# Patient Record
Sex: FEMALE | Race: WHITE | NOT HISPANIC OR LATINO | Employment: OTHER
[De-identification: names, ages, dates, MRNs, and addresses within clinical notes are randomized per-mention and may not be internally consistent; named-entity substitution may affect disease eponyms.]

---

## 2017-05-27 ENCOUNTER — HEALTH MAINTENANCE LETTER (OUTPATIENT)
Age: 57
End: 2017-05-27

## 2017-07-24 ENCOUNTER — TELEPHONE (OUTPATIENT)
Dept: FAMILY MEDICINE | Facility: CLINIC | Age: 57
End: 2017-07-24

## 2017-07-24 NOTE — TELEPHONE ENCOUNTER
Reason for call:  Patient reporting a symptom    Symptom or request: lower back, uncontrolled bladder     Duration (how long have symptoms been present):     Have you been treated for this before? No    Additional comments: when she stands up she pees all over the place, has really bad back pain     Phone Number patient can be reached at:  Home number on file [phone]  Best Time:  Anytime     Can we leave a detailed message on this number:      Call taken on 7/24/2017 at 3:52 PM by Beverly Arauz

## 2017-07-24 NOTE — TELEPHONE ENCOUNTER
Patient last seen 2014  No PCP listed.    Spoke with patient.  States she has had back pain since Friday 7/21. States it's right below her waist. Does not radiated into lower back or buttocks/legs.  Denies any injury.  Very sore in am better as she moves throughout the day.  Taking Tylenol.  Few times when she has stood up from lying or sitting she is incontinent of urine. No coughing/sneezing at the time.  States she can feel when her bladder is full.  Scheduled patient to see AS Wed. 7/26.  States she is ok to wait until then  Advised ice/heat which every seems to help. Tylenol prn  Lawanda Childers RN

## 2017-07-26 ENCOUNTER — OFFICE VISIT (OUTPATIENT)
Dept: FAMILY MEDICINE | Facility: CLINIC | Age: 57
End: 2017-07-26
Payer: COMMERCIAL

## 2017-07-26 ENCOUNTER — RADIANT APPOINTMENT (OUTPATIENT)
Dept: GENERAL RADIOLOGY | Facility: CLINIC | Age: 57
End: 2017-07-26
Attending: FAMILY MEDICINE
Payer: COMMERCIAL

## 2017-07-26 VITALS
BODY MASS INDEX: 30.66 KG/M2 | SYSTOLIC BLOOD PRESSURE: 124 MMHG | TEMPERATURE: 98.1 F | HEART RATE: 97 BPM | RESPIRATION RATE: 16 BRPM | HEIGHT: 64 IN | DIASTOLIC BLOOD PRESSURE: 74 MMHG | WEIGHT: 179.6 LBS | OXYGEN SATURATION: 99 %

## 2017-07-26 DIAGNOSIS — M25.551 HIP PAIN, RIGHT: ICD-10-CM

## 2017-07-26 DIAGNOSIS — S39.012S STRAIN OF LUMBAR REGION, SEQUELA: ICD-10-CM

## 2017-07-26 DIAGNOSIS — M25.551 HIP PAIN, RIGHT: Primary | ICD-10-CM

## 2017-07-26 PROCEDURE — 73523 X-RAY EXAM HIPS BI 5/> VIEWS: CPT

## 2017-07-26 PROCEDURE — 72100 X-RAY EXAM L-S SPINE 2/3 VWS: CPT

## 2017-07-26 PROCEDURE — 99214 OFFICE O/P EST MOD 30 MIN: CPT | Performed by: FAMILY MEDICINE

## 2017-07-26 RX ORDER — CYCLOBENZAPRINE HCL 5 MG
5 TABLET ORAL
Qty: 20 TABLET | Refills: 0 | Status: SHIPPED | OUTPATIENT
Start: 2017-07-26 | End: 2018-11-30

## 2017-07-26 NOTE — PROGRESS NOTES
"  SUBJECTIVE:                                                    Daysi Ashley is a 57 year old female who presents to clinic today for the following health issues:  Patient of DR Hester- being seen after 3 yrs in [facility] clinic      Patient states that she has been having back pain x5 days.   She reports it started on 7/21- and was very severe at the onset and she lost bladder control once due to pain  She states initially there was no radiation to the leg-  On Friday 7/21 &  States it's right below her waist &  Does not radiated into lower back or buttocks/legs.  She Denies any injury.  She states she is Very sore in am, and is  better as she moves throughout the day Taking Tylenol as needed - also helps  States she can feel when her bladder is full and denies numbness in saddle area , pelvic region, or lower extremtities.  She reports right hip is worse than left, and its  radiating as a dull ache down the right thigh.      She is Under care of Dr Roa for depression - last OV 4 weeks, medications changes made- effexor 450 mg once daily -increased dose and also takes klonopin- took some this morning and feels drowsy.  She states she lives by herself, and managed all activites of daily living   she denies suicidal thoughts or ideation.reports no side effects from medications. Would like to continue.    We reviewed history of cocaine abuse- she reports she is drug free and no other drugs or alcohol abuse     PROBLEMS TO ADD ON...    Problem list and histories reviewed & adjusted, as indicated.  Additional history: as documented    Labs reviewed in EPIC    Reviewed and updated as needed this visit by clinical staff  Tobacco  Meds       Reviewed and updated as needed this visit by Provider         ROS:  Constitutional, HEENT, cardiovascular, pulmonary, gi and gu systems are negative, except as otherwise noted.      OBJECTIVE:   /74  Pulse 97  Temp 98.1  F (36.7  C) (Oral)  Resp 16  Ht 5' 4\" (1.626 m)  Wt " 179 lb 9.6 oz (81.5 kg)  LMP 04/04/2012  SpO2 99%  BMI 30.83 kg/m2  Body mass index is 30.83 kg/(m^2).  GENERAL: healthy, alert and no distress  NECK: no adenopathy, no asymmetry, masses, or scars and thyroid normal to palpation  RESP: lungs clear to auscultation - no rales, rhonchi or wheezes  CV: regular rate and rhythm, normal S1 S2, no S3 or S4, no murmur, click or rub, no peripheral edema and peripheral pulses strong  ABDOMEN: soft, nontender, no hepatosplenomegaly, no masses and bowel sounds normal  MS: no gross musculoskeletal defects noted, no edema  SKIN: no suspicious lesions or rashes  NEURO: Normal strength and tone, mentation intact and speech normal      ASSESSMENT/PLAN:   (M25.551) Hip pain, right  (primary encounter diagnosis)  Plan: XR Pelvis and Hip Bilateral 2 Views, mild degenerative joint disease - no acute fractures- official report of the xray is penidng  Start PHYSICAL THERAPY she requested refill of cyclobenzaprine (FLEXERIL) 5 MG tablet      (S39.012S) Strain of lumbar region, sequela  Plan: XR Lumbar Spine 2/3 Views, mild degenerative joint disease  No acute fractures- we discussed if any activites of daily living that maybe contributing to back pain, or strain- she reports she is trying to be careful with cats litter box. I have advised to follow up in 4 weeks if not better- and follow up earlier if any numbness, or bowel bladder dysfunction including incontinence. She has an episode of loss of bladder control due to pain and has been continent of urine since then      History of depression  Under care of Dr Garcia. She states she is stable on current medications  PHQ-9 SCORE 9/20/2013 3/18/2014 7/30/2017   Total Score 19 15 -   Total Score - - 9         Potential medication side effects were discussed with the patient; let me know if any occur.      The patient indicates understanding of these issues and agrees with the plan.      Melonie Srivastava MD  [facility]

## 2017-07-26 NOTE — LETTER
July 28, 2017    Daysi Ashley  [address]  [place] [address]      Dear Daysi,    Mild degenerative changes , otherwise normal xray     Please keep us posted with questions or concerns .    Thank you very much for choosing [facility]. Please call my office if you have any questions or concerns.      Sincerely,        Melonie Srivastava MD

## 2017-07-26 NOTE — PATIENT INSTRUCTIONS
Start PHYSICAL THERAPY  PHYSICAL THERAPY [facility], [phone]    Take aleeve 1-2 tabs over the counter with meals up to twice daily as needed  For next 1 weeks  Muscle relaxants once daily or bedtime as needed     If any saddle seat numbness or bowel or bladder loss- and worsening pain- seek urgent care or emergency department care    If no improvement in pain follow up in 1 months

## 2017-07-26 NOTE — MR AVS SNAPSHOT
After Visit Summary   7/26/2017    Daysi Ashley    MRN: [id number]           Patient Information     Date Of Birth          1960        Visit Information        Provider Department      7/26/2017 1:00 PM Melonie Srivastava MD [facility]        Today's Diagnoses     Hip pain, right    -  1    Strain of lumbar region, sequela          Care Instructions    Start PHYSICAL THERAPY  PHYSICAL THERAPY [facility], [phone]    Take aleeve 1-2 tabs over the counter with meals up to twice daily as needed  For next 1 weeks  Muscle relaxants once daily or bedtime as needed     If any saddle seat numbness or bowel or bladder loss- and worsening pain- seek urgent care or emergency department care    If no improvement in pain follow up in 1 months            Follow-ups after your visit        Additional Services     [facility] PT, HAND, AND CHIROPRACTIC REFERRAL       **This order will print in the [facility] Scheduling Office**    Physical Therapy, Hand Therapy and Chiropractic Care are available through:    *[facility]  *[facility]  *[facility]    Call one number to schedule at any of the above locations: [phone].    Your provider has referred you to: Physical Therapy at [facility] or [facility]    Indication/Reason for Referral: Low Back Pain and right hip bursitis, OA  Onset of Illness: 1 weeks  Therapy Orders: Evaluate and Treat  Special Programs: None  Special Request: None    Benita Joy      Additional Comments for the Therapist or Chiropractor:     Please be aware that coverage of these services is subject to the terms and limitations of your health insurance plan.  Call member services at your health plan with any benefit or coverage questions.      Please bring the following to your appointment:    *Your personal calendar for scheduling future appointments  *Comfortable clothing                  Who to contact     If you have  "questions or need follow up information about today's clinic visit or your schedule please contact [facility] directly at [phone].  Normal or non-critical lab and imaging results will be communicated to you by MyChart, letter or phone within 4 business days after the clinic has received the results. If you do not hear from us within 7 days, please contact the clinic through [employer] or phone. If you have a critical or abnormal lab result, we will notify you by phone as soon as possible.  Submit refill requests through [employer] or call your pharmacy and they will forward the refill request to us. Please allow 3 business days for your refill to be completed.          Additional Information About Your Visit        MyChart Information     UserVoice lets you send messages to your doctor, view your test results, renew your prescriptions, schedule appointments and more. To sign up, go to [email or web address] . Click on \"Log in\" on the left side of the screen, which will take you to the Welcome page. Then click on \"Sign up Now\" on the right side of the page.     You will be asked to enter the access code listed below, as well as some personal information. Please follow the directions to create your username and password.     Your access code is: [email or web address]  Expires: 10/24/2017  2:13 PM     Your access code will  in 90 days. If you need help or a new code, please call your [place] clinic or [phone].        Care EveryWhere ID     This is your Care EveryWhere ID. This could be used by other organizations to access your [place] medical records  [id number]        Your Vitals Were     Pulse Temperature Respirations Height Last Period Pulse Oximetry    97 98.1  F (36.7  C) (Oral) 16 5' 4\" (1.626 m) 2012 99%    BMI (Body Mass Index)                   30.83 kg/m2            Blood Pressure from Last 3 Encounters:   17 124/74   14 120/70   10/14/13 110/72    Weight from Last 3 " Encounters:   07/26/17 179 lb 9.6 oz (81.5 kg)   03/18/14 162 lb (73.5 kg)   10/14/13 154 lb (69.9 kg)              We Performed the Following     [facility] PT, HAND, AND CHIROPRACTIC REFERRAL     XR Lumbar Spine 2/3 Views          Today's Medication Changes          These changes are accurate as of: 7/26/17  2:13 PM.  If you have any questions, ask your nurse or doctor.               Start taking these medicines.        Dose/Directions    cyclobenzaprine 5 MG tablet   Commonly known as:  FLEXERIL   Used for:  Hip pain, right, Strain of lumbar region, sequela   Started by:  Melonie Srivastava MD        Dose:  5 mg   Take 1 tablet (5 mg) by mouth nightly as needed for muscle spasms   Quantity:  20 tablet   Refills:  0            Where to get your medicines      These medications were sent to [facility] [address] [place] [address] AT [address] [address]    Hours:  24-hours Phone:  [phone]     cyclobenzaprine 5 MG tablet                Primary Care Provider Office Phone # Fax #    Melonie Srivastava [phone]       [place] [address] [facility] [address]        Equal Access to Services     SAGRARIO SANTORO AH: Haley gonsalves hadasho Soomaali, waaxda luqadaha, qaybta kaalmada adeegyada, waxay suzanin hayheladion shahzad simpson. So [facility] [phone].    ATENCIÓN: Si habla español, tiene a diaz disposición servicios gratuitos de asistencia lingüística. Llame al [phone].    We comply with applicable federal civil rights laws and [place] laws. We do not discriminate on the basis of race, color, national origin, age, disability sex, sexual orientation or gender identity.            Thank you!     Thank you for choosing [facility]  for your care. Our goal is always to provide you with excellent care. Hearing back from our patients is one way we can continue to improve our services. Please take a few minutes to  complete the written survey that you may receive in the mail after your visit with us. Thank you!             Your Updated Medication List - Protect others around you: Learn how to safely use, store and throw away your medicines at www.disposemymeds.org.          This list is accurate as of: 7/26/17  2:13 PM.  Always use your most recent med list.                   Brand Name Dispense Instructions for use Diagnosis    ABILIFY 15 MG tablet   Generic drug:  ARIPiprazole     30    1 TABLET DAILY        clonazePAM 2 MG tablet    klonoPIN     Take 6 mg by mouth At Bedtime        cyclobenzaprine 5 MG tablet    FLEXERIL    20 tablet    Take 1 tablet (5 mg) by mouth nightly as needed for muscle spasms    Hip pain, right, Strain of lumbar region, sequela       METAMUCIL PO      Take by mouth daily        SENNA S 8.6-50 MG per tablet   Generic drug:  senna-docusate      Take 1 tablet by mouth as needed.        venlafaxine 75 MG 24 hr capsule    EFFEXOR-XR    270 capsule    Take 225 mg by mouth daily Patient states that she takes 450mg daily

## 2017-07-26 NOTE — LETTER
July 28, 2017    Daysi Ashley  [address]  [place] [address]      Dear Daysi,    The xray shows- mild degenerative changes on hip, no acute fractures and that's good    Please keep us posted with questions or concerns .    Thank you very much for choosing [facility]. Please call my office if you have any questions or concerns.      Sincerely,        Melonie Srivastava MD  Results for orders placed or performed in visit on 07/26/17   XR Pelvis and Hip Bilateral 2 Views    Narrative    XR PELVIS AND HIP BILATERAL 2 VIEWS  7/26/2017 2:00 PM    HISTORY:  Pain in right hip    COMPARISON:  None.      Impression    IMPRESSION:  There may be very mild degenerative changes of the hips  bilaterally. Surgical coils project over the right pelvis. Otherwise  negative.     KEVIN NJ MD

## 2017-07-28 NOTE — PROGRESS NOTES
Send lab & letter-    The xray shows- mild degenerative changes on hip, no acute fractures and that's good    Please keep us posted with questions or concerns .      Best Regards,    Melonie Srivastava MD  [facility]  [phone]

## 2017-07-28 NOTE — PROGRESS NOTES
Send lab & letter    Mild degenerative changes , otherwise normal xray     Please keep us posted with questions or concerns .      Best Regards,    Melonie Srivastava MD  [facility]  [phone]

## 2017-07-31 ENCOUNTER — TELEPHONE (OUTPATIENT)
Dept: FAMILY MEDICINE | Facility: CLINIC | Age: 57
End: 2017-07-31

## 2017-07-31 DIAGNOSIS — M54.41 BILATERAL LOW BACK PAIN WITH RIGHT-SIDED SCIATICA, UNSPECIFIED CHRONICITY: Primary | ICD-10-CM

## 2017-07-31 ASSESSMENT — PATIENT HEALTH QUESTIONNAIRE - PHQ9: SUM OF ALL RESPONSES TO PHQ QUESTIONS 1-9: 9

## 2017-07-31 NOTE — TELEPHONE ENCOUNTER
Reason for call:  Patient reporting a symptom    Symptom or request: urinary incontinence, does not want to be seen or  Go to ER, too sick to leave home    Duration (how long have symptoms been present): 2 days    Have you been treated for this before? No    Additional comments: would just like to discuss her symptoms    Phone Number patient can be reached at:  [phone]    Best Time:  anytime    Can we leave a detailed message on this number:  YES    Call taken on 7/31/2017 at 11:10 AM by Geetha Maldonado

## 2017-07-31 NOTE — TELEPHONE ENCOUNTER
I have left a VM , if she call back today discuss following or try her one more time to discuss  Xray show mild degenerative joint disease- no fractures that's good    If lower back pain  is associated with saddle numbness(pelvic region) or urinary incontinence should be seen by md  Proceed with mri -lumbar spine- order is in epic- irrespective of above responses    Thank you  Melonie Srivastava ....................  7/31/2017   4:57 PM

## 2017-08-16 ENCOUNTER — HOSPITAL ENCOUNTER (OUTPATIENT)
Dept: MRI IMAGING | Facility: CLINIC | Age: 57
Discharge: HOME | End: 2017-08-16
Attending: FAMILY MEDICINE | Admitting: FAMILY MEDICINE
Payer: COMMERCIAL

## 2017-08-16 DIAGNOSIS — M54.41 BILATERAL LOW BACK PAIN WITH RIGHT-SIDED SCIATICA, UNSPECIFIED CHRONICITY: ICD-10-CM

## 2017-08-16 PROCEDURE — 72148 MRI LUMBAR SPINE W/O DYE: CPT

## 2017-08-17 NOTE — PROGRESS NOTES
Send lab & letter    There is a very small right foraminal disc protrusion at L3-L4 which does not result in any stenosis.and that's good   There are degenerative changes elsewhere with no other disc herniation or stenosis seen. PHYSICAL THERAPY if helpful- then it should be continued    Please keep us posted with questions or concerns .      Best Regards,    Melonie Srivastava MD  [facility]  [phone]

## 2018-11-30 ENCOUNTER — HOSPITAL ENCOUNTER (INPATIENT)
Facility: CLINIC | Age: 58
LOS: 7 days | Discharge: HOME | DRG: 885 | End: 2018-12-07
Attending: PSYCHIATRY & NEUROLOGY | Admitting: PSYCHIATRY & NEUROLOGY
Payer: COMMERCIAL

## 2018-11-30 DIAGNOSIS — F33.1 MAJOR DEPRESSIVE DISORDER, RECURRENT EPISODE, MODERATE (H): ICD-10-CM

## 2018-11-30 DIAGNOSIS — F43.10 PTSD (POST-TRAUMATIC STRESS DISORDER): ICD-10-CM

## 2018-11-30 DIAGNOSIS — F32.1 MAJOR DEPRESSIVE DISORDER, SINGLE EPISODE, MODERATE (H): ICD-10-CM

## 2018-11-30 DIAGNOSIS — F33.2 SEVERE RECURRENT MAJOR DEPRESSION WITHOUT PSYCHOTIC FEATURES (H): ICD-10-CM

## 2018-11-30 PROBLEM — F32.A DEPRESSION WITH SUICIDAL IDEATION: Status: ACTIVE | Noted: 2018-11-30

## 2018-11-30 PROBLEM — R45.851 DEPRESSION WITH SUICIDAL IDEATION: Status: ACTIVE | Noted: 2018-11-30

## 2018-11-30 LAB
ALBUMIN SERPL-MCNC: 3.6 G/DL (ref 3.4–5)
ALP SERPL-CCNC: 76 U/L (ref 40–150)
ALT SERPL W P-5'-P-CCNC: 17 U/L (ref 0–50)
AMPHETAMINES UR QL SCN: NEGATIVE
ANION GAP SERPL CALCULATED.3IONS-SCNC: 4 MMOL/L (ref 3–14)
AST SERPL W P-5'-P-CCNC: 14 U/L (ref 0–45)
BARBITURATES UR QL: NEGATIVE
BASOPHILS # BLD AUTO: 0 10E9/L (ref 0–0.2)
BASOPHILS NFR BLD AUTO: 0.5 %
BENZODIAZ UR QL: NEGATIVE
BILIRUB SERPL-MCNC: 0.3 MG/DL (ref 0.2–1.3)
BUN SERPL-MCNC: 22 MG/DL (ref 7–30)
CALCIUM SERPL-MCNC: 8.8 MG/DL (ref 8.5–10.1)
CANNABINOIDS UR QL SCN: POSITIVE
CHLORIDE SERPL-SCNC: 106 MMOL/L (ref 94–109)
CO2 SERPL-SCNC: 29 MMOL/L (ref 20–32)
COCAINE UR QL: POSITIVE
CREAT SERPL-MCNC: 1.06 MG/DL (ref 0.52–1.04)
DIFFERENTIAL METHOD BLD: ABNORMAL
EOSINOPHIL # BLD AUTO: 0 10E9/L (ref 0–0.7)
EOSINOPHIL NFR BLD AUTO: 0.5 %
ERYTHROCYTE [DISTWIDTH] IN BLOOD BY AUTOMATED COUNT: 13.4 % (ref 10–15)
ETHANOL UR QL SCN: NEGATIVE
GFR SERPL CREATININE-BSD FRML MDRD: 53 ML/MIN/1.7M2
GLUCOSE SERPL-MCNC: 89 MG/DL (ref 70–99)
HCT VFR BLD AUTO: 37.5 % (ref 35–47)
HGB BLD-MCNC: 12.2 G/DL (ref 11.7–15.7)
IMM GRANULOCYTES # BLD: 0 10E9/L (ref 0–0.4)
IMM GRANULOCYTES NFR BLD: 0 %
LYMPHOCYTES # BLD AUTO: 0.9 10E9/L (ref 0.8–5.3)
LYMPHOCYTES NFR BLD AUTO: 46.3 %
MCH RBC QN AUTO: 29.5 PG (ref 26.5–33)
MCHC RBC AUTO-ENTMCNC: 32.5 G/DL (ref 31.5–36.5)
MCV RBC AUTO: 91 FL (ref 78–100)
MONOCYTES # BLD AUTO: 0.2 10E9/L (ref 0–1.3)
MONOCYTES NFR BLD AUTO: 10.4 %
NEUTROPHILS # BLD AUTO: 0.9 10E9/L (ref 1.6–8.3)
NEUTROPHILS NFR BLD AUTO: 42.3 %
NRBC # BLD AUTO: 0 10*3/UL
NRBC BLD AUTO-RTO: 0 /100
OPIATES UR QL SCN: NEGATIVE
PLATELET # BLD AUTO: 206 10E9/L (ref 150–450)
POTASSIUM SERPL-SCNC: 4.1 MMOL/L (ref 3.4–5.3)
PROT SERPL-MCNC: 7 G/DL (ref 6.8–8.8)
RBC # BLD AUTO: 4.13 10E12/L (ref 3.8–5.2)
SODIUM SERPL-SCNC: 139 MMOL/L (ref 133–144)
TSH SERPL DL<=0.005 MIU/L-ACNC: 0.7 MU/L (ref 0.4–4)
WBC # BLD AUTO: 2 10E9/L (ref 4–11)

## 2018-11-30 PROCEDURE — 99285 EMERGENCY DEPT VISIT HI MDM: CPT | Mod: 25 | Performed by: PSYCHIATRY & NEUROLOGY

## 2018-11-30 PROCEDURE — 80307 DRUG TEST PRSMV CHEM ANLYZR: CPT | Performed by: PSYCHIATRY & NEUROLOGY

## 2018-11-30 PROCEDURE — 90791 PSYCH DIAGNOSTIC EVALUATION: CPT

## 2018-11-30 PROCEDURE — 80053 COMPREHEN METABOLIC PANEL: CPT | Performed by: PSYCHIATRY & NEUROLOGY

## 2018-11-30 PROCEDURE — 85025 COMPLETE CBC W/AUTO DIFF WBC: CPT | Performed by: PSYCHIATRY & NEUROLOGY

## 2018-11-30 PROCEDURE — 84443 ASSAY THYROID STIM HORMONE: CPT | Performed by: PSYCHIATRY & NEUROLOGY

## 2018-11-30 PROCEDURE — 12400007 ZZH R&B MH INTERMEDIATE UMMC

## 2018-11-30 PROCEDURE — 80320 DRUG SCREEN QUANTALCOHOLS: CPT | Performed by: PSYCHIATRY & NEUROLOGY

## 2018-11-30 PROCEDURE — 99285 EMERGENCY DEPT VISIT HI MDM: CPT | Mod: Z6 | Performed by: PSYCHIATRY & NEUROLOGY

## 2018-11-30 RX ORDER — HYDROXYZINE HYDROCHLORIDE 25 MG/1
25 TABLET, FILM COATED ORAL EVERY 4 HOURS PRN
Status: DISCONTINUED | OUTPATIENT
Start: 2018-11-30 | End: 2018-12-05

## 2018-11-30 RX ORDER — CLONAZEPAM 2 MG/1
4-6 TABLET ORAL AT BEDTIME
Status: DISCONTINUED | OUTPATIENT
Start: 2018-11-30 | End: 2018-12-01

## 2018-11-30 RX ORDER — ARIPIPRAZOLE 20 MG/1
20 TABLET ORAL AT BEDTIME
Status: ON HOLD | COMMUNITY
End: 2019-05-02

## 2018-11-30 RX ORDER — ARIPIPRAZOLE 10 MG/1
20 TABLET ORAL AT BEDTIME
Status: DISCONTINUED | OUTPATIENT
Start: 2018-11-30 | End: 2018-12-07 | Stop reason: HOSPADM

## 2018-11-30 RX ORDER — VENLAFAXINE HYDROCHLORIDE 150 MG/1
300 CAPSULE, EXTENDED RELEASE ORAL AT BEDTIME
Status: ON HOLD | COMMUNITY
End: 2019-05-02

## 2018-11-30 RX ORDER — OLANZAPINE 10 MG/2ML
10 INJECTION, POWDER, FOR SOLUTION INTRAMUSCULAR
Status: DISCONTINUED | OUTPATIENT
Start: 2018-11-30 | End: 2018-12-07 | Stop reason: HOSPADM

## 2018-11-30 RX ORDER — OLANZAPINE 10 MG/1
10 TABLET ORAL
Status: DISCONTINUED | OUTPATIENT
Start: 2018-11-30 | End: 2018-12-07 | Stop reason: HOSPADM

## 2018-11-30 RX ORDER — ARIPIPRAZOLE 5 MG/1
5 TABLET ORAL AT BEDTIME
Status: DISCONTINUED | OUTPATIENT
Start: 2018-11-30 | End: 2018-12-01

## 2018-11-30 RX ORDER — ARIPIPRAZOLE 5 MG/1
5 TABLET ORAL AT BEDTIME
Status: ON HOLD | COMMUNITY
End: 2018-12-07

## 2018-11-30 RX ORDER — VENLAFAXINE HYDROCHLORIDE 150 MG/1
300 TABLET, EXTENDED RELEASE ORAL AT BEDTIME
Status: DISCONTINUED | OUTPATIENT
Start: 2018-11-30 | End: 2018-12-07 | Stop reason: HOSPADM

## 2018-11-30 ASSESSMENT — ENCOUNTER SYMPTOMS
MUSCULOSKELETAL NEGATIVE: 1
SLEEP DISTURBANCE: 1
HEMATOLOGIC/LYMPHATIC NEGATIVE: 1
CARDIOVASCULAR NEGATIVE: 1
APPETITE CHANGE: 1
EYES NEGATIVE: 1
HALLUCINATIONS: 0
ACTIVITY CHANGE: 1
HYPERACTIVE: 0
NEUROLOGICAL NEGATIVE: 1
DECREASED CONCENTRATION: 1
GASTROINTESTINAL NEGATIVE: 1
RESPIRATORY NEGATIVE: 1
ENDOCRINE NEGATIVE: 1
NERVOUS/ANXIOUS: 1

## 2018-11-30 ASSESSMENT — ACTIVITIES OF DAILY LIVING (ADL)
ORAL_HYGIENE: INDEPENDENT
GROOMING: INDEPENDENT
LAUNDRY: WITH SUPERVISION
DRESS: INDEPENDENT

## 2018-11-30 NOTE — IP AVS SNAPSHOT
MRN:[id number]                      After Visit Summary   11/30/2018    Daysi Ashley    MRN: [id number]           Thank you!     Thank you for choosing [facility] for your care. Our goal is always to provide you with excellent care.        Patient Information     Date Of Birth          1960        Designated Caregiver       Most Recent Value    Caregiver    Will someone help with your care after discharge? no      About your hospital stay     You were admitted on:  November 30, 2018 You last received care in the:  [facility]    You were discharged on:  December 7, 2018        Reason for your hospital stay       Worsening depression                  Who to Call     For medical emergencies, please call 911.  For non-urgent questions about your medical care, please call your primary care provider or clinic, [phone]          Attending Provider     Provider Specialty    Gopi Pierre MD Psychiatry    Dongre, Eddie Marques MD Psychiatry    Desrosier, Uriel Alvarado MD Psychiatry       Primary Care Provider Office Phone # Fax #    Melonie Srivastava [phone]      After Care Instructions     Activity       Your activity upon discharge: activity as tolerated            Diet       Follow this diet upon discharge: Orders Placed This Encounter      Regular Diet Adult            Discharge Instructions       1. Please do not harm yourself or others.  2. Please continue to take your medications.  3. Please follow up with your outpatient care team.  4. Please do not take drugs or alcohol as this will worsen your condition.  5. Please do not take more than the prescribed doses of medications as this may make them dangerous.   6. Please follow your safety plan of action.  7. Please call crisis if having trouble.  8. If having thoughts of harming self or others please come in to the emergency department as soon as possible.                  Your next 10 appointments already scheduled      Dec 10, 2018  7:15 AM CST   Electroconvulsive Therapy with James Shannon MD   [facility] ([facility])    [address] [place] [id number] [phone]              Additional Services     BEHAVIORAL HEALTH OUTPATIENT PROGRAM       Your provider has referred you to the Adult Mental Health Outpatient Program.    Order Diagnostic Assessment: Assess and Treat. Program placement will be determined by the     Provider recommendation to consider placement for (select one or more programs): 55+ Day Treatment: Specializes in multiple tracks grouped by Diagnosis, Age, Onset and Current Level of Functioning    Is it clinically necessary for the Diagnostic Assessment to be completed prior to the patient's discharge from the hospital: No      If you have questions about this referral,  please contact Behavioral Access at: [phone]    Programs are all located at:  [facility] [place] [address], [address] [address]    Please be aware that coverage of these services is subject to the terms and limitations of your health insurance plan.  Call member services at your health plan with any benefit or coverage questions.      Please bring the following with you to your appointment:      (1) List of current medications   (2) This referral request   (3) Any documents/labs given to you for this referral                  Future tests that were ordered for you     Electroconvulsive therapy                 Further instructions from your care team        Behavioral Discharge Planning and Instructions      Summary:  You were admitted on 2018  due to increasing depression..  You were treated by Dr Uriel Lee and discharged on 18 from [facility] to your home.      Principal Diagnosis: Major Depressive Disorder, Recurrent, Severe      Health Care Follow-up  Appointments:   Dr. Rafal Roa - Standing appointment on Fridays at 1pm  [facility]  [address] [place], [place]  [phone]  FAX: [phone]    We discussed the 55+ Outpatient Day Treatment Program here at [facility]. Dr. Lee entered an Order and the Program should be calling you to set up an Intake.  If you don't hear from them by Wednesday, please call them at [phone] to inquire.      Attend all scheduled appointments with your outpatient providers. Call at least 24 hours in advance if you need to reschedule an appointment to ensure continued access to your outpatient providers.   Major Treatments, Procedures and Findings:  You were provided with: a psychiatric assessment, assessed for medical stability, medication evaluation and/or management, group therapy and milieu management    Symptoms to Report: losing more sleep, mood getting worse or thoughts of suicide    Early warning signs can include: increased depression or anxiety sleep disturbances    Safety and Wellness:  Take all medicines as directed.  Make no changes unless your doctor suggests them.      Follow treatment recommendations.  Refrain from alcohol and non-prescribed drugs.  If there is a concern for safety, call 911.    Resources:   Crisis Intervention: 667.461.1846 or 331-687-2100 (TTY: 212.962.4033).  Call anytime for help.  National [employer] on Mental Illness (www.mn.felix.org): 455.551.2539 or 508-081-1208.    The treatment team has appreciated the opportunity to work with you.     If you have any questions or concerns our unit number is [phone].       Pending Results     Date and Time Order Name Status Description    12/3/2018 0030 Blood Morphology Pathologist Review In process             Statement of Approval     Ordered          12/07/18 1111  I have reviewed and agree with all the recommendations and orders detailed in this document.  EFFECTIVE NOW     Approved and electronically signed by:  Uriel Lee  "MD Christiano             Admission Information     Date & Time Provider Department Dept. Phone    2018 Uriel Lee MD [address] [phone]      Your Vitals Were     Blood Pressure Pulse Temperature Respirations Weight Last Period    134/73 102 97.7  F (36.5  C) (Oral) 18 80.8 kg (178 lb 1.6 oz) 2012    Pulse Oximetry BMI (Body Mass Index)                97% 30.57 kg/m2          [employer] App Information     Bantr lets you send messages to your doctor, view your test results, renew your prescriptions, schedule appointments and more. To sign up, go to [email or web address] . Click on \"Log in\" on the left side of the screen, which will take you to the Welcome page. Then click on \"Sign up Now\" on the right side of the page.     You will be asked to enter the access code listed below, as well as some personal information. Please follow the directions to create your username and password.     Your access code is: [email or web address]  Expires: 3/3/2019  9:34 AM     Your access code will  in 90 days. If you need help or a new code, please call your [facility] clinic or [phone].        Care EveryWhere ID     This is your Care EveryWhere ID. This could be used by other organizations to access your [facility] medical records  [id number]        Equal Access to Services     [facility] AH: [place], waaxda luchandraadaha, qaybta kaalmajohnson marks . So [facility] [phone].    ATENCIÓN: Si habla español, tiene a diaz disposición servicios gratuitos de asistencia lingüística. Llame al [phone].    We comply with applicable federal civil rights laws and [place] laws. We do not discriminate on the basis of race, color, national origin, age, disability, sex, sexual orientation, or gender identity.               Review of your medicines      START taking        Dose / Directions    melatonin 3 MG tablet        Dose:  6 mg   Take 2 tablets (6 mg) by " mouth At Bedtime   Refills:  0         CONTINUE these medicines which may have CHANGED, or have new prescriptions. If we are uncertain of the size of tablets/capsules you have at home, strength may be listed as something that might have changed.        Dose / Directions    ARIPiprazole 20 MG tablet   Commonly known as:  ABILIFY   This may have changed:  Another medication with the same name was removed. Continue taking this medication, and follow the directions you see here.        Dose:  20 mg   Take 20 mg by mouth At Bedtime (take in addition to aripiprazole 5 mg for total daily dose of 25 mg)   Refills:  0         CONTINUE these medicines which have NOT CHANGED        Dose / Directions    clonazePAM 2 MG tablet   Commonly known as:  klonoPIN        Dose:  4-6 mg   Take 4-6 mg by mouth At Bedtime   Refills:  0       venlafaxine 150 MG 24 hr capsule   Commonly known as:  EFFEXOR-XR        Dose:  300 mg   Take 300 mg by mouth At Bedtime   Refills:  0                Protect others around you: Learn how to safely use, store and throw away your medicines at www.disposemymeds.org.             Medication List: This is a list of all your medications and when to take them. Check marks below indicate your daily home schedule. Keep this list as a reference.      Medications           Morning Afternoon Evening Bedtime As Needed    ARIPiprazole 20 MG tablet   Commonly known as:  ABILIFY   Take 20 mg by mouth At Bedtime (take in addition to aripiprazole 5 mg for total daily dose of 25 mg)   Last time this was given:  20 mg on 12/6/2018  7:04 PM                                   clonazePAM 2 MG tablet   Commonly known as:  klonoPIN   Take 4-6 mg by mouth At Bedtime   Last time this was given:  4 mg on 12/6/2018  5:48 PM                                   melatonin 3 MG tablet   Take 2 tablets (6 mg) by mouth At Bedtime   Last time this was given:  6 mg on 12/6/2018  7:05 PM                                   venlafaxine 150 MG 24  hr capsule   Commonly known as:  EFFEXOR-XR   Take 300 mg by mouth At Bedtime

## 2018-11-30 NOTE — IP AVS SNAPSHOT
[address]    [facility] [address]    Phone:  [phone]                                       After Visit Summary   11/30/2018    Daysi Ashley    MRN: [id number]           After Visit Summary Signature Page     I have received my discharge instructions, and my questions have been answered. I have discussed any challenges I see with this plan with the nurse or doctor.    ..........................................................................................................................................  Patient/Patient Representative Signature      ..........................................................................................................................................  Patient Representative Print Name and Relationship to Patient    ..................................................               ................................................  Date                                   Time    ..........................................................................................................................................  Reviewed by Signature/Title    ...................................................              ..............................................  Date                                               Time          22EPIC Rev 08/18

## 2018-12-01 PROCEDURE — 99207 ZZC CDG-MDM COMPONENT: MEETS LOW - DOWN CODED: CPT | Performed by: PSYCHIATRY & NEUROLOGY

## 2018-12-01 PROCEDURE — 25000132 ZZH RX MED GY IP 250 OP 250 PS 637: Performed by: PSYCHIATRY & NEUROLOGY

## 2018-12-01 PROCEDURE — 93005 ELECTROCARDIOGRAM TRACING: CPT

## 2018-12-01 PROCEDURE — 12400007 ZZH R&B MH INTERMEDIATE UMMC

## 2018-12-01 PROCEDURE — 93010 ELECTROCARDIOGRAM REPORT: CPT | Performed by: INTERNAL MEDICINE

## 2018-12-01 PROCEDURE — 99222 1ST HOSP IP/OBS MODERATE 55: CPT | Mod: AI | Performed by: PSYCHIATRY & NEUROLOGY

## 2018-12-01 RX ORDER — CLONAZEPAM 2 MG/1
4 TABLET ORAL AT BEDTIME
Status: DISCONTINUED | OUTPATIENT
Start: 2018-12-01 | End: 2018-12-07 | Stop reason: HOSPADM

## 2018-12-01 RX ADMIN — VENLAFAXINE HYDROCHLORIDE 300 MG: 150 TABLET, EXTENDED RELEASE ORAL at 00:03

## 2018-12-01 RX ADMIN — VENLAFAXINE HYDROCHLORIDE 300 MG: 150 TABLET, EXTENDED RELEASE ORAL at 20:16

## 2018-12-01 RX ADMIN — ARIPIPRAZOLE 20 MG: 10 TABLET ORAL at 00:03

## 2018-12-01 RX ADMIN — ARIPIPRAZOLE 20 MG: 10 TABLET ORAL at 20:16

## 2018-12-01 RX ADMIN — HYDROXYZINE HYDROCHLORIDE 25 MG: 25 TABLET ORAL at 16:58

## 2018-12-01 RX ADMIN — CLONAZEPAM 4 MG: 2 TABLET ORAL at 20:16

## 2018-12-01 RX ADMIN — CLONAZEPAM 4 MG: 2 TABLET ORAL at 00:03

## 2018-12-01 ASSESSMENT — ACTIVITIES OF DAILY LIVING (ADL)
ORAL_HYGIENE: INDEPENDENT
GROOMING: HANDWASHING;BATH;INDEPENDENT
ORAL_HYGIENE: INDEPENDENT
DRESS: INDEPENDENT
DRESS: SCRUBS (BEHAVIORAL HEALTH)
LAUNDRY: UNABLE TO COMPLETE
LAUNDRY: WITH SUPERVISION
GROOMING: INDEPENDENT

## 2018-12-01 NOTE — PROGRESS NOTES
12/01/18 0314   Patient Belongings   Did you bring any home meds/supplements to the hospital?  Yes   Disposition of meds  Sent to security/pharmacy per site process   Patient Belongings clothing;money (see comment);purse;suitcase   Disposition of Belongings Locker;Sent to security per site process;Kept with patient   Belongings Search Yes   Clothing Search (PM Staff)   Second Staff (PM Staff)     In Room With Patient:  Night Gown    In Locker:  Jacket, pair of socks, bra, pair of pants, turtle neck shirt, black sweater    Suitcase containing pink pajama pants, pink pajama shirt, pink turtle neck shirt, pink belt, tan turtle neck shirt, salmon long sleeve shirt, blue striped button up shirt, green long sleeve shirt, 1 night gown, blue pair jeans, green pair pants, red long sleeve shirt, 1 bra    Purse containg 1 GatoradeBottle,  $2.50 in assorted change, receipts, 1 brush, pair of red gloves, pair of sunglasses, pair of glasses, make-up bag with assorted makeup products, hair binder    Wallet containing drivers license, [employer] Rew[employer] Card, library card, insurance card, receipt    To Security:  [employer] Visa Card [id number]  [employer] Visa Card [id number]  [employer] Card [id number]  [employer] Gift Card [phone]  $23 Cash    A               Admission:  I am responsible for any personal items that are not sent to the safe or pharmacy.  [facility] is not responsible for loss, theft or damage of any property in my possession.    Signature:  _________________________________ Date: _______  Time: _____                                              Staff Signature:  ____________________________ Date: ________  Time: _____      2nd Staff person, if patient is unable/unwilling to sign:    Signature: ________________________________ Date: ________  Time: _____     Discharge:  [facility] has returned all of my personal belongings:    Signature: _________________________________ Date: ________  Time: _____                                          Staff  Signature:  ____________________________ Date: ________  Time: _____

## 2018-12-01 NOTE — ED NOTES
"ED to Behavioral Floor Handoff<BR><BR>SITUATION<BR>Daysi Ashley is a 58 year old female who speaks English and lives in a home unknown The patient arrived in the ED by private car from home with a complaint of Depression (\"I'm feeling depressed an my doctor told me to come in.\" Denies SI.)<BR>.In the ED, pt was diagnosed with Final diagnoses:<BR>PTSD (post-traumatic stress disorder)<BR>Major depressive disorder, recurrent episode, moder   ate (H) <BR><BR>Initial vitals were: BP: 116/55<BR>Pulse: 65<BR>Temp: 98.5  F (36.9  C)<BR>Resp: 16<BR>Weight: 81.1 kg (178 lb 12.8 oz)<BR>SpO2: 96 % <BR>--------<BR>Is the patient diabetic? No <BR>If yes, last blood glucose? --     If yes, was this treated in the ED? --<BR>--------<BR>Is the patient inebriated (ETOH) No or Impaired on other substances? No<BR>MSSA done? No<BR>Last MSSA score: --    Were withdrawal symptoms treated? No<B   R>Does the patient have a seizure history? No. If yes, date of most recent seizure--<BR>--------<BR>Is the patient patient experiencing suicidal ideation? reports suicidal ideation with out intention or a suicidal plan  <BR>Homicidal ideation? denies current or recent homicidal ideation or behaviors.  <BR>Self-injurious behavior/urges? denies current or recent self injurious behavior or ideation.<BR>------<BR>Was pt aggressive in the ED    No<BR>Was a code called No<BR>Is the pt now cooperative? Yes<BR>-------<BR>Meds given in ED: Medications - No data to display <BR>Family present during ED course? No<BR>Family currently present? No<BR><BR>BACKGROUND<BR>Does the patient have a cognitive impairment or developmental disability? No<BR>Allergies:  -- Phenothiazines <BR>  --  Compazine. <BR>Social demographics are Social History<BR>  Marital status:             Spous   e name:                   <BR>  Years of education:                 Number of children:           <BR><BR>Social History Main Topics<BR>  Smoking status: Never Smoker            "                                               <BR><BR>  Smokeless status: Never Used                    <BR>  Alcohol use: No          <BR>  Drug use: No          <BR>  Sexual activity: Yes               Partners with: Male<BR><BR> <BR><BR>ASSESSMENT<BR>Labs res   ults Labs Ordered and Resulted from Time of ED Arrival Up to the Time of Departure from the ED<BR>DRUG ABUSE SCREEN 6 CHEM DEP URINE ([facility]) - Abnormal; Notable for the following: <BR>   Cannabinoids Qual Urine       Positive (*)          <BR>   Cocaine Qual Urine            Positive (*)          <BR>All other components within normal limits<BR>CBC WITH PLATELETS DIFFERENTIAL - Abnormal; Notable for the following: <BR>   WBC                              2.0 (*)           <BR>   Absolute Neutrophil           0.9 (*)           <BR>All other components within normal limits<BR>COMPREHENSIVE METABOLIC PANEL<BR>TSH WITH FREE T4 REFLEX <BR>Imaging Studies: No results found for this or any previous visit (from the past 24 hour(s)). <BR>Most recent vital signs /55  Pulse 65  Temp 98.5  F (36.9  C)  Resp 16  Wt 81.1 kg (178 lb 12.8 oz)  LMP 04/04/2012  SpO2 96%  Breast   feeding? No  BMI 30.69 kg/m2 <BR>Abnormal labs/tests/findings requiring intervention:--- <BR>Pain control: pt had none<BR>Nausea control: pt had none<BR><BR>RECOMMENDATION<BR>Are any infection precautions needed (MRSA, VRE, etc.)? No If yes, what infection? --<BR>---<BR>Does the patient have mobility issues? independently. If yes, what device does the pt use? ---<BR>---<BR>Is patient on 72 hour hold or commitment? No If on 72 hour hold   , have hold and rights been given to patient? No<BR>Are admitting orders written if after 10 p.m. ?No<BR>Tasks needing to be completed:--- <BR><BR>Aidee Fournier <BR>ascom-- [id number] <BR>[phone] [facility] ED <BR>[phone] [facility] ED
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
## 2018-12-01 NOTE — PHARMACY-ADMISSION MEDICATION HISTORY
"Admission medication history for the November 30, 2018 admission is complete.     Interview sources:  Patient, [employer] prescription bottles brought in by patient    Reliability of source: good - patient knew the names and doses of her medications; verified all with [employer] prescription bottles    Medication compliance: Moderate - pt has been taking medications daily, however she is prescribed to take aripiprazole 25 mg daily but she has only been taking 20 mg.     Preferred Outpatient Pharmacy: [facility])     Changes made to PTA medication list (reason)  Added: aripiprazole 5 mg tablet (per pharmacy)  Deleted: none  Changed:   - aripiprazole 15 mg daily --> 25 mg at bedtime (using 20 mg tablet + 5 mg tablet) per pt and pharmacy  - clonazepam 2 mg tablets, take 6 mg at bedtime --> 2 mg tablets, take 4-6 mg at bedtime (per pharmacy)   - venlafaxine XR 75 mg capsule, take 225 mg daily --> venlafaxine  mg capsules; take 300 mg at bedtime (per pt and pharmacy)     Additional medication history information:   - aripiprazole - pt confirmed she is prescribed to take 25 mg at bedtime, however she has only been taking 20 mg because she noticed a \"twitch by her mouth\" when she was taking 25 mg. She was concerned about developing TD.   - clonazepam 2 mg tablets dispensed on 10/30/18 for quantity #90 tablets (30 day supply)     Prior to Admission Medication List:  Prior to Admission medications    Medication Sig Last Dose Taking? Auth Provider   ARIPiprazole (ABILIFY) 20 MG tablet Take 20 mg by mouth At Bedtime (take in addition to aripiprazole 5 mg for total daily dose of 25 mg) 11/29/2018 at PM Yes Unknown, Entered By History   ARIPiprazole (ABILIFY) 5 MG tablet Take 5 mg by mouth At Bedtime (take in addition to aripiprazole 20 mg for total daily dose of 25 mg) not taking, pt has only been taking 20 mg at bedtime for about 1 month Yes Unknown, Entered By History   clonazePAM (KLONOPIN) 2 MG tablet " Take 4-6 mg by mouth At Bedtime  11/29/2018 at PM Yes Reported, Patient   venlafaxine (EFFEXOR-XR) 150 MG 24 hr capsule Take 300 mg by mouth At Bedtime 11/29/2018 at PM Yes Unknown, Entered By History       Time spent: 20 minutes    Medication history completed by:   Farrah Scott PharmD  [facility]  Available daily from 1-9 PM: phone [phone], pager [phone]

## 2018-12-01 NOTE — PLAN OF CARE
"Admission:     Admitted voluntarily to [facility] for increasing depression. Pt wants to begin ECT treatment. Per notes, will be transferred to Dr Jordy flores on [address] when a bed is available. Pt states last inpt admission was 12-14 years ago. Does report 3 past suicide attempts with the last being \"a long long time ago\". States all 3 attempts were impulsive acts. Cites financial stressors from a past relationship. 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 The person is now . This took place in , but pt is still struggling. Reporting increased sleep, approximately 14 hours a day, poor appetite, occasional, passive suicidal thoughts, poor self care, hopelessness, increased withdrawal and isolation.     Presents with blunted affect. Provided meal, juice. Appears somnolent. Speech is clear and coherent. Slightly delayed at first but seemed to improve through interview. Pt denies current suicidal ideation or thoughts to harm self and/or others. Oriented to room and unit. Encouraged to voice needs, questions, and concerns. Pt verbalizes understanding.   "

## 2018-12-02 PROBLEM — F33.2 SEVERE RECURRENT MAJOR DEPRESSION WITHOUT PSYCHOTIC FEATURES (H): Status: ACTIVE | Noted: 2018-12-02

## 2018-12-02 PROCEDURE — 99221 1ST HOSP IP/OBS SF/LOW 40: CPT | Performed by: PHYSICIAN ASSISTANT

## 2018-12-02 PROCEDURE — 25000132 ZZH RX MED GY IP 250 OP 250 PS 637: Performed by: PSYCHIATRY & NEUROLOGY

## 2018-12-02 PROCEDURE — 99207 ZZC CONSULT E&M CHANGED TO INITIAL LEVEL: CPT | Performed by: PHYSICIAN ASSISTANT

## 2018-12-02 PROCEDURE — 25000128 H RX IP 250 OP 636: Performed by: PSYCHIATRY & NEUROLOGY

## 2018-12-02 PROCEDURE — 90682 RIV4 VACC RECOMBINANT DNA IM: CPT | Performed by: PSYCHIATRY & NEUROLOGY

## 2018-12-02 PROCEDURE — 12400007 ZZH R&B MH INTERMEDIATE UMMC

## 2018-12-02 RX ORDER — LANOLIN ALCOHOL/MO/W.PET/CERES
6 CREAM (GRAM) TOPICAL AT BEDTIME
Status: DISCONTINUED | OUTPATIENT
Start: 2018-12-02 | End: 2018-12-07 | Stop reason: HOSPADM

## 2018-12-02 RX ADMIN — ARIPIPRAZOLE 20 MG: 10 TABLET ORAL at 20:56

## 2018-12-02 RX ADMIN — INFLUENZA A VIRUS A/MICHIGAN/45/2015 (H1N1) RECOMBINANT HEMAGGLUTININ ANTIGEN, INFLUENZA A VIRUS A/SINGAPORE/INFIMH-16-0019/2016 (H3N2) RECOMBINANT HEMAGGLUTININ ANTIGEN, INFLUENZA B VIRUS B/MARYLAND/15/2016 RECOMBINANT HEMAGGLUTININ ANTIGEN, AND INFLUENZA B VIRUS B/PHUKET/3073/2013 RECOMBINANT HEMAGGLUTININ ANTIGEN 0.5 ML: 45; 45; 45; 45 INJECTION INTRAMUSCULAR at 11:49

## 2018-12-02 RX ADMIN — MELATONIN 6 MG: 3 TAB ORAL at 20:57

## 2018-12-02 RX ADMIN — CLONAZEPAM 4 MG: 2 TABLET ORAL at 17:17

## 2018-12-02 RX ADMIN — VENLAFAXINE HYDROCHLORIDE 300 MG: 150 TABLET, EXTENDED RELEASE ORAL at 20:57

## 2018-12-02 ASSESSMENT — ACTIVITIES OF DAILY LIVING (ADL)
ORAL_HYGIENE: INDEPENDENT
HYGIENE/GROOMING: INDEPENDENT
LAUNDRY: WITH SUPERVISION
DRESS: INDEPENDENT

## 2018-12-02 NOTE — PROGRESS NOTES
"   12/01/18 2100   Behavioral Health   Hallucinations denies / not responding to hallucinations   Thinking poor concentration   Orientation person: oriented;place: oriented   Memory baseline memory   Insight poor   Judgement impaired   Eye Contact at examiner   Affect full range affect   Mood mood is calm   Physical Appearance/Attire neat   Hygiene well groomed   Suicidality other (see comments)  (denies )   1. Wish to be Dead No   2. Non-Specific Active Suicidal Thoughts  No   Self Injury other (see comment)  (denies )   Activity other (see comment)  (visible in the milieu and socialized with others )   Speech clear;coherent   Activities of Daily Living   Hygiene/Grooming independent   Oral Hygiene independent   Dress independent   Laundry with supervision   Room Organization independent       Pt denied SI and SIB.  Pt reported feeling depressed (5), agitated and anxious (6) \" I don't know.\"  Pt reported overall feeling \"bored.\"  Pt seems calm, ate supper, pacing, visible in the milieu and socialized with others.  Pt daily goal \" just to make it through he day.\"  Pt goal  After discharge \"I don't know go back to AA or go to [employer] the doctor said it was too soon to think about it.\"  Pt denied having any racing thoughts or any psychotic symptoms.  Pt reported good appetite, not sleeping well, no pain and no SE's.  Pt is independent with ADL's.  Pt reported feeling hopeful.  Pt wants visitors.    "

## 2018-12-02 NOTE — CONSULTS
"  [facility]  Internal Medicine Consult    Daysi Ashley MRN# [id number]   Age: 58 year old YOB: 1960     Date of Admission: 2018  Date of Consult:  2018    Requesting Service: Behavioral Health - Eddie Del Angel MD  Reason for Consult: Pre ECT Medicine Evaluation   Indication for ECT: Severe depression    Chief Complaint: \"I'm so bad right now, my depression\"  HPI: Ms. Daysi Ashley is a 59 yo female admitted to [address] for severe depression, excessive sleeping, SI and not wanting to live. Please refer to psychiatric H&P by Dr. Simpson dated  for further details that led up to admission. An internal medicine consultation was ordered by Dr. Simpson to assess patient as having any contraindications to ECT tentatively scheduled for tomorrow am. At this time, Ms. Ashley denies acute physical concerns including fever, chills, chest pain, SOB, nausea, abd pain, bowel and bladder concerns.     Review of Systems:   Cardiovascular: negative  Pulmonary: No shortness of breath, dyspnea on exertion, cough, or hemoptysis  Neurological: negative    Past Medical History:   Prior Anesthesia: Yes  If yes, any complications: No    Prior ECT: Yes  If yes, \"Over ten years ago\"  Response, side effects: \"I don't remember\"    Cardiovascular: CAD No, MI No, HTN No, CHF No, pacemaker or ICD No  Pulmonary: Asthma/COPD No, Prior respiratory failure or need for emergent intubation No, On theophylline No (note that theophylline use is a contraindication to proceeding with ECT, needs to be tapered off prior)  Neurological: Brain tumor No, TIA/CVA No, Dementia No, Neuromuscular Disease (including post polio syndrome) No, Seizures and/or Epilepsy No, Head Injury No, Intracranial Hemorrhage No  Diabetes: No    Past Surgical History:   Past Surgical History:   Procedure Laterality Date     APPENDECTOMY       C NONSPECIFIC PROCEDURE      Appendectomy      SECTION   &  " "    FOOT SURGERY      right     HERNIORRHAPHY UMBILICAL  2000     ORTHOPEDIC SURGERY         Allergies:      Allergies   Allergen Reactions     Phenothiazines Other (See Comments)     Compazine. \"My tongue goes back\"       Medication list reviewed.    Physical Exam:  /67  Pulse 76  Temp 97.4  F (36.3  C) (Oral)  Resp 16  Wt 78.8 kg (173 lb 12.8 oz)  LMP 04/04/2012  SpO2 95%  Breastfeeding? No  BMI 29.83 kg/m2   Cardiovascular: RRR  Pulmonary: CTAB  Neurological: A&Ox3. CNs II-XII grossly intact; No acute focal deficits noted.     Data:  EKG: Unremarkable    Head Imaging: None    Labs were obtained within the last 30 days on 11/30 and are as follows:   CBC:  Recent Labs   Lab Test  11/30/18 1942   WBC  2.0*   RBC  4.13   HGB  12.2   HCT  37.5   MCV  91   MCH  29.5   MCHC  32.5   RDW  13.4   PLT  206     CMP:  Recent Labs   Lab Test  11/30/18 1942   NA  139   POTASSIUM  4.1   CHLORIDE  106   MAME  8.8   CO2  29   BUN  22   CR  1.06*   GLC  89   AST  14   ALT  17   BILITOTAL  0.3   ALBUMIN  3.6   PROTTOTAL  7.0   ALKPHOS  76     HCG:   N/A    Assessment:   1) Severe depression with planned ECT tentatively scheduled for tomorrow am. Patient does not have absolute medical contraindications to proceeding with ECT at this time. Treatment plan per psychiatry.   2) Mild SHAHNAZ on admission labs likely prerenal and due to mild dehydration  3) Marked Leukopenia on admission labs of unclear etiology or significance at this time. Pt noted to have a WBC of 1.6 last Jan., 2018, however, she denies hx of hematologic disorders    Recommendations:  ECT per psychiatry. Repeat BMP tomorrow am. In interim, encourage pt to increase her PO water intake. Obtain repeat WBC w/ differential tomorrow am w/ peripheral smear. Medicine will continue to follow and f/u on results. Please feel free to call with questions.     Thank you for this consultation.     Dejan Galaviz PA-C  Internal Medicine Hospitalist   [facility]  [phone]

## 2018-12-02 NOTE — PLAN OF CARE
"Problem: Mood Impairment (Depressive Signs/Symptoms) (Adult)  Goal: Improved Mood Symptoms (Depressive Signs/Symptoms)  Outcome: Improving  Pt has been social with peers and staff all shift. Pt continues to wear her night gown but is well groomed. Pt first states she has no goal then states to \"stay calm and rest\" Pt will have ECT #1 this round tomorrow. Pt has had ECT in the past. Pt denied to watch the ECT video or receive any handouts and did not have any questions for writer regarding ECT.  Will recheck CBC in the am according to IM. WBC was low on admission.  Pt states his depression is \"not too bad\" and \"is better in the morning\". States her anxiety is \"ok\" Denies psychotic symtoms. Concentration is distractable, pt states she would be able to read a paragraph without issue. Denies SI asd SIB. Pt states she would like to be discharged by Dec 21st because she has a plane ticket to her mothers in [place] for a family deanna.  Pt talks very highly of her mother and her daughter and would like to spend time with family for the holiday.  Flu vaccine given without issue. Appetite is \"ok\" pt states she is eating a lot of vegetables so she does not gain weight. Pt states she has been awake since 2 am and is worried her Klonopin is not working as it once did. She states she has been using it for 30 years. Denies pain. Denies SI and SIB.  Pt states she dislikes men and does not trust them, except for \"goldberg men\"  Pt tells writer she has several family struggles now including her father who lives in [place]  pt's college roommate and now has dementia and his new wife is having an affair and not taking care of him as pt thinks she should.       "

## 2018-12-03 ENCOUNTER — ANESTHESIA EVENT (OUTPATIENT)
Dept: BEHAVIORAL HEALTH | Facility: CLINIC | Age: 58
End: 2018-12-03

## 2018-12-03 ENCOUNTER — ANESTHESIA (OUTPATIENT)
Dept: BEHAVIORAL HEALTH | Facility: CLINIC | Age: 58
End: 2018-12-03

## 2018-12-03 LAB
ALBUMIN UR-MCNC: 10 MG/DL
ANION GAP SERPL CALCULATED.3IONS-SCNC: 5 MMOL/L (ref 3–14)
APPEARANCE UR: CLEAR
BACTERIA #/AREA URNS HPF: ABNORMAL /HPF
BASOPHILS # BLD AUTO: 0 10E9/L (ref 0–0.2)
BASOPHILS NFR BLD AUTO: 0.4 %
BILIRUB UR QL STRIP: NEGATIVE
BUN SERPL-MCNC: 24 MG/DL (ref 7–30)
CALCIUM SERPL-MCNC: 8.8 MG/DL (ref 8.5–10.1)
CHLORIDE SERPL-SCNC: 106 MMOL/L (ref 94–109)
CO2 SERPL-SCNC: 30 MMOL/L (ref 20–32)
COLOR UR AUTO: YELLOW
CREAT SERPL-MCNC: 1.32 MG/DL (ref 0.52–1.04)
CREAT UR-MCNC: 122 MG/DL
DIFFERENTIAL METHOD BLD: ABNORMAL
EOSINOPHIL # BLD AUTO: 0 10E9/L (ref 0–0.7)
EOSINOPHIL NFR BLD AUTO: 1.4 %
GFR SERPL CREATININE-BSD FRML MDRD: 41 ML/MIN/1.7M2
GLUCOSE SERPL-MCNC: 85 MG/DL (ref 70–99)
GLUCOSE UR STRIP-MCNC: NEGATIVE MG/DL
HGB UR QL STRIP: ABNORMAL
IMM GRANULOCYTES # BLD: 0 10E9/L (ref 0–0.4)
IMM GRANULOCYTES NFR BLD: 0.4 %
INTERPRETATION ECG - MUSE: NORMAL
KETONES UR STRIP-MCNC: NEGATIVE MG/DL
LEUKOCYTE ESTERASE UR QL STRIP: NEGATIVE
LYMPHOCYTES # BLD AUTO: 1.8 10E9/L (ref 0.8–5.3)
LYMPHOCYTES NFR BLD AUTO: 65.9 %
MONOCYTES # BLD AUTO: 0.3 10E9/L (ref 0–1.3)
MONOCYTES NFR BLD AUTO: 10.9 %
MUCOUS THREADS #/AREA URNS LPF: PRESENT /LPF
NEUTROPHILS # BLD AUTO: 0.6 10E9/L (ref 1.6–8.3)
NEUTROPHILS NFR BLD AUTO: 21 %
NITRATE UR QL: NEGATIVE
NRBC # BLD AUTO: 0 10*3/UL
NRBC BLD AUTO-RTO: 0 /100
PH UR STRIP: 6 PH (ref 5–7)
POTASSIUM SERPL-SCNC: 4.2 MMOL/L (ref 3.4–5.3)
RBC #/AREA URNS AUTO: 8 /HPF (ref 0–2)
RETICS # AUTO: 51.2 10E9/L (ref 25–95)
RETICS/RBC NFR AUTO: 1.1 % (ref 0.5–2)
SODIUM SERPL-SCNC: 141 MMOL/L (ref 133–144)
SODIUM UR-SCNC: 17 MMOL/L
SOURCE: ABNORMAL
SP GR UR STRIP: 1.01 (ref 1–1.03)
SQUAMOUS #/AREA URNS AUTO: 1 /HPF (ref 0–1)
UROBILINOGEN UR STRIP-MCNC: NORMAL MG/DL (ref 0–2)
WBC # BLD AUTO: 2.8 10E9/L (ref 4–11)
WBC #/AREA URNS AUTO: 2 /HPF (ref 0–5)

## 2018-12-03 PROCEDURE — 84300 ASSAY OF URINE SODIUM: CPT | Performed by: NURSE PRACTITIONER

## 2018-12-03 PROCEDURE — 36415 COLL VENOUS BLD VENIPUNCTURE: CPT | Performed by: PHYSICIAN ASSISTANT

## 2018-12-03 PROCEDURE — 85048 AUTOMATED LEUKOCYTE COUNT: CPT | Performed by: PHYSICIAN ASSISTANT

## 2018-12-03 PROCEDURE — 80048 BASIC METABOLIC PNL TOTAL CA: CPT | Performed by: PHYSICIAN ASSISTANT

## 2018-12-03 PROCEDURE — 12400007 ZZH R&B MH INTERMEDIATE UMMC

## 2018-12-03 PROCEDURE — 85004 AUTOMATED DIFF WBC COUNT: CPT | Performed by: PHYSICIAN ASSISTANT

## 2018-12-03 PROCEDURE — 25000132 ZZH RX MED GY IP 250 OP 250 PS 637: Performed by: PSYCHIATRY & NEUROLOGY

## 2018-12-03 PROCEDURE — 25000128 H RX IP 250 OP 636: Performed by: STUDENT IN AN ORGANIZED HEALTH CARE EDUCATION/TRAINING PROGRAM

## 2018-12-03 PROCEDURE — 90870 ELECTROCONVULSIVE THERAPY: CPT

## 2018-12-03 PROCEDURE — 81001 URINALYSIS AUTO W/SCOPE: CPT | Performed by: NURSE PRACTITIONER

## 2018-12-03 PROCEDURE — 25000125 ZZHC RX 250: Performed by: STUDENT IN AN ORGANIZED HEALTH CARE EDUCATION/TRAINING PROGRAM

## 2018-12-03 PROCEDURE — 82570 ASSAY OF URINE CREATININE: CPT | Performed by: NURSE PRACTITIONER

## 2018-12-03 PROCEDURE — 85045 AUTOMATED RETICULOCYTE COUNT: CPT | Performed by: PHYSICIAN ASSISTANT

## 2018-12-03 PROCEDURE — 25000128 H RX IP 250 OP 636: Performed by: NURSE PRACTITIONER

## 2018-12-03 PROCEDURE — 99233 SBSQ HOSP IP/OBS HIGH 50: CPT | Mod: 25 | Performed by: PSYCHIATRY & NEUROLOGY

## 2018-12-03 RX ORDER — ACETAMINOPHEN 325 MG/1
650 TABLET ORAL EVERY 4 HOURS PRN
Status: DISCONTINUED | OUTPATIENT
Start: 2018-12-03 | End: 2018-12-07 | Stop reason: HOSPADM

## 2018-12-03 RX ADMIN — ACETAMINOPHEN 650 MG: 325 TABLET, FILM COATED ORAL at 13:42

## 2018-12-03 RX ADMIN — CLONAZEPAM 4 MG: 2 TABLET ORAL at 20:07

## 2018-12-03 RX ADMIN — METHOHEXITAL SODIUM 80 MG: 500 INJECTION, POWDER, LYOPHILIZED, FOR SOLUTION INTRAMUSCULAR; INTRAVENOUS; RECTAL at 10:13

## 2018-12-03 RX ADMIN — Medication 80 MG: at 10:13

## 2018-12-03 RX ADMIN — ARIPIPRAZOLE 20 MG: 10 TABLET ORAL at 20:08

## 2018-12-03 RX ADMIN — MELATONIN 6 MG: 3 TAB ORAL at 20:08

## 2018-12-03 RX ADMIN — SODIUM CHLORIDE 1000 ML: 9 INJECTION, SOLUTION INTRAVENOUS at 17:53

## 2018-12-03 RX ADMIN — VENLAFAXINE HYDROCHLORIDE 300 MG: 150 TABLET, EXTENDED RELEASE ORAL at 20:07

## 2018-12-03 ASSESSMENT — ACTIVITIES OF DAILY LIVING (ADL)
DRESS: INDEPENDENT
GROOMING: INDEPENDENT
ORAL_HYGIENE: INDEPENDENT

## 2018-12-03 ASSESSMENT — PAIN DESCRIPTION - DESCRIPTORS: DESCRIPTORS: HEADACHE

## 2018-12-03 NOTE — PLAN OF CARE
Problem: General Plan of Care (Inpatient Behavioral)  Goal: Team Discussion  Team Plan:  BEHAVIORAL TEAM DISCUSSION    Participants: Amina ALFARO RN; Yessenia BLAKE; Arlene Lira OTR/L    Progress: Patient here for ECT treatments.  Cooperative with care.    Continued Stay Criteria/Rationale: Getting course of ECT    Medical/Physical: See consult    Precautions:   Behavioral Orders   Procedures     Code 1 - Restrict to Unit     Electroconvulsive therapy     Series of up to 12 treatments. Begin Date: 12/3/2018  Treating Psychiatrist providing ECT:  Dr Shannon  Notified on:  December 1, 2018     Electroconvulsive therapy     ECT every Monday, Wednesday, and Friday     Electroconvulsive therapy     Series of up to 12 treatments. Begin Date: 12/3/18     Treating Psychiatrist providing ECT:  Mirtha     Notified on:  12/2/18     Fall precautions     Routine Programming     As clinically indicated     Status 15     Every 15 minutes.     Plan: The patient has been encouraged to attend all programming, will complete a course of ECT and return home to resume care with her outpatient providers.    Rationale for change in precautions or plan: No changes

## 2018-12-03 NOTE — ANESTHESIA POSTPROCEDURE EVALUATION
Anesthesia POST Procedure Evaluation    Patient: Daysi Ashley   MRN:     [id number] Gender:   female   Age:    58 year old :      1960        Preoperative Diagnosis: * No pre-op diagnosis entered *   * No procedures listed *   Postop Comments: No value filed.       Anesthesia Type:  General    Reportable Event: NO     PAIN: Uncomplicated   Sign Out status: Comfortable, Well controlled pain     PONV: No PONV   Sign Out status:  No Nausea or Vomiting     Neuro/Psych: Uneventful perioperative course   Sign Out Status: Preoperative baseline; Age appropriate mentation     Airway/Resp.: Uneventful perioperative course   Sign Out Status: Non labored breathing, age appropriate RR; Resp. Status within EXPECTED Parameters     CV: Uneventful perioperative course   Sign Out status: Appropriate BP and perfusion indices; Appropriate HR/Rhythm     Disposition:   Sign Out in:  PACU  Disposition:  Phase II; Home  Recovery Course: Uneventful  Follow-Up: Not required           Last Anesthesia Record Vitals:  CRNA VITALS  12/3/2018 0951 - 12/3/2018 1041      12/3/2018             Resp Rate (set): 8          Last PACU/Preop Vitals:  Vitals:    18 0755 18 1021 18 1038   BP: 101/49 163/70 147/61   Pulse: 67 79 94   Resp: 18 20 20   Temp:  36.4  C (97.5  F)    SpO2:  97% 96%         Electronically Signed By: Oh Bolton MD, December 3, 2018, 10:41 AM

## 2018-12-03 NOTE — PROGRESS NOTES
Pt discharge from recovery room via wheelchair back to unit with staff at this time, 11:00.  Report given to [facility] nurse, RICKIE Huynh.

## 2018-12-03 NOTE — PROGRESS NOTES
12/02/18 1957   Behavioral Health   Hallucinations denies / not responding to hallucinations   Thinking distractable   Orientation person: oriented;place: oriented;date: oriented;time: oriented   Memory baseline memory   Insight admits / accepts   Judgement intact   Eye Contact at examiner   Affect full range affect   Mood mood is calm   Physical Appearance/Attire appears stated age   Hygiene well groomed   Suicidality other (see comments)  (denies)   1. Wish to be Dead No   2. Non-Specific Active Suicidal Thoughts  No   Self Injury other (see comment)  (denies)   Activity other (see comment)  (visible on the milieu)   Speech clear;coherent   Medication Sensitivity no observed side effects   Psychomotor / Gait balanced;steady   Activities of Daily Living   Hygiene/Grooming independent   Oral Hygiene independent   Dress independent   Laundry with supervision   Room Organization independent   Patient had a good shift.    Patient did not require seclusion/restraints to manage behavior.    Daysi Ashley did participate in groups and was visible in the milieu.    Notable mental health symptoms during this shift:depressed mood  distractable    Patient is working on these coping/social skills: Sharing feelings  Asking for help  Avoiding engaging in negative behavior of others    Visitors during this shift included daughter.  Overall, the visit was good.  Significant events during the visit included N/A.    Other information about this shift: Pt was visible on the milieu. Calm and pleasant. Bright and social with select peers. Pt attended community meeting and watched movie with peers. Pt reported of feeling depressed(8). Pt visited with daughter and it appears the visit is going well. Pt denies thoughts to harm self. No other issues to reported.

## 2018-12-03 NOTE — PROGRESS NOTES
"Pt has been resting much of the shift.  She had her first ECT treatment today.  She reports that she has had ECT before and that it has been helpful.  Pt reports that she is feeling \"numb\".  She reports that her depression level is 5/10 and her anxiety level is 4/10 with 10 the most.  Pt denies any SI or SIB thinking.       12/03/18 1440   Behavioral Health   Hallucinations denies / not responding to hallucinations   Thinking poor concentration   Orientation person: oriented;place: oriented;date: oriented   Insight (fair)   Judgement (fair)   Eye Contact at examiner   Affect blunted, flat   Mood mood is calm   Physical Appearance/Attire attire appropriate to age and situation   Hygiene (adequate)   Suicidality (denies SI)   1. Wish to be Dead No   2. Non-Specific Active Suicidal Thoughts  No   Self Injury (denies SIB thinking)   Elopement (none observed)   Activity (resting much of the shift/had ECT today)   Speech clear;coherent   Psychomotor / Gait balanced;steady   Activities of Daily Living   Hygiene/Grooming independent   Oral Hygiene independent   Dress independent   Room Organization independent     "

## 2018-12-03 NOTE — ANESTHESIA PREPROCEDURE EVALUATION
Anesthesia Pre-Procedure Evaluation    Patient: Daysi Ashley   MRN:     [id number] Gender:   female   Age:    58 year old :      1960        Preoperative Diagnosis: * No surgery found *        Past Medical History:   Diagnosis Date     311      Allergic rhinitis, cause unspecified      Migraine, unspecified, without mention of intractable migraine without mention of status migrainosus      Rapid weight loss 2013      Past Surgical History:   Procedure Laterality Date     APPENDECTOMY       C NONSPECIFIC PROCEDURE      Appendectomy      SECTION   &      FOOT SURGERY      right     HERNIORRHAPHY UMBILICAL  2000     ORTHOPEDIC SURGERY            Anesthesia Evaluation     .             ROS/MED HX    ENT/Pulmonary:  - neg pulmonary ROS     Neurologic:  - neg neurologic ROS     Cardiovascular:  - neg cardiovascular ROS   (+) ----. : . . . :. . Previous cardiac testing date:results:date: results:ECG reviewed date: results: date: results:          METS/Exercise Tolerance:  >4 METS   Hematologic:  - neg hematologic  ROS       Musculoskeletal:  - neg musculoskeletal ROS       GI/Hepatic:  - neg GI/hepatic ROS       Renal/Genitourinary:  - ROS Renal section negative       Endo:         Psychiatric:     (+) psychiatric history depression (polysubstance abuse)      Infectious Disease:  - neg infectious disease ROS       Malignancy:      - no malignancy   Other:    - neg other ROS                     PHYSICAL EXAM:   Mental Status/Neuro: A/A/O   Airway: Facies: Feasible  Mallampati: I  Mouth/Opening: Full  TM distance: > 6 cm  Neck ROM: Full   Respiratory: Auscultation: CTAB     Resp. Rate: Normal     Resp. Effort: Normal      CV: Rhythm: Regular  Rate: Age appropriate  Heart: Normal Sounds   Comments:      Dental: Normal                  Lab Results   Component Value Date    WBC 2.8 (L) 2018    HGB 12.2 2018    HCT 37.5 2018     2018    CRP 14.4 (H) 2012     "SED 10 04/20/2012     12/03/2018    POTASSIUM 4.2 12/03/2018    CHLORIDE 106 12/03/2018    CO2 30 12/03/2018    BUN 24 12/03/2018    CR 1.32 (H) 12/03/2018    GLC 85 12/03/2018    MAME 8.8 12/03/2018    ALBUMIN 3.6 11/30/2018    PROTTOTAL 7.0 11/30/2018    ALT 17 11/30/2018    AST 14 11/30/2018    GGT 48 (H) 03/28/2006    ALKPHOS 76 11/30/2018    BILITOTAL 0.3 11/30/2018    TSH 0.70 11/30/2018    HCG Negative 03/27/2006       Preop Vitals  BP Readings from Last 3 Encounters:   12/03/18 101/49   07/26/17 124/74   03/18/14 120/70    Pulse Readings from Last 3 Encounters:   12/03/18 67   07/26/17 97   03/18/14 76      Resp Readings from Last 3 Encounters:   12/03/18 18   07/26/17 16   03/18/14 16    SpO2 Readings from Last 3 Encounters:   12/03/18 100%   07/26/17 99%   10/14/13 100%      Temp Readings from Last 1 Encounters:   12/03/18 36.7  C (98.1  F) (Oral)    Ht Readings from Last 1 Encounters:   07/26/17 1.626 m (5' 4\")      Wt Readings from Last 1 Encounters:   12/02/18 78.8 kg (173 lb 12.8 oz)    Estimated body mass index is 29.83 kg/(m^2) as calculated from the following:    Height as of 7/26/17: 1.626 m (5' 4\").    Weight as of this encounter: 78.8 kg (173 lb 12.8 oz).     LDA:            Assessment:   ASA SCORE: 2    NPO Status: > 2 hours since completed Clear Liquids; > 6 hours since completed Solid Foods   Documentation: H&P complete; Preop Testing complete; Consents complete   Proceeding: Proceed without further delay  Tobacco Use:  NO Active use of Tobacco/UNKNOWN Tobacco use status     Plan:   Anes. Type:  General   Pre-Induction: Midazolam IV; Acetaminophen PO   Induction:  IV (Standard)   Airway: Mask   Access/Monitoring: PIV   Maintenance: Balanced   Emergence: Procedure Site   Logistics: Same Day Surgery     PONV Management:  Adult Risk Factors: Female, Non-Smoker     CONSENT: Direct conversation   Plan and risks discussed with: Patient                            Oh Bolton MD  "

## 2018-12-03 NOTE — PROGRESS NOTES
Patient with headache post-ECT. No medications ordered prn for pain/headache. Dr. Olson paged to inform. Also notified ECT of possible need to premedicate to prevent headache next treatment Wednesday. Once order received, prn acetaminophen given. Patient reported resolution of pain when rechecked.

## 2018-12-03 NOTE — PROGRESS NOTES
During lab draw, patient had near syncopal episode. She was assisted to lay down (had been sitting). There was no loss of consciousness. Vital signs checked and WNL. She was assisted to laying position. ECT RN Andra called and updated on this information.

## 2018-12-03 NOTE — PROGRESS NOTES
Pt has not attended scheduled occupational therapy sessions. Encourage attendance and participation. Pt will be given self-assessment form, and OT staff will explain the purpose of including them in their treatment plan and offer options for meeting their needs.     Repair Anesthesia Method: local infiltration

## 2018-12-03 NOTE — PROCEDURES
"Procedure/Surgery Information   [facility], [place]    Bedside Procedure Note  Date of Service (when I performed the procedure): 12/03/2018    Daysi Ashley is a 58 year old female patient.  1. PTSD (post-traumatic stress disorder)    2. Major depressive disorder, recurrent episode, moderate (H)    3. Major depressive disorder, single episode, moderate (H)      Past Medical History:   Diagnosis Date     311      Allergic rhinitis, cause unspecified      Migraine, unspecified, without mention of intractable migraine without mention of status migrainosus      Rapid weight loss 1/22/2013     Temp: 98.1  F (36.7  C) Temp src: Oral BP: 101/49 Pulse: 67   Resp: 18 SpO2: 100 % O2 Device: None (Room air)      Procedures     James Shannon     [facility], [place]   ECT Procedure Note  12/03/2018    Daysi Ashley [id number]   58 year old 1960     Patient Status: Inpatient    Is this the first in a series of 12 treatments?  Yes    History and Physical: Reviewed in medical record    Consent: Informed consent was signed by: patient    Date Consent Signed: 12/3/18      Allergies   Allergen Reactions     Phenothiazines Other (See Comments)     Compazine. \"My tongue goes back\"       Weight:  173 lbs 12.8 oz    Patient Preparations: Other (comment)         Indications for ECT:   Medications ineffective and History of good ECT response in one or more previous episodes of illness         Clinical Narrative:   Patient is admitted with worsening depression, SI and inability to function.  She's starting ECT for depression.   NPO after 2400.  Alert and Oriented x 3.  Mood is depressed.            Diagnosis:   Major depression         Pause for the Cause:     Right patient Yes   Right procedure/laterality settings: Yes          Intra-Procedure Documentation:     ECT #: 1   Treatment number this series: 1   Total treatment number: Many (past ECT)     Type of ECT:  Right, unilateral " ultrabrief    ECT Medications:    Brevital: 80mg  Succinyl Choline: 80mg    ECT Strip Summary:   Energy Level: 40 percent  Motor Seizure Duration: 26 seconds  EEG Seizure Duration:  45 seconds    Complications: No    Plan:   Next ECT 12/5/18    James Shannon MD

## 2018-12-03 NOTE — PROGRESS NOTES
"Met with the patient to introduce myself and discuss her current outpatient treatment providers.  She is interested in doing CBT therapy with \"someone.\"  She has seen a few people in the past and had no connection with them.  This writer will look into finding her a therapy clinic and provider near to her home. She reported being a patient of Dr. Light for many years and he kept her as a private patient after he moved from [facility] to [facility] but his specialty is ED and she is not eating disordered.    "

## 2018-12-03 NOTE — PROGRESS NOTES
[facility], [place]   Psychiatric Progress Note  Daysi Ashley  [id number]  12/03/18    Chief Complaint: Continued medical care  Time: 37 minutes on encounter, >50% of which was spent in counseling and/or coordination of care consisting of: communication and education with the patient/family, lab/image/study evaluation, support staff communication, and other sources pertinent to excellent patient care.          Interim History:   The patient's care was discussed with the treatment team during the daily team meeting and/or staff's chart notes were reviewed.  Staff report patient has been feeling slightly better since being in the hospital creatinine was increased slightly and she is interested in doing electroconvulsive therapy she slept about 7 hours.    Upon meeting with her she says that she is feeling okay she had her first electroconvulsive therapy treatment today and that went well.  She says that her depression has been worsening over the past several months and getting more flat and depressed.  She did use cocaine and cannabis about a week ago and that was helpful for her symptoms though she felt worse afterwards.  She would like to continue her current medications and does not believe she has had any side effects nor has she had any side effects from the electroconvulsive therapy yet.  No current thoughts of harming herself or others or any hopelessness or helplessness but does have low energy guilty feelings and poor appetite.  Her sleep is also disruptive.  She says many things have occurred including limited contact with her children lost her house of more than 30 years.  She is dealing with trying to be alone and has had worsening depressive symptoms which worsens how she has been feeling.  Denies any paranoia or hallucinations or any manic kind of symptoms in terms of grandiose ideas or elevation of energy.         Medications:       ARIPiprazole  20 mg Oral At Bedtime      "clonazePAM  4 mg Oral At Bedtime     melatonin  6 mg Oral At Bedtime     venlafaxine  300 mg Oral At Bedtime          Allergies:     Allergies   Allergen Reactions     Phenothiazines Other (See Comments)     Compazine. \"My tongue goes back\"          Labs:     Recent Results (from the past 24 hour(s))   WBC and differential    Collection Time: 12/03/18  7:55 AM   Result Value Ref Range    WBC 2.8 (L) 4.0 - 11.0 10e9/L    Diff Method Automated Method     % Neutrophils 21.0 %    % Lymphocytes 65.9 %    % Monocytes 10.9 %    % Eosinophils 1.4 %    % Basophils 0.4 %    % Immature Granulocytes 0.4 %    Nucleated RBCs 0 0 /100    Absolute Neutrophil 0.6 (L) 1.6 - 8.3 10e9/L    Absolute Lymphocytes 1.8 0.8 - 5.3 10e9/L    Absolute Monocytes 0.3 0.0 - 1.3 10e9/L    Absolute Eosinophils 0.0 0.0 - 0.7 10e9/L    Absolute Basophils 0.0 0.0 - 0.2 10e9/L    Abs Immature Granulocytes 0.0 0 - 0.4 10e9/L    Absolute Nucleated RBC 0.0    Reticulocyte Count    Collection Time: 12/03/18  7:55 AM   Result Value Ref Range    % Retic 1.1 0.5 - 2.0 %    Absolute Retic 51.2 25 - 95 10e9/L   Basic metabolic panel    Collection Time: 12/03/18  7:55 AM   Result Value Ref Range    Sodium 141 133 - 144 mmol/L    Potassium 4.2 3.4 - 5.3 mmol/L    Chloride 106 94 - 109 mmol/L    Carbon Dioxide 30 20 - 32 mmol/L    Anion Gap 5 3 - 14 mmol/L    Glucose 85 70 - 99 mg/dL    Urea Nitrogen 24 7 - 30 mg/dL    Creatinine 1.32 (H) 0.52 - 1.04 mg/dL    GFR Estimate 41 (L) >60 mL/min/1.7m2    GFR Estimate If Black 50 (L) >60 mL/min/1.7m2    Calcium 8.8 8.5 - 10.1 mg/dL          Psychiatric Examination:     /57  Pulse 73  Temp 96.9  F (36.1  C) (Tympanic)  Resp 16  Wt 78.8 kg (173 lb 12.8 oz)  LMP 04/04/2012  SpO2 96%  Breastfeeding? No  BMI 29.83 kg/m2  Weight is 173 lbs 12.8 oz  Body mass index is 29.83 kg/(m^2).                Sitting Orthostatic BP: 119/60      Sitting Orthostatic Pulse: 65 bpm      Standing Orthostatic BP: 126/76    "   Standing Orthostatic Pulse: 68 bpm       Weight over time:  Vitals:    11/30/18 1819 11/30/18 2201 12/02/18 0818   Weight: 81.1 kg (178 lb 12.8 oz) 78.4 kg (172 lb 14.4 oz) 78.8 kg (173 lb 12.8 oz)       Orthostatic Vitals       Most Recent      Sitting Orthostatic /60 12/03 0730    Sitting Orthostatic Pulse (bpm) 65 12/03 0730    Standing Orthostatic /76 12/03 0730    Standing Orthostatic Pulse (bpm) 68 12/03 0730            Daysi 58-year-old female with gray hair wearing a robe.  Her speech is of an appropriate rate and tone in her language is intact.  Her behavior is appropriate and she does not have any abnormal movements.  Her affect is neutral and flat though she does smile slightly at times.  Her mood she describes as okay.  Her thought content consists of the above without thoughts of harming herself or others or current delusions.  Her thought process is logical without looseness of association.  She does not have any abnormal perceptions.  She is alert and aware of her current location and circumstances.  Her attention and concentration appear adequate.  Her cognition and fund of knowledge appear normal.  Her long-term/short-term/remote memory appear intact.  Her insight and judgment are both fair.         Precautions:     Behavioral Orders   Procedures     Code 1 - Restrict to Unit     Electroconvulsive therapy     Series of up to 12 treatments. Begin Date: 12/3/2018  Treating Psychiatrist providing ECT:  Dr Shannon  Notified on:  December 1, 2018     Electroconvulsive therapy     ECT every Monday, Wednesday, and Friday     Electroconvulsive therapy     Series of up to 12 treatments. Begin Date: 12/3/18     Treating Psychiatrist providing ECT:  Mirtha     Notified on:  12/2/18     Fall precautions     Routine Programming     As clinically indicated     Status 15     Every 15 minutes.          DIagnoses:     Major depressive disorder severe recurrent  Generalized anxiety disorder  History of  OCD  Cocaine use disorder  Cannabis use disorder         Assessment & Plan:     Daysi has had worsening depressive symptoms and has previously benefited from electroconvulsive therapy about 30 years ago.  She is hopeful that this will again be beneficial and she would like to continue her current medications.  We did discuss that some of her medications might impair her ability to get treatment with ECT including her benzodiazepines.  She is accepting of the need to potentially reduce these medications in the future if needed.  We went through electroconvulsive therapy and how things have changed over the many years since she has had it as well as her current medications.    Continue voluntary hospitalization    The risks, benefits, alternatives and side effects have been discussed and are understood by the patient and other caregivers.      Uriel Banks  [facility] Psychiat[facility]      The following document has been created with voice recognition software and may contain unintentional word substitutions.    Non clinically relevant CMS requirements:  Clinical Global Impressions  First:  Considering your total clinical experience with this particular patient population, how severe are the patient's symptoms at this time?: 7 (12/01/18 1456)  Compared to the patient's condition at the START of treatment, this patient's condition is:: 4 (12/01/18 1456)  Most recent:  Considering your total clinical experience with this particular patient population, how severe are the patient's symptoms at this time?: 7 (12/01/18 1456)  Compared to the patient's condition at the START of treatment, this patient's condition is:: 4 (12/01/18 1456)

## 2018-12-03 NOTE — PROGRESS NOTES
Lab called to inform that blood sat for too long due to lab error and the blood morph was unable to be resulted. Dr. Olson informed.

## 2018-12-04 LAB
ANION GAP SERPL CALCULATED.3IONS-SCNC: 4 MMOL/L (ref 3–14)
BASOPHILS # BLD AUTO: 0 10E9/L (ref 0–0.2)
BASOPHILS NFR BLD AUTO: 0.5 %
BUN SERPL-MCNC: 22 MG/DL (ref 7–30)
CALCIUM SERPL-MCNC: 8.3 MG/DL (ref 8.5–10.1)
CHLORIDE SERPL-SCNC: 109 MMOL/L (ref 94–109)
CO2 SERPL-SCNC: 29 MMOL/L (ref 20–32)
CREAT SERPL-MCNC: 1.15 MG/DL (ref 0.52–1.04)
DIFFERENTIAL METHOD BLD: ABNORMAL
EOSINOPHIL # BLD AUTO: 0 10E9/L (ref 0–0.7)
EOSINOPHIL NFR BLD AUTO: 1 %
ERYTHROCYTE [DISTWIDTH] IN BLOOD BY AUTOMATED COUNT: 13.1 % (ref 10–15)
GFR SERPL CREATININE-BSD FRML MDRD: 48 ML/MIN/1.7M2
GLUCOSE SERPL-MCNC: 86 MG/DL (ref 70–99)
HCT VFR BLD AUTO: 36.2 % (ref 35–47)
HGB BLD-MCNC: 11.7 G/DL (ref 11.7–15.7)
IMM GRANULOCYTES # BLD: 0 10E9/L (ref 0–0.4)
IMM GRANULOCYTES NFR BLD: 0 %
LYMPHOCYTES # BLD AUTO: 1.3 10E9/L (ref 0.8–5.3)
LYMPHOCYTES NFR BLD AUTO: 62.8 %
MCH RBC QN AUTO: 29.5 PG (ref 26.5–33)
MCHC RBC AUTO-ENTMCNC: 32.3 G/DL (ref 31.5–36.5)
MCV RBC AUTO: 91 FL (ref 78–100)
MONOCYTES # BLD AUTO: 0.2 10E9/L (ref 0–1.3)
MONOCYTES NFR BLD AUTO: 10.6 %
NEUTROPHILS # BLD AUTO: 0.5 10E9/L (ref 1.6–8.3)
NEUTROPHILS NFR BLD AUTO: 25.1 %
NRBC # BLD AUTO: 0 10*3/UL
NRBC BLD AUTO-RTO: 0 /100
PLATELET # BLD AUTO: 201 10E9/L (ref 150–450)
POTASSIUM SERPL-SCNC: 4 MMOL/L (ref 3.4–5.3)
RBC # BLD AUTO: 3.97 10E12/L (ref 3.8–5.2)
RETICS # AUTO: 44.1 10E9/L (ref 25–95)
RETICS/RBC NFR AUTO: 1.1 % (ref 0.5–2)
SODIUM SERPL-SCNC: 142 MMOL/L (ref 133–144)
WBC # BLD AUTO: 2 10E9/L (ref 4–11)

## 2018-12-04 PROCEDURE — 25000132 ZZH RX MED GY IP 250 OP 250 PS 637: Performed by: PSYCHIATRY & NEUROLOGY

## 2018-12-04 PROCEDURE — 85045 AUTOMATED RETICULOCYTE COUNT: CPT | Performed by: PHYSICIAN ASSISTANT

## 2018-12-04 PROCEDURE — 40000611 ZZHCL STATISTIC MORPHOLOGY W/INTERP HEMEPATH TC 85060: Performed by: PHYSICIAN ASSISTANT

## 2018-12-04 PROCEDURE — 36415 COLL VENOUS BLD VENIPUNCTURE: CPT | Performed by: NURSE PRACTITIONER

## 2018-12-04 PROCEDURE — 80048 BASIC METABOLIC PNL TOTAL CA: CPT | Performed by: NURSE PRACTITIONER

## 2018-12-04 PROCEDURE — 99232 SBSQ HOSP IP/OBS MODERATE 35: CPT | Performed by: PSYCHIATRY & NEUROLOGY

## 2018-12-04 PROCEDURE — 85025 COMPLETE CBC W/AUTO DIFF WBC: CPT | Performed by: PHYSICIAN ASSISTANT

## 2018-12-04 PROCEDURE — G0177 OPPS/PHP; TRAIN & EDUC SERV: HCPCS

## 2018-12-04 PROCEDURE — 12400007 ZZH R&B MH INTERMEDIATE UMMC

## 2018-12-04 RX ADMIN — MELATONIN 6 MG: 3 TAB ORAL at 19:05

## 2018-12-04 RX ADMIN — CLONAZEPAM 4 MG: 2 TABLET ORAL at 17:39

## 2018-12-04 RX ADMIN — ARIPIPRAZOLE 20 MG: 10 TABLET ORAL at 19:05

## 2018-12-04 RX ADMIN — VENLAFAXINE HYDROCHLORIDE 300 MG: 150 TABLET, EXTENDED RELEASE ORAL at 19:06

## 2018-12-04 ASSESSMENT — ACTIVITIES OF DAILY LIVING (ADL)
LAUNDRY: UNABLE TO COMPLETE
ORAL_HYGIENE: INDEPENDENT
GROOMING: SHOWER;INDEPENDENT
ORAL_HYGIENE: INDEPENDENT
DRESS: INDEPENDENT
LAUNDRY: WITH SUPERVISION
GROOMING: INDEPENDENT
DRESS: STREET CLOTHES

## 2018-12-04 NOTE — PROGRESS NOTES
INITIAL OT NOTE     12/04/18 1500   Occupational Therapy   Type of Intervention structured groups   Response Initiates, socially acceptable   Hours 1     Groups Attended: OT Life Skills    Affect/Hygiene Presentation: Pleasant and engaged with a congruent affect. No apparent hygiene concerns.     Patient Performance/Response: Pt attended a structured OT group with a focus on self-awareness and socialization. Pt appeared comfortable sharing positive personal information, and was respectful in listening and responding to peers.     Plan: Will continue to assess, initial assessment and OT care plan/goals to be initiated upon additional group participation. Pt will be given self-assessment form, and OT staff will explain the purpose of including them in their treatment plan and offer options for meeting their needs.
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
## 2018-12-04 NOTE — PROGRESS NOTES
"[facility], [place]   Psychiatric Progress Note  Daysi Ashley  [id number]  12/04/18    Chief Complaint: Continued medical care          Interim History:   The patient's care was discussed with the treatment team during the daily team meeting and/or staff's chart notes were reviewed.  Staff report patient has been doing well on the unit had some nausea and headache after ECT now better slept about 6 hours.    Upon meeting with her says that she is feeling okay her headache has improved after given acetaminophen yesterday.  Denies any thoughts of harming herself or others any hallucinations or paranoia or other side effects from the electroconvulsive therapy.  No attention or concentration issues though she does have some sadness lack of energy and some hopelessness and helplessness.  She does not have much anxiety or guilty feelings and is hoping that electroconvulsive therapy will be helpful.  She has planning on getting out of bed and forcing her to go to activities so that she can increase her energy level and participate in unit activities.  Denies any grandiose ideas about herself or delusional content.         Medications:       ARIPiprazole  20 mg Oral At Bedtime     clonazePAM  4 mg Oral At Bedtime     melatonin  6 mg Oral At Bedtime     venlafaxine  300 mg Oral At Bedtime          Allergies:     Allergies   Allergen Reactions     Phenothiazines Other (See Comments)     Compazine. \"My tongue goes back\"          Labs:     Recent Results (from the past 24 hour(s))   Sodium random urine    Collection Time: 12/03/18  5:05 PM   Result Value Ref Range    Sodium Urine mmol/L 17 mmol/L   Creatinine random urine    Collection Time: 12/03/18  5:05 PM   Result Value Ref Range    Creatinine Urine Random 122 mg/dL   UA with Microscopic reflex to Culture    Collection Time: 12/03/18  5:05 PM   Result Value Ref Range    Color Urine Yellow     Appearance Urine Clear     Glucose Urine Negative " NEG^Negative mg/dL    Bilirubin Urine Negative NEG^Negative    Ketones Urine Negative NEG^Negative mg/dL    Specific Gravity Urine 1.014 1.003 - 1.035    Blood Urine Small (A) NEG^Negative    pH Urine 6.0 5.0 - 7.0 pH    Protein Albumin Urine 10 (A) NEG^Negative mg/dL    Urobilinogen mg/dL Normal 0.0 - 2.0 mg/dL    Nitrite Urine Negative NEG^Negative    Leukocyte Esterase Urine Negative NEG^Negative    Source Midstream Urine     WBC Urine 2 0 - 5 /HPF    RBC Urine 8 (H) 0 - 2 /HPF    Bacteria Urine Few (A) NEG^Negative /HPF    Squamous Epithelial /HPF Urine 1 0 - 1 /HPF    Mucous Urine Present (A) NEG^Negative /LPF   Basic metabolic panel    Collection Time: 12/04/18  9:21 AM   Result Value Ref Range    Sodium 142 133 - 144 mmol/L    Potassium 4.0 3.4 - 5.3 mmol/L    Chloride 109 94 - 109 mmol/L    Carbon Dioxide 29 20 - 32 mmol/L    Anion Gap 4 3 - 14 mmol/L    Glucose 86 70 - 99 mg/dL    Urea Nitrogen 22 7 - 30 mg/dL    Creatinine 1.15 (H) 0.52 - 1.04 mg/dL    GFR Estimate 48 (L) >60 mL/min/1.7m2    GFR Estimate If Black 58 (L) >60 mL/min/1.7m2    Calcium 8.3 (L) 8.5 - 10.1 mg/dL   CBC with platelets differential    Collection Time: 12/04/18  9:21 AM   Result Value Ref Range    WBC 2.0 (L) 4.0 - 11.0 10e9/L    RBC Count 3.97 3.8 - 5.2 10e12/L    Hemoglobin 11.7 11.7 - 15.7 g/dL    Hematocrit 36.2 35.0 - 47.0 %    MCV 91 78 - 100 fl    MCH 29.5 26.5 - 33.0 pg    MCHC 32.3 31.5 - 36.5 g/dL    RDW 13.1 10.0 - 15.0 %    Platelet Count 201 150 - 450 10e9/L    Diff Method Automated Method     % Neutrophils 25.1 %    % Lymphocytes 62.8 %    % Monocytes 10.6 %    % Eosinophils 1.0 %    % Basophils 0.5 %    % Immature Granulocytes 0.0 %    Nucleated RBCs 0 0 /100    Absolute Neutrophil 0.5 (L) 1.6 - 8.3 10e9/L    Absolute Lymphocytes 1.3 0.8 - 5.3 10e9/L    Absolute Monocytes 0.2 0.0 - 1.3 10e9/L    Absolute Eosinophils 0.0 0.0 - 0.7 10e9/L    Absolute Basophils 0.0 0.0 - 0.2 10e9/L    Abs Immature Granulocytes 0.0 0 - 0.4  10e9/L    Absolute Nucleated RBC 0.0    Reticulocyte count    Collection Time: 12/04/18  9:21 AM   Result Value Ref Range    % Retic 1.1 0.5 - 2.0 %    Absolute Retic 44.1 25 - 95 10e9/L          Psychiatric Examination:     /65  Pulse 79  Temp 98.3  F (36.8  C) (Oral)  Resp 16  Wt 80.4 kg (177 lb 3.2 oz)  LMP 04/04/2012  SpO2 96%  Breastfeeding? No  BMI 30.42 kg/m2  Weight is 177 lbs 3.2 oz  Body mass index is 30.42 kg/(m^2).                Sitting Orthostatic BP: 134/48      Sitting Orthostatic Pulse: 73 bpm      Standing Orthostatic BP: 123/63      Standing Orthostatic Pulse: 73 bpm       Weight over time:  Vitals:    11/30/18 1819 11/30/18 2201 12/02/18 0818 12/04/18 0757   Weight: 81.1 kg (178 lb 12.8 oz) 78.4 kg (172 lb 14.4 oz) 78.8 kg (173 lb 12.8 oz) 80.4 kg (177 lb 3.2 oz)       Orthostatic Vitals       Most Recent      Sitting Orthostatic /48 12/04 0757    Sitting Orthostatic Pulse (bpm) 73 12/04 0757    Standing Orthostatic /63 12/04 0757    Standing Orthostatic Pulse (bpm) 73 12/04 0757            Daysi 58-year-old female with gray hair wearing a robe.  Her speech is of an appropriate rate and tone in her language is intact.  Her behavior is appropriate and she does not have any abnormal movements.  Her affect is neutral and flat though she does smile slightly at times.  Her mood she describes as sad.  Her thought content consists of the above without thoughts of harming herself or others or current delusions.  Her thought process is logical without looseness of association.  She does not have any abnormal perceptions.  She is alert and aware of her current location and circumstances.  Her attention and concentration appear adequate.  Her cognition and fund of knowledge appear normal.  Her long-term/short-term/remote memory appear intact.  Her insight and judgment are both fair.         Precautions:     Behavioral Orders   Procedures     Code 1 - Restrict to Unit      Electroconvulsive therapy     Series of up to 12 treatments. Begin Date: 12/3/2018  Treating Psychiatrist providing ECT:  Dr Shannon  Notified on:  December 1, 2018     Electroconvulsive therapy     ECT every Monday, Wednesday, and Friday     Electroconvulsive therapy     Series of up to 12 treatments. Begin Date: 12/3/18     Treating Psychiatrist providing ECT:  Mirtha     Notified on:  12/2/18     Fall precautions     Routine Programming     As clinically indicated     Status 15     Every 15 minutes.          DIagnoses:     Major depressive disorder severe recurrent  Generalized anxiety disorder  History of OCD  Cocaine use disorder  Cannabis use disorder         Assessment & Plan:     Daysi has received her first electroconvulsive therapy which seems to have been adequate and she is early in the process of treatment and will likely not have benefit until about the sixth treatment.  We discussed her current circumstances and medications as well as potential benefits from electroconvulsive therapy.  She did have some headache but it seems as though that has resolved with acetaminophen which is quite common.  She otherwise is doing well with her current treatment continue the current planning unchanged.    Continue voluntary hospitalization    The risks, benefits, alternatives and side effects have been discussed and are understood by the patient and other caregivers.      Uriel Banks  [facility]      The following document has been created with voice recognition software and may contain unintentional word substitutions.    Non clinically relevant CMS requirements:  Clinical Global Impressions  First:  Considering your total clinical experience with this particular patient population, how severe are the patient's symptoms at this time?: 7 (12/01/18 1456)  Compared to the patient's condition at the START of treatment, this patient's condition is:: 4 (12/01/18 1456)  Most recent:  Considering  your total clinical experience with this particular patient population, how severe are the patient's symptoms at this time?: 7 (12/01/18 1456)  Compared to the patient's condition at the START of treatment, this patient's condition is:: 4 (12/01/18 1456)

## 2018-12-04 NOTE — PROGRESS NOTES
"Pt rates dep. At 8 and anxiety at 5. Pt denies SI or SIB. Pt was encouraged to get out of bed this am and go to groups. Pt said she didn't want to but got up, got dressed and went to group. Pt said \"I know it is better for me to do this but it is just so hard sometimes.\" pt is pleasant but still very depressed     12/04/18 1140   Behavioral Health   Hallucinations denies / not responding to hallucinations   Thinking distractable;poor concentration   Orientation person: oriented;place: oriented;date: oriented;time: oriented   Memory baseline memory   Insight insight appropriate to situation   Judgement impaired   Eye Contact at examiner   Affect blunted, flat;other (see comments)  (brightens when engaged)   Mood depressed;anxious   Physical Appearance/Attire neat   Hygiene well groomed   Suicidality other (see comments)  (denies)   Self Injury other (see comment)  (denies)   Elopement (no statements)   Activity isolative;withdrawn   Speech clear;coherent   Psychomotor / Gait balanced;steady   Sleep/Rest/Relaxation   Sleep/Rest/Relaxation sleeping between care;no problem identified   Safety   Suicidality Status 15   Coping/Psychosocial   Verbalized Emotional State anxiety;depression;frustration   Psycho Education   Type of Intervention 1:1 intervention   Response participates, initiates socially appropriate   Hours 0.5   Treatment Detail (not isolating)   Activities of Daily Living   Hygiene/Grooming shower;independent   Oral Hygiene independent   Dress street clothes   Laundry with supervision   Room Organization independent     "

## 2018-12-04 NOTE — PLAN OF CARE
"Problem: Mood Impairment (Depressive Signs/Symptoms) (Adult)  Goal: Improved Mood Symptoms (Depressive Signs/Symptoms)  Outcome: No Change  Pt had ECT #1 this round.  Denies headache or nausea but states she did have a headache earlier. Pt states no memory loss. Depression is \"better now I know ECT goes\" and anxiety as \"always have some\" Pts goal was to \"get thru ECT and shower\" both of which she did. Pt is not wearing her nightgown and rope anymore and is wearing personal shirt and pant.  Pt received a NaCl IV 1000ml/hr bolus once without issue.  Pt denies current racing thoughts and states she did have racing thoughts last night when she could not go back to sleep. Appetite is \"ok\" Social with peers and staff. Medication complaint. Polite and cooperative. Blood draws should be done laying down.        "

## 2018-12-05 ENCOUNTER — ANESTHESIA EVENT (OUTPATIENT)
Dept: BEHAVIORAL HEALTH | Facility: CLINIC | Age: 58
End: 2018-12-05

## 2018-12-05 ENCOUNTER — ANESTHESIA (OUTPATIENT)
Dept: BEHAVIORAL HEALTH | Facility: CLINIC | Age: 58
End: 2018-12-05

## 2018-12-05 LAB
ANION GAP SERPL CALCULATED.3IONS-SCNC: 4 MMOL/L (ref 3–14)
BUN SERPL-MCNC: 28 MG/DL (ref 7–30)
CALCIUM SERPL-MCNC: 8.5 MG/DL (ref 8.5–10.1)
CHLORIDE SERPL-SCNC: 108 MMOL/L (ref 94–109)
CO2 SERPL-SCNC: 31 MMOL/L (ref 20–32)
COPATH REPORT: NORMAL
CREAT SERPL-MCNC: 1.17 MG/DL (ref 0.52–1.04)
GFR SERPL CREATININE-BSD FRML MDRD: 47 ML/MIN/1.7M2
GLUCOSE SERPL-MCNC: 90 MG/DL (ref 70–99)
POTASSIUM SERPL-SCNC: 4.8 MMOL/L (ref 3.4–5.3)
SODIUM SERPL-SCNC: 143 MMOL/L (ref 133–144)

## 2018-12-05 PROCEDURE — 25000125 ZZHC RX 250: Performed by: PSYCHIATRY & NEUROLOGY

## 2018-12-05 PROCEDURE — 99232 SBSQ HOSP IP/OBS MODERATE 35: CPT | Mod: 25 | Performed by: PSYCHIATRY & NEUROLOGY

## 2018-12-05 PROCEDURE — 25000132 ZZH RX MED GY IP 250 OP 250 PS 637: Performed by: PSYCHIATRY & NEUROLOGY

## 2018-12-05 PROCEDURE — 25000131 ZZH RX MED GY IP 250 OP 636 PS 637: Performed by: PSYCHIATRY & NEUROLOGY

## 2018-12-05 PROCEDURE — 25000128 H RX IP 250 OP 636: Performed by: ANESTHESIOLOGY

## 2018-12-05 PROCEDURE — 90870 ELECTROCONVULSIVE THERAPY: CPT

## 2018-12-05 PROCEDURE — 36415 COLL VENOUS BLD VENIPUNCTURE: CPT | Performed by: NURSE PRACTITIONER

## 2018-12-05 PROCEDURE — 80048 BASIC METABOLIC PNL TOTAL CA: CPT | Performed by: NURSE PRACTITIONER

## 2018-12-05 PROCEDURE — 25000125 ZZHC RX 250: Performed by: ANESTHESIOLOGY

## 2018-12-05 PROCEDURE — 12400007 ZZH R&B MH INTERMEDIATE UMMC

## 2018-12-05 PROCEDURE — G0177 OPPS/PHP; TRAIN & EDUC SERV: HCPCS

## 2018-12-05 RX ORDER — IBUPROFEN 400 MG/1
400 TABLET, FILM COATED ORAL EVERY 6 HOURS PRN
Status: DISCONTINUED | OUTPATIENT
Start: 2018-12-05 | End: 2018-12-07 | Stop reason: HOSPADM

## 2018-12-05 RX ORDER — ONDANSETRON 4 MG/1
4 TABLET, ORALLY DISINTEGRATING ORAL EVERY 6 HOURS PRN
Status: DISCONTINUED | OUTPATIENT
Start: 2018-12-05 | End: 2018-12-07 | Stop reason: HOSPADM

## 2018-12-05 RX ORDER — HYDROXYZINE HYDROCHLORIDE 50 MG/1
50 TABLET, FILM COATED ORAL EVERY 4 HOURS PRN
Status: DISCONTINUED | OUTPATIENT
Start: 2018-12-05 | End: 2018-12-07 | Stop reason: HOSPADM

## 2018-12-05 RX ORDER — CAFFEINE AND SODIUM BENZOATE 125 MG/ML
500 INJECTION, SOLUTION INTRAMUSCULAR; INTRAVENOUS
Status: COMPLETED | OUTPATIENT
Start: 2018-12-05 | End: 2018-12-05

## 2018-12-05 RX ORDER — TRAZODONE HYDROCHLORIDE 50 MG/1
50 TABLET, FILM COATED ORAL
Status: DISCONTINUED | OUTPATIENT
Start: 2018-12-05 | End: 2018-12-07 | Stop reason: HOSPADM

## 2018-12-05 RX ADMIN — MELATONIN 6 MG: 3 TAB ORAL at 19:53

## 2018-12-05 RX ADMIN — ONDANSETRON 4 MG: 4 TABLET, ORALLY DISINTEGRATING ORAL at 16:59

## 2018-12-05 RX ADMIN — ACETAMINOPHEN 650 MG: 325 TABLET, FILM COATED ORAL at 13:30

## 2018-12-05 RX ADMIN — CAFFEINE AND SODIUM BENZOATE 500 MG: 125 INJECTION, SOLUTION INTRAMUSCULAR; INTRAVENOUS at 12:53

## 2018-12-05 RX ADMIN — CLONAZEPAM 4 MG: 2 TABLET ORAL at 19:53

## 2018-12-05 RX ADMIN — Medication 70 MG: at 12:53

## 2018-12-05 RX ADMIN — VENLAFAXINE HYDROCHLORIDE 300 MG: 150 TABLET, EXTENDED RELEASE ORAL at 19:53

## 2018-12-05 RX ADMIN — ARIPIPRAZOLE 20 MG: 10 TABLET ORAL at 19:53

## 2018-12-05 RX ADMIN — IBUPROFEN 400 MG: 400 TABLET ORAL at 16:59

## 2018-12-05 RX ADMIN — METHOHEXITAL SODIUM 80 MG: 500 INJECTION, POWDER, LYOPHILIZED, FOR SOLUTION INTRAMUSCULAR; INTRAVENOUS; RECTAL at 12:53

## 2018-12-05 ASSESSMENT — ACTIVITIES OF DAILY LIVING (ADL)
GROOMING: INDEPENDENT
ORAL_HYGIENE: INDEPENDENT
DRESS: INDEPENDENT
ORAL_HYGIENE: INDEPENDENT
GROOMING: INDEPENDENT
DRESS: INDEPENDENT
LAUNDRY: WITH SUPERVISION

## 2018-12-05 NOTE — PROCEDURES
"Procedure/Surgery Information   [facility], [place]    Bedside Procedure Note  Date of Service (when I performed the procedure): 12/05/2018    Daysi Ashley is a 58 year old female patient.  1. PTSD (post-traumatic stress disorder)    2. Major depressive disorder, recurrent episode, moderate (H)    3. Major depressive disorder, single episode, moderate (H)    4. Severe recurrent major depression without psychotic features (H)      Past Medical History:   Diagnosis Date     311      Allergic rhinitis, cause unspecified      Migraine, unspecified, without mention of intractable migraine without mention of status migrainosus      Rapid weight loss 1/22/2013     Temp: 99  F (37.2  C) Temp src: Tympanic       Resp: 16          Procedures     James Shannon     [facility], [place]   ECT Procedure Note  12/05/2018    Daysi Ashley [id number]   58 year old 1960     Patient Status: Inpatient    Is this the first in a series of 12 treatments?  No    History and Physical: Reviewed in medical record    Consent: Informed consent was signed by: patient    Date Consent Signed: 12/3/18      Allergies   Allergen Reactions     Phenothiazines Other (See Comments)     Compazine. \"My tongue goes back\"       Weight:  177 lbs 3.2 oz    Patient Preparations: Other (comment)         Indications for ECT:   Medications ineffective and History of good ECT response in one or more previous episodes of illness         Clinical Narrative:   Patient is admitted with worsening depression, SI and inability to function.  She's continuing ECT for depression.   NPO after 2400.  Alert and Oriented x 3.  Mood is depressed, no better.  No problems with the last ECT.           Diagnosis:   Major depression         Pause for the Cause:     Right patient Yes   Right procedure/laterality settings: Yes          Intra-Procedure Documentation:     ECT #: 2   Treatment number this series: 2   Total " treatment number: Many (past ECT)     Type of ECT:  Right, unilateral ultrabrief    ECT Medications:    Caffeine:  500mg  Brevital: 80mg  Succinyl Choline: 80mg    ECT Strip Summary:   Energy Level: 40 percent  Motor Seizure Duration:  29 seconds  EEG Seizure Duration:   40 seconds    Complications: No    Plan:   Next ECT 12/7/18    James Shannon MD

## 2018-12-05 NOTE — ANESTHESIA PREPROCEDURE EVALUATION
Anesthesia Pre-Procedure Evaluation    Patient: Daysi Ashley   MRN:     [id number] Gender:   female   Age:    58 year old :      1960        Preoperative Diagnosis: * No pre-op diagnosis entered *   * No procedures listed *     Past Medical History:   Diagnosis Date     311      Allergic rhinitis, cause unspecified      Migraine, unspecified, without mention of intractable migraine without mention of status migrainosus      Rapid weight loss 2013      Past Surgical History:   Procedure Laterality Date     APPENDECTOMY       C NONSPECIFIC PROCEDURE      Appendectomy      SECTION   &      FOOT SURGERY      right     HERNIORRHAPHY UMBILICAL  2000     ORTHOPEDIC SURGERY            Anesthesia Evaluation     . Pt has had prior anesthetic. Type: General           ROS/MED HX    ENT/Pulmonary:  - neg pulmonary ROS     Neurologic:  - neg neurologic ROS     Cardiovascular:  - neg cardiovascular ROS   (+) ----. : . . . :. . Previous cardiac testing date:results:date: results:ECG reviewed date: results: date: results:          METS/Exercise Tolerance:  >4 METS   Hematologic:  - neg hematologic  ROS       Musculoskeletal:  - neg musculoskeletal ROS       GI/Hepatic:  - neg GI/hepatic ROS       Renal/Genitourinary:  - ROS Renal section negative       Endo:         Psychiatric:     (+) psychiatric history depression (polysubstance abuse)      Infectious Disease:  - neg infectious disease ROS       Malignancy:      - no malignancy   Other:    - neg other ROS                     PHYSICAL EXAM:   Mental Status/Neuro: A/A/O   Airway: Facies: Feasible  Mallampati: I  Mouth/Opening: Full  TM distance: > 6 cm  Neck ROM: Full   Respiratory: Auscultation: CTAB     Resp. Rate: Normal     Resp. Effort: Normal      CV: Rhythm: Regular  Rate: Age appropriate  Heart: Normal Sounds   Comments:      Dental: Normal                  Lab Results   Component Value Date    WBC 2.0 (L) 2018    HGB 11.7 2018  "   HCT 36.2 12/04/2018     12/04/2018    CRP 14.4 (H) 04/20/2012    SED 10 04/20/2012     12/05/2018    POTASSIUM 4.8 12/05/2018    CHLORIDE 108 12/05/2018    CO2 31 12/05/2018    BUN 28 12/05/2018    CR 1.17 (H) 12/05/2018    GLC 90 12/05/2018    MAME 8.5 12/05/2018    ALBUMIN 3.6 11/30/2018    PROTTOTAL 7.0 11/30/2018    ALT 17 11/30/2018    AST 14 11/30/2018    GGT 48 (H) 03/28/2006    ALKPHOS 76 11/30/2018    BILITOTAL 0.3 11/30/2018    TSH 0.70 11/30/2018    HCG Negative 03/27/2006       Preop Vitals  BP Readings from Last 3 Encounters:   12/03/18 118/65   07/26/17 124/74   03/18/14 120/70    Pulse Readings from Last 3 Encounters:   12/03/18 79   07/26/17 97   03/18/14 76      Resp Readings from Last 3 Encounters:   12/05/18 16   07/26/17 16   03/18/14 16    SpO2 Readings from Last 3 Encounters:   12/05/18 98%   07/26/17 99%   10/14/13 100%      Temp Readings from Last 1 Encounters:   12/05/18 36.4  C (97.6  F) (Oral)    Ht Readings from Last 1 Encounters:   07/26/17 1.626 m (5' 4\")      Wt Readings from Last 1 Encounters:   12/04/18 80.4 kg (177 lb 3.2 oz)    Estimated body mass index is 30.42 kg/(m^2) as calculated from the following:    Height as of 7/26/17: 1.626 m (5' 4\").    Weight as of this encounter: 80.4 kg (177 lb 3.2 oz).     LDA:  Peripheral IV Right Lower forearm (Active)   Site Assessment WDL 12/3/2018  5:00 PM   Line Status Saline locked 12/3/2018  5:00 PM   Phlebitis Scale 0-->no symptoms 12/3/2018  5:00 PM   Infiltration Scale 0 12/3/2018  5:00 PM   Infiltration Site Treatment Method  None 12/3/2018  5:00 PM   Extravasation? No 12/3/2018  5:00 PM   Dressing Intervention Other (Comment) 12/3/2018  5:00 PM   Number of days:            Assessment:   ASA SCORE: 2    NPO Status: > 6 hours since completed Solid [employer]   Documentation: H&P complete; Preop Testing complete; Consents complete   Proceeding: Proceed without further delay  Tobacco Use:  NO Active use of Tobacco/UNKNOWN Tobacco " use status     Plan:   Anes. Type:  General   Pre-Induction: Midazolam IV; Acetaminophen PO   Induction:  IV (Standard)   Airway: Oral ETT   Access/Monitoring: PIV   Maintenance: Balanced   Emergence: Procedure Site   Logistics: Same Day Surgery     Postop Pain/Sedation Strategy:  Standard-Options: Opioids PRN     PONV Management:  Adult Risk Factors: Female, Non-Smoker, Postop Opioids     CONSENT: Direct conversation                               Gary Villa MD, MD

## 2018-12-05 NOTE — ANESTHESIA POSTPROCEDURE EVALUATION
Anesthesia POST Procedure Evaluation    Patient: Daysi Ashley   MRN:     [id number] Gender:   female   Age:    58 year old :      1960        Preoperative Diagnosis: * No pre-op diagnosis entered *   * No procedures listed *   Postop Comments: No value filed.       Anesthesia Type:  General    Reportable Event: NO     PAIN: Uncomplicated   Sign Out status: Comfortable, Well controlled pain     PONV: No PONV   Sign Out status:  No Nausea or Vomiting     Neuro/Psych: Uneventful perioperative course   Sign Out Status: Preoperative baseline; Age appropriate mentation     Airway/Resp.: Uneventful perioperative course   Sign Out Status: Non labored breathing, age appropriate RR; Resp. Status within EXPECTED Parameters     CV: Uneventful perioperative course   Sign Out status: Appropriate BP and perfusion indices; Appropriate HR/Rhythm     Disposition:   Sign Out in:  PACU  Recovery Course: Uneventful  Follow-Up: Not required           Last Anesthesia Record Vitals:      Last PACU/Preop Vitals:  Vitals:    18 0757 18 1600 18 0950   BP:      Pulse:      Resp: 16 16 16   Temp: 36.8  C (98.3  F) 37.2  C (99  F) 36.4  C (97.6  F)   SpO2:   98%         Electronically Signed By: Gary Villa MD, MD, 2018, 12:37 PM

## 2018-12-05 NOTE — OR NURSING
Patient adequate for discharge. Report given to RN providing transport - Amina, ECT, RN. Awaiting call from inpatient RN.  VSS, A/O, IV removed. Discharged with staff at this time. 650mg tylenol given at 1330.

## 2018-12-05 NOTE — PROGRESS NOTES
"[facility], [place]   Psychiatric Progress Note  Daysi Ashley  [id number]  12/05/18    Chief Complaint: Continued medical care          Interim History:   The patient's care was discussed with the treatment team during the daily team meeting and/or staff's chart notes were reviewed.  Staff report patient has been feeling blah and been keeping to herself describes recent hard times and slept about 8 hours.    Upon meeting with her says that she is irritated she has not gone for electroconvulsive therapy yet apparently they are backed up.  She still has sadness energy problems attention concentration issues and her sleep is still difficult.  She has noise from the nursing station and feels overly hot at night.  She does not have any thoughts of harming others or any muscle problems or soreness excluding her foot from the ECT.  No memory gaps or hallucinations or paranoia.  Her appetite is adequate and she does not have any grandiose ideas about herself or guilty feelings.  She does not have any rambling thoughts.  She is hoping to move rooms away from the nursing station and later asked the nurse for some as needed trazodone if she needs it at night.         Medications:       ARIPiprazole  20 mg Oral At Bedtime     caffeine-sodium benzoate  500 mg Intravenous Once in ECT     clonazePAM  4 mg Oral At Bedtime     melatonin  6 mg Oral At Bedtime     venlafaxine  300 mg Oral At Bedtime          Allergies:     Allergies   Allergen Reactions     Phenothiazines Other (See Comments)     Compazine. \"My tongue goes back\"          Labs:     Recent Results (from the past 24 hour(s))   Basic metabolic panel    Collection Time: 12/05/18  7:51 AM   Result Value Ref Range    Sodium 143 133 - 144 mmol/L    Potassium 4.8 3.4 - 5.3 mmol/L    Chloride 108 94 - 109 mmol/L    Carbon Dioxide 31 20 - 32 mmol/L    Anion Gap 4 3 - 14 mmol/L    Glucose 90 70 - 99 mg/dL    Urea Nitrogen 28 7 - 30 mg/dL    Creatinine " 1.17 (H) 0.52 - 1.04 mg/dL    GFR Estimate 47 (L) >60 mL/min/1.7m2    GFR Estimate If Black 57 (L) >60 mL/min/1.7m2    Calcium 8.5 8.5 - 10.1 mg/dL          Psychiatric Examination:     /65  Pulse 79  Temp 97.6  F (36.4  C) (Oral)  Resp 16  Wt 80.4 kg (177 lb 3.2 oz)  LMP 04/04/2012  SpO2 98%  Breastfeeding? No  BMI 30.42 kg/m2  Weight is 177 lbs 3.2 oz  Body mass index is 30.42 kg/(m^2).                Sitting Orthostatic BP: 120/58      Sitting Orthostatic Pulse: 71 bpm      Standing Orthostatic BP: 134/54      Standing Orthostatic Pulse: 85 bpm       Weight over time:  Vitals:    11/30/18 1819 11/30/18 2201 12/02/18 0818 12/04/18 0757   Weight: 81.1 kg (178 lb 12.8 oz) 78.4 kg (172 lb 14.4 oz) 78.8 kg (173 lb 12.8 oz) 80.4 kg (177 lb 3.2 oz)       Orthostatic Vitals       Most Recent      Sitting Orthostatic /58 12/05 0950    Sitting Orthostatic Pulse (bpm) 71 12/05 0950    Standing Orthostatic /54 12/05 0950    Standing Orthostatic Pulse (bpm) 85 12/04 1600            Daysi 58-year-old female with gray hair wearing a robe.  Her speech is of an appropriate rate and tone in her language is intact.  Her behavior is appropriate and she does not have any abnormal movements.  Her affect is neutral and flat though she does smile slightly at times.  Her mood she describes as sad.  Her thought content consists of the above without thoughts of harming herself or others or current delusions.  Her thought process is logical without looseness of association.  She does not have any abnormal perceptions.  She is alert and aware of her current location and circumstances.  Her attention and concentration appear adequate.  Her cognition and fund of knowledge appear normal.  Her long-term/short-term/remote memory appear intact.  Her insight and judgment are both fair.         Precautions:     Behavioral Orders   Procedures     Code 2     Electroconvulsive therapy     Series of up to 12 treatments. Begin  Date: 12/3/2018  Treating Psychiatrist providing ECT:  Dr Shannon  Notified on:  December 1, 2018     Electroconvulsive therapy     ECT every Monday, Wednesday, and Friday     Electroconvulsive therapy     Series of up to 12 treatments. Begin Date: 12/3/18     Treating Psychiatrist providing ECT:  Mirtha     Notified on:  12/2/18     Fall precautions     Routine Programming     As clinically indicated     Status 15     Every 15 minutes.          DIagnoses:     Major depressive disorder severe recurrent  Generalized anxiety disorder  History of OCD  Cocaine use disorder  Cannabis use disorder         Assessment & Plan:     Daysi has continued to receive electroconvulsive therapy and is awaiting her next treatment.  She has not noticed any severe side effects other than some muscle soreness of her right leg and headache after treatment.  She has not noticed any memory issues or problems and feels as though her medications are still adequate at this point.  She is having difficulty sleeping and we talked about potentially moving her room in the future if we are able to do so.  We will continue her current series of electroconvulsive therapy.    Continue voluntary hospitalization  Increased hydroxyzine to 50 and trazodone was added if she would like to take it to assist with sleep    The risks, benefits, alternatives and side effects have been discussed and are understood by the patient and other caregivers.      Uriel Banks  [facility]      The following document has been created with voice recognition software and may contain unintentional word substitutions.    Non clinically relevant CMS requirements:  Clinical Global Impressions  First:  Considering your total clinical experience with this particular patient population, how severe are the patient's symptoms at this time?: 7 (12/01/18 2935)  Compared to the patient's condition at the START of treatment, this patient's condition is:: 4  (12/01/18 1456)  Most recent:  Considering your total clinical experience with this particular patient population, how severe are the patient's symptoms at this time?: 7 (12/01/18 1456)  Compared to the patient's condition at the START of treatment, this patient's condition is:: 4 (12/01/18 1456)

## 2018-12-05 NOTE — PROGRESS NOTES
Returned from ECT at this time.  States has a slight headache and slight nausea, is eating lunch and taking fluids.  Alert and oriented X3.

## 2018-12-05 NOTE — PLAN OF CARE
"Problem: Mood Impairment (Depressive Signs/Symptoms) (Adult)  Goal: Improved Mood Symptoms (Depressive Signs/Symptoms)  Outcome: Improving   12/05/18 1430   Improved Mood Symptoms (Depressive Signs/Symptoms)   Improved Mood Symptoms Time Frame for Action Step (STG) other (see comments)   Improved Mood Symptoms Action Step (STG) Outcome making progress toward outcome     48 hour nursing observation     Major depressive disorder, recurrent episode, moderate (H) [F33.1]  PTSD (post-traumatic stress disorder) [F43.10]    Admit Date: 11/30/2018    Patient evaluation continues. Assessed mood,anxiety,thoughts and behavior. Patient is slowly progressing towards goals. Patient is encouraged to participate in groups and assisted to develop healthy coping skills.      /83  Pulse 78  Temp 97.2  F (36.2  C)  Resp 16  Wt 80.4 kg (177 lb 3.2 oz)  LMP 04/04/2012  SpO2 99%  Breastfeeding? No  BMI 30.42 kg/m2    Patient reports depression level of 4-5 and anxiety level of  2 with 10 being the worst. Patient's affect is blunted, flat but does brighten some upon approach. Patient denies SI, denies SIB, denies HI and when asked if feeling hopeful about the future, pt replied, \"I don't know yet\". Patient states concentration is \"bad\" and endorses thoughts going \"too fast\".  Patient denies auditory or visual hallucinations. Patient reports sleeping from [id number] last night \"but then I was awake the rest of the night, it was really noisy at the desk\".  Writer instructed pt to tell staff if she is awake so they can record her sleep hours more accurately and to check with nurse if she has any meds that might help her sleep during the night.  Pt stated, \"I took Trazodone a long time ago, maybe that would help\".  This info was passed on to Dr Lee. Writer spoke with one of the night RN's, Claudia, today about pt's request for staff to try and be quieter at the desk. Appetite \"too good\".  Patient is compliant with medications " "and side effects reported are none. Patient attended no groups this AM, rested in bed until taken to ECT#2 at 1230.      Pt returned at 1400.  B/P elevated some (see flowsheet) and c/o mild HA but was already given Tylenol 650 mg in ECT Dept at 1330.  Pt ate lunch and took fluids.  Alert and oriented X 3 upon her return. ADLs are adequate.  Pt stated she had some mild right foot aching-\"I have a pin in the foot and in ECT on Monday they had tied a band around my lower leg\".  Writer notified Tonya, ECT RN to request that it be placed on her left leg and Tonya said she will make a note about that. Refer to daily team meeting notes for individualized plan of care. Nursing will continue to observe and assess, offer support and encouragement.              "

## 2018-12-05 NOTE — PROGRESS NOTES
"Pt had a calm shift. Pt was present in the milieu. Pt kept to herself for the most part but brighten up upon approach. At check in pt stated that she was board and feeling \"blah\". Pt ranked her depression at a 5 (on a scale 1-10,10 being the worse) and anxiety at a 5 as well. Pt denied any SI/SIB/AH/ and VH. Pt stated that she has no question or concerns at this time.        12/04/18 4458   Behavioral Health   Hallucinations denies / not responding to hallucinations   Thinking poor concentration   Orientation person: oriented;place: oriented;date: oriented;time: oriented   Memory baseline memory   Insight insight appropriate to situation   Judgement impaired   Eye Contact at examiner   Affect blunted, flat   Mood mood is calm   Hygiene well groomed   Suicidality other (see comments)  (Pt denies )   1. Wish to be Dead No   2. Non-Specific Active Suicidal Thoughts  No   Self Injury other (see comment)  (pt denies )   Elopement (none observed )   Activity other (see comment)  (present in milieu)   Speech clear;coherent   Medication Sensitivity no observed side effects;no stated side effects   Psychomotor / Gait steady;balanced   Activities of Daily Living   Hygiene/Grooming independent   Oral Hygiene independent   Dress independent   Laundry unable to complete   Room Organization independent     "

## 2018-12-06 LAB
ANION GAP SERPL CALCULATED.3IONS-SCNC: 6 MMOL/L (ref 3–14)
BUN SERPL-MCNC: 28 MG/DL (ref 7–30)
CALCIUM SERPL-MCNC: 8.7 MG/DL (ref 8.5–10.1)
CHLORIDE SERPL-SCNC: 107 MMOL/L (ref 94–109)
CO2 SERPL-SCNC: 29 MMOL/L (ref 20–32)
CREAT SERPL-MCNC: 1.14 MG/DL (ref 0.52–1.04)
GFR SERPL CREATININE-BSD FRML MDRD: 49 ML/MIN/1.7M2
GLUCOSE SERPL-MCNC: 90 MG/DL (ref 70–99)
POTASSIUM SERPL-SCNC: 4.5 MMOL/L (ref 3.4–5.3)
SODIUM SERPL-SCNC: 142 MMOL/L (ref 133–144)

## 2018-12-06 PROCEDURE — G0177 OPPS/PHP; TRAIN & EDUC SERV: HCPCS

## 2018-12-06 PROCEDURE — 25000132 ZZH RX MED GY IP 250 OP 250 PS 637: Performed by: PSYCHIATRY & NEUROLOGY

## 2018-12-06 PROCEDURE — 80048 BASIC METABOLIC PNL TOTAL CA: CPT | Performed by: NURSE PRACTITIONER

## 2018-12-06 PROCEDURE — 12400007 ZZH R&B MH INTERMEDIATE UMMC

## 2018-12-06 PROCEDURE — 36415 COLL VENOUS BLD VENIPUNCTURE: CPT | Performed by: NURSE PRACTITIONER

## 2018-12-06 PROCEDURE — 85025 COMPLETE CBC W/AUTO DIFF WBC: CPT | Performed by: NURSE PRACTITIONER

## 2018-12-06 RX ADMIN — MELATONIN 6 MG: 3 TAB ORAL at 19:05

## 2018-12-06 RX ADMIN — VENLAFAXINE HYDROCHLORIDE 300 MG: 150 TABLET, EXTENDED RELEASE ORAL at 19:04

## 2018-12-06 RX ADMIN — ARIPIPRAZOLE 20 MG: 10 TABLET ORAL at 19:04

## 2018-12-06 RX ADMIN — CLONAZEPAM 4 MG: 2 TABLET ORAL at 17:48

## 2018-12-06 ASSESSMENT — ACTIVITIES OF DAILY LIVING (ADL)
LAUNDRY: WITH SUPERVISION
DRESS: INDEPENDENT
DRESS: INDEPENDENT
HYGIENE/GROOMING: INDEPENDENT
ORAL_HYGIENE: INDEPENDENT
LAUNDRY: WITH SUPERVISION
GROOMING: INDEPENDENT
ORAL_HYGIENE: INDEPENDENT

## 2018-12-06 NOTE — PROGRESS NOTES
Met with the patient to discuss her progress.  She is feeling a bit better today.  Has no idea how many more ECT treatments she will be getting but has only had two so far.  Dr. Rafal Roa is her outpatient psychiatrist and she sees him at [facility] on [facility] in [place].  He usually keeps a 1pm appt open for her on Fridays.  She has seen him for 27 years here when he was on staff at [facility].

## 2018-12-06 NOTE — PROGRESS NOTES
Pt was sleeping until noon this morning except for coming out for breakfast after which she was present in the [facility]. Pt stated her mood was much better than it has been in a long time , rating depression at a low 4/10 and no anxiety at all. Pt feels she can do things without feeling too overwhelmed which is a good feeling for her and wants to keep her mood up for as long as she can. Pt denies SI/SiB by saying not today. Pt denies AH/VH.       12/06/18 1419   Behavioral Health   Hallucinations denies / not responding to hallucinations   Thinking intact   Orientation person: oriented;place: oriented;date: oriented;time: oriented   Memory baseline memory   Insight admits / accepts   Judgement intact   Eye Contact at examiner   Affect full range affect   Mood mood is calm   Physical Appearance/Attire attire appropriate to age and situation   Hygiene well groomed   Suicidality (Pt denies)   1. Wish to be Dead No   Wish to be Dead Description Not today   2. Non-Specific Active Suicidal Thoughts  No   Non-Specific Active Suicidal Thought Description Not today   Self Injury (Pt denies)   Elopement (no concerning thoughts or behaviours)   Activity isolative;other (see comment)   Speech clear   Medication Sensitivity no stated side effects   Psychomotor / Gait slow;balanced   Activities of Daily Living   Hygiene/Grooming independent   Oral Hygiene independent   Dress independent   Laundry with supervision   Room Organization independent

## 2018-12-06 NOTE — PROGRESS NOTES
12/05/18 0837   Behavioral Health   Hallucinations denies / not responding to hallucinations   Thinking intact   Orientation person: oriented;place: oriented;date: oriented   Memory baseline memory   Insight admits / accepts   Judgement intact   Eye Contact cultural specific   Affect full range affect   Mood depressed;mood is calm   Physical Appearance/Attire attire appropriate to age and situation   Hygiene well groomed   1. Wish to be Dead No   2. Non-Specific Active Suicidal Thoughts  No   Medication Sensitivity no stated side effects   Psychomotor / Gait slow;balanced;steady   Safety   Suicidality Status 15   Psycho Education   Type of Intervention 1:1 intervention   Response participates, initiates socially appropriate   Hours 0.5   Treatment Detail IMR   Activities of Daily Living   Hygiene/Grooming independent   Oral Hygiene independent   Dress independent   Room Organization independent   Pt ate meals and at times was social with peers. Pt states that she believes mood has been improving. Pt states she has no thought or desire to harm self or others.

## 2018-12-07 ENCOUNTER — ANESTHESIA EVENT (OUTPATIENT)
Dept: BEHAVIORAL HEALTH | Facility: CLINIC | Age: 58
End: 2018-12-07

## 2018-12-07 ENCOUNTER — ANESTHESIA (OUTPATIENT)
Dept: BEHAVIORAL HEALTH | Facility: CLINIC | Age: 58
End: 2018-12-07

## 2018-12-07 VITALS
BODY MASS INDEX: 30.57 KG/M2 | SYSTOLIC BLOOD PRESSURE: 134 MMHG | RESPIRATION RATE: 18 BRPM | DIASTOLIC BLOOD PRESSURE: 73 MMHG | WEIGHT: 178.1 LBS | OXYGEN SATURATION: 97 % | TEMPERATURE: 97.7 F | HEART RATE: 102 BPM

## 2018-12-07 PROCEDURE — GZB0ZZZ ELECTROCONVULSIVE THERAPY, UNILATERAL-SINGLE SEIZURE: ICD-10-PCS | Performed by: PSYCHIATRY & NEUROLOGY

## 2018-12-07 PROCEDURE — 25000128 H RX IP 250 OP 636: Performed by: ANESTHESIOLOGY

## 2018-12-07 PROCEDURE — 90870 ELECTROCONVULSIVE THERAPY: CPT

## 2018-12-07 PROCEDURE — 99239 HOSP IP/OBS DSCHRG MGMT >30: CPT | Mod: 25 | Performed by: PSYCHIATRY & NEUROLOGY

## 2018-12-07 PROCEDURE — 25000125 ZZHC RX 250: Performed by: PSYCHIATRY & NEUROLOGY

## 2018-12-07 PROCEDURE — 25000132 ZZH RX MED GY IP 250 OP 250 PS 637: Performed by: PSYCHIATRY & NEUROLOGY

## 2018-12-07 PROCEDURE — 25000125 ZZHC RX 250: Performed by: ANESTHESIOLOGY

## 2018-12-07 PROCEDURE — 25000128 H RX IP 250 OP 636: Performed by: PSYCHIATRY & NEUROLOGY

## 2018-12-07 RX ORDER — KETOROLAC TROMETHAMINE 30 MG/ML
30 INJECTION, SOLUTION INTRAMUSCULAR; INTRAVENOUS
Status: COMPLETED | OUTPATIENT
Start: 2018-12-07 | End: 2018-12-07

## 2018-12-07 RX ORDER — CAFFEINE AND SODIUM BENZOATE 125 MG/ML
500 INJECTION, SOLUTION INTRAMUSCULAR; INTRAVENOUS
Status: COMPLETED | OUTPATIENT
Start: 2018-12-07 | End: 2018-12-07

## 2018-12-07 RX ORDER — LANOLIN ALCOHOL/MO/W.PET/CERES
6 CREAM (GRAM) TOPICAL AT BEDTIME
COMMUNITY
Start: 2018-12-07 | End: 2019-04-26

## 2018-12-07 RX ORDER — SUMATRIPTAN 25 MG/1
25 TABLET, FILM COATED ORAL ONCE
Status: COMPLETED | OUTPATIENT
Start: 2018-12-07 | End: 2018-12-07

## 2018-12-07 RX ORDER — CAFFEINE AND SODIUM BENZOATE 125 MG/ML
500 INJECTION, SOLUTION INTRAMUSCULAR; INTRAVENOUS
Status: CANCELLED
Start: 2018-12-07 | End: 2018-12-07

## 2018-12-07 RX ADMIN — SUMATRIPTAN SUCCINATE 25 MG: 25 TABLET, FILM COATED ORAL at 11:47

## 2018-12-07 RX ADMIN — KETOROLAC TROMETHAMINE 30 MG: 30 INJECTION, SOLUTION INTRAMUSCULAR at 08:54

## 2018-12-07 RX ADMIN — CAFFEINE AND SODIUM BENZOATE 500 MG: 125 INJECTION, SOLUTION INTRAMUSCULAR; INTRAVENOUS at 08:16

## 2018-12-07 RX ADMIN — Medication 80 MG: at 08:26

## 2018-12-07 RX ADMIN — METHOHEXITAL SODIUM 80 MG: 500 INJECTION, POWDER, LYOPHILIZED, FOR SOLUTION INTRAMUSCULAR; INTRAVENOUS; RECTAL at 08:23

## 2018-12-07 RX ADMIN — ACETAMINOPHEN 650 MG: 325 TABLET, FILM COATED ORAL at 10:36

## 2018-12-07 ASSESSMENT — ACTIVITIES OF DAILY LIVING (ADL)
GROOMING: INDEPENDENT
DRESS: INDEPENDENT
ORAL_HYGIENE: INDEPENDENT

## 2018-12-07 NOTE — DISCHARGE SUMMARY
[facility], [place]   Psychiatric Discharge Summary  Time: 37 minutes on encounter.    Daysi Ashley MRN# [id number]   Age: 58 year old YOB: 1960     Date of Admission:  11/30/2018  Date of Discharge:  12/07/18  Admitting Physician:  Uriel Lee  Discharge Physician:  Uriel Lee         Event Leading to Hospitalization and Hospital Course:   Daysi Ashley was admitted for worsening stress and thoughts of suicide.       See Admission note by Tatiana on 12/1 for additional details.     Daysi Ashley was hospitalized voluntarily primarily to initiate electroconvulsive therapy.  She completed 3 treatments of electroconvulsive therapy and was participating in activities on the unit and felt as though she was improved.  No medication changes or adjustments were made during her hospital stay and she participated adequately on the unit.  On day of discharge she reported feeling better and had anxiety about continuing electroconvulsive therapy.  She was found to have leukopenia during her hospitalization and she was open to going to day treatment services outside of the hospital and possibly continuing outpatient electroconvulsive therapy.  We did discuss that it is important that someone bring her and monitor her after electroconvulsive therapy treatments if she is going to receive them in the outpatient setting.  The only side effect she has had from electroconvulsive therapy was headaches which resolved with acetaminophen.  On day of discharge she denied any thoughts of harming herself or others and is future oriented without current concerns.  Denies any hallucinations or paranoia or any grandiose ideas about herself or delusional content.  She does not have any appetite issues or any guilty feelings and is planning on following up in the outpatient setting with her psychiatrist.  We discussed safety planning in detail and she was prepared for discharge.            DIagnoses:   Major depressive disorder severe recurrent  Generalized anxiety disorder  History of OCD  Cocaine use disorder  Cannabis use disorder         Labs:   No results found for this or any previous visit (from the past 24 hour(s)).         Consults:   Assessment:   1) Severe depression with planned ECT tentatively scheduled for tomorrow am. Patient does not have absolute medical contraindications to proceeding with ECT at this time. Treatment plan per psychiatry.   2) Mild SHAHNAZ on admission labs likely prerenal and due to mild dehydration  3) Marked Leukopenia on admission labs of unclear etiology or significance at this time. Pt noted to have a WBC of 1.6 last Jan., 2018, however, she denies hx of hematologic disorders     Recommendations:  ECT per psychiatry. Repeat BMP tomorrow am. In interim, encourage pt to increase her PO water intake. Obtain repeat WBC w/ differential tomorrow am w/ peripheral smear. Medicine will continue to follow and f/u on results. Please feel free to call with questions.            Discharge Medications:        Review of your medicines      START taking       Dose / Directions    melatonin 3 MG tablet        Dose:  6 mg   Take 2 tablets (6 mg) by mouth At Bedtime   Refills:  0         CONTINUE these medicines which may have CHANGED, or have new prescriptions. If we are uncertain of the size of tablets/capsules you have at home, strength may be listed as something that might have changed.       Dose / Directions    ARIPiprazole 20 MG tablet   Commonly known as:  ABILIFY   This may have changed:  Another medication with the same name was removed. Continue taking this medication, and follow the directions you see here.        Dose:  20 mg   Take 20 mg by mouth At Bedtime (take in addition to aripiprazole 5 mg for total daily dose of 25 mg)   Refills:  0         CONTINUE these medicines which have NOT CHANGED       Dose / Directions    clonazePAM 2 MG tablet   Commonly known as:   klonoPIN        Dose:  4-6 mg   Take 4-6 mg by mouth At Bedtime   Refills:  0       venlafaxine 150 MG 24 hr capsule   Commonly known as:  EFFEXOR-XR        Dose:  300 mg   Take 300 mg by mouth At Bedtime   Refills:  0                  Mental Status Examination:   Daysi 58-year-old female with gray hair.  Her speech is of an appropriate rate and tone in her language is intact.  Her behavior is appropriate and she does not have any abnormal movements.  Her affect is neutral and flat though she does smile slightly at times.  Her mood she describes as better.  Her thought content consists of the above without thoughts of harming herself or others or current delusions.  Her thought process is logical without looseness of association.  She does not have any abnormal perceptions.  She is alert and aware of her current location and circumstances.  Her attention and concentration appear adequate.  Her cognition and fund of knowledge appear normal.  Her long-term/short-term/remote memory appear intact.  Her insight and judgment are both fair.         Discharge Plan:     BEHAVIORAL HEALTH OUTPATIENT PROGRAM     Reason for your hospital stay   Worsening depression     Activity   Your activity upon discharge: activity as tolerated     Discharge Instructions   1. Please do not harm yourself or others.  2. Please continue to take your medications.  3. Please follow up with your outpatient care team.  4. Please do not take drugs or alcohol as this will worsen your condition.  5. Please do not take more than the prescribed doses of medications as this may make them dangerous.   6. Please follow your safety plan of action.  7. Please call crisis if having trouble.  8. If having thoughts of harming self or others please come in to the emergency department as soon as possible.     Full Code     Electroconvulsive therapy     Diet   Follow this diet upon discharge: Orders Placed This Encounter     Regular Diet Adult             Further  instructions from your care team        Behavioral Discharge Planning and Instructions      Summary:  You were admitted on 11/30/2018  due to increasing depression..  You were treated by Dr Uriel Lee and discharged on 12/8/18 from [facility] to your home.      Principal Diagnosis: Major Depressive Disorder, Recurrent, Severe      Health Care Follow-up Appointments:   Dr. Rafal Roa - Standing appointment on Fridays at 1pm  [address]  [phone]  FAX: [phone]    We discussed the 55+ Outpatient Day Treatment Program here at [facility].      Attend all scheduled appointments with your outpatient providers. Call at least 24 hours in advance if you need to reschedule an appointment to ensure continued access to your outpatient providers.   Major Treatments, Procedures and Findings:  You were provided with: a psychiatric assessment, assessed for medical stability, medication evaluation and/or management, group therapy and milieu management    Symptoms to Report: losing more sleep, mood getting worse or thoughts of suicide    Early warning signs can include: increased depression or anxiety sleep disturbances    Safety and Wellness:  Take all medicines as directed.  Make no changes unless your doctor suggests them.      Follow treatment recommendations.  Refrain from alcohol and non-prescribed drugs.  If there is a concern for safety, call [phone].    Resources:   Crisis Intervention: 762.322.9560 or 037-986-9797 (TTY: 472.376.5378).  Call anytime for help.  National [place] on Mental Illness (www.mn.felix.org): 380.977.6730 or 446-241-3911.    The treatment team has appreciated the opportunity to work with you.     If you have any questions or concerns our unit number is [phone].             Please send copy to Rafal Roa    During hospitalizations patients have perpetuating, complicating, situational, acute, and chronic conditions that lead to risk for suicidality or  homicidality. During hospitalizations we mitigate any modifiable risk factors that may have been identified in the history and physical examination and throughout the hospitalization. Chronic non-modifiable risk factors are not able to be changed with acute hospitalization. As a patient that is discharging these risk factors have been modified as much as possible and a supportive network and safety plan has been put in place. Further modifications and assistance will have to be obtained in the outpatient setting and the inpatient hospitalization team is no longer responsible for outcomes.    Uriel Banks  [facility]      The following document has been created with voice recognition software and may contain unintentional word substitutions.      Non clinically relevant CMS requirements:  Clinical Global Impressions  First:  Considering your total clinical experience with this particular patient population, how severe are the patient's symptoms at this time?: 7 (12/01/18 1456)  Compared to the patient's condition at the START of treatment, this patient's condition is:: 4 (12/01/18 1456)  Most recent:  Considering your total clinical experience with this particular patient population, how severe are the patient's symptoms at this time?: 7 (12/01/18 1456)  Compared to the patient's condition at the START of treatment, this patient's condition is:: 4 (12/01/18 1456)

## 2018-12-07 NOTE — PROGRESS NOTES
Patient asked to meet with me earlier today and reported that she had her ECT today and wants to be discharged.  She does not want any further outpatient ECT treatments.  Patient lives alone and has no one to supervise her so explained that usually, a patient goes home the next day.   Let her know that I would mention her request to Dr. Lee but she also needed to discuss it with him.  Patient also was interested in the 55+ [facility] Day Treatment here at [facility].  In Team, mentioned this to Dr. Lee who will discuss requests with her.

## 2018-12-07 NOTE — DISCHARGE INSTRUCTIONS
Behavioral Discharge Planning and Instructions      Summary:  You were admitted on 11/30/2018  due to increasing depression..  You were treated by Dr Uriel Lee and discharged on 12/7/18 from [facility] to your home.      Principal Diagnosis: Major Depressive Disorder, Recurrent, Severe      Health Care Follow-up Appointments:   Dr. Rafal Roa - Standing appointment on Fridays at 1pm  [address]  [phone]  FAX: [phone]    We discussed the 55+ Outpatient Day Treatment Program here at [facility]. Dr. Lee entered an Order and the Program should be calling you to set up an Intake.  If you don't hear from them by Wednesday, please call them at [phone] to inquire.      Attend all scheduled appointments with your outpatient providers. Call at least 24 hours in advance if you need to reschedule an appointment to ensure continued access to your outpatient providers.   Major Treatments, Procedures and Findings:  You were provided with: a psychiatric assessment, assessed for medical stability, medication evaluation and/or management, group therapy and milieu management    Symptoms to Report: losing more sleep, mood getting worse or thoughts of suicide    Early warning signs can include: increased depression or anxiety sleep disturbances    Safety and Wellness:  Take all medicines as directed.  Make no changes unless your doctor suggests them.      Follow treatment recommendations.  Refrain from alcohol and non-prescribed drugs.  If there is a concern for safety, call [phone].    Resources:   Crisis Intervention: 388.337.2337 or 421-012-5685 (TTY: 404.351.6377).  Call anytime for help.  National [place] on Mental Illness (www.mn.felix.org): 881.202.9472 or 645-156-8467.    The treatment team has appreciated the opportunity to work with you.     If you have any questions or concerns our unit number is [phone].

## 2018-12-07 NOTE — PROGRESS NOTES
Patient returned from ECT at about 0915. At this time she denied pain, nausea. Was able to eat breakfast. At 1030 she reported a headache and nausea. Patient was given 650 mg acetaminophen for headache and lemon lime soda for nausea. Patient requested imitrex (she does not have this medication ordered) as she believes the headache may become a migraine. Provider notified of request.

## 2018-12-07 NOTE — PROCEDURES
"Procedure/Surgery Information   [facility], [place]    Bedside Procedure Note  Date of Service (when I performed the procedure): 12/07/2018    Daysi Ashley is a 58 year old female patient.  1. PTSD (post-traumatic stress disorder)    2. Major depressive disorder, recurrent episode, moderate (H)    3. Major depressive disorder, single episode, moderate (H)    4. Severe recurrent major depression without psychotic features (H)      Past Medical History:   Diagnosis Date     311      Allergic rhinitis, cause unspecified      Migraine, unspecified, without mention of intractable migraine without mention of status migrainosus      Rapid weight loss 1/22/2013     Temp: 97.5  F (36.4  C) Temp src: Oral BP: 114/68 Pulse: 72   Resp: 18 SpO2: 97 % O2 Device: None (Room air)      Procedures     James Shannon     [facility], [place]   ECT Procedure Note  12/07/2018    Daysi Ashley [id number]   58 year old 1960     Patient Status: Inpatient    Is this the first in a series of 12 treatments?  No    History and Physical: Reviewed in medical record    Consent: Informed consent was signed by: patient    Date Consent Signed: 12/3/18      Allergies   Allergen Reactions     Phenothiazines Other (See Comments)     Compazine. \"My tongue goes back\"       Weight:  178 lbs 1.6 oz    Patient Preparations: Other (comment)         Indications for ECT:   Medications ineffective and History of good ECT response in one or more previous episodes of illness         Clinical Narrative:   Patient is admitted with worsening depression, SI and inability to function.  She's continuing ECT for depression.   NPO after 2400.  Alert and Oriented x 3.  Mood is 50% better.  No problems with the last ECT.           Diagnosis:   Major depression         Pause for the Cause:     Right patient Yes   Right procedure/laterality settings: Yes          Intra-Procedure Documentation:     ECT #: 3 "   Treatment number this series: 3   Total treatment number: Many (past ECT)     Type of ECT:  Right, unilateral ultrabrief    ECT Medications:    Caffeine:  500mg  Brevital: 80mg  Succinyl Choline: 80mg  Today:  Toradol: 30mg in recovery room for HA    ECT Strip Summary:   Energy Level: 50 percent  Motor Seizure Duration:  31 seconds  EEG Seizure Duration:   45 seconds    Complications: No    Plan:   Next ECT 12/10/18    James Shannon MD

## 2018-12-07 NOTE — PROGRESS NOTES
Discharge Note      Discharge instructions reviewed with patient, paper works signed, including future appointments. Pt left with all of her belongings. She denied depressive symptoms, suicidal and homocidal ideations, and was encouraged to repeat back her warning signs and how to effectively cope with them.

## 2018-12-07 NOTE — ANESTHESIA POSTPROCEDURE EVALUATION
Anesthesia POST Procedure Evaluation    Patient: Daysi Ashley   MRN:     [id number] Gender:   female   Age:    58 year old :      1960        Preoperative Diagnosis: * No pre-op diagnosis entered *   * No procedures listed *   Postop Comments: No value filed.       Anesthesia Type:  General    Reportable Event: NO     PAIN: Uncomplicated   Sign Out status: Comfortable, Well controlled pain     PONV: No PONV   Sign Out status:  No Nausea or Vomiting     Neuro/Psych: Uneventful perioperative course   Sign Out Status: Preoperative baseline; Age appropriate mentation     Airway/Resp.: Uneventful perioperative course   Sign Out Status: Non labored breathing, age appropriate RR; Resp. Status within EXPECTED Parameters     CV: Uneventful perioperative course   Sign Out status: Appropriate BP and perfusion indices; Appropriate HR/Rhythm     Disposition:   Sign Out in:  PACU  Disposition:  Phase II; Home  Recovery Course: Uneventful  Follow-Up: Not required           Last Anesthesia Record Vitals:  CRNA VITALS  2018 0802 - 2018 0847      2018             Pulse: 103    Ht Rate: 104    SpO2: 98 %    Resp Rate (set): 8          Last PACU/Preop Vitals:  Vitals:    18 0709 18 0830 18 0845   BP: 114/68 144/80 136/90   Pulse: 72 115 120   Resp: 18 28 20   Temp: 36.4  C (97.5  F) 36.6  C (97.8  F)    SpO2: 97% 93% 94%         Electronically Signed By: Debra Emanuel MD, 2018, 8:47 AM

## 2018-12-07 NOTE — PLAN OF CARE
"Problem: Mood Impairment (Depressive Signs/Symptoms) (Adult)  Goal: Improved Mood Symptoms (Depressive Signs/Symptoms)  Outcome: Therapy, progress toward functional goals as expected    RN ASSESSMENT    Pt visible in milieu. Pacing in hallway with peer and conversing casually. Affect flat but brightens appropriately on approach. Insight and judgment appropriate. Some short term memory deficits observed, but pt recalls events when reminded. Reports mood overall improved. Rates severity of depressive symptoms at 4/10 today compared to 8/10 on admission. Anxiety similarly improved. Reports worries about ECT procedure and \"thinking I'm going to die, but I know I won't. Any time I go under I think that.\" Also reports some concerns about aftercare arrangements but knows this concern is premature. Denies SI/SIB. Hygiene & appetite good. Sleep better last night. Klonopin given prior to 6 PM in anticipation of ECT in a.m. Continue with current treatment plan and recommendations. Continue to monitor and reassess symptoms. Monitor response to medications. Monitor progress towards treatment goals. Encourage groups and participation.      "

## 2018-12-07 NOTE — PLAN OF CARE
Problem: Mood Impairment (Depressive Signs/Symptoms) (Adult)  Goal: Improved Mood Symptoms (Depressive Signs/Symptoms)  Outcome: Improving  Patient has been resting in bed off and on throughout the shift. Has been up for meals and has made a few phone calls. States she feels tired after ECT. Had complained of a headache/migraine which was relieved with tylenol and imitrex. Patient states her mood has improved greatly. Currently rates her depression and anxiety at a 3/10. Denies suicidal ideation, plan, intent. Feels ready for discharge, provider has written orders for discharge tomorrow 12/8/18. Post-ECT instructions handout provided and questions answered. Patient is aware that it is recommended that she not drive for 7 days following a course of ECT and is planning accordingly.     Update: Patient was able to find a ride home this afternoon from a friend. Verbalizes readiness for discharge. Denies suicidal ideation, plan, intent. Has adequate support network and coping skills in place. No medications sent with patient as she has supply at home. All belongings returned to patient including those sent to security. Patient is discharged at this time.

## 2018-12-07 NOTE — ANESTHESIA PREPROCEDURE EVALUATION
Anesthesia Pre-Procedure Evaluation    Patient: Daysi Ashley   MRN:     [id number] Gender:   female   Age:    58 year old :      1960        Preoperative Diagnosis: * No pre-op diagnosis entered *   * No procedures listed *     Past Medical History:   Diagnosis Date     311      Allergic rhinitis, cause unspecified      Migraine, unspecified, without mention of intractable migraine without mention of status migrainosus      Rapid weight loss 2013      Past Surgical History:   Procedure Laterality Date     APPENDECTOMY       C NONSPECIFIC PROCEDURE      Appendectomy      SECTION   &      FOOT SURGERY      right     HERNIORRHAPHY UMBILICAL  2000     ORTHOPEDIC SURGERY            Anesthesia Evaluation     . Pt has had prior anesthetic. Type: General           ROS/MED HX    ENT/Pulmonary:  - neg pulmonary ROS     Neurologic:  - neg neurologic ROS     Cardiovascular:  - neg cardiovascular ROS   (+) ----. : . . . :. . Previous cardiac testing date:results:date: results:ECG reviewed date: results: date: results:          METS/Exercise Tolerance:  >4 METS   Hematologic:  - neg hematologic  ROS       Musculoskeletal:  - neg musculoskeletal ROS       GI/Hepatic:  - neg GI/hepatic ROS       Renal/Genitourinary:     (+) chronic renal disease, type: CRI, Pt does not require dialysis, Pt has no history of transplant,       Endo:         Psychiatric:     (+) psychiatric history depression (polysubstance abuse)      Infectious Disease:  - neg infectious disease ROS       Malignancy:      - no malignancy   Other:    - neg other ROS                     PHYSICAL EXAM:   Mental Status/Neuro: A/A/O   Airway: Facies: Feasible  Mallampati: I  Mouth/Opening: Full  TM distance: > 6 cm  Neck ROM: Full   Respiratory: Auscultation: CTAB     Resp. Rate: Normal     Resp. Effort: Normal      CV: Rhythm: Regular  Rate: Age appropriate  Heart: Normal Sounds   Comments:      Dental: Normal                  Lab  "Results   Component Value Date    WBC 2.0 (L) 12/04/2018    HGB 11.7 12/04/2018    HCT 36.2 12/04/2018     12/04/2018    CRP 14.4 (H) 04/20/2012    SED 10 04/20/2012     12/06/2018    POTASSIUM 4.5 12/06/2018    CHLORIDE 107 12/06/2018    CO2 29 12/06/2018    BUN 28 12/06/2018    CR 1.14 (H) 12/06/2018    GLC 90 12/06/2018    MAME 8.7 12/06/2018    ALBUMIN 3.6 11/30/2018    PROTTOTAL 7.0 11/30/2018    ALT 17 11/30/2018    AST 14 11/30/2018    GGT 48 (H) 03/28/2006    ALKPHOS 76 11/30/2018    BILITOTAL 0.3 11/30/2018    TSH 0.70 11/30/2018    HCG Negative 03/27/2006       Preop Vitals  BP Readings from Last 3 Encounters:   12/07/18 114/68   07/26/17 124/74   03/18/14 120/70    Pulse Readings from Last 3 Encounters:   12/07/18 72   07/26/17 97   03/18/14 76      Resp Readings from Last 3 Encounters:   12/07/18 18   07/26/17 16   03/18/14 16    SpO2 Readings from Last 3 Encounters:   12/07/18 97%   07/26/17 99%   10/14/13 100%      Temp Readings from Last 1 Encounters:   12/07/18 36.4  C (97.5  F) (Oral)    Ht Readings from Last 1 Encounters:   07/26/17 1.626 m (5' 4\")      Wt Readings from Last 1 Encounters:   12/06/18 80.8 kg (178 lb 1.6 oz)    Estimated body mass index is 30.57 kg/(m^2) as calculated from the following:    Height as of 7/26/17: 1.626 m (5' 4\").    Weight as of this encounter: 80.8 kg (178 lb 1.6 oz).     LDA:  Peripheral IV 12/07/18 Left Hand (Active)   Number of days:0            Assessment:   ASA SCORE: 2    NPO Status: > 6 hours since completed [employer]   Documentation: H&P complete; Preop Testing complete; Consents complete   Proceeding: Proceed without further delay  Tobacco Use:  NO Active use of Tobacco/UNKNOWN Tobacco use status     Plan:   Anes. Type:  General   Pre-Induction: Midazolam IV; Acetaminophen PO   Induction:  IV (Standard)   Airway: Oral ETT   Access/Monitoring: PIV   Maintenance: Balanced   Emergence: Procedure Site   Logistics: Same Day Surgery     Postop " Pain/Sedation Strategy:  Standard-Options: Opioids PRN     PONV Management:  Adult Risk Factors: Female, Non-Smoker, Postop Opioids     CONSENT: Direct conversation                                   Debra Emanuel MD

## 2018-12-07 NOTE — PROGRESS NOTES
Pt refused CBC blood draw this evening. (Lab unable to add on to sample drawn this morning as all the vials required for CBC were not drawn when BMP was drawn.) Pt has multiple bruises on R hand and antecubital space from IV placement and states she does not want to be stuck with needle again even though rationale was explained (low WBC).    Lab states they will cancel the lab draw for now and requested attending psychiatrist reorder it if/when pt willing.

## 2019-04-26 ENCOUNTER — HOSPITAL ENCOUNTER (INPATIENT)
Facility: CLINIC | Age: 59
LOS: 6 days | Discharge: HOME | End: 2019-05-02
Attending: INTERNAL MEDICINE | Admitting: PSYCHIATRY & NEUROLOGY
Payer: COMMERCIAL

## 2019-04-26 ENCOUNTER — TELEPHONE (OUTPATIENT)
Dept: BEHAVIORAL HEALTH | Facility: CLINIC | Age: 59
End: 2019-04-26

## 2019-04-26 DIAGNOSIS — R63.4 RAPID WEIGHT LOSS: ICD-10-CM

## 2019-04-26 DIAGNOSIS — E55.9 VITAMIN D DEFICIENCY: ICD-10-CM

## 2019-04-26 DIAGNOSIS — R45.851 SUICIDAL IDEATION: ICD-10-CM

## 2019-04-26 DIAGNOSIS — F33.2 SEVERE RECURRENT MAJOR DEPRESSION WITHOUT PSYCHOTIC FEATURES (H): Primary | ICD-10-CM

## 2019-04-26 DIAGNOSIS — F19.10 SUBSTANCE ABUSE (H): ICD-10-CM

## 2019-04-26 DIAGNOSIS — F32.2 CURRENT SEVERE EPISODE OF MAJOR DEPRESSIVE DISORDER WITHOUT PSYCHOTIC FEATURES, UNSPECIFIED WHETHER RECURRENT (H): ICD-10-CM

## 2019-04-26 LAB
ALBUMIN UR-MCNC: 100 MG/DL
AMPHETAMINES UR QL SCN: NEGATIVE
APPEARANCE UR: CLEAR
BARBITURATES UR QL: NEGATIVE
BENZODIAZ UR QL: POSITIVE
BILIRUB UR QL STRIP: NEGATIVE
CANNABINOIDS UR QL SCN: POSITIVE
COCAINE UR QL: POSITIVE
COLOR UR AUTO: YELLOW
ETHANOL UR QL SCN: NEGATIVE
GLUCOSE UR STRIP-MCNC: NEGATIVE MG/DL
HGB UR QL STRIP: ABNORMAL
HYALINE CASTS #/AREA URNS LPF: 4 /LPF (ref 0–2)
KETONES UR STRIP-MCNC: NEGATIVE MG/DL
LEUKOCYTE ESTERASE UR QL STRIP: ABNORMAL
MUCOUS THREADS #/AREA URNS LPF: PRESENT /LPF
NITRATE UR QL: NEGATIVE
OPIATES UR QL SCN: NEGATIVE
PH UR STRIP: 6 PH (ref 5–7)
RBC #/AREA URNS AUTO: 18 /HPF (ref 0–2)
SOURCE: ABNORMAL
SP GR UR STRIP: 1.02 (ref 1–1.03)
SQUAMOUS #/AREA URNS AUTO: 1 /HPF (ref 0–1)
TRANS CELLS #/AREA URNS HPF: <1 /HPF (ref 0–1)
UROBILINOGEN UR STRIP-MCNC: NORMAL MG/DL (ref 0–2)
WBC #/AREA URNS AUTO: 4 /HPF (ref 0–5)

## 2019-04-26 PROCEDURE — 99285 EMERGENCY DEPT VISIT HI MDM: CPT | Mod: Z6 | Performed by: INTERNAL MEDICINE

## 2019-04-26 PROCEDURE — 81001 URINALYSIS AUTO W/SCOPE: CPT | Performed by: INTERNAL MEDICINE

## 2019-04-26 PROCEDURE — 99285 EMERGENCY DEPT VISIT HI MDM: CPT | Mod: 25 | Performed by: INTERNAL MEDICINE

## 2019-04-26 PROCEDURE — 80307 DRUG TEST PRSMV CHEM ANLYZR: CPT | Performed by: INTERNAL MEDICINE

## 2019-04-26 PROCEDURE — 80320 DRUG SCREEN QUANTALCOHOLS: CPT | Performed by: INTERNAL MEDICINE

## 2019-04-26 PROCEDURE — 25000132 ZZH RX MED GY IP 250 OP 250 PS 637: Performed by: STUDENT IN AN ORGANIZED HEALTH CARE EDUCATION/TRAINING PROGRAM

## 2019-04-26 PROCEDURE — 93005 ELECTROCARDIOGRAM TRACING: CPT

## 2019-04-26 PROCEDURE — 12400001 ZZH R&B MH UMMC

## 2019-04-26 PROCEDURE — 93010 ELECTROCARDIOGRAM REPORT: CPT | Performed by: INTERNAL MEDICINE

## 2019-04-26 RX ORDER — ALUMINA, MAGNESIA, AND SIMETHICONE 2400; 2400; 240 MG/30ML; MG/30ML; MG/30ML
30 SUSPENSION ORAL EVERY 4 HOURS PRN
Status: DISCONTINUED | OUTPATIENT
Start: 2019-04-26 | End: 2019-05-02 | Stop reason: HOSPADM

## 2019-04-26 RX ORDER — HYDROXYZINE HYDROCHLORIDE 25 MG/1
25 TABLET, FILM COATED ORAL EVERY 4 HOURS PRN
Status: DISCONTINUED | OUTPATIENT
Start: 2019-04-26 | End: 2019-04-28

## 2019-04-26 RX ORDER — VENLAFAXINE HYDROCHLORIDE 150 MG/1
300 CAPSULE, EXTENDED RELEASE ORAL AT BEDTIME
Status: DISCONTINUED | OUTPATIENT
Start: 2019-04-26 | End: 2019-05-02 | Stop reason: HOSPADM

## 2019-04-26 RX ORDER — CLONAZEPAM 2 MG/1
4-6 TABLET ORAL AT BEDTIME
Status: DISCONTINUED | OUTPATIENT
Start: 2019-04-26 | End: 2019-04-27

## 2019-04-26 RX ORDER — ARIPIPRAZOLE 10 MG/1
20 TABLET ORAL AT BEDTIME
Status: DISCONTINUED | OUTPATIENT
Start: 2019-04-26 | End: 2019-04-27

## 2019-04-26 RX ORDER — LANOLIN ALCOHOL/MO/W.PET/CERES
3 CREAM (GRAM) TOPICAL
Status: DISCONTINUED | OUTPATIENT
Start: 2019-04-26 | End: 2019-05-02 | Stop reason: HOSPADM

## 2019-04-26 RX ORDER — ACETAMINOPHEN 325 MG/1
650 TABLET ORAL EVERY 4 HOURS PRN
Status: DISCONTINUED | OUTPATIENT
Start: 2019-04-26 | End: 2019-05-02 | Stop reason: HOSPADM

## 2019-04-26 RX ADMIN — ARIPIPRAZOLE 20 MG: 10 TABLET ORAL at 21:13

## 2019-04-26 RX ADMIN — VENLAFAXINE HYDROCHLORIDE 300 MG: 150 CAPSULE, EXTENDED RELEASE ORAL at 21:13

## 2019-04-26 RX ADMIN — CLONAZEPAM 4 MG: 2 TABLET ORAL at 22:35

## 2019-04-26 ASSESSMENT — ENCOUNTER SYMPTOMS
SHORTNESS OF BREATH: 0
ARTHRALGIAS: 0
ABDOMINAL PAIN: 0
HEADACHES: 0
COLOR CHANGE: 0
NECK STIFFNESS: 0
DIFFICULTY URINATING: 0
FEVER: 0
CONFUSION: 0
VOMITING: 0
EYE REDNESS: 0

## 2019-04-26 ASSESSMENT — MIFFLIN-ST. JEOR: SCORE: 1323.85

## 2019-04-26 NOTE — ED PROVIDER NOTES
"  History     Chief Complaint   Patient presents with     Suicidal     SI with plan 50mg of prescribed medication with alcohol. Previous attempts x2, overdose, last attempt 20 years ago . Stressors include adult children \"hate me. They block their phones. I haven't seen them since \" states patient. Is able to contract for safety.      The history is provided by the patient and medical records.     Daysi Ashley is a 58 year old female with a medical history significant for MDD, MATT, OCD, cocaine use disorder, and cannabis use disorder who presents to the Emergency Department for evaluation of suicidal ideation.    Per chart review, Dr Arthur would like patient admitted to [facility] under his care after she is medically cleared.     Here, patient reports that she is aware that she is here for evaluation prior to mental health admission. She reports that she started to have suicidal ideation a couple of weeks ago, planning to overdose on 40 mg of clonazepam with alcohol. She denies other medical issues. She denies vomiting or shortness of breath. She denies pain. No other symptoms noted. She notes that she is currently on 20 mg Abilify, 4 mg clonazepam, and 300 mg Effexor.     Past Medical History:   Diagnosis Date     311      Allergic rhinitis, cause unspecified      Migraine, unspecified, without mention of intractable migraine without mention of status migrainosus      Rapid weight loss 2013       Past Surgical History:   Procedure Laterality Date     APPENDECTOMY       C NONSPECIFIC PROCEDURE      Appendectomy      SECTION   &      FOOT SURGERY      right     HERNIORRHAPHY UMBILICAL  2000     ORTHOPEDIC SURGERY         History reviewed. No pertinent family history.    Social History     Tobacco Use     Smoking status: Never Smoker     Smokeless tobacco: Never Used   Substance Use Topics     Alcohol use: No       Current Facility-Administered Medications   Medication     " "acetaminophen (TYLENOL) tablet 650 mg     alum & mag hydroxide-simethicone (MYLANTA ES/MAALOX  ES) suspension 30 mL     ARIPiprazole (ABILIFY) tablet 20 mg     clonazePAM (klonoPIN) tablet 4-6 mg     hydrOXYzine (ATARAX) tablet 25 mg     magnesium hydroxide (MILK OF MAGNESIA) suspension 30 mL     melatonin tablet 3 mg     venlafaxine (EFFEXOR-XR) 24 hr capsule 300 mg        Allergies   Allergen Reactions     Phenothiazines Other (See Comments)     Compazine. \"My tongue goes back\"       I have reviewed the Medications, Allergies, Past Medical and Surgical History, and Social History in the Epic system.    Review of Systems   Constitutional: Negative for fever.   HENT: Negative for congestion.    Eyes: Negative for redness.   Respiratory: Negative for shortness of breath.    Cardiovascular: Negative for chest pain.   Gastrointestinal: Negative for abdominal pain and vomiting.   Genitourinary: Negative for difficulty urinating.   Musculoskeletal: Negative for arthralgias and neck stiffness.   Skin: Negative for color change.   Neurological: Negative for headaches.   Psychiatric/Behavioral: Positive for suicidal ideas (Thoughts to overdose on clonazapam with alcohol). Negative for confusion.       Physical Exam   BP: 151/65  Pulse: 70  Temp: 98.2  F (36.8  C)  Resp: 16  Height: 160 cm (5' 3\")  Weight: 78.5 kg (173 lb)  SpO2: 97 %  Sitting Orthostatic BP: 154/69  Sitting Orthostatic Pulse: 63 bpm      Physical Exam   Constitutional: She is oriented to person, place, and time. No distress.   HENT:   Head: Atraumatic.   Mouth/Throat: Oropharynx is clear and moist. No oropharyngeal exudate.   Eyes: Pupils are equal, round, and reactive to light. No scleral icterus.   Neck: Neck supple. No JVD present.   Cardiovascular: Normal rate, regular rhythm, normal heart sounds and intact distal pulses. Exam reveals no gallop and no friction rub.   No murmur heard.  Pulmonary/Chest: Effort normal and breath sounds normal. No stridor. " No respiratory distress. She has no wheezes. She has no rales. She exhibits no tenderness.   Abdominal: Soft. Bowel sounds are normal. She exhibits no distension and no mass. There is no tenderness. There is no rebound and no guarding. No hernia.   Musculoskeletal: She exhibits no edema or tenderness.   Neurological: She is alert and oriented to person, place, and time. No cranial nerve deficit. Coordination normal.   Skin: Skin is warm. No rash noted. She is not diaphoretic.   Psychiatric: Her speech is delayed. She is slowed and withdrawn. She is not actively hallucinating. Thought content is not paranoid and not delusional. She exhibits a depressed mood. She expresses suicidal ideation. She expresses no homicidal ideation. She expresses suicidal plans (OD with klonopin and ETOH). She expresses no homicidal plans. She is attentive.       ED Course   6:00 PM  The patient was seen and examined by David Daugherty MD in Room ED10.        Procedures        Results for orders placed or performed during the hospital encounter of 04/26/19 (from the past 24 hour(s))   Drug abuse screen 6 urine (chem dep)     Status: Abnormal    Collection Time: 04/26/19  5:19 PM   Result Value Ref Range    Amphetamine Qual Urine Negative NEG^Negative    Barbiturates Qual Urine Negative NEG^Negative    Benzodiazepine Qual Urine Positive (A) NEG^Negative    Cannabinoids Qual Urine Positive (A) NEG^Negative    Cocaine Qual Urine Positive (A) NEG^Negative    Ethanol Qual Urine Negative NEG^Negative    Opiates Qualitative Urine Negative NEG^Negative   UA with Microscopic     Status: Abnormal    Collection Time: 04/26/19  5:19 PM   Result Value Ref Range    Color Urine Yellow     Appearance Urine Clear     Glucose Urine Negative NEG^Negative mg/dL    Bilirubin Urine Negative NEG^Negative    Ketones Urine Negative NEG^Negative mg/dL    Specific Gravity Urine 1.017 1.003 - 1.035    Blood Urine Moderate (A) NEG^Negative    pH Urine 6.0 5.0 - 7.0 pH     Protein Albumin Urine 100 (A) NEG^Negative mg/dL    Urobilinogen mg/dL Normal 0.0 - 2.0 mg/dL    Nitrite Urine Negative NEG^Negative    Leukocyte Esterase Urine Small (A) NEG^Negative    Source Unspecified Urine     WBC Urine 4 0 - 5 /HPF    RBC Urine 18 (H) 0 - 2 /HPF    Squamous Epithelial /HPF Urine 1 0 - 1 /HPF    Transitional Epi <1 0 - 1 /HPF    Mucous Urine Present (A) NEG^Negative /LPF    Hyaline Casts 4 (H) 0 - 2 /LPF   EKG 12-lead, tracing only     Status: None (Preliminary result)    Collection Time: 04/26/19  9:01 PM   Result Value Ref Range    Interpretation ECG Click View Image link to view waveform and result            Labs Ordered and Resulted from Time of ED Arrival Up to the Time of Departure from the ED   DRUG ABUSE SCREEN 6 CHEM DEP URINE ([facility]) - Abnormal; Notable for the following components:       Result Value    Benzodiazepine Qual Urine Positive (*)     Cannabinoids Qual Urine Positive (*)     Cocaine Qual Urine Positive (*)     All other components within normal limits   ROUTINE UA WITH MICROSCOPIC - Abnormal; Notable for the following components:    Blood Urine Moderate (*)     Protein Albumin Urine 100 (*)     Leukocyte Esterase Urine Small (*)     RBC Urine 18 (*)     Mucous Urine Present (*)     Hyaline Casts 4 (*)     All other components within normal limits            Assessments & Plan (with Medical Decision Making)  Major depression with SI and plan of Overdose with klonopin and ETOH, medically stable, but noted substance abuse as well, D/W  intake-admit to  voluntary.       I have reviewed the nursing notes.    I have reviewed the findings, diagnosis, plan and need for follow up with the patient.       Medication List      There are no discharge medications for this visit.         Final diagnoses:   Current severe episode of major depressive disorder without psychotic features, unspecified whether recurrent (H)   Suicidal ideation   Substance abuse (H)     I, Antonette Washburn,  am serving as a trained medical scribe to document services personally performed by David Daugherty MD, based on the provider's statements to me.      I, David Daugherty MD, was physically present and have reviewed and verified the accuracy of this note documented by Antonette Washburn.     4/26/2019   [facility], [place], EMERGENCY DEPARTMENT     David Daugherty MD  04/27/19 0111

## 2019-04-26 NOTE — ED TRIAGE NOTES
"SI with plan 50mg of prescribed medication with alcohol. Previous attempts x2, overdose, last attempt 20 years ago . Stressors include adult children \"hate me. They block their phones. I haven't seen them since Ace\" states patient. Is able to contract for safety.   "
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
## 2019-04-26 NOTE — TELEPHONE ENCOUNTER
"S: Dr. Arthur calling with a \"heads up\" regarding this pt en route to ED from Dr. Mcgowan's office.    B: Dr. Arthur states that he is familiar with this patient and would like her admitted to [facility] under his care. Pt is currently coming in for suicidal ideation.    A: Pt en route to [facility] ED to be medically cleared.     R: Admit to [facility] once medically cleared under Dr. Arthur.  "

## 2019-04-26 NOTE — ED NOTES
Bed: HW01  Expected date: 4/26/19  Expected time: 4:31 PM  Means of arrival: Ambulance  Comments:  447--- 58 female, depression

## 2019-04-27 LAB
ALBUMIN SERPL-MCNC: 2.8 G/DL (ref 3.4–5)
ALP SERPL-CCNC: 57 U/L (ref 40–150)
ALT SERPL W P-5'-P-CCNC: 12 U/L (ref 0–50)
ANION GAP SERPL CALCULATED.3IONS-SCNC: 7 MMOL/L (ref 3–14)
AST SERPL W P-5'-P-CCNC: 10 U/L (ref 0–45)
BASOPHILS # BLD AUTO: 0 10E9/L (ref 0–0.2)
BASOPHILS NFR BLD AUTO: 0.6 %
BILIRUB SERPL-MCNC: 0.3 MG/DL (ref 0.2–1.3)
BUN SERPL-MCNC: 17 MG/DL (ref 7–30)
CALCIUM SERPL-MCNC: 8.1 MG/DL (ref 8.5–10.1)
CHLORIDE SERPL-SCNC: 109 MMOL/L (ref 94–109)
CHOLEST SERPL-MCNC: 230 MG/DL
CO2 SERPL-SCNC: 29 MMOL/L (ref 20–32)
CREAT SERPL-MCNC: 0.99 MG/DL (ref 0.52–1.04)
DIFFERENTIAL METHOD BLD: ABNORMAL
EOSINOPHIL # BLD AUTO: 0.1 10E9/L (ref 0–0.7)
EOSINOPHIL NFR BLD AUTO: 2.2 %
ERYTHROCYTE [DISTWIDTH] IN BLOOD BY AUTOMATED COUNT: 13.6 % (ref 10–15)
GFR SERPL CREATININE-BSD FRML MDRD: 62 ML/MIN/{1.73_M2}
GLUCOSE SERPL-MCNC: 85 MG/DL (ref 70–99)
HCT VFR BLD AUTO: 36.9 % (ref 35–47)
HDLC SERPL-MCNC: 42 MG/DL
HGB BLD-MCNC: 11.8 G/DL (ref 11.7–15.7)
IMM GRANULOCYTES # BLD: 0 10E9/L (ref 0–0.4)
IMM GRANULOCYTES NFR BLD: 0 %
LDLC SERPL CALC-MCNC: 167 MG/DL
LYMPHOCYTES # BLD AUTO: 1.7 10E9/L (ref 0.8–5.3)
LYMPHOCYTES NFR BLD AUTO: 53 %
MCH RBC QN AUTO: 28.9 PG (ref 26.5–33)
MCHC RBC AUTO-ENTMCNC: 32 G/DL (ref 31.5–36.5)
MCV RBC AUTO: 90 FL (ref 78–100)
MONOCYTES # BLD AUTO: 0.3 10E9/L (ref 0–1.3)
MONOCYTES NFR BLD AUTO: 8.2 %
NEUTROPHILS # BLD AUTO: 1.1 10E9/L (ref 1.6–8.3)
NEUTROPHILS NFR BLD AUTO: 36 %
NONHDLC SERPL-MCNC: 188 MG/DL
NRBC # BLD AUTO: 0 10*3/UL
NRBC BLD AUTO-RTO: 0 /100
PLATELET # BLD AUTO: 211 10E9/L (ref 150–450)
POTASSIUM SERPL-SCNC: 3.8 MMOL/L (ref 3.4–5.3)
PROT SERPL-MCNC: 5.7 G/DL (ref 6.8–8.8)
RBC # BLD AUTO: 4.08 10E12/L (ref 3.8–5.2)
SODIUM SERPL-SCNC: 145 MMOL/L (ref 133–144)
TRIGL SERPL-MCNC: 103 MG/DL
VIT B12 SERPL-MCNC: 513 PG/ML (ref 193–986)
WBC # BLD AUTO: 3.2 10E9/L (ref 4–11)

## 2019-04-27 PROCEDURE — 25000132 ZZH RX MED GY IP 250 OP 250 PS 637: Performed by: STUDENT IN AN ORGANIZED HEALTH CARE EDUCATION/TRAINING PROGRAM

## 2019-04-27 PROCEDURE — 36415 COLL VENOUS BLD VENIPUNCTURE: CPT | Performed by: STUDENT IN AN ORGANIZED HEALTH CARE EDUCATION/TRAINING PROGRAM

## 2019-04-27 PROCEDURE — 12400001 ZZH R&B MH UMMC

## 2019-04-27 PROCEDURE — 85025 COMPLETE CBC W/AUTO DIFF WBC: CPT | Performed by: STUDENT IN AN ORGANIZED HEALTH CARE EDUCATION/TRAINING PROGRAM

## 2019-04-27 PROCEDURE — 80061 LIPID PANEL: CPT | Performed by: STUDENT IN AN ORGANIZED HEALTH CARE EDUCATION/TRAINING PROGRAM

## 2019-04-27 PROCEDURE — 99223 1ST HOSP IP/OBS HIGH 75: CPT | Mod: AI | Performed by: PSYCHIATRY & NEUROLOGY

## 2019-04-27 PROCEDURE — 82306 VITAMIN D 25 HYDROXY: CPT | Performed by: STUDENT IN AN ORGANIZED HEALTH CARE EDUCATION/TRAINING PROGRAM

## 2019-04-27 PROCEDURE — 82607 VITAMIN B-12: CPT | Performed by: STUDENT IN AN ORGANIZED HEALTH CARE EDUCATION/TRAINING PROGRAM

## 2019-04-27 PROCEDURE — 80053 COMPREHEN METABOLIC PANEL: CPT | Performed by: STUDENT IN AN ORGANIZED HEALTH CARE EDUCATION/TRAINING PROGRAM

## 2019-04-27 RX ORDER — CLONAZEPAM 2 MG/1
2-4 TABLET ORAL AT BEDTIME
Status: DISCONTINUED | OUTPATIENT
Start: 2019-04-27 | End: 2019-05-01

## 2019-04-27 RX ADMIN — BREXPIPRAZOLE 1 MG: 1 TABLET ORAL at 20:05

## 2019-04-27 RX ADMIN — VENLAFAXINE HYDROCHLORIDE 300 MG: 150 CAPSULE, EXTENDED RELEASE ORAL at 20:05

## 2019-04-27 RX ADMIN — CLONAZEPAM 2 MG: 2 TABLET ORAL at 20:04

## 2019-04-27 ASSESSMENT — ACTIVITIES OF DAILY LIVING (ADL)
HYGIENE/GROOMING: INDEPENDENT;SHOWER;HANDWASHING
DRESS: INDEPENDENT
LAUNDRY: WITH SUPERVISION
ORAL_HYGIENE: INDEPENDENT
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
## 2019-04-27 NOTE — PROGRESS NOTES
04/26/19 2105   Valuables   Patient Belongings locker   Patient Belongings Put in Hospital Secure Location (Security or Locker, etc.) bracelet;cash/credit card;cell phone/electronics;clothing;keys;jewelry;earrings;money (see comment);laptop computer;purse/wallet;shoes   Did you bring any home meds/supplements to the hospital?  No     Pt has in their locker:    Jeans Bill statements Keys  Pink cardigan  Assorted mail  One Bracelet   Pink bra  Newspaper  Three rings  Blue shirt  Catalogues   One pair of earrings  Pink Nightgown iPhone  Pink robe  Black purse  Blue slippers  Red Wallet  MAC laptop  Chapsticks    Sent to security in envelope [id number]:    Credit One Visa ending 5266  [employer] Visa ending 6077  $29 cash     A               Admission:  I am responsible for any personal items that are not sent to the safe or pharmacy.  [facility] is not responsible for loss, theft or damage of any property in my possession.    Signature:  _________________________________ Date: _______  Time: _____                                              Staff Signature:  ____________________________ Date: ________  Time: _____      2nd Staff person, if patient is unable/unwilling to sign:    Signature: ________________________________ Date: ________  Time: _____     Discharge:  [facility] has returned all of my personal belongings:    Signature: _________________________________ Date: ________  Time: _____                                          Staff Signature:  ____________________________ Date: ________  Time: _____

## 2019-04-27 NOTE — PROGRESS NOTES
"Pt was visible in the milieu, usually kept to herself, quiet. Pt reports depression, and is sad that her daughters do not have time for her. Pt attended group and participated with encouragement, worked on a scratch art project. Pt denies SI/SIB, stated \"I just don't want to go home\". Pt denies medication side effects. Pt is concerned about her cat, stated he only has a food supply for 4 days.     04/27/19 8963   Behavioral Health   Hallucinations denies / not responding to hallucinations   Thinking distractable   Orientation person: oriented;date: oriented;place: oriented;time: oriented   Memory baseline memory   Insight admits / accepts   Judgement impaired   Eye Contact at examiner   Affect sad   Mood depressed   Physical Appearance/Attire attire appropriate to age and situation   Hygiene well groomed   Suicidality other (see comments)  (denies)   1. Wish to be Dead No   2. Non-Specific Active Suicidal Thoughts  No   Self Injury other (see comment)  (denies)   Elopement   (no current concerns )   Activity withdrawn   Speech clear;coherent   Medication Sensitivity no stated side effects;no observed side effects   Psychomotor / Gait balanced;steady   Psycho Education   Type of Intervention 1:1 intervention   Response participates, initiates socially appropriate   Hours 0.5   Treatment Detail check in    Activities of Daily Living   Hygiene/Grooming independent;shower;handwashing   Oral Hygiene independent   Dress independent   Laundry with supervision   Room Organization independent   Activity   Activity Assistance Provided independent     "
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
## 2019-04-27 NOTE — ED NOTES
"ED to Behavioral Floor Handoff    SITUATION  Daysi Ashley is a 58 year old female who speaks English and lives home alone The patient arrived in the ED by ambulance from home with a complaint of Suicidal (SI with plan 50mg of prescribed medication with alcohol. Previous attempts x2, overdose, last attempt 20 years ago . Stressors include adult children \"hate me. They block their phones. I haven't seen them since [place]\" states patient. Is able to contract for safety. )  .The patient's current symptoms started/worsened 2 month(s) ago and during this time the symptoms have increased.   In the ED, pt was diagnosed with   Final diagnoses:   Current severe episode of major depressive disorder without psychotic features, unspecified whether recurrent (H)   Suicidal ideation   Substance abuse (H)        Initial vitals were: BP: 151/65  Pulse: 70  Temp: 98.2  F (36.8  C)  Resp: 16  Height: 160 cm (5' 3\")  Weight: 78.5 kg (173 lb)  SpO2: 97 %   --------  Is the patient diabetic? No   If yes, last blood glucose? --     If yes, was this treated in the ED? --  --------  Is the patient inebriated (ETOH) No or Impaired on other substances? No  MSSA done? N/A  Last MSSA score: --    Were withdrawal symptoms treated? N/A  Does the patient have a seizure history? No. If yes, date of most recent seizure--  --------  Is the patient patient experiencing suicidal ideation? SI with plan to overdose on Rx with alcohol.     Homicidal ideation? denies current or recent homicidal ideation or behaviors.    Self-injurious behavior/urges? denies current or recent self injurious behavior or ideation.  ------  Was pt aggressive in the ED No  Was a code called No  Is the pt now cooperative? Yes  -------  Meds given in ED: Medications - No data to display   Family present during ED course? No  Family currently present? No    BACKGROUND  Does the patient have a cognitive impairment or developmental disability? No  Allergies:   Allergies " "  Allergen Reactions     Phenothiazines Other (See Comments)     Compazine. \"My tongue goes back\"   .   Social demographics are   Social History     Socioeconomic History     Marital status:      Spouse name: None     Number of children: None     Years of education: None     Highest education level: None   Occupational History     None   Social Needs     Financial resource strain: None     Food insecurity:     Worry: None     Inability: None     Transportation needs:     Medical: None     Non-medical: None   Tobacco Use     Smoking status: Never Smoker     Smokeless tobacco: Never Used   Substance and Sexual Activity     Alcohol use: No     Drug use: No     Sexual activity: Yes     Partners: Male   Lifestyle     Physical activity:     Days per week: None     Minutes per session: None     Stress: None   Relationships     Social connections:     Talks on phone: None     Gets together: None     Attends [employer] service: None     Active member of club or organization: None     Attends meetings of clubs or organizations: None     Relationship status: None     Intimate partner violence:     Fear of current or ex partner: None     Emotionally abused: None     Physically abused: None     Forced sexual activity: None   Other Topics Concern     None   Social History Narrative     None        ASSESSMENT  Labs results   Labs Ordered and Resulted from Time of ED Arrival Up to the Time of Departure from the ED   DRUG ABUSE SCREEN 6 CHEM DEP URINE ([facility]) - Abnormal; Notable for the following components:       Result Value    Benzodiazepine Qual Urine Positive (*)     Cannabinoids Qual Urine Positive (*)     Cocaine Qual Urine Positive (*)     All other components within normal limits   ROUTINE UA WITH MICROSCOPIC - Abnormal; Notable for the following components:    Blood Urine Moderate (*)     Protein Albumin Urine 100 (*)     Leukocyte Esterase Urine Small (*)     RBC Urine 18 (*)     Mucous Urine Present (*)     Hyaline " "Casts 4 (*)     All other components within normal limits      Imaging Studies: No results found for this or any previous visit (from the past 24 hour(s)).   Most recent vital signs /65   Pulse 70   Temp 98.2  F (36.8  C) (Oral)   Resp 16   Ht 1.6 m (5' 3\")   Wt 78.5 kg (173 lb)   LMP 04/04/2012   SpO2 97%   BMI 30.65 kg/m     Abnormal labs/tests/findings requiring intervention:---   Pain control: pt had none  Nausea control: pt had none    RECOMMENDATION  Are any infection precautions needed (MRSA, VRE, etc.)? No If yes, what infection? --  ---  Does the patient have mobility issues? independently. If yes, what device does the pt use? ---  ---  Is patient on 72 hour hold or commitment? No If on 72 hour hold, have hold and rights been given to patient? N/A  Are admitting orders written if after 10 p.m. ?N/A  Tasks needing to be completed:---     Susan Kendrick   [phone] [facility]     "

## 2019-04-28 PROCEDURE — 12400001 ZZH R&B MH UMMC

## 2019-04-28 PROCEDURE — 25000132 ZZH RX MED GY IP 250 OP 250 PS 637: Performed by: STUDENT IN AN ORGANIZED HEALTH CARE EDUCATION/TRAINING PROGRAM

## 2019-04-28 RX ORDER — HYDROXYZINE HYDROCHLORIDE 25 MG/1
25-50 TABLET, FILM COATED ORAL EVERY 4 HOURS PRN
Status: DISCONTINUED | OUTPATIENT
Start: 2019-04-28 | End: 2019-05-02 | Stop reason: HOSPADM

## 2019-04-28 RX ORDER — TRAZODONE HYDROCHLORIDE 50 MG/1
50-100 TABLET, FILM COATED ORAL
Status: DISCONTINUED | OUTPATIENT
Start: 2019-04-28 | End: 2019-05-01

## 2019-04-28 RX ADMIN — HYDROXYZINE HYDROCHLORIDE 50 MG: 25 TABLET ORAL at 17:47

## 2019-04-28 RX ADMIN — CLONAZEPAM 2 MG: 2 TABLET ORAL at 20:39

## 2019-04-28 RX ADMIN — TRAZODONE HYDROCHLORIDE 50 MG: 50 TABLET ORAL at 20:41

## 2019-04-28 RX ADMIN — VENLAFAXINE HYDROCHLORIDE 300 MG: 150 CAPSULE, EXTENDED RELEASE ORAL at 20:39

## 2019-04-28 RX ADMIN — BREXPIPRAZOLE 1 MG: 1 TABLET ORAL at 20:39

## 2019-04-28 RX ADMIN — TRAZODONE HYDROCHLORIDE 50 MG: 50 TABLET ORAL at 22:17

## 2019-04-28 ASSESSMENT — ACTIVITIES OF DAILY LIVING (ADL)
HYGIENE/GROOMING: INDEPENDENT
DRESS: INDEPENDENT
ORAL_HYGIENE: INDEPENDENT

## 2019-04-28 ASSESSMENT — MIFFLIN-ST. JEOR: SCORE: 1332.92

## 2019-04-28 NOTE — PROVIDER NOTIFICATION
Pt out in milieu much of shift, sitting among peers watching TV but minimal interaction.   Pt pleasant in conversation... Smiling/ laughing on occasion.  Patient  Admits being  Depressed.

## 2019-04-28 NOTE — PROGRESS NOTES
Pt spent majority of day in bed sleeping, towards end of shift pt was visible in milieu, talking on the phone.  Pleasant upon approach.  Appears sad, depressed.         04/28/19 1500   Behavioral Health   Hallucinations denies / not responding to hallucinations   Thinking intact   Orientation person: oriented;place: oriented   Memory baseline memory   Eye Contact at examiner   Affect sad   Mood mood is calm;depressed   Physical Appearance/Attire attire appropriate to age and situation   Hygiene neglected grooming - unclean body, hair, teeth   Suicidality other (see comments)  (none observed)   1. Wish to be Dead   (martin)   2. Non-Specific Active Suicidal Thoughts    (martin)   Self Injury other (see comment)  (none observed)   Activity isolative;withdrawn   Speech clear;coherent   Medication Sensitivity no observed side effects   Psychomotor / Gait balanced;steady   Psycho Education   Type of Intervention 1:1 intervention   Response observes from a distance   Activities of Daily Living   Hygiene/Grooming independent   Oral Hygiene independent   Dress independent   Room Organization independent

## 2019-04-29 LAB
DEPRECATED CALCIDIOL+CALCIFEROL SERPL-MC: 8 UG/L (ref 20–75)
INTERPRETATION ECG - MUSE: NORMAL

## 2019-04-29 PROCEDURE — 25000132 ZZH RX MED GY IP 250 OP 250 PS 637: Performed by: STUDENT IN AN ORGANIZED HEALTH CARE EDUCATION/TRAINING PROGRAM

## 2019-04-29 PROCEDURE — H2032 ACTIVITY THERAPY, PER 15 MIN: HCPCS

## 2019-04-29 PROCEDURE — 90853 GROUP PSYCHOTHERAPY: CPT

## 2019-04-29 PROCEDURE — 99232 SBSQ HOSP IP/OBS MODERATE 35: CPT | Mod: GC | Performed by: PSYCHIATRY & NEUROLOGY

## 2019-04-29 PROCEDURE — 12400001 ZZH R&B MH UMMC

## 2019-04-29 RX ORDER — MULTIPLE VITAMINS W/ MINERALS TAB 9MG-400MCG
1 TAB ORAL DAILY
Status: DISCONTINUED | OUTPATIENT
Start: 2019-04-30 | End: 2019-05-02 | Stop reason: HOSPADM

## 2019-04-29 RX ADMIN — VENLAFAXINE HYDROCHLORIDE 300 MG: 150 CAPSULE, EXTENDED RELEASE ORAL at 20:19

## 2019-04-29 RX ADMIN — CLONAZEPAM 2 MG: 2 TABLET ORAL at 20:19

## 2019-04-29 RX ADMIN — TRAZODONE HYDROCHLORIDE 100 MG: 50 TABLET ORAL at 20:51

## 2019-04-29 RX ADMIN — BREXPIPRAZOLE 1 MG: 1 TABLET ORAL at 13:59

## 2019-04-29 ASSESSMENT — ACTIVITIES OF DAILY LIVING (ADL)
ORAL_HYGIENE: INDEPENDENT
HYGIENE/GROOMING: INDEPENDENT
LAUNDRY: WITH SUPERVISION
DRESS: INDEPENDENT
HYGIENE/GROOMING: INDEPENDENT
DRESS: INDEPENDENT
LAUNDRY: WITH SUPERVISION
ORAL_HYGIENE: INDEPENDENT
DRESS: SCRUBS (BEHAVIORAL HEALTH);INDEPENDENT
LAUNDRY: UNABLE TO COMPLETE
HYGIENE/GROOMING: INDEPENDENT
ORAL_HYGIENE: INDEPENDENT

## 2019-04-29 NOTE — PROGRESS NOTES
"    ----------------------------------------------------------------------------------------------------------  [facility], [place]   Psychiatric Progress Note  Hospital Day #3     Interim History:   The patient's care was discussed with the treatment team and chart notes were reviewed.    Sleep 7 hours (04/29/19 0600)  Scheduled Medications: took all scheduled medications as prescribed   PRN medications: hydroxyzine x1 & trazodone 100 mg yesterday     Staff Report:   She is visible in the milieu though usually kept to herself and was quiet  She reported feeling depressed and that \" I just do not want to go home.  \" She denied SI/SIB at one point.  She is slept for the majority of the day yesterday and is isolated to her room in the evening.  She reported concern that her cat only has food supplied for 4 days.    Patient Interview:   Daysi Ashley was interviewed on the unit. She reports that she is \"still feeling really depressed.\" She states that \"I know there are a lot of things that need to change and it won't be fast.\" She did talk to one of her daughters and the conversation was \"ok. She still doesn't want to talk to me because she says I made her depressed. She is going to talk to my other daughter though to make sure my cat is taken care of.\" States that her other daughter still will not talk to her.   She denied having any negative side effects from the brexpiprazole though feels like her sleep was worse. She states that she also only took 2 mg of clonazepam (had been taking 4 mg) and that could also be impacting it though states \"I want to get off that. I don't think it's a good medication.\" States she has been on it \"since Yenny Solano worked here. Probably 30 years.\" She was comfortable with keeping the dose at 2 mg for now, she doesn't want to make too many changes at once then not know what is causing what. She was agreeable for a rule 25 assessment. States she thinks being " "more connected would be helpful though would prefer not to do inpatient CD treatment. She said things were better on this unit then she expect though was still agreeable with transferring to Dr. Arthur's team as previously planned when she was admitted to the hospital.     The risks, benefits, alternatives and side effects of any medication changes have been discussed and are understood by the patient and other caregivers.    Review of systems:     ROS was negative unless noted above.          Allergies:     Allergies   Allergen Reactions     Phenothiazines Other (See Comments)     Compazine. \"My tongue goes back\"            Psychiatric Examination:   /82   Pulse 72   Temp 98.4  F (36.9  C) (Oral)   Resp 16   Ht 1.6 m (5' 3\")   Wt 79.4 kg (175 lb)   LMP 04/04/2012   SpO2 94%   BMI 31.00 kg/m    Weight is 175 lbs 0 oz  Body mass index is 31 kg/m .    Appearance:  Older  female with gray just above the shoulder length hair, wearing a long pale pink bathrobe and pale pink pajamas underneath. She was resting in bed though woke easily for our interview.   Attitude:  cooperative  Eye Contact:  good  Mood:  depressed  Affect:  mood congruent and intensity is blunted to sad appearing  Speech:  clear, coherent and somewhat slow  Psychomotor Behavior:  no evidence of tardive dyskinesia, dystonia, or tics  Thought Process:  logical, linear and goal oriented  Associations:  no loose associations  Thought Content:  no evidence of psychotic thought and passive suicidal ideation present  Insight:  good  Judgment:  intact  Oriented to:  time, person, and place  Attention Span and Concentration:  intact able to remain engaged in conversation  Recent and Remote Memory:  fair  Language: Speaks English clearly and coherently   Fund of Knowledge: appropriate  Muscle Strength and Tone: normal  Gait and Station: Normal         Labs:   No results found for this or any previous visit (from the past 24 hour(s)).     " Assessment    Principal psychiatric diagnosis:   - Major Depressive disorder, severe, recurrent without psychotic features     Secondary psychiatric diagnoses:   - Cocaine use disorder  - R/O cannabis use disorder   - Generalized anxiety disorder   - Obsessive compulsive disorder      Diagnostic Impression: Daysi Ashley is a 58 year old  female with a past psychiatric history of MDD, MATT, OCD who presented to the [facility] ED via ambulance from her psychiatrist office with suicidal ideation and plan in the context of significant psychosocial stressors and substance use. Her last psychiatric hospitalization was in November 2018. She is currently followed by Dr. Mcgowan. Current psychosocial stressors include trauma, chronic mental health issues, family dynamics and finanical stress which she has been coping with by using substances and withdrawing.  Patient's support system includes outpatient team.  Substance use does appear to be playing a contributing role in the patient's presentation.  There is genetic loading for mood and anxiety. Medical history does not appear to be significant for thyroid disorder.  The MSE on admission was notable for a tearful appearing woman, with blunted affect who reports suicidal ideation with a plan and lack of psychotic symptoms. Her current presentation is consistent with her historic diagnosis of Major Depressive Disorder, severe, recurrent without psychotic features as well as cocaine use disorder.      Psychiatric Hospital course:   Daysi Ashely was admitted to [facility] as a voluntary patient. Her PTA venlafaxine was continued, her PTA abilify was discontinued and replaced with brexpiprazole and her klonipin dose was decreased from 4-6 mg to 2-4 mg at bedtime. Trazodone PRN was initiated as well to help with sleep.     Discontinued Medications (& Rationale):  - Abilify - inefficacy/replaced with Brexipiprazole   - decreased clonazepam dose- she would like to get off this  medication     Medical course She was medically cleared for admission and has had no acute medical issues during this hospitalization.      Data:   - UDS + for cannabis, cocaine, benzodiazepines   - EtOH  Level negative   - CMP, CBC, TSH, B12, Vit D., Lipids notable for low albumin & protein likely due to poor nutrition, elevated cholesterol and mild leukopenia of unclear etiology     Consults:   Nutrition     Plan     Today's Changes:  - switch brexipiprazole to AM dosing  - CD assessment ordered  - transfer to station 20 with Dr. Arthur (planned at time of admission but no beds were available)   - Vitamin D level pending   - nutrition consult     Psychotropic Medications:  Scheduled Psych Medications:  - brexpiprazole 1 mg daily   - klonopin2-4 mg at bedtime   - Effexor 24 hr capsule 300 mg at bedtime    PRN Psych Medications  - hydroxyzine 25-50 mg PRN Q4H for anxiety   - trazodone  mg  At bedtime prn for sleep   - melatonin 3 mg at bedtime prn for sleep     Patient will be treated in therapeutic milieu with appropriate individual and group therapies as described.    Medical diagnoses to be addressed this admission:    # Mild Leukopenia, chronic, improved: WBC count of 3.2 which is improved from her last admission. She was evaluated for this at her last admission and it was thought to be insignificant given normal peripheral smear  - follow up with PCP     #hypoalbuminemia and mild proteinemia: likely due to poor nutritional intake   - nutrition consulted     #Elevated cholesterol:   - follow up with PCP after discharge       #Comfort medications:   -Tylenol 650 mg Q4H PRN for pain and fever  -Mylanta 30 ml Q4H PRN for indigestion  - milk of magnesia PRN for constipation     Legal Status:   Orders Placed This Encounter      Voluntary    Safety Assessment:   Behavioral Orders   Procedures     Code 1 - Restrict to Unit     Routine Programming     As clinically indicated     Status 15     Every 15 minutes.      Suicide precautions     Patients on Suicide Precautions should have a Combination Diet ordered that includes a Diet selection(s) AND a Behavioral Tray selection for Safe Tray - with utensils, or Safe Tray - NO utensils         Disposition: Pending stabilization & development of a safe discharge plan.     The patient was seen and the plan was discussed with the attending physician.     Attestation:   Na Bhatti M.D.  Psychiatry PGY-1     Psychiatry Attending Attestation:   This patient has been seen and evaluated by me, Andry Davis.  I have discussed this patient with the house staff team including the resident and medical student and I agree with the findings and plan in this note.    I have reviewed today's vital signs, medications, labs and imaging. Andry Davis M.D., Ph.D.

## 2019-04-29 NOTE — PROGRESS NOTES
"Pt was isolative and withdrawn to her room laying in bed the majority of he morning. Pt reports depression and anxiety, stated \"I'm just waiting for the other shoe to drop\". Pt stated she would attend one group today to get out of bed. Pt hopes to have family therapy with her daughters to help their relationship.      04/29/19 1226   Behavioral Health   Hallucinations denies / not responding to hallucinations   Thinking intact   Orientation person: oriented;place: oriented;date: oriented   Memory baseline memory   Insight poor   Judgement impaired   Eye Contact at examiner   Affect sad   Mood depressed;anxious   Physical Appearance/Attire attire appropriate to age and situation   Hygiene neglected grooming - unclean body, hair, teeth   Suicidality thoughts only   1. Wish to be Dead No   2. Non-Specific Active Suicidal Thoughts  No   Self Injury other (see comment)  (denies)   Elopement   (no current concerns )   Activity isolative;withdrawn   Speech clear;coherent   Medication Sensitivity no stated side effects;no observed side effects   Psychomotor / Gait balanced;steady   Psycho Education   Type of Intervention 1:1 intervention   Response participates, initiates socially appropriate   Hours 0.5   Treatment Detail check in    Activities of Daily Living   Hygiene/Grooming independent   Oral Hygiene independent   Dress independent   Laundry with supervision   Room Organization independent   Activity   Activity Assistance Provided independent     "

## 2019-04-29 NOTE — PLAN OF CARE
BEHAVIORAL TEAM DISCUSSION    Participants: Dr. Andry Bhatti PGY-1  Kaylene Ramos [facility]  Progress: Initiated with hospitalization   Continued Stay Criteria/Rationale: Admitted due to suicidal ideation in the the context of drug use and psychosocial stressors.   Medical/Physical: see psychiatry physician progress note for further details   Precautions:   Behavioral Orders   Procedures    Code 1 - Restrict to Unit    Routine Programming     As clinically indicated    Status 15     Every 15 minutes.    Suicide precautions     Patients on Suicide Precautions should have a Combination Diet ordered that includes a Diet selection(s) AND a Behavioral Tray selection for Safe Tray - with utensils, or Safe Tray - NO utensils       Plan: Patient will continue with medication to help with symptoms. Patient will be transferred to [facility] for an more appropriate milieu.   Rationale for change in precautions or plan: No change has been indicated.

## 2019-04-29 NOTE — PROGRESS NOTES
"CLINICAL NUTRITION SERVICES - ASSESSMENT NOTE     Nutrition Prescription    RECOMMENDATIONS FOR MDs/PROVIDERS TO ORDER:  Recommend order Vitamin D supplement given low lab value.    Malnutrition Status:    Patient does not meet two of the criteria necessary for diagnosing malnutrition     Recommendations already ordered by Registered Dietitian (RD):  Multivitamin/mineral supplement therapy - Thera-Vit-M    Future/Additional Recommendations:  Monitor po intakes and wt trends. Consider need to add snack/supplement. Adjust flavors pending trends and pt preferences.       REASON FOR ASSESSMENT  Daysi Ashley is a/an 58 year old female assessed by the dietitian for Provider Order - \"Please give recs for any dietary modifcations/supplements. Patient agreeable. Hypoalbuminemia and hypoproteinemia.\"    Admitted for suicidal ideation    NUTRITION HISTORY  Daysi reports no known food allergies or intolerances. Regular diet at home. States appetite has \"not been very good\" and has been forcing self to eat. She states this has been going on for the past ~1 year. Usual intakes of Breakfast: OJ, peanut butter toast; Snacks on chips and salsa; Dinner: burger, eggs, or soup with steamed vegetables (\"lots\"). She reports that over the past few months she has been eating less than normal. Does not take vit/min supplements at home.    CURRENT NUTRITION ORDERS  Diet: Regular  Intake/Tolerance: Reports appetite has not necessarily improved here but that it has been easier for her to eat because meals are provided (doesn't have to cook). She reports eating all of dinner last night and all of breakfast and lunch today. Denies nausea, vomiting, constipation, and diarrhea.     LABS  BUN 17 (WNL)  Albumin 2.8 (L) - though this does not serve as a marker for malnutrition  Vitamin D 8 (L)    MEDICATIONS reviewed    ANTHROPOMETRICS  Height: 160 cm (5' 3\")  Most Recent Weight: 79.4 kg (175 lb)    IBW: 52.3 kg (152% IBW)  BMI: Obesity Grade I BMI " "30-34.9  Weight History: States she does not know her UBW. She says that he does not weigh herself \"because I can go into an ED mindset.\" Wt loss of 3 lb (<2%) over the past ~4.5 months per wts below, which is not significant wt loss.  Wt Readings from Last 5 Encounters:   04/28/19 79.4 kg (175 lb)   12/06/18 80.8 kg (178 lb 1.6 oz)   07/26/17 81.5 kg (179 lb 9.6 oz)   03/18/14 73.5 kg (162 lb)   10/14/13 69.9 kg (154 lb)     Dosing Weight: 59 kg (adjusted)    ASSESSED NUTRITION NEEDS  Estimated Energy Needs: 7587-4934 kcals/day (25 - 30 kcals/kg)  Justification: Maintenance  Estimated Protein Needs: 45-60+ grams protein/day (0.8 - 1+ grams of pro/kg)  Justification: Maintenance  Estimated Fluid Needs: 1 mL/kcal, or per provider   Justification: Maintenance    PHYSICAL FINDINGS  See malnutrition section below.    MALNUTRITION  % Intake: < 75% for >/= 1 month (non-severe)  % Weight Loss: Weight loss does not meet criteria  Subcutaneous Fat Loss: None observed  Muscle Loss: None observed  Fluid Accumulation/Edema: None noted  Malnutrition Diagnosis: Patient does not meet two of the above criteria necessary for diagnosing malnutrition    NUTRITION DIAGNOSIS  Predicted inadequate nutrient intake (protein-energy) related to variable appetite as evidenced by intakes adequate so far this admission through reliance on po intakes to meet estimated needs with potential for decline.       INTERVENTIONS  Implementation  Nutrition Education: Encouraged continuation of meals TID, goal to eat at least 75% of nutritionally adequate meal trays. Encouraged pt to utilize easy to prepare meals and sides at home to help ease of preparing food for self. Discussed option of Boost Plus, but pt declined at this time. Discussed recommendation of MVI with minerals. Pt agreeable. Also notified pt will recommend Vitamin D supplement to provider.   Multivitamin/mineral supplement therapy - Thera-Vit-M    Goals  Patient to consume % of " nutritionally adequate meal trays TID, or the equivalent with supplements/snacks.     Monitoring/Evaluation  Progress toward goals will be monitored and evaluated per protocol.    Elizabeth Tom RD, RAMANA  Unit pgr: [phone]

## 2019-04-29 NOTE — PROGRESS NOTES
Pt spent more time today isolating in room, but pleasant on approach.   Pt spoke with staff, lamenting on past decisions in her life.  Pt smiles at times, but otherwise appears flat.

## 2019-04-29 NOTE — PROGRESS NOTES
Participated in Music Therapy group with focus on mood elevation, validation and decreasing anxiety and improved group cohesiveness. Engaged and cooperative in music listening interventions.   Showed progress in session goals.  Chose one song and listened to part of another, then was called out of group.

## 2019-04-29 NOTE — PLAN OF CARE
Problem: Depressive Symptoms  Goal: Depressive Symptoms  Description  Signs and symptoms of listed problems will be absent or manageable.  Outcome: No Change     48 Hour Nursing Assessment    SI/SIB/HI: denies  Hallucinations: denies  Depression: has some depression  Anxiety: has anxiety over transfer to another station  Medication side effects: Pt states that she doesn't know when the best time to take Rexulti would be.  She had taken it at night, but thought that it was keeping her up.  Today, she said that she took it in AM (according to MAR, she took it at 1359) and pt said she's been very drowsy all day.  She would like to figure out how to take her meds so she can sleep at night and is not drowsy during the day.  Medical concerns: none    Shift Summary: Pt was visible in the milieu.  Social during community meeting.  Pt was polite and pleasant during nursing assessment.  She endorses anxiety and appears depressed.  She feels concerned about the transfer and about how she is going to be able to continue her care after she is discharged.  Pt states that she has lost her transportation, but may need to attend a 5-day per week program and she worries about how she will be able to attend.

## 2019-04-30 PROCEDURE — H2032 ACTIVITY THERAPY, PER 15 MIN: HCPCS

## 2019-04-30 PROCEDURE — 12400001 ZZH R&B MH UMMC

## 2019-04-30 PROCEDURE — 25000132 ZZH RX MED GY IP 250 OP 250 PS 637: Performed by: STUDENT IN AN ORGANIZED HEALTH CARE EDUCATION/TRAINING PROGRAM

## 2019-04-30 PROCEDURE — 99232 SBSQ HOSP IP/OBS MODERATE 35: CPT | Mod: GC | Performed by: PSYCHIATRY & NEUROLOGY

## 2019-04-30 PROCEDURE — 25000132 ZZH RX MED GY IP 250 OP 250 PS 637: Performed by: PSYCHIATRY & NEUROLOGY

## 2019-04-30 PROCEDURE — G0177 OPPS/PHP; TRAIN & EDUC SERV: HCPCS

## 2019-04-30 RX ADMIN — MULTIPLE VITAMINS W/ MINERALS TAB 1 TABLET: TAB at 09:47

## 2019-04-30 RX ADMIN — BREXPIPRAZOLE 2 MG: 1 TABLET ORAL at 09:48

## 2019-04-30 RX ADMIN — TRAZODONE HYDROCHLORIDE 50 MG: 50 TABLET ORAL at 20:13

## 2019-04-30 RX ADMIN — CLONAZEPAM 4 MG: 2 TABLET ORAL at 20:13

## 2019-04-30 RX ADMIN — VITAMIN D, TAB 1000IU (100/BT) 1000 UNITS: 25 TAB at 20:13

## 2019-04-30 RX ADMIN — VENLAFAXINE HYDROCHLORIDE 300 MG: 150 CAPSULE, EXTENDED RELEASE ORAL at 20:13

## 2019-04-30 ASSESSMENT — ACTIVITIES OF DAILY LIVING (ADL)
DRESS: INDEPENDENT
ORAL_HYGIENE: INDEPENDENT
HYGIENE/GROOMING: INDEPENDENT

## 2019-04-30 NOTE — PROGRESS NOTES
Pt states she still has transient SI thoughts upon waking. She attributes these thoughts to her own perseverating on mistakes and poor choices of the past. She denies any SIB urges or hallucinations. She was visible at times in the milieu, selectively social. She has a mostly flat affect.     04/30/19 1452   Behavioral Health   Hallucinations denies / not responding to hallucinations   Thinking distractable   Orientation person: oriented;place: oriented;date: oriented;time: oriented   Memory baseline memory   Insight admits / accepts   Judgement impaired   Eye Contact at examiner   Affect sad;irritable;full range affect   Mood mood is calm   Physical Appearance/Attire attire appropriate to age and situation   Hygiene other (see comment)  (adequate)   Suicidality thoughts only;other (see comments)  (*thoughts in the morning upon waking)   1. Wish to be Dead No   2. Non-Specific Active Suicidal Thoughts  No   Self Injury other (see comment)  (denies)   Elopement   (no statements or attempts)   Activity other (see comment)  (visible at times, attended some groups)   Speech clear;coherent

## 2019-04-30 NOTE — PROGRESS NOTES
"    ----------------------------------------------------------------------------------------------------------  [facility], [place]   Psychiatric Progress Note  Hospital Day #4     Interim History:   The patient's care was discussed with the treatment team and chart notes were reviewed.    Sleep 6.5 hours (04/30/19 0600)  Scheduled Medications: took all scheduled medications as prescribed   PRN medications: Trazodone x1    Staff Report:   Patient was transferred to our unit from station 22 yesterday. She was social in the milieu and attended some groups yesterday. She denied SI/SIB yesterday but did endorse feelings of anxiety and depression that she rates as a 9/10. Had difficulty last evening with other patients on the unit making noise.     Patient Interview:   Daysi Ashley was interviewed in the conference room with the team on station 20.   The patient reports that the transition from station 22 did not go very smoothly. She expresses frustration about the increased noise level on the unit, and had difficulty with other patients being disruptive in the night. Felt like it was very difficulty to sleep and she estimates that she only got about 4 hours, and is very tired today. Feels like her depression and suicidal thoughts are \"unchanged\". Denies any side effects from her medications, or any physical symptoms. She notes that she does prefer to take the brexipirazole in the morning.      Daysi would like to get a new outpatient psychiatrist, as she did not feel like her last one was a \"good fit\". She is interested at being seen in the resident clinic. She is also interested in establishing care with a therapist, and is considering the 55+ outpatient program for her depression.     The risks, benefits, alternatives and side effects of any medication changes have been discussed and are understood by the patient and other caregivers.    Review of systems:     ROS was negative unless noted " "above.          Allergies:     Allergies   Allergen Reactions     Phenothiazines Other (See Comments)     Compazine. \"My tongue goes back\"            Psychiatric Examination:   /61 (BP Location: Right arm)   Pulse 87   Temp 98.6  F (37  C) (Oral)   Resp 16   Ht 1.6 m (5' 3\")   Wt 79.4 kg (175 lb)   LMP 04/04/2012   SpO2 97%   BMI 31.00 kg/m    Weight is 175 lbs 0 oz  Body mass index is 31 kg/m .    Appearance: Alert and awake. Appears older than stated age. Wearing a long pink bathrobe and pink pajamas.   Attitude:  cooperative  Eye Contact:  good  Mood:  depressed  Affect:  mood congruent and intensity is blunted   Speech:  clear, coherent and somewhat slow  Psychomotor Behavior:  no evidence of tardive dyskinesia, dystonia, or tics. Mild psychomotor slowing throughout.   Thought Process:  logical, linear and goal oriented  Associations:  no loose associations  Thought Content:  Reports suicidal thoughts are \"unchanged\". No evidence of psychotic symptoms.   Insight:  good  Judgment:  intact  Oriented to:  time, person, and place  Attention Span and Concentration:  intact able to remain engaged in conversation  Recent and Remote Memory:  fair  Language: Speaks English clearly and coherently   Fund of Knowledge: appropriate  Muscle Strength and Tone: normal  Gait and Station: Normal         Labs:   UDS upon admission was positive for cocaine, cannabis, and benzodiazepines.   CBC, CMP, TSH, lipids and B12 show low albumin & protein likely due to poor nutrition, elevated cholesterol and mild leukopenia at 3.2 of unclear etiology   Vitamin D level was low at 8     Assessment    Principal psychiatric diagnosis:   - Major Depressive disorder, severe, recurrent without psychotic features     Secondary psychiatric diagnoses:   - Cocaine use disorder  - R/O cannabis use disorder   - Generalized anxiety disorder   - Obsessive compulsive disorder      Diagnostic Impression: Daysi Ashley is a 58 year old female " with a past psychiatric history of MDD, MATT, OCD who presented to the [facility] ED via ambulance from her psychiatrist office with suicidal ideation and plan in the context of significant psychosocial stressors and substance use. Her last psychiatric hospitalization was in November 2018. She is currently followed by Dr. Mcgowan. Current psychosocial stressors include trauma, chronic mental health issues, family dynamics and finanical stress which she has been coping with by using substances and withdrawing.  Patient's support system includes outpatient team.  Substance use appears to be a contributing role in the patient's presentation. Biological factors include a family history of mood disorders and anxiety. She was found to be vitamin D deficient with a level of 8, which may have been contributing to her depressive symptoms, so supplementation was initiated.  Her current presentation is consistent with her historic diagnosis of Major Depressive Disorder, severe, recurrent without psychotic features as well as cocaine use disorder.      Psychiatric Hospital course:   Daysi Ashley was admitted to Station 22 as a voluntary patient. Her PTA venlafaxine was continued, her PTA abilify was discontinued and replaced with brexpiprazole and then titrated up to 2 mg. Her klonipin dose was decreased from 4-6 mg to 2-4 mg at bedtime. Trazodone PRN was initiated as well to help with sleep.     Discontinued Medications (& Rationale):  - Abilify - inefficacy/replaced with Brexipiprazole   - decreased clonazepam dose- she would like to get off this medication     Medical course She was medically cleared for admission and has had no acute medical issues during this hospitalization.      Consults:   Nutrition     Plan     Today's Changes:  - CD assessment ordered  -Begin vitamin D supplementation     Psychotropic Medications:  Scheduled Psych Medications:  - brexpiprazole 1 mg daily with AM dosing.   - klonopin2-4 mg at bedtime   -  Effexor 24 hr capsule 300 mg at bedtime    PRN Psych Medications  - hydroxyzine 25-50 mg PRN Q4H for anxiety   - trazodone  mg  At bedtime prn for sleep   - melatonin 3 mg at bedtime prn for sleep     Patient will be treated in therapeutic milieu with appropriate individual and group therapies as described.    Medical diagnoses to be addressed this admission:   #Vitamin D deficiency  Vitamin D level at admission was low at 8  -Begin vitamin D supplementation     # Mild Leukopenia, chronic, improved: WBC count of 3.2 which is improved from her last admission. She was evaluated for this at her last admission and it was thought to be insignificant given normal peripheral smear  - follow up with PCP     #hypoalbuminemia and mild proteinemia: likely due to poor nutritional intake   - nutrition consulted, appreciate recs    #Elevated cholesterol:   - follow up with PCP after discharge     #Comfort medications:   -Tylenol 650 mg Q4H PRN for pain and fever  -Mylanta 30 ml Q4H PRN for indigestion  - milk of magnesia PRN for constipation     Legal Status:   Orders Placed This Encounter      Voluntary    Safety Assessment:   Behavioral Orders   Procedures     Code 1 - Restrict to Unit     Routine Programming     As clinically indicated     Status 15     Every 15 minutes.     Suicide precautions     Patients on Suicide Precautions should have a Combination Diet ordered that includes a Diet selection(s) AND a Behavioral Tray selection for Safe Tray - with utensils, or Safe Tray - NO utensils         Disposition: Pending clinical stabilization & development of a safe discharge plan.     The patient was seen and  discussed with the attending physician.     Dyllan Willard, MS3        I was present with the medical student who participated in the service and in the documentation of the note. I have verified the history and personally performed the physical exam and medical decision making. I agree with the assessment and plan  of care as documented in the note .    Nisha Khan MD   Psychiatry resident PGY1      Attestation:  I, Elmer Arthur, saw and evaluated the patient with the resident physician.  I agree with the findings and plan of care as documented in the resident note.  I have reviewed all labs and vital signs.

## 2019-04-30 NOTE — PROGRESS NOTES
" Note  The patient participated in an hour long case management led group. The focus of the group was on formulating SMART goals to aid in mental health recovery. At the beginning of the group, the patient reported that she was feeling \"okay.\" The patient had a flat affect throughout the group and was appropriate with both writer and peers in the group.   "

## 2019-04-30 NOTE — PROGRESS NOTES
Patient reported that she feels fairly ok, denies suicidal ideations, hallucinations, rates depression at seven, anxiety at nine, and mood at two out of ten. Patient complains that her sleep was interrupted last night by another patient. She was assured that staff will work on making sure her sleep will not be disrupted again tonight.  She shares she is working on getting her Clinical treatment Coordinator to connect her with outpatient services she will use when discharged. Overall, patient appears calm, pleasant, displays bright affect, socially withdrawn, and accepts redirections.     04/30/19 1806   Behavioral Health   Hallucinations denies / not responding to hallucinations   Thinking other (see comment)  (Good)   Orientation person: oriented;place: oriented   Memory baseline memory   Insight admits / accepts   Judgement intact   Eye Contact at examiner   Affect full range affect   Mood mood is calm   Physical Appearance/Attire appears stated age;attire appropriate to age and situation   Hygiene well groomed   Suicidality other (see comments)  (Pt denies)   1. Wish to be Dead No   2. Non-Specific Active Suicidal Thoughts  No   Self Injury other (see comment)  (Pt denies)   Elopement   (No concern)   Activity withdrawn   Speech clear;coherent   Medication Sensitivity no stated side effects;no observed side effects   Psychomotor / Gait steady;balanced   Coping/Psychosocial   Verbalized Emotional State acceptance;depression;anxiety   Safety   Suicidality Status 15   Assault status 15   Elopement status 15   Activities of Daily Living   Hygiene/Grooming independent   Oral Hygiene independent   Dress independent   Room Organization independent

## 2019-04-30 NOTE — PROGRESS NOTES
Pt indicated to feeling depressed and anxious some times at 9/10  Pt denies SIB thoughts and denies visual or auditory hallucination  Pt socialized and was visible in the milieu  Pt indicated good appetite and good sleep     04/29/19 1925   Behavioral Health   Hallucinations denies / not responding to hallucinations   Thinking confused;distractable;poor concentration   Orientation person: oriented;place: oriented;date: oriented;time: oriented   Memory baseline memory   Insight admits / accepts;poor   Judgement impaired   Eye Contact at examiner   Affect sad;full range affect   Physical Appearance/Attire disheveled;attire appropriate to age and situation;neat   Suicidality thoughts only  (Pt reported SI thoughts only from time to time from time)   1. Wish to be Dead No   2. Non-Specific Active Suicidal Thoughts  No   Self Injury   (Pt  denies SIB thoughts)   Elopement   (Pt not observed to show risk of elopement)   Activity   (Pt  socialized and was visible in the milieu)   Speech clear;coherent   Medication Sensitivity no stated side effects;no observed side effects   Psychomotor / Gait balanced;steady   Psycho Education   Type of Intervention 1:1 intervention   Response participates with encouragement   Hours 0.5   Activities of Daily Living   Hygiene/Grooming independent   Oral Hygiene independent   Dress independent   Laundry unable to complete   Activity   Activity Assistance Provided independent

## 2019-04-30 NOTE — PROGRESS NOTES
" 04/30/19 1900   Art Therapy   Type of Intervention structured groups   Response Participates   Hours 1   Treatment Detail    (Art Therapy-collage anjelica   Problem-Principal psychiatric diagnosis:   - Major Depressive disorder, severe, recurrent without psychotic features     Secondary psychiatric diagnoses:   - Cocaine use disorder  - R/O cannabis use disorder   - Generalized anxiety disorder   - Obsessive compulsive disorder        Goal- cope/ express/ regulate through Art Therapy directive-      Outcome- pt attended a partial group. She partiicpated in check in and then was hesitant here was a specific art directive. Writer heard her say to peers, \" in other OT groups we get to do whatever we want.\" Writer gently encouraged her and gave her some tips for starting the [facility]., She seemed like she was open and would try, but she left soon after and went to sit in the lounge without letting writer know she was leaving group.                              "

## 2019-04-30 NOTE — PROGRESS NOTES
"   04/29/19 8012   Psycho Education   Type of Intervention structured groups   Response participates, initiates socially appropriate   Hours 1   Patient participated in psychotherapy group which focused on feelings expression in-the-moment through drawing and then processing.  Daysi actively engaged in the \"feelings heart\" activity and processed with the group. She reported feeling surprised when she realized she feels thankful and hopeful. She reported feeling thankful she is in the in the hospital, safe from her suicidal thoughts.  Her initial mood presented as sorrow; however, she left in a bright mood and smiling.   "

## 2019-05-01 PROCEDURE — 25000132 ZZH RX MED GY IP 250 OP 250 PS 637: Performed by: STUDENT IN AN ORGANIZED HEALTH CARE EDUCATION/TRAINING PROGRAM

## 2019-05-01 PROCEDURE — 12400001 ZZH R&B MH UMMC

## 2019-05-01 PROCEDURE — 25000132 ZZH RX MED GY IP 250 OP 250 PS 637: Performed by: PSYCHIATRY & NEUROLOGY

## 2019-05-01 PROCEDURE — 90791 PSYCH DIAGNOSTIC EVALUATION: CPT

## 2019-05-01 PROCEDURE — 25000132 ZZH RX MED GY IP 250 OP 250 PS 637

## 2019-05-01 PROCEDURE — 99231 SBSQ HOSP IP/OBS SF/LOW 25: CPT | Mod: GC | Performed by: PSYCHIATRY & NEUROLOGY

## 2019-05-01 RX ORDER — TRAZODONE HYDROCHLORIDE 100 MG/1
100 TABLET ORAL
Status: DISCONTINUED | OUTPATIENT
Start: 2019-05-01 | End: 2019-05-02 | Stop reason: HOSPADM

## 2019-05-01 RX ORDER — CLONAZEPAM 1 MG/1
1 TABLET ORAL AT BEDTIME
Status: DISCONTINUED | OUTPATIENT
Start: 2019-05-01 | End: 2019-05-02 | Stop reason: HOSPADM

## 2019-05-01 RX ADMIN — CLONAZEPAM 1 MG: 1 TABLET ORAL at 19:13

## 2019-05-01 RX ADMIN — BREXPIPRAZOLE 2 MG: 1 TABLET ORAL at 09:24

## 2019-05-01 RX ADMIN — MULTIPLE VITAMINS W/ MINERALS TAB 1 TABLET: TAB at 09:24

## 2019-05-01 RX ADMIN — VITAMIN D, TAB 1000IU (100/BT) 1000 UNITS: 25 TAB at 09:25

## 2019-05-01 RX ADMIN — VENLAFAXINE HYDROCHLORIDE 300 MG: 150 CAPSULE, EXTENDED RELEASE ORAL at 19:12

## 2019-05-01 RX ADMIN — BREXPIPRAZOLE 1 MG: 1 TABLET ORAL at 14:56

## 2019-05-01 RX ADMIN — HYDROXYZINE HYDROCHLORIDE 25 MG: 25 TABLET ORAL at 03:59

## 2019-05-01 ASSESSMENT — ACTIVITIES OF DAILY LIVING (ADL)
LAUNDRY: WITH SUPERVISION
LAUNDRY: WITH SUPERVISION
ORAL_HYGIENE: INDEPENDENT
DRESS: INDEPENDENT
ORAL_HYGIENE: INDEPENDENT
HYGIENE/GROOMING: INDEPENDENT
DRESS: INDEPENDENT
HYGIENE/GROOMING: INDEPENDENT

## 2019-05-01 NOTE — PROGRESS NOTES
05/01/19 1500   General Information   Has Not Attended OT as of: 05/01/19     OT staff will meet with pt to review the role of occupational therapy and explain the value of having them involved in their treatment plan including options to meet current needs/self-identified goals. As group attendance is established, Pt will be given self-assessment to inform OT initial assessment.

## 2019-05-01 NOTE — PLAN OF CARE
"48 Hour Assessment    Pt isolative at the beginning of the shift, and mostly withdrawn. She ate lunch in the lounge, but did not attend any groups that were offered. When asked how she is feeling today, pt reports, \"The loud people on the unit are annoying me.\" This writer asked what staff could do to help her with this, and pt was not sure at this time.     SI/SIB: Pt denies. States, \"I am over that now.\" Reports feelings hopeful, and that she feels her medications are helping improve her mood.    Auditory/Visual Hallucinations: Pt denies.    Quality of sleep: Pt reports not sleeping well, and that the pt in the room next to her was keeping her up at night.    No additional concerns at this time. Will continue to assess.    "

## 2019-05-01 NOTE — PROGRESS NOTES
"    ----------------------------------------------------------------------------------------------------------  [facility], [place]   Psychiatric Progress Note  Hospital Day #5     Interim History:   The patient's care was discussed with the treatment team and chart notes were reviewed.    Sleep 6.5 hours (05/01/19 0600)  Scheduled Medications: taken as prescribed   PRN medications: hydroxyzine x1, trazodone x1    Staff Report: Pt reported feeling ok, denied SI and hallucinations in the evening. She rated her depression 7/10, anxiety 9/10, and mood 2/10. Pt was calm, pleasant, displayed a bright affect, and was socially withdrawn. She did report SI thoughts on 4/30 upon waking which she attributed to perseverating on mistakes and poor past choices.     Patient Interview: Pt was interviewed in the conference room on station 20. She reported getting poor sleep 2/2 the noise on the unit. She reported feeling \"pretty sad,\" but denied SI. Treatment team commented that she was moving less slowly than previously, and pt was in agreement. She still feels like her thoughts are \"slowed\" and that she's having trouble with memory. She was open to increasing the Rexulti, increasing the trazodone at bedtime for sleep, and decreasing the Klonopin.    The risks, benefits, alternatives and side effects of any medication changes have been discussed and are understood by the patient and other caregivers.    Review of systems:     ROS was negative unless noted above.          Allergies:     Allergies   Allergen Reactions     Phenothiazines Other (See Comments)     Compazine. \"My tongue goes back\"            Psychiatric Examination:   /73 (BP Location: Left arm)   Pulse 90   Temp 98.6  F (37  C) (Oral)   Resp 16   Ht 1.6 m (5' 3\")   Wt 79.4 kg (175 lb)   LMP 04/04/2012   SpO2 97%   BMI 31.00 kg/m    Weight is 175 lbs 0 oz  Body mass index is 31 kg/m .    Appearance: Appears tired, but " "interactive with interview. Appears older than stated age.   Attitude:  cooperative  Eye Contact:  good  Mood: \"pretty sad\"  Affect:  mood congruent and intensity is blunted   Speech:  clear, coherent and somewhat slow. Appears slightly improved compared to prior.  Psychomotor Behavior:  no evidence of tardive dyskinesia, dystonia, or tics. Psychomotor slowing appears to be improving.  Thought Process:  logical, linear and goal oriented  Associations:  no loose associations  Thought Content:  No SI reported. No evidence of psychotic symptoms.   Insight:  good  Judgment:  intact  Oriented to:  time, person, and place  Attention Span and Concentration:  intact able to remain engaged in conversation  Recent and Remote Memory:  fair  Language: Speaks English clearly and coherently   Fund of Knowledge: appropriate  Muscle Strength and Tone: normal  Gait and Station: Normal         Labs:   UDS upon admission was positive for cocaine, cannabis, and benzodiazepines.   CBC, CMP, TSH, lipids and B12 show low albumin & protein likely due to poor nutrition, elevated cholesterol and mild leukopenia at 3.2 of unclear etiology   Vitamin D level was low at 8     Assessment    Principal psychiatric diagnosis:   - Major Depressive disorder, severe, recurrent without psychotic features     Secondary psychiatric diagnoses:   - Cocaine use disorder  - R/O cannabis use disorder   - Generalized anxiety disorder   - Obsessive compulsive disorder      Diagnostic Impression: Daysi Ashley is a 58 year old female with a past psychiatric history of MDD, MATT, OCD who presented to the [facility] ED via ambulance from her psychiatrist office with suicidal ideation and plan in the context of significant psychosocial stressors and substance use. Her last psychiatric hospitalization was in November 2018. She is currently followed by Dr. Mcgowan. Current psychosocial stressors include trauma, chronic mental health issues, family dynamics and finanical stress " which she has been coping with by using substances and withdrawing.  Patient's support system includes outpatient team.  Substance use appears to be a contributing role in the patient's presentation. Biological factors include a family history of mood disorders and anxiety. She was found to be vitamin D deficient with a level of 8, which may have been contributing to her depressive symptoms, so supplementation was initiated.  Her current presentation is consistent with her historic diagnosis of Major Depressive Disorder, severe, recurrent without psychotic features as well as cocaine use disorder.      Psychiatric Hospital course: Daysi Ashley was admitted to Station 22 as a voluntary patient, and was transferred to Station 20 on 4/29/19 with attending . Her PTA venlafaxine was continued for depressive symptoms. Her PTA Abilify was discontinued and replaced with brexpiprazole for antidepressant augmentation. The brexpiprazole was titrated to 3mg. A Klonopin taper was started due to potential for side effects and unclear indication or positive effect for pt. Trazodone PRN was initiated to help with sleep.     Discontinued Medications (& Rationale):  - Abilify - inefficacy/replaced with Brexipiprazole   - Klonopin taper    Medical course: She was medically cleared for admission. Pt was found to be vitamin D deficient and supplementation was started. Nutrition was consulted for hypoalbuminemia and hypoproteinemia and felt pt had inadequate nutrient intake due to appetite and recommended continuing nutritionally adequate meals and starting MVI with minerals.    Consults:  Nutrition for hypoalbuminemia and hypoproteinemia    Plan     Today's Changes:  - Decrease PTA Klonopin to 1mg at bedtime  - Increase brexpiprazole to 3mg qam   - Increase trazodone to 100 mg at bedtime prn for sleep     Psychotropic Medications:  Scheduled Psych Medications:  - PTA Effexor 24 hr capsule 300 mg at bedtime    PRN Psych  Medications  - hydroxyzine 25-50 mg PRN Q4H for anxiety   - melatonin 3 mg at bedtime prn for sleep     Patient will be treated in therapeutic milieu with appropriate individual and group therapies as described.    Medical diagnoses to be addressed this admission:   #Vitamin D deficiency  Vitamin D level at admission was low at 8.  -vitamin D supplementation     # Mild Leukopenia, chronic, improved: WBC count of 3.2 which is improved from her last admission. She was evaluated for this at her last admission and it was thought to be insignificant given normal peripheral smear  - follow up with PCP     #hypoalbuminemia and mild proteinemia: likely due to poor nutritional intake   - nutrition consulted    #Elevated cholesterol:   - follow up with PCP after discharge     #Comfort medications:   - Tylenol 650 mg Q4H PRN for pain and fever  - Mylanta 30 ml Q4H PRN for indigestion  - milk of magnesia PRN for constipation     Legal Status:   Orders Placed This Encounter      Voluntary    Safety Assessment:   Behavioral Orders   Procedures     Code 1 - Restrict to Unit     Routine Programming     As clinically indicated     Status 15     Every 15 minutes.     Suicide precautions     Patients on Suicide Precautions should have a Combination Diet ordered that includes a Diet selection(s) AND a Behavioral Tray selection for Safe Tray - with utensils, or Safe Tray - NO utensils         Disposition: Pending clinical stabilization & development of a safe discharge plan.  55+ day treatment referral placed.    Paula Mcelroy MD   Psychiatry resident PGY1    Attestation:  I, Elmer Arthur, saw and evaluated the patient with the resident physician.  I agree with the findings and plan of care as documented in the resident note.  I have reviewed all labs and vital signs.

## 2019-05-02 VITALS
BODY MASS INDEX: 30.83 KG/M2 | HEART RATE: 114 BPM | RESPIRATION RATE: 16 BRPM | WEIGHT: 174 LBS | SYSTOLIC BLOOD PRESSURE: 150 MMHG | HEIGHT: 63 IN | OXYGEN SATURATION: 98 % | TEMPERATURE: 98.5 F | DIASTOLIC BLOOD PRESSURE: 87 MMHG

## 2019-05-02 PROCEDURE — 99238 HOSP IP/OBS DSCHRG MGMT 30/<: CPT | Mod: GC | Performed by: PSYCHIATRY & NEUROLOGY

## 2019-05-02 PROCEDURE — 25000132 ZZH RX MED GY IP 250 OP 250 PS 637

## 2019-05-02 PROCEDURE — 25000132 ZZH RX MED GY IP 250 OP 250 PS 637: Performed by: PSYCHIATRY & NEUROLOGY

## 2019-05-02 PROCEDURE — G0177 OPPS/PHP; TRAIN & EDUC SERV: HCPCS

## 2019-05-02 RX ORDER — MULTIPLE VITAMINS W/ MINERALS TAB 9MG-400MCG
1 TAB ORAL DAILY
Qty: 30 TABLET | Refills: 0 | Status: SHIPPED | OUTPATIENT
Start: 2019-05-02 | End: 2022-01-16

## 2019-05-02 RX ORDER — CLONAZEPAM 1 MG/1
TABLET ORAL
Qty: 5 TABLET | Refills: 0 | Status: SHIPPED | OUTPATIENT
Start: 2019-05-02 | End: 2019-05-06

## 2019-05-02 RX ORDER — VENLAFAXINE HYDROCHLORIDE 150 MG/1
300 CAPSULE, EXTENDED RELEASE ORAL AT BEDTIME
Qty: 60 CAPSULE | Refills: 0 | Status: SHIPPED | OUTPATIENT
Start: 2019-05-02 | End: 2022-01-16

## 2019-05-02 RX ORDER — CLONAZEPAM 1 MG/1
TABLET ORAL
Qty: 5 TABLET | Refills: 0 | Status: SHIPPED | OUTPATIENT
Start: 2019-05-02 | End: 2019-05-02

## 2019-05-02 RX ADMIN — MULTIPLE VITAMINS W/ MINERALS TAB 1 TABLET: TAB at 09:59

## 2019-05-02 RX ADMIN — BREXPIPRAZOLE 3 MG: 1 TABLET ORAL at 09:59

## 2019-05-02 RX ADMIN — VITAMIN D, TAB 1000IU (100/BT) 1000 UNITS: 25 TAB at 09:59

## 2019-05-02 ASSESSMENT — ACTIVITIES OF DAILY LIVING (ADL)
DRESS: STREET CLOTHES
HYGIENE/GROOMING: INDEPENDENT
LAUNDRY: UNABLE TO COMPLETE
ORAL_HYGIENE: INDEPENDENT

## 2019-05-02 ASSESSMENT — MIFFLIN-ST. JEOR: SCORE: 1328.39
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
## 2019-05-02 NOTE — PROGRESS NOTES
Patient requesting discharge today due to loudness of milieu and improvement in mood.  Affect is brighter and patient much more engaging  Patient will return home to her apartment..  She will begin outpatient 55 plus program Monday and has been referred to [facility] for individual therapy.  Patient plans to resume AA and will contact [employer] to look into rides.  She appears very motivated to stop using and repair relationship with her children.   Patient will be sent home via cab.

## 2019-05-02 NOTE — PROGRESS NOTES
Behavioral Health  Note   Behavioral Health  Spirituality Group Note     Unit 20    Name: Daysi Ashley    YOB: 1960   MRN: [id number]    Age: 58 year old     Patient attended -led group, which included discussion of spirituality, coping with illness and building resilience.   Patient attended group for 1.0 hrs.   patient minimally participated, but was respectful to the group process.     [facility]  Staff    Page [phone]

## 2019-05-02 NOTE — PROGRESS NOTES
05/02/19 1400   Behavioral Health   Hallucinations denies / not responding to hallucinations   Thinking intact   Orientation person: oriented;place: oriented;date: oriented;time: oriented   Memory baseline memory   Insight admits / accepts;insight appropriate to situation;insight appropriate to events   Judgement intact   Eye Contact at examiner   Affect sad;full range affect   Mood mood is calm;depressed   Physical Appearance/Attire attire appropriate to age and situation   Hygiene well groomed   Suicidality other (see comments)  (denies)   1. Wish to be Dead No   2. Non-Specific Active Suicidal Thoughts  No   Activities of Daily Living   Hygiene/Grooming independent   Oral Hygiene independent   Dress street clothes   Laundry unable to complete   Room Organization independent       Patient had a good shift, was visible in the milieu and cooperated with staff and peers. She reported being anxious but excited about leaving, saying that she lacks social supports and has concerns about transportation but has set up outpatient treatment and AA meetings. She denies any thoughts of harming herself or others and no SI, just general anxiety.

## 2019-05-02 NOTE — PLAN OF CARE
Discharge :  alert and oriented x 4.  Denies suicidal thoughts , thoughts of self harm and hallucinations.  Received written discharge instructions with verbal review of instructions.  All questions answered, verbalizes understanding.  All belongings returned to patient and received filled prescriptions . Discharged home at 1450 via cab.

## 2019-05-02 NOTE — DISCHARGE INSTRUCTIONS
Behavioral Discharge Planning and Instructions    Summary:   You were admitted to [facility] on 4/26/19 with worsening depression, substance use under the care of Dr. Arthur.  You met with Dr. Arthur and his team daily for ongoing psychiatric assessment and medication management.  You had opportunities to participate in therapeutic groups on the unit.   At this time you report your mood is stabilizing and you report you are not having thoughts or intent to harm yourself or others.   You will be discharged home and have been referred to outpatient day treatment, individual therapy and Psychiatry.    Disposition:  Home    Main Diagnosis:   Major Depressive Disorder  Cocaine Use Disorder  Alcohol Use Disorder    Major Treatments, Procedures and Findings:   Medications were  managed throughout your stay. An internal medicine consult was completed during your stay. You had the opportunity to participate in treatment programming while on the unit including occupational therapy, mental health support and education and spiritual services.     Symptoms to Report:   Please report if you are experiencing increased aggression and/or confusion, problematic loss of sleep, worsening mood, or thoughts of suicide to your treatment team or notify your primary provider.   IF THE SYMPTOMS YOU ARE EXPERIENCING ARE A MEDICAL EMERGENCY, CALL 911 IMMEDIATELY    Lifestyle Adjustment:   1. Adjust your lifestyle to get enough sleep, relaxation, exercise and good nutrition.  Continue to develop healthy coping skills to decrease stress and promote a healthy and sober lifestyle.  2. Abstain from all substances of abuse.  3. Take medications as prescribed.  Please work with your doctor to discuss any concerns you have with your medications or side effects you may be experiencing.  4. Follow up with appointments as scheduled.        Psychiatry Follow-up:   Appointment: 55 Plus Outpatient Program 5/6/19 at 10:00am  (Mon, & Wed.  "10:00am-3:00pm)  [facility]  [address] Room  [address]  [place] [address]  [phone]    Appointment Psychiatry: Dr. Rafal Roa: 5/10/17 at 1:30pm  [address] [place] [address]    [phone]  [phone]  F: [phone]    Appointment: Therapy: Devorah Matthews PhD:  19 at 12:50pm  [facility]: [address] [place] [id number] [phone]    Appointment: Psychiatry:: Dr. Davie Chen: 19 at 1:20pm  [facility]: [address] [place] [address] [phone]    [facility]: [phone]    [facility]: : [address], [place] [address]      [phone]    Resources:   *[facility] Crisis: COPE: ([phone]) 24 hour mobile crisis support for people having a mental health crisis in [facility].   *[facility]ute Psych[facility] Services ([phone]). 24-hour walk-in crisis psychiatric support at [facility]; Emergency Medications Clinic available 7:30am - 2:00pm  *Pkwa9aodo: Text  \"life\"  to [phone]   A trained crisis counselor will respond immediately  *[facility]: ([phone])  24-hour confidential telephone counseling   *[facility] Emergency Room: [phone]    General Medication Instructions:   See your medication sheet(s) for instructions.   Take all medicines as directed.  Make no changes unless your doctor suggests them.   Go to all your doctor visits.  Be sure to have all your required lab tests. This way, your medicines can be refilled on time.  Do not use any drugs not prescribed by your doctor.    The treatment team has appreciated the opportunity to work with you.  We wish you the best in the future.    If you have any questions or concerns our unit number is [phone].     "

## 2019-05-02 NOTE — PROGRESS NOTES
Patient reports continued depression and anxiety but denies current SI/SIB. Patient was isolative and withdrawn with flat affect for most of the shift, stating she is frustrated by loud and disruptive patients.     05/01/19 2300   Behavioral Health   Hallucinations denies / not responding to hallucinations   Thinking intact   Orientation person: oriented;place: oriented;date: oriented;time: oriented   Memory baseline memory   Insight admits / accepts   Judgement impaired   Eye Contact at examiner   Affect blunted, flat   Mood depressed;anxious   Physical Appearance/Attire appears stated age;attire appropriate to age and situation   Hygiene other (see comment)   Suicidality other (see comments)  (Denies)   1. Wish to be Dead No   2. Non-Specific Active Suicidal Thoughts  No   Self Injury other (see comment)  (Denies)   Activity isolative;withdrawn   Speech clear;coherent   Medication Sensitivity no stated side effects;no observed side effects   Psychomotor / Gait balanced;steady   Activities of Daily Living   Hygiene/Grooming independent   Oral Hygiene independent   Dress independent   Laundry with supervision   Room Organization independent

## 2019-05-03 ENCOUNTER — TELEPHONE (OUTPATIENT)
Dept: FAMILY MEDICINE | Facility: CLINIC | Age: 59
End: 2019-05-03

## 2019-05-03 NOTE — TELEPHONE ENCOUNTER
Hospital F/U 5/2/19 Severe Recurrent Major Depression Without Psychotic Features (H), Current Severe Episode Of Major Depressive Disord ED/IP 0/2    [phone] (home)

## 2019-05-06 DIAGNOSIS — F33.2 SEVERE RECURRENT MAJOR DEPRESSION WITHOUT PSYCHOTIC FEATURES (H): ICD-10-CM

## 2019-05-06 RX ORDER — CLONAZEPAM 1 MG/1
2 TABLET ORAL AT BEDTIME
Qty: 50 TABLET | Refills: 0 | Status: SHIPPED | OUTPATIENT
Start: 2019-05-06 | End: 2022-01-16

## 2019-05-07 ENCOUNTER — TELEPHONE (OUTPATIENT)
Dept: PSYCHIATRY | Facility: CLINIC | Age: 59
End: 2019-05-07

## 2019-05-07 NOTE — TELEPHONE ENCOUNTER
Elmer Arthur MD Pratt, Laura, RN             Yes, please.    Previous Messages      ----- Message -----   From: Gia Gandara, RN   Sent: 5/7/2019  10:38 AM   To: Elmer Arthur MD   Subject: Klonopin 1 mg Script                             Hi Dr. Arthur,     I located a Klonopin 1 mg script on the triage printer. Would you like me to call it into the pharmacy?     -Gia             Writer phoned Klonopin 1 mg tabs, #60 with 0 RFs to Pauline ziegler with [facility] pharmacy at [phone]. Paper script voided and shredded.

## 2019-05-08 ENCOUNTER — TELEPHONE (OUTPATIENT)
Dept: BEHAVIORAL HEALTH | Facility: CLINIC | Age: 59
End: 2019-05-08

## 2019-05-08 NOTE — TELEPHONE ENCOUNTER
Left a vm for client as she has called in ill both days this week stating she is in withdrawal from klonopin. Inquired if she needs any support and requested return call.

## 2019-05-20 ENCOUNTER — TELEPHONE (OUTPATIENT)
Dept: BEHAVIORAL HEALTH | Facility: CLINIC | Age: 59
End: 2019-05-20

## 2019-05-20 NOTE — TELEPHONE ENCOUNTER
Left  for client requested return call as she did not attend today was start day and did not call.

## 2019-05-21 ENCOUNTER — TELEPHONE (OUTPATIENT)
Dept: BEHAVIORAL HEALTH | Facility: CLINIC | Age: 59
End: 2019-05-21

## 2019-05-21 NOTE — TELEPHONE ENCOUNTER
Left client another vm as she did not show up for scheduled 55+ B1 track, informed her she would be removed from her spot in B1 due to no call no show.

## 2019-05-23 ENCOUNTER — TELEPHONE (OUTPATIENT)
Dept: BEHAVIORAL HEALTH | Facility: CLINIC | Age: 59
End: 2019-05-23

## 2019-05-23 NOTE — TELEPHONE ENCOUNTER
Spoke with Daysi. She was scheduled to start the 55+ Program over two weeks ago, but is not interested in the program at this time because of her depression. She states that she has not left the house, taken a bath or washed her clothes since being in the hospital. She will meet with her psychiatrist on Tues and call me back if she changes her mind. No safety concerns.

## 2019-06-25 ENCOUNTER — HOSPITAL ENCOUNTER (OUTPATIENT)
Facility: CLINIC | Age: 59
Setting detail: OBSERVATION
Discharge: HOME | End: 2019-06-26
Attending: EMERGENCY MEDICINE | Admitting: INTERNAL MEDICINE
Payer: COMMERCIAL

## 2019-06-25 DIAGNOSIS — R55 SYNCOPE, UNSPECIFIED SYNCOPE TYPE: ICD-10-CM

## 2019-06-25 DIAGNOSIS — R10.32 LLQ ABDOMINAL PAIN: ICD-10-CM

## 2019-06-25 LAB
ALBUMIN SERPL-MCNC: 3.3 G/DL (ref 3.4–5)
ALP SERPL-CCNC: 70 U/L (ref 40–150)
ALT SERPL W P-5'-P-CCNC: 15 U/L (ref 0–50)
AMPHETAMINES UR QL SCN: NEGATIVE
ANION GAP SERPL CALCULATED.3IONS-SCNC: 6 MMOL/L (ref 3–14)
AST SERPL W P-5'-P-CCNC: 11 U/L (ref 0–45)
BARBITURATES UR QL: NEGATIVE
BASOPHILS # BLD AUTO: 0 10E9/L (ref 0–0.2)
BASOPHILS NFR BLD AUTO: 0.6 %
BENZODIAZ UR QL: NEGATIVE
BILIRUB DIRECT SERPL-MCNC: <0.1 MG/DL (ref 0–0.2)
BILIRUB SERPL-MCNC: 0.4 MG/DL (ref 0.2–1.3)
BUN SERPL-MCNC: 18 MG/DL (ref 7–30)
CALCIUM SERPL-MCNC: 8 MG/DL (ref 8.5–10.1)
CANNABINOIDS UR QL SCN: POSITIVE
CHLORIDE SERPL-SCNC: 106 MMOL/L (ref 94–109)
CO2 SERPL-SCNC: 25 MMOL/L (ref 20–32)
COCAINE UR QL: POSITIVE
CREAT SERPL-MCNC: 1.15 MG/DL (ref 0.52–1.04)
D DIMER PPP FEU-MCNC: 0.3 UG/ML FEU (ref 0–0.5)
DIFFERENTIAL METHOD BLD: ABNORMAL
EOSINOPHIL # BLD AUTO: 0 10E9/L (ref 0–0.7)
EOSINOPHIL NFR BLD AUTO: 0.3 %
ERYTHROCYTE [DISTWIDTH] IN BLOOD BY AUTOMATED COUNT: 13.4 % (ref 10–15)
ETHANOL UR QL SCN: NEGATIVE
GFR SERPL CREATININE-BSD FRML MDRD: 51 ML/MIN/{1.73_M2}
GLUCOSE SERPL-MCNC: 103 MG/DL (ref 70–99)
HCT VFR BLD AUTO: 36.6 % (ref 35–47)
HGB BLD-MCNC: 11.7 G/DL (ref 11.7–15.7)
IMM GRANULOCYTES # BLD: 0 10E9/L (ref 0–0.4)
IMM GRANULOCYTES NFR BLD: 0.3 %
INTERPRETATION ECG - MUSE: NORMAL
LIPASE SERPL-CCNC: 231 U/L (ref 73–393)
LYMPHOCYTES # BLD AUTO: 0.8 10E9/L (ref 0.8–5.3)
LYMPHOCYTES NFR BLD AUTO: 26.5 %
MCH RBC QN AUTO: 29.5 PG (ref 26.5–33)
MCHC RBC AUTO-ENTMCNC: 32 G/DL (ref 31.5–36.5)
MCV RBC AUTO: 92 FL (ref 78–100)
MONOCYTES # BLD AUTO: 0.3 10E9/L (ref 0–1.3)
MONOCYTES NFR BLD AUTO: 9.7 %
NEUTROPHILS # BLD AUTO: 1.9 10E9/L (ref 1.6–8.3)
NEUTROPHILS NFR BLD AUTO: 62.6 %
NRBC # BLD AUTO: 0 10*3/UL
NRBC BLD AUTO-RTO: 0 /100
OPIATES UR QL SCN: NEGATIVE
PLATELET # BLD AUTO: 224 10E9/L (ref 150–450)
POTASSIUM SERPL-SCNC: 4.3 MMOL/L (ref 3.4–5.3)
PROT SERPL-MCNC: 6.3 G/DL (ref 6.8–8.8)
RBC # BLD AUTO: 3.97 10E12/L (ref 3.8–5.2)
SODIUM SERPL-SCNC: 137 MMOL/L (ref 133–144)
TROPONIN I SERPL-MCNC: <0.015 UG/L (ref 0–0.04)
TSH SERPL DL<=0.005 MIU/L-ACNC: 0.78 MU/L (ref 0.4–4)
WBC # BLD AUTO: 3.1 10E9/L (ref 4–11)

## 2019-06-25 PROCEDURE — 85025 COMPLETE CBC W/AUTO DIFF WBC: CPT | Performed by: EMERGENCY MEDICINE

## 2019-06-25 PROCEDURE — 25800030 ZZH RX IP 258 OP 636: Performed by: NURSE PRACTITIONER

## 2019-06-25 PROCEDURE — 76705 ECHO EXAM OF ABDOMEN: CPT | Mod: 26 | Performed by: EMERGENCY MEDICINE

## 2019-06-25 PROCEDURE — G0378 HOSPITAL OBSERVATION PER HR: HCPCS

## 2019-06-25 PROCEDURE — 80076 HEPATIC FUNCTION PANEL: CPT | Performed by: EMERGENCY MEDICINE

## 2019-06-25 PROCEDURE — 84484 ASSAY OF TROPONIN QUANT: CPT | Performed by: NURSE PRACTITIONER

## 2019-06-25 PROCEDURE — 36415 COLL VENOUS BLD VENIPUNCTURE: CPT | Performed by: NURSE PRACTITIONER

## 2019-06-25 PROCEDURE — 99220 ZZC INITIAL OBSERVATION CARE,LEVL III: CPT | Mod: 25 | Performed by: NURSE PRACTITIONER

## 2019-06-25 PROCEDURE — 25000132 ZZH RX MED GY IP 250 OP 250 PS 637: Performed by: NURSE PRACTITIONER

## 2019-06-25 PROCEDURE — 81001 URINALYSIS AUTO W/SCOPE: CPT | Performed by: INTERNAL MEDICINE

## 2019-06-25 PROCEDURE — 83690 ASSAY OF LIPASE: CPT | Performed by: NURSE PRACTITIONER

## 2019-06-25 PROCEDURE — 93010 ELECTROCARDIOGRAM REPORT: CPT | Mod: 59 | Performed by: EMERGENCY MEDICINE

## 2019-06-25 PROCEDURE — 83690 ASSAY OF LIPASE: CPT | Performed by: EMERGENCY MEDICINE

## 2019-06-25 PROCEDURE — 76705 ECHO EXAM OF ABDOMEN: CPT | Performed by: EMERGENCY MEDICINE

## 2019-06-25 PROCEDURE — 84443 ASSAY THYROID STIM HORMONE: CPT | Performed by: EMERGENCY MEDICINE

## 2019-06-25 PROCEDURE — 80048 BASIC METABOLIC PNL TOTAL CA: CPT | Performed by: EMERGENCY MEDICINE

## 2019-06-25 PROCEDURE — 99285 EMERGENCY DEPT VISIT HI MDM: CPT | Mod: 25 | Performed by: EMERGENCY MEDICINE

## 2019-06-25 PROCEDURE — 84484 ASSAY OF TROPONIN QUANT: CPT | Performed by: EMERGENCY MEDICINE

## 2019-06-25 PROCEDURE — 80320 DRUG SCREEN QUANTALCOHOLS: CPT | Performed by: INTERNAL MEDICINE

## 2019-06-25 PROCEDURE — 85379 FIBRIN DEGRADATION QUANT: CPT | Performed by: EMERGENCY MEDICINE

## 2019-06-25 PROCEDURE — 93005 ELECTROCARDIOGRAM TRACING: CPT | Mod: 59 | Performed by: EMERGENCY MEDICINE

## 2019-06-25 PROCEDURE — 80307 DRUG TEST PRSMV CHEM ANLYZR: CPT | Performed by: INTERNAL MEDICINE

## 2019-06-25 RX ORDER — NITROGLYCERIN 0.4 MG/1
0.4 TABLET SUBLINGUAL EVERY 5 MIN PRN
Status: DISCONTINUED | OUTPATIENT
Start: 2019-06-25 | End: 2019-06-26 | Stop reason: HOSPADM

## 2019-06-25 RX ORDER — SODIUM CHLORIDE 9 MG/ML
INJECTION, SOLUTION INTRAVENOUS CONTINUOUS
Status: DISCONTINUED | OUTPATIENT
Start: 2019-06-25 | End: 2019-06-26 | Stop reason: HOSPADM

## 2019-06-25 RX ORDER — CLONAZEPAM 0.5 MG/1
2 TABLET ORAL AT BEDTIME
Status: DISCONTINUED | OUTPATIENT
Start: 2019-06-25 | End: 2019-06-26 | Stop reason: HOSPADM

## 2019-06-25 RX ORDER — ONDANSETRON 4 MG/1
4 TABLET, ORALLY DISINTEGRATING ORAL EVERY 6 HOURS PRN
Status: DISCONTINUED | OUTPATIENT
Start: 2019-06-25 | End: 2019-06-26 | Stop reason: HOSPADM

## 2019-06-25 RX ORDER — PROPRANOLOL HYDROCHLORIDE 10 MG/1
10 TABLET ORAL 2 TIMES DAILY
Status: ON HOLD | COMMUNITY
End: 2019-06-26

## 2019-06-25 RX ORDER — ONDANSETRON 2 MG/ML
4 INJECTION INTRAMUSCULAR; INTRAVENOUS EVERY 6 HOURS PRN
Status: DISCONTINUED | OUTPATIENT
Start: 2019-06-25 | End: 2019-06-26 | Stop reason: HOSPADM

## 2019-06-25 RX ORDER — ACETAMINOPHEN 325 MG/1
650 TABLET ORAL EVERY 4 HOURS PRN
Status: DISCONTINUED | OUTPATIENT
Start: 2019-06-25 | End: 2019-06-26 | Stop reason: HOSPADM

## 2019-06-25 RX ORDER — LIDOCAINE 40 MG/G
CREAM TOPICAL
Status: DISCONTINUED | OUTPATIENT
Start: 2019-06-25 | End: 2019-06-26 | Stop reason: HOSPADM

## 2019-06-25 RX ORDER — PROPRANOLOL HYDROCHLORIDE 20 MG/1
20 TABLET ORAL 2 TIMES DAILY
COMMUNITY
End: 2019-06-25

## 2019-06-25 RX ORDER — ACETAMINOPHEN 650 MG/1
650 SUPPOSITORY RECTAL EVERY 4 HOURS PRN
Status: DISCONTINUED | OUTPATIENT
Start: 2019-06-25 | End: 2019-06-26 | Stop reason: HOSPADM

## 2019-06-25 RX ORDER — VENLAFAXINE HYDROCHLORIDE 150 MG/1
300 CAPSULE, EXTENDED RELEASE ORAL AT BEDTIME
Status: DISCONTINUED | OUTPATIENT
Start: 2019-06-25 | End: 2019-06-26 | Stop reason: HOSPADM

## 2019-06-25 RX ADMIN — CLONAZEPAM 2 MG: 0.5 TABLET ORAL at 21:44

## 2019-06-25 RX ADMIN — VENLAFAXINE HYDROCHLORIDE 300 MG: 150 CAPSULE, EXTENDED RELEASE ORAL at 21:44

## 2019-06-25 RX ADMIN — SODIUM CHLORIDE: 9 INJECTION, SOLUTION INTRAVENOUS at 22:41

## 2019-06-25 ASSESSMENT — ENCOUNTER SYMPTOMS
SEIZURES: 0
SHORTNESS OF BREATH: 0
HEADACHES: 0
ABDOMINAL PAIN: 1

## 2019-06-25 ASSESSMENT — MIFFLIN-ST. JEOR
SCORE: 1323.39
SCORE: 1312.05

## 2019-06-25 NOTE — ED NOTES
"[facility], [place]   ED Nurse to Floor Handoff     Daysi Ashley is a 59 year old female who speaks English and lives alone,  in a home  They arrived in the ED by ambulance from public when with friend.    ED Chief Complaint: Loss of Consciousness    ED Dx;   Final diagnoses:   Syncope, unspecified syncope type         Needed?: No    Allergies:   Allergies   Allergen Reactions     Phenothiazines Other (See Comments)     Compazine. \"My tongue goes back\"   .  Past Medical Hx:   Past Medical History:   Diagnosis Date     311      Allergic rhinitis, cause unspecified      Arthritis      Depressive disorder      Migraine, unspecified, without mention of intractable migraine without mention of status migrainosus      Rapid weight loss 1/22/2013      Baseline Mental status: WDL  Current Mental Status changes: at basesline    Infection present or suspected this encounter: no  Sepsis suspected: No  Isolation type: No active isolations     Activity level - Baseline/Home:  Independent  Activity Level - Current:   Stand with Assist    Bariatric equipment needed?: No    In the ED these meds were given: Medications - No data to display    Drips running?  No    Home pump  No    Current LDAs  Peripheral IV 06/25/19 Right Hand (Active)   Number of days: 0       Labs results:   Labs Ordered and Resulted from Time of ED Arrival Up to the Time of Departure from the ED   CBC WITH PLATELETS DIFFERENTIAL - Abnormal; Notable for the following components:       Result Value    WBC 3.1 (*)     All other components within normal limits   BASIC METABOLIC PANEL - Abnormal; Notable for the following components:    Glucose 103 (*)     Creatinine 1.15 (*)     GFR Estimate 51 (*)     GFR Estimate If Black 59 (*)     Calcium 8.0 (*)     All other components within normal limits   TROPONIN I   PERIPHERAL IV CATHETER   ORTHOSTATIC BLOOD PRESSURE AND PULSE       Imaging Studies: No results found for this or any " "previous visit (from the past 24 hour(s)).    Recent vital signs:   /56   Pulse 73   Temp 97.9  F (36.6  C) (Oral)   Resp 19   Ht 1.6 m (5' 3\")   Wt 78.9 kg (174 lb)   LMP 04/04/2012   SpO2 98%   BMI 30.82 kg/m      [place] Coma Scale Score: 15 (06/25/19 1536)       Cardiac Rhythm: Normal Sinus  Pt needs tele? No  Skin/wound Issues: None    Code Status: Full Code    Pain control: pt had none    Nausea control: pt had none    Abnormal labs/tests/findings requiring intervention: See EPIC    Family present during ED course? No   Family Comments/Social Situation comments: NA    Tasks needing completion: None    Anderson Baugh, RN  [phone] [facility]    "

## 2019-06-25 NOTE — ED TRIAGE NOTES
Syncopal episode witnessed by friend - who then lowered her to the ground. Pt reports recently started taking propanolol.

## 2019-06-25 NOTE — ED PROVIDER NOTES
"  History     Chief Complaint   Patient presents with     Loss of Consciousness     The history is provided by the patient.     Daysi Ashley is a 59 year old female with a history of hyperlipidemia, depression, anxiety, cocaine and cannabis use disorder,who presents to the Emergency Department with complaint of syncope. She is unsure what happened, and notes she was out having coffee with a friend when she stood up from her chair and everything became loud and \"rushing all my thoughts, visibly, too quickly in front of me\". She then states \"lights were on and off and then colors, suddenly I was whirling\". She reports being woken up while on the ground and then paramedics came. She states her friend said they caught her and brought her to the ground, and there were no seizure like activities. She states she was not responsive for a time.  She denies ever fainting in the past. She denies any head trauma or headache. She is not anticoagulated. She denies chest pain, or shortness of breath prior or after her syncopal episode. She denies history of DVT or PE.The patient states she has \"terrible cholesterol\" even when she was young and healthy. She notes she was on Lipitor for some time but stopped taking Lipitor a while ago. The patient states she normally has low blood pressure. She also notes that a cat jumped on her last night and since then she has noticed \"that my abdomen is a little sore\" in the left lower quadrant.     The patient takes Rexulti 4mg at night, venlafaxine 300 mg at night, clonazepam 2 mg at night. The patient notes she was recently started on propanolol as of Friday, 4 days ago. She started propanolol to \"mask the side effects of Rexulti\". She reports taking her medications as prescribed. She reports her father has a \"horrible heart.\" She last saw a PCP about 8 years ago.    I have reviewed the Medications, Allergies, Past Medical and Surgical History, and Social History in the Epic system.  Past " "Medical History:   Diagnosis Date     311      Allergic rhinitis, cause unspecified      Arthritis      Depressive disorder      Migraine, unspecified, without mention of intractable migraine without mention of status migrainosus      Rapid weight loss 2013       Past Surgical History:   Procedure Laterality Date     APPENDECTOMY       C NONSPECIFIC PROCEDURE      Appendectomy      SECTION   &      FOOT SURGERY      right     HERNIORRHAPHY UMBILICAL  2000     ORTHOPEDIC SURGERY         Family History   Problem Relation Age of Onset     Dementia Father      Depression Father      Depression Daughter        Social History     Tobacco Use     Smoking status: Never Smoker     Smokeless tobacco: Never Used   Substance Use Topics     Alcohol use: No       No current facility-administered medications for this encounter.      Current Outpatient Medications   Medication     brexpiprazole (REXULTI) 3 MG tablet     clonazePAM (KLONOPIN) 1 MG tablet     propranolol (INDERAL) 20 MG tablet     venlafaxine (EFFEXOR-XR) 150 MG 24 hr capsule     multivitamin w/minerals (THERA-VIT-M) tablet     vitamin D3 1000 units TABS        Allergies   Allergen Reactions     Phenothiazines Other (See Comments)     Compazine. \"My tongue goes back\"       Review of Systems   Respiratory: Negative for shortness of breath.    Cardiovascular: Negative for chest pain.   Gastrointestinal: Positive for abdominal pain (sore LLQ).   Neurological: Positive for syncope. Negative for seizures and headaches.   All other systems reviewed and are negative.      Physical Exam   BP: 129/56  Pulse: 73  Heart Rate: 70  Temp: 97.9  F (36.6  C)  Resp: 12  Height: 160 cm (5' 3\")  Weight: 78.9 kg (174 lb)  SpO2: 100 %  Lying Orthostatic BP: 132/79  Lying Orthostatic Pulse: 69 bpm  Sitting Orthostatic BP: 137/81(feels unbalanced when sitting up)  Sitting Orthostatic Pulse: 71 bpm  Standing Orthostatic BP: 117/105  Standing Orthostatic Pulse: 81 " bpm      Physical Exam  General: awake, alert, NAD  Head: normal cephalic  HEENT: pupils equal, conjugate gaze in tact  Neck: Supple  CV: regular rate and rhythm without murmur  Lungs: clear to ascultation  Abd: soft, patient has some mild lower abdominal tenderness, no guarding, no peritoneal signs  EXT: lower extremities without swelling or edema  Neuro: awake, answers questions appropriately. No focal deficits noted       ED Course        Procedures  Results for orders placed during the hospital encounter of 06/25/19   POC US ABDOMEN LIMITED    Impression Limited Bedside Aortic Ultrasound, performed and interpreted by me.    Indication: Abdominal pain, syncope  The abdominal aorta was evaluated in long and short axis from its most proximally viewable position to the aortic bifurcation. The aorta was measured in largest diameter and screened for evidence of aneurysm (> 3 cm outer wall to outer wall). Image quality was satisfactory..     Findings: Aortic aneurysm was not identified.     IMPRESSION: No evidence of aortic aneurysm.               EKG Interpretation:      Interpreted by Neil Vargas MD   Time reviewed: 1416  Symptoms at time of EKG: loss of consciousness    Rhythm: normal sinus   Rate: 74  Axis: normal  Ectopy: none  Conduction: normal  ST Segments/ T Waves: No ST-T wave changes  Q Waves: none  Comparison to prior: Unchanged from 4/26/19    Clinical Impression: normal EKG, no signs of arrhythmia or acute ischemia         Labs Ordered and Resulted from Time of ED Arrival Up to the Time of Departure from the ED   CBC WITH PLATELETS DIFFERENTIAL - Abnormal; Notable for the following components:       Result Value    WBC 3.1 (*)     All other components within normal limits   BASIC METABOLIC PANEL - Abnormal; Notable for the following components:    Glucose 103 (*)     Creatinine 1.15 (*)     GFR Estimate 51 (*)     GFR Estimate If Black 59 (*)     Calcium 8.0 (*)     All other components within normal  limits   HEPATIC PANEL - Abnormal; Notable for the following components:    Albumin 3.3 (*)     Protein Total 6.3 (*)     All other components within normal limits   TROPONIN I   LIPASE   TSH WITH FREE T4 REFLEX   TROPONIN I   D DIMER QUANTITATIVE   TSH WITH FREE T4 REFLEX   DRUG ABUSE SCREEN 6 CHEM DEP URINE ([facility])   ROUTINE UA WITH MICROSCOPIC   PERIPHERAL IV CATHETER   ORTHOSTATIC BLOOD PRESSURE AND PULSE            Assessments & Plan (with Medical Decision Making)   This is a 59-year-old female who presents with a syncopal event.  Patient reports syncopal episode while going from sitting to standing with a vasovagal certainly consideration, patient also recently started propranolol however she has an appropriate pulse here in the ER.  Other potential etiologies could include things like ACS or cardiac arrhythmia.  Patient denies chest pain, shortness breath, has no significant risk factors for PE so we will not work-up for PE as a potential cause for syncope here in the ER.  EKG obtained, showed no evidence of cardiac arrhythmia.  Of note, there is a potential notching in the 3 beats at V3, however, this does not appear consistent with a delta wave and she has normal VA interval.  Cardiology also reviewed EKG with me and agreed that this was not consistent with a delta wave or WPW.  EKG otherwise showed no signs of ischemia or underlying arrhythmia.    Labs were notable for a normal hemoglobin; mildly suppressed white count but this is been baseline for her and likely is secondary to medication side effect of her antipsychotics.  Patient has a slightly elevated creatinine that appears to be baseline otherwise normal electrolytes.  Her troponin is negative.  Patient was complaining of some abdominal pain in setting of recent syncopal episodes.  Despite normal blood pressure, I did obtain a bedside ultrasound that showed no aortic aneurysm.  Patient pulse is not orthostatic.    Patient does have significant past  medical history of ACS, she reports noncompliance with her hyperlipidemia medications and strong family history of ACS.  At this point, will admit to the ED observation to the syncope protocol for cardiac monitoring, serial troponin, and an echo in the morning.  Patient remained vitally stable throughout the ER course.    This part of the medical record was transcribed by Antonette Washburn Medical Scribe, from a dictation done by Neil Vargas MD.    I have reviewed the nursing notes.    I have reviewed the findings, diagnosis, plan and need for follow up with the patient.       Medication List      There are no discharge medications for this visit.         Final diagnoses:   Syncope, unspecified syncope type   I, Khadra Delgadillo, am serving as a trained medical scribe to document services personally performed by Neil Vargas MD, based on the provider's statements to me.   I, Neil Vargas MD, was physically present and have reviewed and verified the accuracy of this note documented by Khadra Delgadillo.      6/25/2019   [facility], [place], EMERGENCY DEPARTMENT     Neil Vargas MD  06/25/19 7593

## 2019-06-25 NOTE — H&P
"[facility]   History & Physical    Daysi Ashley MRN# [id number]   Age: 59 year old YOB: 1960     Date of Admission: 6/25/2019    Primary Care Provider: No Ref-Primary, Physician         Chief Complaint:   \"fainting\"         History of Present Illness:   Per ER MD, \"Daysi Ashley is a 59 year old female with a history of hyperlipidemia, depression, anxiety, cocaine and cannabis use disorder,who presents to the Emergency Department with complaint of syncope. She is unsure what happened, and notes she was out having coffee with a friend when she stood up from her chair and everything became loud and \"rushing all my thoughts, visibly, too quickly in front of me\". She then states \"lights were on and off and then colors, suddenly I was whirling\". She reports being woken up while on the ground and then paramedics came. She states her friend said they caught her and brought her to the ground, and there were no seizure like activities. She states she was not responsive for a time.  She denies ever fainting in the past. She denies any head trauma or headache. She is not anticoagulated. She denies chest pain, or shortness of breath prior or after her syncopal episode. She denies history of DVT or PE.The patient states she has \"terrible cholesterol\" even when she was young and healthy. She notes she was on Lipitor for some time but suddenly stopped taking Lipitor a while ago. The patient states she normally has low blood pressure. She also notes that a cat jumped on her last night and since then she has noticed \"that my abdomen is quite sore\" in the left lower quadrant.      The patient takes Rexulti 4mg at night, venlafaxine 300 mg at night, clonazepam 2 mg at night. The patient notes she was recently started on propanolol as of Friday, 4 days ago. She started propanolol to \"mask the side effects of Rexulti\". She reports taking her medications as prescribed. The patient also " "reports that there is a spot on her left lower extremity where if you push it is painful. She reports her father has a \"horrible heart\" and notes her dad has to take blood thinners. She last saw a PCP about 8 years ago.\"    In the ED the patient's vital signs were stable, she was afebrile.  Labs: BMP with sodium 137, creatinine 1.15. Troponin negative.  Glucose 103, CBC with WBC 3.1.  Hgb 11.7. EKG showed NSR with no ischemic changes.  Bedside ultrasound showed no aortic aneurysm.  She was subsequently admitted to the observation unit for syncope evaluation. Orthostatics normal in the ED.     Patient seen in the ED and denied current lightheadedness, dizziness, headache, fever, chills, chest pain, or SOB.  She says she has just been depressed lately, but denies suicidal ideation.          Review of Systems:     All others reviewed and are negative         Past Medical History:     Past Medical History:   Diagnosis Date     311      Allergic rhinitis, cause unspecified      Arthritis      Depressive disorder      Migraine, unspecified, without mention of intractable migraine without mention of status migrainosus      Rapid weight loss 2013             Past Surgical History:      Past Surgical History:   Procedure Laterality Date     APPENDECTOMY       C NONSPECIFIC PROCEDURE      Appendectomy      SECTION   &      FOOT SURGERY      right     HERNIORRHAPHY UMBILICAL  2000     ORTHOPEDIC SURGERY               Family History:     Family History   Problem Relation Age of Onset     Dementia Father      Depression Father      Depression Daughter     father- significant heart history of MIs and heart surgery, per patient         Social History:     Social History     Tobacco Use     Smoking status: Never Smoker     Smokeless tobacco: Never Used   Substance Use Topics     Alcohol use: No      Denies drug use         Medications:     No current facility-administered medications on file prior to " "encounter.   Current Outpatient Medications on File Prior to Encounter:  brexpiprazole (REXULTI) 3 MG tablet Take 1 tablet (3 mg) by mouth daily   clonazePAM (KLONOPIN) 1 MG tablet Take 2 tablets (2 mg) by mouth At Bedtime   propranolol (INDERAL) 20 MG tablet Take 20 mg by mouth 2 times daily   venlafaxine (EFFEXOR-XR) 150 MG 24 hr capsule Take 2 capsules (300 mg) by mouth At Bedtime   multivitamin w/minerals (THERA-VIT-M) tablet Take 1 tablet by mouth daily   vitamin D3 1000 units TABS Take 1,000 Units by mouth daily            Allergies:     Allergies   Allergen Reactions     Phenothiazines Other (See Comments)     Compazine. \"My tongue goes back\"             Physical Exam:   /56   Pulse 73   Temp 97.9  F (36.6  C) (Oral)   Resp 19   Ht 1.6 m (5' 3\")   Wt 78.9 kg (174 lb)   LMP 04/04/2012   SpO2 98%   BMI 30.82 kg/m     GENERAL: Alert and oriented x 3. NAD.   HEENT: Anicteric sclera. PERRL. Mucous membranes moist and without lesions.   CV: RRR. S1, S2. No murmurs appreciated.   RESPIRATORY: Effort normal. Lungs CTAB with no wheezing, rales, rhonchi.   GI: Abdomen soft and non distended with normoactive bowel sounds present in all quadrants. +RUQ tenderness, no rebound, guarding.   MUSCULOSKELETAL: No joint swelling or tenderness. Moves all extremities.   NEUROLOGICAL: No focal deficits. Strength 5/5 bilaterally in upper and lower extremities.   EXTREMITIES: No peripheral edema. Intact bilateral pedal pulses.   SKIN: No jaundice. No rashes.          Labs:   CBC:  Recent Labs   Lab Test 06/25/19  1457   WBC 3.1*   RBC 3.97   HGB 11.7   HCT 36.6   MCV 92   MCH 29.5   MCHC 32.0   RDW 13.4          CMP:  Recent Labs   Lab Test 06/25/19  1457 04/27/19  0737    145*   POTASSIUM 4.3 3.8   CHLORIDE 106 109   MAME 8.0* 8.1*   CO2 25 29   BUN 18 17   CR 1.15* 0.99   * 85   AST  --  10   ALT  --  12   BILITOTAL  --  0.3   ALBUMIN  --  2.8*   PROTTOTAL  --  5.7*   ALKPHOS  --  57       INR: "   No results for input(s): INR in the last 89958 hours.         Imaging:   Bedside ultrasound negative for aortic anuerysm         Assessment and Plan:   Daysi Ashley is a 59 year old female with a history of hyperlipidemia, depression, anxiety, cocaine and cannabis use disorder,who presents to the Emergency Department with complaint of syncope.    1. Syncope: In the ED the patient's vital signs were stable, she was afebrile.  Labs: BMP with sodium 137, creatinine 1.15. Troponin negative.  Glucose 103, CBC with WBC 3.1.  Hgb 11.7. EKG showed NSR with no ischemic changes.  Bedside ultrasound showed no aortic aneurysm.  She was subsequently admitted to the observation unit for syncope evaluation. Last lipid panel showed cholesterol 230, HDL 42, , triglycerides 103.  Orthostatics normal in the ED.  -admit to the observation unit  -serial troponins  -telemetry  -resting echo in am  -hold propranolol   -needs to establish care with a PCP    2. RUQ abdominal pain: bedside ultrasound negative for aortic aneurysm.  Denies nausea, vomiting.  Last BM was this am and was yellow in color.  Has history of appendectomy, still has gallbladder  -add on LFTs, lipase  -RUQ ultrasound    3. SHAHNAZ: creatinine up slightly from baseline at 1.15.  Denies dysuria.    -NS at 100 ml/hour  -UA  -repeat BMP in am    4. Leukopenia: WBC 3.1, appears at baseline, likely secondary to psychiatric medications  -repeat CBC in am      Chronic medical problems:  #depression, anxiety: follows with a psychiatrist at the [facility].  Denies SI.  -continue PTA rexulti, clonazepam, effexor, hold PTA propranolol          FEN: regular  Prophylaxis: anticipate short stay  Code Status: Full        Whit Alicia, APRN, CNP  Ascom #[id number]

## 2019-06-26 ENCOUNTER — APPOINTMENT (OUTPATIENT)
Dept: CARDIOLOGY | Facility: CLINIC | Age: 59
End: 2019-06-26
Attending: NURSE PRACTITIONER
Payer: COMMERCIAL

## 2019-06-26 ENCOUNTER — APPOINTMENT (OUTPATIENT)
Dept: ULTRASOUND IMAGING | Facility: CLINIC | Age: 59
End: 2019-06-26
Attending: NURSE PRACTITIONER
Payer: COMMERCIAL

## 2019-06-26 VITALS
SYSTOLIC BLOOD PRESSURE: 132 MMHG | RESPIRATION RATE: 18 BRPM | DIASTOLIC BLOOD PRESSURE: 64 MMHG | HEART RATE: 58 BPM | BODY MASS INDEX: 30.39 KG/M2 | TEMPERATURE: 98.4 F | HEIGHT: 63 IN | WEIGHT: 171.5 LBS | OXYGEN SATURATION: 97 %

## 2019-06-26 LAB
ALBUMIN SERPL-MCNC: 3 G/DL (ref 3.4–5)
ALBUMIN UR-MCNC: NEGATIVE MG/DL
ALP SERPL-CCNC: 66 U/L (ref 40–150)
ALT SERPL W P-5'-P-CCNC: 15 U/L (ref 0–50)
ANION GAP SERPL CALCULATED.3IONS-SCNC: 5 MMOL/L (ref 3–14)
APPEARANCE UR: CLEAR
AST SERPL W P-5'-P-CCNC: 8 U/L (ref 0–45)
BILIRUB SERPL-MCNC: 0.2 MG/DL (ref 0.2–1.3)
BILIRUB UR QL STRIP: NEGATIVE
BUN SERPL-MCNC: 18 MG/DL (ref 7–30)
CALCIUM SERPL-MCNC: 8.2 MG/DL (ref 8.5–10.1)
CHLORIDE SERPL-SCNC: 109 MMOL/L (ref 94–109)
CO2 SERPL-SCNC: 27 MMOL/L (ref 20–32)
COLOR UR AUTO: ABNORMAL
CREAT SERPL-MCNC: 1.2 MG/DL (ref 0.52–1.04)
ERYTHROCYTE [DISTWIDTH] IN BLOOD BY AUTOMATED COUNT: 13.3 % (ref 10–15)
GFR SERPL CREATININE-BSD FRML MDRD: 49 ML/MIN/{1.73_M2}
GLUCOSE SERPL-MCNC: 94 MG/DL (ref 70–99)
GLUCOSE UR STRIP-MCNC: NEGATIVE MG/DL
HCT VFR BLD AUTO: 35.8 % (ref 35–47)
HGB BLD-MCNC: 11.5 G/DL (ref 11.7–15.7)
HGB UR QL STRIP: ABNORMAL
KETONES UR STRIP-MCNC: NEGATIVE MG/DL
LEUKOCYTE ESTERASE UR QL STRIP: NEGATIVE
MCH RBC QN AUTO: 29.6 PG (ref 26.5–33)
MCHC RBC AUTO-ENTMCNC: 32.1 G/DL (ref 31.5–36.5)
MCV RBC AUTO: 92 FL (ref 78–100)
MUCOUS THREADS #/AREA URNS LPF: PRESENT /LPF
NITRATE UR QL: NEGATIVE
PH UR STRIP: 6 PH (ref 5–7)
PLATELET # BLD AUTO: 196 10E9/L (ref 150–450)
POTASSIUM SERPL-SCNC: 4 MMOL/L (ref 3.4–5.3)
PROT SERPL-MCNC: 5.9 G/DL (ref 6.8–8.8)
RBC # BLD AUTO: 3.88 10E12/L (ref 3.8–5.2)
RBC #/AREA URNS AUTO: <1 /HPF (ref 0–2)
SODIUM SERPL-SCNC: 141 MMOL/L (ref 133–144)
SOURCE: ABNORMAL
SP GR UR STRIP: 1 (ref 1–1.03)
TROPONIN I SERPL-MCNC: <0.015 UG/L (ref 0–0.04)
UROBILINOGEN UR STRIP-MCNC: NORMAL MG/DL (ref 0–2)
WBC # BLD AUTO: 2.4 10E9/L (ref 4–11)
WBC #/AREA URNS AUTO: <1 /HPF (ref 0–5)

## 2019-06-26 PROCEDURE — 40000264 ECHOCARDIOGRAM COMPLETE

## 2019-06-26 PROCEDURE — 85027 COMPLETE CBC AUTOMATED: CPT | Performed by: NURSE PRACTITIONER

## 2019-06-26 PROCEDURE — 25000132 ZZH RX MED GY IP 250 OP 250 PS 637: Performed by: NURSE PRACTITIONER

## 2019-06-26 PROCEDURE — G0378 HOSPITAL OBSERVATION PER HR: HCPCS

## 2019-06-26 PROCEDURE — 84484 ASSAY OF TROPONIN QUANT: CPT | Performed by: NURSE PRACTITIONER

## 2019-06-26 PROCEDURE — 76705 ECHO EXAM OF ABDOMEN: CPT

## 2019-06-26 PROCEDURE — 80053 COMPREHEN METABOLIC PANEL: CPT | Performed by: NURSE PRACTITIONER

## 2019-06-26 PROCEDURE — 93306 TTE W/DOPPLER COMPLETE: CPT | Mod: 26 | Performed by: INTERNAL MEDICINE

## 2019-06-26 PROCEDURE — 36415 COLL VENOUS BLD VENIPUNCTURE: CPT | Performed by: NURSE PRACTITIONER

## 2019-06-26 PROCEDURE — 99217 ZZC OBSERVATION CARE DISCHARGE: CPT | Mod: Z6 | Performed by: NURSE PRACTITIONER

## 2019-06-26 RX ORDER — ACYCLOVIR 200 MG/1
20 CAPSULE ORAL ONCE
Status: DISCONTINUED | OUTPATIENT
Start: 2019-06-26 | End: 2019-06-26 | Stop reason: HOSPADM

## 2019-06-26 RX ADMIN — VITAMIN D, TAB 1000IU (100/BT) 1000 UNITS: 25 TAB at 08:22

## 2019-06-26 NOTE — PHARMACY-ADMISSION MEDICATION HISTORY
"Admission medication history interview status for the 6/25/2019 admission is complete. See Epic admission navigator for allergy information, pharmacy, prior to admission medications and immunization status.     Interview sources: Patient, [facility] ([phone]), [facility]    Reliability of source: Good - The patient was a reliable historian. She was very familiar with her medications and was able to provide dosing and directions, except for propranolol which was a new medication started 06/21/2019. Medications and dosing were verified with [facility] and [facility].    Medication compliance: Moderate - The patient reports never missing a dose of her medications, except for vitamin D and her multivitamin, which she has not taken in about a month because she \"does not like taking too many medications.\"    Current Outpatient Pharmacy: [facility] in [place], [place], also reports getting prescriptions at [facility]    Changes made to PTA medication list (reason)  Added: none    Deleted: none    Changed:  -Brexpiprazole: Take 3 mg by mouth daily >>> Take 4 mg by mouth daily (per patient; was unable to confirm dose from documentation, and this medication was not filled at [facility]. Last fill in [facility] was for the 3 mg dose, but the patient was confident that she is now taking two 2 mg tablets for a total dose of 4 mg daily.)  -Propranolol: take 20 mg by mouth 2 times daily >>> Take 10 mg by mouth 2 times daily (per [facility] pharmacy)    Additional medication history information:   -The patient denies any additional prescription or over-the-counter medications.    Prior to Admission Medication List:  Prior to Admission medications    Medication Sig Last Dose Taking? Auth Provider   brexpiprazole (REXULTI) 2 MG tablet Take 4 mg by mouth daily 6/24/2019 at 2330 Yes Unknown, Entered By History   clonazePAM (KLONOPIN) 1 MG tablet Take 2 tablets (2 mg) by mouth At Bedtime 6/24/2019 at " 2330 Yes Elmer Arthur MD   propranolol (INDERAL) 10 MG tablet Take 10 mg by mouth 2 times daily 6/24/2019 at 2330 Yes Unknown, Entered By History   venlafaxine (EFFEXOR-XR) 150 MG 24 hr capsule Take 2 capsules (300 mg) by mouth At Bedtime 6/24/2019 at 2330 Yes Elmer Arthur MD   multivitamin w/minerals (THERA-VIT-M) tablet Take 1 tablet by mouth daily More than a month  Elmer Arthur MD   vitamin D3 1000 units TABS Take 1,000 Units by mouth daily More than a month  Elmer Arthur MD       Time spent: 30 minutes    Medication history completed by: DARIO Mahmood Intern

## 2019-06-26 NOTE — DISCHARGE SUMMARY
[facility]  Hospitalist Discharge Summary       Date of Admission:  6/25/2019  Date of Discharge:  6/26/2019  Discharging Provider: JAKOB Aguilar CNP  Discharge Team: Emergency Department Observation Unit    Discharge Diagnoses   Syncope    Follow-ups Needed After Discharge   Follow-up Appointments     Adult [facility] Follow-up and recommended labs and tests      Recommend follow up with a primary care doctor in one week for hospital   follow up.     Appointments on [place] and/or [place] campus (with [facility] or [facility]   provider or service). Call [phone] if you haven't heard regarding   these appointments within 7 days of discharge.         {Additional follow-up instructions/to-do's for PCP    :Hospital follow up.     Unresulted Labs Ordered in the Past 30 Days of this Admission     No orders found for last 61 day(s).      These results will be followed up by PCP    Discharge Disposition   Discharged to home  Condition at discharge: Stable    Hospital Course   Daysi Ashley is a 59 year old female with a history of hyperlipidemia, depression, anxiety, cocaine and cannabis use disorder,who presents to the Emergency Department with complaint of syncope.     1. Syncope: In the ED the patient's vital signs were stable, she was afebrile.  Labs: BMP with sodium 137, creatinine 1.15. Troponin negative x3 .  Bedside ultrasound showed no aortic aneurysm. Resting echo showed  Global and regional left ventricular function is hyperkinetic with an EF of 65-70%. Left ventricular wall thickness is normal. Left ventricular size isnormal. Left ventricular diastolic function is normal. No regional wall motion  abnormalities are seen.    Recommended to hold propranolol until follow up with Psychiatrist.      2. RUQ abdominal pain: bedside ultrasound negative for aortic aneurysm.  Denies nausea, vomiting.  Last BM was this am and was yellow in color.  Has history of appendectomy, still  has gallbladder. LFTS normal. RUQ US showed:   Hepatomegaly. Sonographic findings of hepatic intrinsic disease  such as hepatic steatosis. No focal liver lesions.       3.CKD: Baseline CR is 1.15. Pt will follow up with PCP.        4. Leukopenia: WBC 2.4, appears at baseline, likely secondary to psychiatric medications      Consultations This Hospital Stay   MEDICATION HISTORY IP PHARMACY CONSULT    Code Status   Full Code    Time Spent on this Encounter   I, Mai Mario, personally saw the patient today and spent less than or equal to 30 minutes discharging this patient.       JAKOB Aguilar [facility] [place]  ______________________________________________________________________    Physical Exam   Vital Signs: Temp: 98.3  F (36.8  C) Temp src: Oral BP: 126/70 Pulse: 58 Heart Rate: 62 Resp: 16 SpO2: 100 % O2 Device: None (Room air)    Weight: 171 lbs 8 oz  GENERAL: Alert and oriented x 3. NAD.   HEENT: Anicteric sclera. PERRL. Mucous membranes moist and without lesions.   CV: RRR. S1, S2. No murmurs appreciated.   RESPIRATORY: Effort normal. Lungs CTAB with no wheezing, rales, rhonchi.   GI: Abdomen soft and non distended with normoactive bowel sounds present in all quadrants. +RUQ tenderness, no rebound, guarding.   MUSCULOSKELETAL: No joint swelling or tenderness. Moves all extremities.   NEUROLOGICAL: No focal deficits. Strength 5/5 bilaterally in upper and lower extremities.   EXTREMITIES: No peripheral edema. Intact bilateral pedal pulses.   SKIN: No jaundice. No rashes.        Primary Care Physician   Physician No Ref-Primary    Discharge Orders      Reason for your hospital stay    Syncope     Adult [facility] Follow-up and recommended labs and tests    Recommend follow up with a primary care doctor in one week for hospital follow up.     Appointments on [place] and/or [facility] (with [facility] or [facility] provider or service). Call [phone] if you haven't  heard regarding these appointments within 7 days of discharge.     Activity    Your activity upon discharge: activity as tolerated     When to contact your care team    Call 911 if any of these occur:    Another fainting spell that's not explained by the common causes listed above    Pain in your chest, arm, neck, jaw, back, or abdomen    Shortness of breath    Severe headache or seizure    Blood in vomit or stools (black or red color)    Unexpected vaginal bleeding    Your heart beats very rapidly, very slowly, or irregularly (palpitations)    Weakness in an arm or leg or on one side of the face    Difficulty speaking or seeing    Extreme drowsiness, confusion, dizziness, or fainting     Discharge Instructions    You were admitted to the ED observation unit at the [facility] after a syncopal event. Troponins (a lab test to check if there were damage to the heart tissue) were checked and these were negative.  An ultrasound of your heart was completed and showed **. Recommend to hold your propranolol and follow up with your psychiatrists for further medication recommendations.     You reported abdominal pain. An ultrasound was completed of the right upper part of your abdomen and showed Hepatic steatosis which is an accumulation of fat in the liver.It can be caused by alcohol use or obesity. Your liver function tests were normal. Recommend follow up with a primary care doctor in one week for hospital follow up.     Diet    Follow this diet upon discharge: Regular       Significant Results and Procedures   Results for orders placed or performed during the hospital encounter of 06/25/19   POC US ABDOMEN LIMITED    Impression    Limited Bedside Aortic Ultrasound, performed and interpreted by me.    Indication: Abdominal pain, syncope  The abdominal aorta was evaluated in long and short axis from its most proximally viewable position to the aortic bifurcation. The aorta was measured in largest diameter and screened for  evidence of aneurysm (> 3 cm outer wall to outer wall). Image quality was satisfactory..     Findings: Aortic aneurysm was not identified.     IMPRESSION: No evidence of aortic aneurysm.     US Abdomen Limited    Narrative    EXAMINATION: US ABDOMEN RIGHT UPPER QUADRANT,  6/26/2019 9:25 AM     COMPARISON: None.    HISTORY: Right upper quadrant pain    TECHNIQUE: The abdomen was scanned in standard fashion with  specialized ultrasound transducer(s) using both gray-scale and limited  color Doppler techniques.    FINDINGS:  Liver: Diffuse increased echogenicity throughout the liver parenchyma.  No focal liver lesions. The liver is enlarged measuring 19.2 cm in  length. Antegrade flow in the main portal vein.    Gallbladder:  There is no wall thickening, pericholecystic fluid,  positive sonographic Tobin's sign or evidence for cholelithiasis.    Bile Ducts: Both the intra- and extrahepatic biliary system are of  normal caliber.  The common bile duct measures 5 mm in diameter.    Pancreas: Visualized portions of the head and body of the pancreas are  unremarkable.     Right kidney measures 9.1 cm of normal echotexture, without mass or  hydronephrosis.    Fluid: No right pleural effusion. No free fluid in the right upper  abdominal quadrant.      Impression    IMPRESSION:   1.  Hepatomegaly. Sonographic findings of hepatic intrinsic disease  such as hepatic steatosis. No focal liver lesions.    SHELL SANTAMARIA MD       Discharge Medications   Current Discharge Medication List      CONTINUE these medications which have NOT CHANGED    Details   brexpiprazole (REXULTI) 2 MG tablet Take 4 mg by mouth daily      clonazePAM (KLONOPIN) 1 MG tablet Take 2 tablets (2 mg) by mouth At Bedtime  Qty: 50 tablet, Refills: 0    Associated Diagnoses: Severe recurrent major depression without psychotic features (H)      venlafaxine (EFFEXOR-XR) 150 MG 24 hr capsule Take 2 capsules (300 mg) by mouth At Bedtime  Qty: 60 capsule,  "Refills: 0    Associated Diagnoses: Severe recurrent major depression without psychotic features (H)      multivitamin w/minerals (THERA-VIT-M) tablet Take 1 tablet by mouth daily  Qty: 30 tablet, Refills: 0    Associated Diagnoses: Rapid weight loss      vitamin D3 1000 units TABS Take 1,000 Units by mouth daily  Qty: 30 tablet, Refills: 0    Associated Diagnoses: Vitamin D deficiency         STOP taking these medications       propranolol (INDERAL) 10 MG tablet Comments:   Reason for Stopping:             Allergies   Allergies   Allergen Reactions     Phenothiazines Other (See Comments)     Compazine. \"My tongue goes back\"     "

## 2019-06-26 NOTE — DISCHARGE INSTRUCTIONS
Please arrange for a new primary care provider within 7 days of hospital discharge.  Below is the information you requested on the primary care provider you anticipate scheduling an appointment  [facility]  Dr. Srivastava  [address] [place] [id number] [phone].

## 2019-06-26 NOTE — PROGRESS NOTES
Care Coordinator - Discharge Planning    Admission Date/Time:  6/25/2019  Attending MD:  Martinez Nuñez*     Data  Date of initial CC assessment:  6/25/19  Chart reviewed, discussed with interdisciplinary team.   Patient was admitted for:   1. Syncope, unspecified syncope type         Assessment   Concerns with insurance coverage for discharge needs: None.  Current Living Situation: Patient lives alone.  Support System: Supportive and Involved  Services Involved: none  Transportation at Discharge: Car and Family or friend will provide  Transportation to Medical Appointments:    - Name of caregiver: Self  Barriers to Discharge: Medical stability     Pt status reviewed/discussed during care team rounds.  Pt admitted s/p fainting episode.  Patient with a history of hyperlipidemia, depression, anxiety, cocaine and cannabis use disorder.    Noted that per chart pt has not seen a PCP in over 8 years.  Anticipate that pt will discharge home pending ultrasound and echo results scheduled for today.    Pt should schedule an appointment with a PCP for post hospital f/u.    Met with pt.  Introduced RNCC role.  Discussed PCP optoins.  Per pt her previous PCP Dr. Hester was at the [facility] but has since retired.  Per pt since Dr. Hester retired she has been seen by a primary care provider.  Pt confirms she is closely followed by her psychiatrist/therapist.   Pt would like to re-establish care at the [facility].  Reviewed provider options.  Pt requesting  Dr. Srivastava at the [facility].  Per discussion with pt she will arrange her outpatient f/u appointment on her own.  Agreed to place clinic/provider information on pt discharge instructions.    New PCP:  [facility]  Dr. Srivastava  [address] [place], [place] [id number] [phone].    Coordination of Care and Referrals: Provided patient/family with options for New PCP.      Plan  Anticipated Discharge Date: TBD  Anticipated  Discharge Plan:  JOSE ALFREDO David, RN BSN, PHN RN Care Coordinator  Internal Medicine   [phone]  Pager: [phone]  Weekend RN Care Coordinator job code * * * [phone]  6/26/2019 12:04 PM

## 2019-06-26 NOTE — PROGRESS NOTES
DC instructions given to p, verbalized understanding.  All belongings with pt, IV DC'd and documented. Will ambulate to lobby independently.

## 2019-06-26 NOTE — PLAN OF CARE
- Diagnostic tests and consults completed and resulted: No   - No further episodes of syncope and any new arrhythmia addressed with controlled heart rates: Yes   - Vital signs normal or at patient baseline and orthostatic vitals are normal and patient not lightheaded with standing: Yes   - Tolerating oral intake to maintain hydration: Yes   - Safe disposition plan has been identified: Yes, plans to return home after D/C

## 2019-07-16 ENCOUNTER — OFFICE VISIT (OUTPATIENT)
Dept: FAMILY MEDICINE | Facility: CLINIC | Age: 59
End: 2019-07-16
Payer: COMMERCIAL

## 2019-07-16 VITALS
HEIGHT: 63 IN | HEART RATE: 85 BPM | OXYGEN SATURATION: 97 % | WEIGHT: 175 LBS | RESPIRATION RATE: 14 BRPM | DIASTOLIC BLOOD PRESSURE: 83 MMHG | SYSTOLIC BLOOD PRESSURE: 113 MMHG | BODY MASS INDEX: 31.01 KG/M2

## 2019-07-16 DIAGNOSIS — K13.70 ORAL MUCOSAL LESION: ICD-10-CM

## 2019-07-16 DIAGNOSIS — N18.4 CKD (CHRONIC KIDNEY DISEASE) STAGE 4, GFR 15-29 ML/MIN (H): ICD-10-CM

## 2019-07-16 DIAGNOSIS — S99.921A INJURY OF FOOT, RIGHT, INITIAL ENCOUNTER: Primary | ICD-10-CM

## 2019-07-16 DIAGNOSIS — F32.2 MAJOR DEPRESSIVE DISORDER, SEVERE (H): ICD-10-CM

## 2019-07-16 DIAGNOSIS — F42.2 MIXED OBSESSIONAL THOUGHTS AND ACTS: ICD-10-CM

## 2019-07-16 PROCEDURE — 99214 OFFICE O/P EST MOD 30 MIN: CPT | Performed by: FAMILY MEDICINE

## 2019-07-16 RX ORDER — PRAMIPEXOLE DIHYDROCHLORIDE 0.25 MG/1
0.25 TABLET ORAL DAILY PRN
COMMUNITY
End: 2022-01-16

## 2019-07-16 RX ORDER — PREDNISONE 20 MG/1
20 TABLET ORAL DAILY
Qty: 5 TABLET | Refills: 0 | Status: SHIPPED | OUTPATIENT
Start: 2019-07-16 | End: 2019-07-21

## 2019-07-16 ASSESSMENT — PAIN SCALES - GENERAL: PAINLEVEL: MODERATE PAIN (5)

## 2019-07-16 ASSESSMENT — MIFFLIN-ST. JEOR: SCORE: 1327.92

## 2019-07-16 NOTE — PROGRESS NOTES
Subjective     Daysi Ashley is a 59 year old female who presents to clinic today for the following health issues:    HPI   Musculoskeletal problem/pain      Duration: 10 days    Description  Location: right foot    Intensity:  5/10    Accompanying signs and symptoms: numbness    History  Previous similar problem: no   Previous evaluation:  none    Precipitating or alleviating factors:  Trauma or overuse: YES- fell on foot, foot has screw in it from a pizano fracture  Aggravating factors include: standing and walking, uneven surfaces make it worse as well    Therapies tried and outcome: nothing    Running to bathroom at home and the foot inverted from toes a bit and she fell forward without hiting any thng.  She noted pain in the right lateral foot right away, with minimal swelling over the mid foot. The pain and swelling improved in couple days and now feels a bit numb on the right lateral mid foot and feels the screws from  A surgery 10 yrs ago inside the right lateral margin of the foot.  History of pizano fracture- & surgical repair of what sounds like a matatarsal fracture , treated at [facility].     Patient would like to talk about a lesion she has in her lower mouth,  Large blister like , painful. Slightly.        [facility] - psychiatrist Dr Roa  Most medications not working  Start a new medications today- she does not know the name. Stopping rexulty.  Still taking effexor   BP Readings from Last 3 Encounters:   07/16/19 113/83   06/26/19 132/64   05/02/19 150/87    Wt Readings from Last 3 Encounters:   07/16/19 79.4 kg (175 lb)   06/25/19 77.8 kg (171 lb 8 oz)   05/02/19 78.9 kg (174 lb)                    PROBLEMS TO ADD ON...  Reviewed and updated as needed this visit by Provider         Review of Systems   ROS COMP: Constitutional, HEENT, cardiovascular, pulmonary, GI, , musculoskeletal, neuro, skin, endocrine and psych systems are negative, except as otherwise noted.      Objective    /83 (BP  "Location: Left arm, Patient Position: Sitting, Cuff Size: Adult Large)   Pulse 85   Resp 14   Ht 1.6 m (5' 3\")   Wt 79.4 kg (175 lb)   LMP 04/04/2012   SpO2 97%   BMI 31.00 kg/m    Body mass index is 31 kg/m .  Physical Exam   GENERAL: healthy, alert and no distress  Right foot exam: no obvious deformity. tenderness along the medial malleolar- No limitation on ROM.intact sensation & pulsation.  Left fopt & bilateral knees in normal limits  oral exam- aphthous ulcer on the left tonsillar pillar  NECK: no adenopathy,and thyroid normal to palpation  RESP: lungs clear to auscultation -   CV: regular rate and rhythmur,   ABDOMEN: soft, nontender,   PSYCH: mentation appears normal, affect normal/bright    Diagnostic Test Results:  Labs reviewed in Epic        Assessment & Plan     1. Injury of foot, right, initial encounter  - XR Foot Right G/E 3 Views- no fractures  Monitor symptoms '  wear firm boot for comfort.  Follow up in 2 weeks if more pain.    2. Oral mucosal lesion  Aphthous ulcer on tonsillar pillar.  - OTOLARYNGOLOGY REFERRAL  - predniSONE (DELTASONE) 20 MG tablet; Take 1 tablet (20 mg) by mouth daily for 5 days  Dispense: 5 tablet; Refill: 0    3. Major depressive disorder, severe (H)  And   4. Mixed obsessional thoughts and acts  We discussed safety at home & resources for psych follow up     [facility] - psychiatrist Dr Roa  Most medications not working  Start a new medications today- she does not know the name. Stopping rexulty.  Still taking Effexor    .   5. CKD (chronic kidney disease) stage 4, GFR 15-29 ml/min (H)  No NSAID  Advised follow up in a month for recheck       BMI:   Estimated body mass index is 31 kg/m  as calculated from the following:    Height as of this encounter: 1.6 m (5' 3\").    Weight as of this encounter: 79.4 kg (175 lb).       Return in about 4 weeks (around 8/13/2019) for concerns,unresolved.    Melonie Srivastava MD  [facility]        "

## 2019-07-22 DIAGNOSIS — K13.70 ORAL MUCOSAL LESION: ICD-10-CM

## 2019-07-22 RX ORDER — PREDNISONE 20 MG/1
20 TABLET ORAL DAILY
Qty: 5 TABLET | Refills: 0 | Status: CANCELLED | OUTPATIENT
Start: 2019-07-22

## 2019-07-22 NOTE — TELEPHONE ENCOUNTER
Reason for Call:  Medication or medication refill:    Do you use a [facility]?  Name of the pharmacy and phone number for the current request:  [facility] [address] [place] [address] [address] AT [facility]    Name of the medication requested:   predniSONE (DELTASONE) 20 MG tablet 5 tablet         Other request:  Pt would like to get a refill for this rx until her 7/26 dental appt ?    Can we leave a detailed message on this number? YES    Phone number patient can be reached at: Home number on file [phone] (home)    Best Time: any    Call taken on 7/22/2019 at 3:26 PM by Shabbir Flanagan

## 2019-07-23 NOTE — TELEPHONE ENCOUNTER
Prednisone 20MG       Last Written Prescription Date:  07/16/2019  Last Fill Quantity: 5,   # refills: 0  Last Office Visit: 07/16/2019  Future Office visit:       Routing refill request to provider for review/approval because:  Drug not on the [facility], [facility] or [facility] refill protocol or controlled substance  Requested Prescriptions   Pending Prescriptions Disp Refills     predniSONE (DELTASONE) 20 MG tablet 5 tablet 0     Sig: Take 1 tablet (20 mg) by mouth daily       There is no refill protocol information for this order

## 2019-07-24 NOTE — TELEPHONE ENCOUNTER
A.S.    Ok to refill prednisone for mouth lesions until her 7/26 dentist appointment?    Thank you,  Julianna Richardson RN

## 2019-07-25 PROBLEM — N18.4 CKD (CHRONIC KIDNEY DISEASE) STAGE 4, GFR 15-29 ML/MIN (H): Status: ACTIVE | Noted: 2019-07-25

## 2019-07-29 NOTE — TELEPHONE ENCOUNTER
Left detailed VM for pt   Looks like she should have been to dentist by now  Call if further questions/concerns  Abbi CAST RN

## 2021-12-20 ENCOUNTER — APPOINTMENT (OUTPATIENT)
Dept: CT IMAGING | Facility: CLINIC | Age: 61
End: 2021-12-20
Attending: EMERGENCY MEDICINE
Payer: COMMERCIAL

## 2021-12-20 ENCOUNTER — HOSPITAL ENCOUNTER (EMERGENCY)
Facility: CLINIC | Age: 61
Discharge: HOME | End: 2021-12-20
Attending: EMERGENCY MEDICINE | Admitting: EMERGENCY MEDICINE
Payer: COMMERCIAL

## 2021-12-20 VITALS
TEMPERATURE: 98 F | HEART RATE: 77 BPM | SYSTOLIC BLOOD PRESSURE: 169 MMHG | RESPIRATION RATE: 18 BRPM | OXYGEN SATURATION: 97 % | DIASTOLIC BLOOD PRESSURE: 73 MMHG

## 2021-12-20 DIAGNOSIS — J34.89 MASS OF PYRIFORM SINUS: ICD-10-CM

## 2021-12-20 DIAGNOSIS — L73.9 FOLLICULITIS: ICD-10-CM

## 2021-12-20 LAB
ANION GAP SERPL CALCULATED.3IONS-SCNC: 5 MMOL/L (ref 3–14)
BASOPHILS # BLD AUTO: 0 10E3/UL (ref 0–0.2)
BASOPHILS NFR BLD AUTO: 1 %
BUN SERPL-MCNC: 29 MG/DL (ref 7–30)
CALCIUM SERPL-MCNC: 9.1 MG/DL (ref 8.5–10.1)
CHLORIDE BLD-SCNC: 105 MMOL/L (ref 94–109)
CO2 SERPL-SCNC: 27 MMOL/L (ref 20–32)
CREAT SERPL-MCNC: 1.25 MG/DL (ref 0.52–1.04)
DEPRECATED S PYO AG THROAT QL EIA: NEGATIVE
EOSINOPHIL # BLD AUTO: 0 10E3/UL (ref 0–0.7)
EOSINOPHIL NFR BLD AUTO: 0 %
ERYTHROCYTE [DISTWIDTH] IN BLOOD BY AUTOMATED COUNT: 13.1 % (ref 10–15)
GFR SERPL CREATININE-BSD FRML MDRD: 46 ML/MIN/1.73M2
GLUCOSE BLD-MCNC: 99 MG/DL (ref 70–99)
GROUP A STREP BY PCR: NOT DETECTED
HCT VFR BLD AUTO: 40.2 % (ref 35–47)
HGB BLD-MCNC: 12.9 G/DL (ref 11.7–15.7)
IMM GRANULOCYTES # BLD: 0 10E3/UL
IMM GRANULOCYTES NFR BLD: 0 %
LYMPHOCYTES # BLD AUTO: 1.7 10E3/UL (ref 0.8–5.3)
LYMPHOCYTES NFR BLD AUTO: 40 %
MCH RBC QN AUTO: 29.5 PG (ref 26.5–33)
MCHC RBC AUTO-ENTMCNC: 32.1 G/DL (ref 31.5–36.5)
MCV RBC AUTO: 92 FL (ref 78–100)
MONOCYTES # BLD AUTO: 0.4 10E3/UL (ref 0–1.3)
MONOCYTES NFR BLD AUTO: 9 %
NEUTROPHILS # BLD AUTO: 2.1 10E3/UL (ref 1.6–8.3)
NEUTROPHILS NFR BLD AUTO: 50 %
NRBC # BLD AUTO: 0 10E3/UL
NRBC BLD AUTO-RTO: 0 /100
PLATELET # BLD AUTO: 265 10E3/UL (ref 150–450)
POTASSIUM BLD-SCNC: 3.8 MMOL/L (ref 3.4–5.3)
RBC # BLD AUTO: 4.38 10E6/UL (ref 3.8–5.2)
SODIUM SERPL-SCNC: 137 MMOL/L (ref 133–144)
WBC # BLD AUTO: 4.2 10E3/UL (ref 4–11)

## 2021-12-20 PROCEDURE — 258N000003 HC RX IP 258 OP 636: Performed by: EMERGENCY MEDICINE

## 2021-12-20 PROCEDURE — 96375 TX/PRO/DX INJ NEW DRUG ADDON: CPT

## 2021-12-20 PROCEDURE — 96365 THER/PROPH/DIAG IV INF INIT: CPT | Mod: 59

## 2021-12-20 PROCEDURE — 36415 COLL VENOUS BLD VENIPUNCTURE: CPT | Performed by: EMERGENCY MEDICINE

## 2021-12-20 PROCEDURE — 96361 HYDRATE IV INFUSION ADD-ON: CPT | Mod: 59

## 2021-12-20 PROCEDURE — 85025 COMPLETE CBC W/AUTO DIFF WBC: CPT | Performed by: EMERGENCY MEDICINE

## 2021-12-20 PROCEDURE — 31575 DIAGNOSTIC LARYNGOSCOPY: CPT

## 2021-12-20 PROCEDURE — 250N000011 HC RX IP 250 OP 636: Performed by: EMERGENCY MEDICINE

## 2021-12-20 PROCEDURE — 80048 BASIC METABOLIC PNL TOTAL CA: CPT | Performed by: EMERGENCY MEDICINE

## 2021-12-20 PROCEDURE — 250N000009 HC RX 250: Performed by: EMERGENCY MEDICINE

## 2021-12-20 PROCEDURE — 99285 EMERGENCY DEPT VISIT HI MDM: CPT | Mod: 25

## 2021-12-20 PROCEDURE — 70491 CT SOFT TISSUE NECK W/DYE: CPT

## 2021-12-20 PROCEDURE — 87651 STREP A DNA AMP PROBE: CPT | Performed by: EMERGENCY MEDICINE

## 2021-12-20 RX ORDER — DEXAMETHASONE SODIUM PHOSPHATE 10 MG/ML
10 INJECTION, SOLUTION INTRAMUSCULAR; INTRAVENOUS ONCE
Status: COMPLETED | OUTPATIENT
Start: 2021-12-20 | End: 2021-12-20

## 2021-12-20 RX ORDER — PREDNISONE 20 MG/1
TABLET ORAL
Qty: 6 TABLET | Refills: 0 | Status: SHIPPED | OUTPATIENT
Start: 2021-12-20 | End: 2022-01-16

## 2021-12-20 RX ORDER — IOPAMIDOL 755 MG/ML
80 INJECTION, SOLUTION INTRAVASCULAR ONCE
Status: COMPLETED | OUTPATIENT
Start: 2021-12-20 | End: 2021-12-20

## 2021-12-20 RX ORDER — MUPIROCIN 20 MG/G
OINTMENT TOPICAL 3 TIMES DAILY
Qty: 30 G | Refills: 0 | Status: SHIPPED | OUTPATIENT
Start: 2021-12-20 | End: 2022-01-03

## 2021-12-20 RX ORDER — CLINDAMYCIN PHOSPHATE 900 MG/50ML
900 INJECTION, SOLUTION INTRAVENOUS ONCE
Status: COMPLETED | OUTPATIENT
Start: 2021-12-20 | End: 2021-12-20

## 2021-12-20 RX ORDER — CLINDAMYCIN HCL 300 MG
300 CAPSULE ORAL 4 TIMES DAILY
Qty: 40 CAPSULE | Refills: 0 | Status: SHIPPED | OUTPATIENT
Start: 2021-12-20 | End: 2021-12-30

## 2021-12-20 RX ADMIN — SODIUM CHLORIDE 60 ML: 900 INJECTION INTRAVENOUS at 07:59

## 2021-12-20 RX ADMIN — CLINDAMYCIN PHOSPHATE 900 MG: 900 INJECTION, SOLUTION INTRAVENOUS at 10:08

## 2021-12-20 RX ADMIN — DEXAMETHASONE SODIUM PHOSPHATE 10 MG: 10 INJECTION, SOLUTION INTRAMUSCULAR; INTRAVENOUS at 10:07

## 2021-12-20 RX ADMIN — SODIUM CHLORIDE 1000 ML: 9 INJECTION, SOLUTION INTRAVENOUS at 07:14

## 2021-12-20 RX ADMIN — IOPAMIDOL 80 ML: 755 INJECTION, SOLUTION INTRAVENOUS at 07:58

## 2021-12-20 ASSESSMENT — ENCOUNTER SYMPTOMS
ABDOMINAL PAIN: 0
BACK PAIN: 0
FEVER: 0
SORE THROAT: 1
EYE DISCHARGE: 0
VOICE CHANGE: 0
EYE PAIN: 0
TROUBLE SWALLOWING: 1
CHEST TIGHTNESS: 0
CHILLS: 0
MYALGIAS: 0
DYSURIA: 0
SHORTNESS OF BREATH: 0
NECK PAIN: 0
VOMITING: 0
HEADACHES: 0
DIARRHEA: 0
COUGH: 0

## 2021-12-20 NOTE — ED TRIAGE NOTES
Patient states not sure what she needs to be seen for.  Bumps to face, right leg, right wrist, chest, back.  Painful.

## 2021-12-20 NOTE — ED PROVIDER NOTES
History     Chief Complaint:  Derm Problem       HPI   Daysi Ashley is a 61 year old female who presents with skin rash and left-sided throat pain.  The patient states that her symptoms started 10 days ago with a scabbed lesion to the anterior aspect of the right thigh and right proximal tibia then 6 days ago she noticed a scabbed rash on her upper back and nose.  The rash is itchy and she has been picking at it.  4 days ago she noticed irritation feeling on the left side of her throat and feels like there is a string in the left side of the back of her throat.  She states that she spit up some mucus that look like a string and noticed hair like object in the mucus.  She has been having trouble eating due to the left-sided throat irritation feeling and feels that it is swollen in the left side of her throat.  She has only been drinking fluids and eating soft foods.  She denies any fevers or chills, headache, chest pain, shortness of breath, abdominal pain, vomiting or diarrhea.  She denies any auditory or visual hallucinations.  She denies any suicidal or homicidal thoughts.  She has not been sexually active for years.  No joint pain or swelling.    Allergies:  Phenothiazines     Medications:    brexpiprazole (REXULTI) 2 MG tablet  clonazePAM (KLONOPIN) 1 MG tablet  multivitamin w/minerals (THERA-VIT-M) tablet  pramipexole (MIRAPEX) 0.25 MG tablet  venlafaxine (EFFEXOR-XR) 150 MG 24 hr capsule  vitamin D3 1000 units TABS        Past Medical History:    Past Medical History:   Diagnosis Date     311      Allergic rhinitis, cause unspecified      Arthritis      Depressive disorder      Migraine, unspecified, without mention of intractable migraine without mention of status migrainosus      Rapid weight loss 1/22/2013       Patient Active Problem List    Diagnosis Date Noted     CKD (chronic kidney disease) stage 4, GFR 15-29 ml/min (H) 07/25/2019     Priority: Medium     Major depressive disorder, severe (H)  2019     Priority: Medium     Syncope 2019     Priority: Medium     Suicidal ideation 2019     Priority: Medium     Severe recurrent major depression without psychotic features (H) 2018     Priority: Medium     Depression with suicidal ideation 2018     Priority: Medium     Advanced directives, counseling/discussion 10/14/2013     Priority: Medium     Advance Care Planning:   ACP Review and Resources Provided:  Reviewed chart for advance care plan.  Daysi Ashley has no plan or code status on file. Discussed available resources and provided with information. Confirmed code status reflects current choices pending further ACP discussions.    Added by Renetta Alvarez on 10/14/2013               PTSD (post-traumatic stress disorder) 2013     Priority: Medium     Rapid weight loss 2013     Priority: Medium     Joint pain 2012     Priority: Medium     Dysthymia 2010     Priority: Medium     Hyperlipidemia LDL goal <160 2010     Priority: Medium     Drug dependence (H) 2006     Priority: Medium     PT HAS HAD ADDICTION PROBLEMS FROM BENZODIAZEPINES AND BARBITUATES, CD RX SUCCESSFUL. NO PROB HISTORICALLY FROM ETOH OR NARCOTICS  Problem list name updated by automated process. Provider to review       Migraine 2004     Priority: Medium     Problem list name updated by automated process. Provider to review       Allergic rhinitis 2004     Priority: Medium     Problem list name updated by automated process. Provider to review          Past Surgical History:    Past Surgical History:   Procedure Laterality Date     APPENDECTOMY        SECTION   &      FOOT SURGERY      right     HERNIORRHAPHY UMBILICAL  2000     ORTHOPEDIC SURGERY       [facility] NONSPECIFIC PROCEDURE      Appendectomy        Family History:    family history includes Dementia in her father; Depression in her daughter and father.    Social History:   reports that she has  never smoked. She has never used smokeless tobacco. She reports that she does not drink alcohol and does not use drugs.    PCP: Melonie Srivastava     Review of Systems   Constitutional: Negative for chills and fever.   HENT: Positive for mouth sores, sore throat and trouble swallowing. Negative for congestion, ear pain and voice change.    Eyes: Negative for pain and discharge.   Respiratory: Negative for cough, chest tightness and shortness of breath.    Cardiovascular: Negative for chest pain and leg swelling.   Gastrointestinal: Negative for abdominal pain, diarrhea and vomiting.   Genitourinary: Negative for dysuria.   Musculoskeletal: Negative for back pain, myalgias and neck pain.   Skin: Positive for rash.   Neurological: Negative for headaches.   All other systems reviewed and are negative.        Physical Exam     Patient Vitals for the past 24 hrs:   BP Temp Temp src Pulse Resp SpO2   12/20/21 0729 -- -- -- -- 18 --   12/20/21 0727 -- 98  F (36.7  C) Oral -- -- 97 %   12/20/21 0726 (!) 169/73 -- -- 77 -- --        Physical Exam  Nursing note and vitals reviewed.  Constitutional:  Appears well-developed and well-nourished.   HENT:   Head:    Atraumatic.   Mouth/Throat:   Small aphthous ulcer inside the left cheek posteriorly.  Oropharynx is clear and moist. No oropharyngeal exudate.   Eyes:    Pupils are equal, round, and reactive to light.   Neck:    Normal range of motion. Neck supple.      No tracheal deviation present. No thyromegaly present.   Cardiovascular:  Normal rate, regular rhythm, no murmur   Pulmonary/Chest: Breath sounds are clear and equal without wheezes or crackles.  Abdominal:   Soft. Bowel sounds are normal. Exhibits no distension and      no mass. There is no tenderness.      There is no rebound and no guarding.   Musculoskeletal:  Exhibits no edema.   Lymphadenopathy:  No cervical adenopathy.   Neurological:   Alert and oriented to person, place, and time.   Skin:    Skin is warm and  dry. Multiple excoriated scabs over her skin.  1 over the right anterior thigh with a small amount of surrounding erythema, but no palpable fluid collection or drainage, 1 over right anterior proximal tibia, multiple lesions over dorsum of right wrist and upper back.  Excoriated scabs over her nose without surrounding erythema or drainage.    Emergency Department Course     Imaging:      Soft tissue neck CT w contrast   Final Result   IMPRESSION:   1.  Effacement of the left piriform sinus. Direct visualization is   suggested.     2.  Mildly enlarged left level II lymph node which may be reactive.   Recommend follow-up to ensure resolution.   3.  No other evidence of cervical lymphadenopathy by size or   morphologic criteria.   4.  Indeterminate right thyroid nodule. This could be further   evaluated with a thyroid ultrasound.      NOELLE VENTURA MD            SYSTEM ID:  [id number]           Laboratory:  Labs Ordered and Resulted from Time of ED Arrival to Time of ED Departure   BASIC METABOLIC PANEL - Abnormal       Result Value    Sodium 137      Potassium 3.8      Chloride 105      Carbon Dioxide (CO2) 27      Anion Gap 5      Urea Nitrogen 29      Creatinine 1.25 (*)     Calcium 9.1      Glucose 99      GFR Estimate 46 (*)    STREPTOCOCCUS A RAPID SCREEN W REFELX TO PCR - Normal    Group A Strep antigen Negative     CBC WITH PLATELETS AND DIFFERENTIAL    WBC Count 4.2      RBC Count 4.38      Hemoglobin 12.9      Hematocrit 40.2      MCV 92      MCH 29.5      MCHC 32.1      RDW 13.1      Platelet Count 265      % Neutrophils 50      % Lymphocytes 40      % Monocytes 9      % Eosinophils 0      % Basophils 1      % Immature Granulocytes 0      NRBCs per 100 WBC 0      Absolute Neutrophils 2.1      Absolute Lymphocytes 1.7      Absolute Monocytes 0.4      Absolute Eosinophils 0.0      Absolute Basophils 0.0      Absolute Immature Granulocytes 0.0      Absolute NRBCs 0.0          Procedure:  Fiberoptic  Laryngoscopy:    Indication:  Left throat pain with fullness to the left piriform sinus seen on CT.  Method:  Hurriaine spray was applied to her uvula.  Fiberoptic scope was inserted through her mouth without difficulty  Findings:  Findings appear normal without any swelling or mass seen.  Epiglottis appears normal.  Normal vocal cords with normal function.  Supraglottic structures normal.    Interventions:  Medications   0.9% sodium chloride BOLUS (0 mLs Intravenous Stopped 12/20/21 0844)   iopamidol (ISOVUE-370) solution 80 mL (80 mLs Intravenous Given 12/20/21 0758)   Saline (60 mLs Intravenous Given 12/20/21 0759)   dexamethasone PF (DECADRON) injection 10 mg (10 mg Intravenous Given 12/20/21 1007)   clindamycin (CLEOCIN) infusion 900 mg (0 mg Intravenous Stopped 12/20/21 1057)         Emergency Department Course:  Past medical records, nursing notes, and vitals reviewed.  I performed an exam of the patient and obtained history, as documented above.  I rechecked the patient. Findings and plan explained to the Patient. Patient was discharged to home.    Impression & Plan      Medical Decision Making:  This patient had the onset 10 days ago of an excoriated rash over her back face arms and legs which is worst over her nose.  However her presenting complaint is left-sided throat discomfort with an uncomfortable feeling when she eats or swallows, which has been limiting her eating, but no impairment of her breathing.  I performed a CT soft tissue neck on the patient which showed some effacement of the left piriform sinus, therefore I consulted ENT surgery and spoke with Dr. Mckeon.  I discussed the CT findings and the patient's symptoms with her as well as the fact that the patient is having trouble eating and and drinking.  I requested that Dr. Mckeon examined the patient here in the emergency department and performed the laryngoscopy exam however she was unable to come to the emergency department to evaluate the  patient.  She recommended that the patient be discharged on 5 days of prednisone 40 mg daily and either Augmentin or clindamycin antibiotic treatment.  I chose clindamycin to treat the patient with because she has an excoriated rash which is concerning for either staph or strep so the clindamycin would cover for any potential throat infection plus skin infection.  She does not appear to be having any systemic infection or sepsis.  I performed a fiberoptic laryngoscopy exam on the patient and did not see any abnormal findings.  There was no sign of epiglottitis supraglottitis vocal cord problem or any obvious airway swelling.  I felt she be safely discharged home but she was told to have close follow-up in the ENT surgery clinic this week and to return if she has any worsening of her symptoms.  I discussed with her her CT neck findings including the lymphadenopathy.  I discussed with the patient that I have concern for possible infection with reactive lymph node especially since she has the excoriated rash but malignancy could also cause these CT findings so this needs to be ruled out through ENT clinic surgery evaluation which she understands and agrees.  She was also told to follow-up with her dermatologist this week.  I did not feel that there was any sign of secondary syphilis, scabies, bedbugs, or an usual rheumatologic problem.      Diagnosis:    ICD-10-CM    1. Mass of pyriform sinus  J34.89    2. Folliculitis  L73.9         Discharge Medications:     Medication List      There are no discharge medications for this visit.          12/20/2021   Alea Angeles MD [place], Cheri Lee, MD  12/20/21 1427

## 2021-12-21 ENCOUNTER — TRANSFERRED RECORDS (OUTPATIENT)
Dept: HEALTH INFORMATION MANAGEMENT | Facility: CLINIC | Age: 61
End: 2021-12-21
Payer: COMMERCIAL

## 2021-12-29 ENCOUNTER — NURSE TRIAGE (OUTPATIENT)
Dept: NURSING | Facility: CLINIC | Age: 61
End: 2021-12-29
Payer: COMMERCIAL

## 2021-12-30 ENCOUNTER — TELEPHONE (OUTPATIENT)
Dept: FAMILY MEDICINE | Facility: CLINIC | Age: 61
End: 2021-12-30
Payer: COMMERCIAL

## 2021-12-30 NOTE — TELEPHONE ENCOUNTER
"Patient calling - says she does not currently have a PCP.  Says she was seen in ED 10 days ago.  She was told she has a staph infection.  Was prescribed an antibiotic and prednisone.  Says she has crusty blisters all over her nose.  Says she went to the ENT doctor for a mass in her throat.  They said her exam was normal.  Yesterday she missed one of her antibiotic and steroid dose due to sleeping.  Says she is worse today.  Feels tired, weak and is really uncomfortable.  Says her right eye is bumpy on the clear part.  She is \"warm\" off and on.  Does not have a thermometer.    No severe difficulty breathing.    Triaged to disposition of Go to ED Now (or PCP Triage).  No PCP - advised ED now.    Stefany Zacarias RN  Triage Nurse Advisor    COVID 19 Nurse Triage Plan/Patient Instructions    Please be aware that novel coronavirus (COVID-19) may be circulating in the community. If you develop symptoms such as fever, cough, or SOB or if you have concerns about the presence of another infection including coronavirus (COVID-19), please contact your health care provider or visit [email or web address].     Disposition/Instructions    ED Visit recommended. Follow protocol based instructions.     Bring Your Own Device:  Please also bring your smart device(s) (smart phones, tablets, laptops) and their charging cables for your personal use and to communicate with your care team during your visit.    Thank you for taking steps to prevent the spread of this virus.  o Limit your contact with others.  o Wear a simple mask to cover your cough.  o Wash your hands well and often.    Resources    [facility]: About COVID-19: [email or web address]    CDC: What to Do If You're Sick: www.cdc.gov/coronavirus/2019-ncov/about/steps-when-sick.html    CDC: Ending Home Isolation: www.cdc.gov/coronavirus/2019-ncov/hcp/disposition-in-home-patients.html     CDC: Caring for Someone: " www.cdc.gov/coronavirus/2019-ncov/if-you-are-sick/care-for-someone.html     [facility]: Interim Guidance for Hospital Discharge to Home: www.health.[facility].[place].us/diseases/coronavirus/hcp/hospdischarge.pdf    [facility] clinical trials (COVID-19 research studies): [email or web address]-trials     Below are the COVID-19 hotlines at the [place] Department of Health ([facility]). Interpreters are available.   o For health questions: Call 396-103-1014 or 1-859.344.9676 (7 a.m. to 7 p.m.)  o For questions about schools and childcare: Call 114-664-2347 or 1-662.908.1802 (7 a.m. to 7 p.m.)   Reason for Disposition    Patient sounds very sick or weak to the triager    Additional Information    Negative: Severe difficulty breathing (e.g., struggling for each breath, speaks in single words)    Negative: Sounds like a life-threatening emergency to the triager    Negative: [1] Recent hospitalization for pneumonia AND [2] taking an antibiotic    Negative: [1] Animal bite infection AND [2] taking an antibiotic    Negative: [1] Cellulitis AND [2] taking an antibiotic    Negative: [1] Ear  infection (Otitis Media) AND [2] taking an antibiotic    Negative: [1] Ear  infection (Swimmer's Ear) AND [2] taking an antibiotic    Negative: [1] Sinus infection AND [2] taking an antibiotic    Negative: [1] Strep throat AND [2] taking an antibiotic    Negative: [1] Urinary tract  infection (e.g., cystitis, pyelonephritis, urethritis, epididymitis) AND [2] male AND [3] taking an antibiotic    Negative: [1] Urinary tract  infection (e.g., cystitis, pyelonephritis, urethritis) AND [2] female AND [3] taking an antibiotic    Negative: [1] Wound infection AND [2] taking an antibiotic    Negative: MODERATE difficulty breathing (e.g., speaks in phrases, SOB even at rest, pulse 100-120)    Negative: Fever > 103 F (39.4 C)    Protocols used: INFECTION ON ANTIBIOTIC FOLLOW-UP CALL-A-

## 2021-12-30 NOTE — TELEPHONE ENCOUNTER
Pt calling says she was a former [facility] pt.  Says she has been seeing another doc/clinic for some time but stopped seeing him because she didn't care for him and started using ERs instead.    Calling today says she was seen in ER rxd prednisone and sent to ENT. Calling us requesting prednisone refill.    Advised being seen, no openings here today (only 3 docs here and already double booked). Pt upset and says she just wants a refill. Advised calling another clinic or going to UC/ER.     Pt upset and hung up.    Kaitlin Canas RN

## 2022-01-16 ENCOUNTER — HOSPITAL ENCOUNTER (OUTPATIENT)
Facility: CLINIC | Age: 62
Setting detail: OBSERVATION
Discharge: HOME | End: 2022-01-17
Attending: EMERGENCY MEDICINE | Admitting: INTERNAL MEDICINE
Payer: COMMERCIAL

## 2022-01-16 ENCOUNTER — APPOINTMENT (OUTPATIENT)
Dept: CT IMAGING | Facility: CLINIC | Age: 62
End: 2022-01-16
Attending: INTERNAL MEDICINE
Payer: COMMERCIAL

## 2022-01-16 DIAGNOSIS — R04.0 EPISTAXIS: ICD-10-CM

## 2022-01-16 DIAGNOSIS — R53.1 GENERALIZED WEAKNESS: ICD-10-CM

## 2022-01-16 DIAGNOSIS — R76.8 P-ANCA TITER POSITIVE: ICD-10-CM

## 2022-01-16 DIAGNOSIS — J02.9 SORE THROAT: ICD-10-CM

## 2022-01-16 DIAGNOSIS — K08.89 PAIN, DENTAL: Primary | ICD-10-CM

## 2022-01-16 LAB
ALBUMIN SERPL-MCNC: 3.3 G/DL (ref 3.4–5)
ALBUMIN UR-MCNC: 20 MG/DL
ALP SERPL-CCNC: 72 U/L (ref 40–150)
ALT SERPL W P-5'-P-CCNC: 23 U/L (ref 0–50)
ANION GAP SERPL CALCULATED.3IONS-SCNC: 5 MMOL/L (ref 3–14)
APPEARANCE UR: CLEAR
AST SERPL W P-5'-P-CCNC: 14 U/L (ref 0–45)
ATRIAL RATE - MUSE: 87 BPM
BASOPHILS # BLD AUTO: 0 10E3/UL (ref 0–0.2)
BASOPHILS NFR BLD AUTO: 0 %
BILIRUB SERPL-MCNC: 0.4 MG/DL (ref 0.2–1.3)
BILIRUB UR QL STRIP: NEGATIVE
BUN SERPL-MCNC: 24 MG/DL (ref 7–30)
CALCIUM SERPL-MCNC: 8.8 MG/DL (ref 8.5–10.1)
CHLORIDE BLD-SCNC: 106 MMOL/L (ref 94–109)
CO2 SERPL-SCNC: 27 MMOL/L (ref 20–32)
COLOR UR AUTO: ABNORMAL
CREAT SERPL-MCNC: 1.26 MG/DL (ref 0.52–1.04)
CRP SERPL-MCNC: 9.4 MG/L (ref 0–8)
DEPRECATED S PYO AG THROAT QL EIA: NEGATIVE
DIASTOLIC BLOOD PRESSURE - MUSE: NORMAL MMHG
EOSINOPHIL # BLD AUTO: 0.1 10E3/UL (ref 0–0.7)
EOSINOPHIL NFR BLD AUTO: 1 %
ERYTHROCYTE [DISTWIDTH] IN BLOOD BY AUTOMATED COUNT: 12.9 % (ref 10–15)
ERYTHROCYTE [SEDIMENTATION RATE] IN BLOOD BY WESTERGREN METHOD: 18 MM/HR (ref 0–30)
FLUAV RNA SPEC QL NAA+PROBE: NEGATIVE
FLUBV RNA RESP QL NAA+PROBE: NEGATIVE
GFR SERPL CREATININE-BSD FRML MDRD: 48 ML/MIN/1.73M2
GLUCOSE BLD-MCNC: 101 MG/DL (ref 70–99)
GLUCOSE UR STRIP-MCNC: 30 MG/DL
GROUP A STREP BY PCR: NOT DETECTED
HCT VFR BLD AUTO: 42.8 % (ref 35–47)
HGB BLD-MCNC: 14.3 G/DL (ref 11.7–15.7)
HGB UR QL STRIP: NEGATIVE
HYALINE CASTS: 50 /LPF
IMM GRANULOCYTES # BLD: 0 10E3/UL
IMM GRANULOCYTES NFR BLD: 0 %
INTERPRETATION ECG - MUSE: NORMAL
KETONES UR STRIP-MCNC: NEGATIVE MG/DL
LEUKOCYTE ESTERASE UR QL STRIP: NEGATIVE
LYMPHOCYTES # BLD AUTO: 1.1 10E3/UL (ref 0.8–5.3)
LYMPHOCYTES NFR BLD AUTO: 23 %
MCH RBC QN AUTO: 30.2 PG (ref 26.5–33)
MCHC RBC AUTO-ENTMCNC: 33.4 G/DL (ref 31.5–36.5)
MCV RBC AUTO: 90 FL (ref 78–100)
MONOCYTES # BLD AUTO: 0.6 10E3/UL (ref 0–1.3)
MONOCYTES NFR BLD AUTO: 11 %
MUCOUS THREADS #/AREA URNS LPF: PRESENT /LPF
NEUTROPHILS # BLD AUTO: 3.2 10E3/UL (ref 1.6–8.3)
NEUTROPHILS NFR BLD AUTO: 65 %
NITRATE UR QL: NEGATIVE
NRBC # BLD AUTO: 0 10E3/UL
NRBC BLD AUTO-RTO: 0 /100
P AXIS - MUSE: 68 DEGREES
PH UR STRIP: 6 [PH] (ref 5–7)
PLATELET # BLD AUTO: 265 10E3/UL (ref 150–450)
POTASSIUM BLD-SCNC: 3.9 MMOL/L (ref 3.4–5.3)
PR INTERVAL - MUSE: 136 MS
PROT SERPL-MCNC: 6.7 G/DL (ref 6.8–8.8)
QRS DURATION - MUSE: 80 MS
QT - MUSE: 372 MS
QTC - MUSE: 447 MS
R AXIS - MUSE: 27 DEGREES
RBC # BLD AUTO: 4.74 10E6/UL (ref 3.8–5.2)
RBC URINE: 6 /HPF
SARS-COV-2 RNA RESP QL NAA+PROBE: NEGATIVE
SODIUM SERPL-SCNC: 138 MMOL/L (ref 133–144)
SP GR UR STRIP: 1.01 (ref 1–1.03)
SYSTOLIC BLOOD PRESSURE - MUSE: NORMAL MMHG
T AXIS - MUSE: 68 DEGREES
UROBILINOGEN UR STRIP-MCNC: NORMAL MG/DL
VENTRICULAR RATE- MUSE: 87 BPM
WBC # BLD AUTO: 5 10E3/UL (ref 4–11)
WBC URINE: 1 /HPF

## 2022-01-16 PROCEDURE — 87077 CULTURE AEROBIC IDENTIFY: CPT | Performed by: OTOLARYNGOLOGY

## 2022-01-16 PROCEDURE — 70486 CT MAXILLOFACIAL W/O DYE: CPT

## 2022-01-16 PROCEDURE — G0378 HOSPITAL OBSERVATION PER HR: HCPCS

## 2022-01-16 PROCEDURE — 85025 COMPLETE CBC W/AUTO DIFF WBC: CPT | Performed by: EMERGENCY MEDICINE

## 2022-01-16 PROCEDURE — 250N000013 HC RX MED GY IP 250 OP 250 PS 637: Performed by: INTERNAL MEDICINE

## 2022-01-16 PROCEDURE — 36415 COLL VENOUS BLD VENIPUNCTURE: CPT | Performed by: INTERNAL MEDICINE

## 2022-01-16 PROCEDURE — 36415 COLL VENOUS BLD VENIPUNCTURE: CPT | Performed by: EMERGENCY MEDICINE

## 2022-01-16 PROCEDURE — 258N000003 HC RX IP 258 OP 636: Performed by: EMERGENCY MEDICINE

## 2022-01-16 PROCEDURE — C9803 HOPD COVID-19 SPEC COLLECT: HCPCS

## 2022-01-16 PROCEDURE — 87040 BLOOD CULTURE FOR BACTERIA: CPT | Performed by: INTERNAL MEDICINE

## 2022-01-16 PROCEDURE — 86036 ANCA SCREEN EACH ANTIBODY: CPT | Performed by: INTERNAL MEDICINE

## 2022-01-16 PROCEDURE — 99220 PR INITIAL OBSERVATION CARE,LEVEL III: CPT | Performed by: INTERNAL MEDICINE

## 2022-01-16 PROCEDURE — 87636 SARSCOV2 & INF A&B AMP PRB: CPT | Performed by: EMERGENCY MEDICINE

## 2022-01-16 PROCEDURE — 81001 URINALYSIS AUTO W/SCOPE: CPT | Performed by: EMERGENCY MEDICINE

## 2022-01-16 PROCEDURE — 99285 EMERGENCY DEPT VISIT HI MDM: CPT | Mod: 25

## 2022-01-16 PROCEDURE — 93005 ELECTROCARDIOGRAM TRACING: CPT

## 2022-01-16 PROCEDURE — 85652 RBC SED RATE AUTOMATED: CPT | Performed by: INTERNAL MEDICINE

## 2022-01-16 PROCEDURE — 86038 ANTINUCLEAR ANTIBODIES: CPT | Performed by: INTERNAL MEDICINE

## 2022-01-16 PROCEDURE — 96360 HYDRATION IV INFUSION INIT: CPT

## 2022-01-16 PROCEDURE — 87651 STREP A DNA AMP PROBE: CPT | Performed by: EMERGENCY MEDICINE

## 2022-01-16 PROCEDURE — 86140 C-REACTIVE PROTEIN: CPT | Performed by: INTERNAL MEDICINE

## 2022-01-16 PROCEDURE — 258N000003 HC RX IP 258 OP 636: Performed by: INTERNAL MEDICINE

## 2022-01-16 PROCEDURE — 80053 COMPREHEN METABOLIC PANEL: CPT | Performed by: EMERGENCY MEDICINE

## 2022-01-16 RX ORDER — NALOXONE HYDROCHLORIDE 0.4 MG/ML
0.4 INJECTION, SOLUTION INTRAMUSCULAR; INTRAVENOUS; SUBCUTANEOUS
Status: DISCONTINUED | OUTPATIENT
Start: 2022-01-16 | End: 2022-01-17 | Stop reason: HOSPADM

## 2022-01-16 RX ORDER — VENLAFAXINE HYDROCHLORIDE 37.5 MG/1
75 TABLET, EXTENDED RELEASE ORAL DAILY
COMMUNITY

## 2022-01-16 RX ORDER — NALOXONE HYDROCHLORIDE 0.4 MG/ML
0.2 INJECTION, SOLUTION INTRAMUSCULAR; INTRAVENOUS; SUBCUTANEOUS
Status: DISCONTINUED | OUTPATIENT
Start: 2022-01-16 | End: 2022-01-17 | Stop reason: HOSPADM

## 2022-01-16 RX ORDER — SODIUM CHLORIDE 9 MG/ML
INJECTION, SOLUTION INTRAVENOUS CONTINUOUS
Status: DISCONTINUED | OUTPATIENT
Start: 2022-01-16 | End: 2022-01-17 | Stop reason: HOSPADM

## 2022-01-16 RX ORDER — AMOXICILLIN 250 MG
1 CAPSULE ORAL 2 TIMES DAILY PRN
Status: DISCONTINUED | OUTPATIENT
Start: 2022-01-16 | End: 2022-01-17 | Stop reason: HOSPADM

## 2022-01-16 RX ORDER — ONDANSETRON 2 MG/ML
4 INJECTION INTRAMUSCULAR; INTRAVENOUS EVERY 6 HOURS PRN
Status: DISCONTINUED | OUTPATIENT
Start: 2022-01-16 | End: 2022-01-17 | Stop reason: HOSPADM

## 2022-01-16 RX ORDER — HYDROMORPHONE HYDROCHLORIDE 2 MG/1
2 TABLET ORAL EVERY 4 HOURS PRN
Status: DISCONTINUED | OUTPATIENT
Start: 2022-01-16 | End: 2022-01-17 | Stop reason: HOSPADM

## 2022-01-16 RX ORDER — ARIPIPRAZOLE 10 MG/1
5 TABLET ORAL DAILY
COMMUNITY

## 2022-01-16 RX ORDER — ACETAMINOPHEN 325 MG/1
650 TABLET ORAL EVERY 6 HOURS PRN
Status: DISCONTINUED | OUTPATIENT
Start: 2022-01-16 | End: 2022-01-17 | Stop reason: HOSPADM

## 2022-01-16 RX ORDER — ARIPIPRAZOLE 10 MG/1
10 TABLET ORAL DAILY
Status: DISCONTINUED | OUTPATIENT
Start: 2022-01-17 | End: 2022-01-17 | Stop reason: HOSPADM

## 2022-01-16 RX ORDER — VENLAFAXINE HYDROCHLORIDE 150 MG/1
150 CAPSULE, EXTENDED RELEASE ORAL DAILY
Status: DISCONTINUED | OUTPATIENT
Start: 2022-01-16 | End: 2022-01-17 | Stop reason: HOSPADM

## 2022-01-16 RX ORDER — POLYETHYLENE GLYCOL 3350 17 G/17G
17 POWDER, FOR SOLUTION ORAL DAILY PRN
Status: DISCONTINUED | OUTPATIENT
Start: 2022-01-16 | End: 2022-01-17 | Stop reason: HOSPADM

## 2022-01-16 RX ORDER — ACETAMINOPHEN 650 MG/1
650 SUPPOSITORY RECTAL EVERY 6 HOURS PRN
Status: DISCONTINUED | OUTPATIENT
Start: 2022-01-16 | End: 2022-01-17 | Stop reason: HOSPADM

## 2022-01-16 RX ORDER — AMOXICILLIN 250 MG
2 CAPSULE ORAL 2 TIMES DAILY PRN
Status: DISCONTINUED | OUTPATIENT
Start: 2022-01-16 | End: 2022-01-17 | Stop reason: HOSPADM

## 2022-01-16 RX ORDER — ONDANSETRON 4 MG/1
4 TABLET, ORALLY DISINTEGRATING ORAL EVERY 6 HOURS PRN
Status: DISCONTINUED | OUTPATIENT
Start: 2022-01-16 | End: 2022-01-17 | Stop reason: HOSPADM

## 2022-01-16 RX ADMIN — SODIUM CHLORIDE 75 ML/HR: 9 INJECTION, SOLUTION INTRAVENOUS at 17:40

## 2022-01-16 RX ADMIN — SODIUM CHLORIDE 1000 ML: 9 INJECTION, SOLUTION INTRAVENOUS at 10:12

## 2022-01-16 RX ADMIN — VENLAFAXINE HYDROCHLORIDE 150 MG: 150 CAPSULE, EXTENDED RELEASE ORAL at 19:13

## 2022-01-16 ASSESSMENT — ENCOUNTER SYMPTOMS
FEVER: 1
CONFUSION: 1
COUGH: 0
NAUSEA: 0
CHILLS: 1
WEAKNESS: 1
ABDOMINAL PAIN: 1
SORE THROAT: 1
VOMITING: 1
CONSTIPATION: 1
FATIGUE: 1
DIARRHEA: 1
SHORTNESS OF BREATH: 0

## 2022-01-16 ASSESSMENT — MIFFLIN-ST. JEOR
SCORE: 2125.13
SCORE: 1204.53

## 2022-01-16 NOTE — PROGRESS NOTES
RECEIVING UNIT ED HANDOFF REVIEW    ED Nurse Handoff Report was reviewed by: Fabi Mack RN on January 16, 2022 at 3:41 PM      read

## 2022-01-16 NOTE — ED NOTES
"[facility]  ED Nurse Handoff Report    ED Chief complaint: Oral Swelling      ED Diagnosis:   Final diagnoses:   Generalized weakness   Sore throat       Code Status: Full Code    Allergies:   Allergies   Allergen Reactions     Phenothiazines Other (See Comments)     Compazine. \"My tongue goes back\"       Patient Story: Pt comes in with a multitude of complaints including fatigue, mouth pain, chills and sore throat.  Focused Assessment:  Alert and oriented.  C/O increased weakness over the last couple of weeks.  States that her mouth is so sore, she has not been able to have as much fluids as normal.  Independent in room.    Treatments and/or interventions provided: 1L bolus  Patient's response to treatments and/or interventions:     To be done/followed up on inpatient unit:  See epic    Does this patient have any cognitive concerns?:     Activity level - Baseline/Home:  Independent  Activity Level - Current:   Independent    Patient's Preferred language: English   Needed?: No    Isolation: None  Infection: Not Applicable  Patient tested for COVID 19 prior to admission: YES  Bariatric?: No    Vital Signs:   Vitals:    01/16/22 0811   BP: (!) 153/100   Pulse: 105   Resp: 18   Temp: 98.7  F (37.1  C)   TempSrc: Oral   SpO2: 96%   Weight: 68 kg (150 lb)   Height: 1.6 m (5' 3\")       Cardiac Rhythm:     Was the PSS-3 completed:   Yes  What interventions are required if any?               Family Comments:   OBS brochure/video discussed/provided to patient/family: Yes              Name of person given brochure if not patient:               Relationship to patient:     For the majority of the shift this patient's behavior was Green.   Behavioral interventions performed were .    ED NURSE PHONE NUMBER: [id number]       "

## 2022-01-16 NOTE — PHARMACY-ADMISSION MEDICATION HISTORY
"Pharmacy Medication History  Admission medication history interview status for the 1/16/2022  admission is complete. See EPIC admission navigator for prior to admission medications     Location of Interview: Patient room  Medication history sources: Patient, Surescripts and Care Everywhere    Significant changes made to the medication list:  Removed:   -brexpiprazole 4 mg daily (per patient, therapy failed, taking aripiprazole)   -clonazepam 2 mg at bedtime (per patient, tapered slowly over 2.5 years, no longer taking, last filled ODT 1/2021)   -multivitamin daily (pt states not taking)   -pramipexole 0.25 mg daily prn (per patient, ineffective, last filled 6/2021)   -prednisone 40 mg X3 days  (12/30/21 prescribed 9ds, patient confirmed no active steroid)   -vitamin D 1000 units daily (pt states not taking)   Adjusted venlafaxine 300 mg daily --> 150 mg daily  Added aripiprazole     In the past week, patient estimated taking medication this percent of the time: less than 50% due to other (patient states she \"doesn't always take\"/\"not taking regularly\" aripiprazole)     Additional medication history information:   Patient is fair historian - able to state previous therapies on PTA med list.   Clindamycin filled 1/7/22 for #10ds, patient did not recognize this (or that she was taking any antibiotic, stating this was \"from a long time ago, summer\") - likely not filled/started, not added to PTA med list.   Venlafaxine prescribed most recently (as of 12/2021) for dose of 187.5 mg in AM, 150 mg in PM, but patient states only taking 150 mg in PM.     Medication reconciliation completed by provider prior to medication history? No    Time spent in this activity: 25 minutes    Prior to Admission medications    Medication Sig Last Dose Taking? Auth Provider   ARIPiprazole (ABILIFY) 10 MG tablet Take 10 mg by mouth daily Past Month at Unknown time Yes Unknown, Entered By History   venlafaxine (EFFEXOR-XR) 150 MG 24 hr capsule " Take 150 mg by mouth daily 1/14/2022 at PM Yes Unknown, Entered By History       The information provided in this note is only as accurate as the sources available at the time of update(s)   Daysi HortaD

## 2022-01-16 NOTE — ED PROVIDER NOTES
"  History   Chief Complaint:  Oral Swelling    HPI   Daysi Ashley is a 61 year old female with a history of stage 4 CKD, hyperlipidemia, and severe depression who presents alone for oral swelling. Per Chart Review, the patient was last seen here on 12/20/2021 for a derm problem. She was discharged and diagnosed with a mass of pyriform sinus and folliculitis. She was instructed to meet an Ear, Nose, Throat doctor who referred her to get a lab test done before they further prescribe any medication for her symptoms. However, she would have to wait until April to get the testing done, which she states she could not wait for, so she came here. Six weeks since her last visit here, she has been having mouth sores, lump with discomfort in her throat, chills, fevers, jaw pain, fatigue, weakness, loss of appetite, confusion, diarrhea, vomiting (once), abdominal pain, congestion, and a rash all over. She states that the bacteria \"moves throughout her mouth\".     The patient denies having trouble breathing, chest pain, shortness of breath, cough, swelling of the legs, nausea, bloody urine, or vaginal bleeding. She denies having any drug, alcohol, or smoking/vaping issues. She is COVID vaccinated, boostered, however she is not Flu vaccinated. She does not have a primary physician she follows up with.    Of note, she has a new cat of a year that often drools on her.    CT SOFT TISSUE NECK WITH CONTRAST - 12/20/2021:  IMPRESSION:  1.  Effacement of the left piriform sinus. Direct visualization is  suggested.    2.  Mildly enlarged left level II lymph node which may be reactive.  Recommend follow-up to ensure resolution.  3.  No other evidence of cervical lymphadenopathy by size or  morphologic criteria.  4.  Indeterminate right thyroid nodule. This could be further  evaluated with a thyroid ultrasound.    Review of Systems   Constitutional: Positive for chills, fatigue and fever.   HENT: Positive for congestion and sore throat.  " "  Respiratory: Negative for cough and shortness of breath.    Cardiovascular: Negative for chest pain and leg swelling.   Gastrointestinal: Positive for abdominal pain, constipation, diarrhea and vomiting. Negative for nausea.   Genitourinary: Negative for vaginal bleeding.        No blood in urine   Skin: Positive for rash.   Neurological: Positive for weakness.   Psychiatric/Behavioral: Positive for confusion.   All other systems reviewed and are negative.    Allergies:  Phenothiazines  Compazine    Medications:  brexpiprazole  clonazePAM   pramipexole   predniSONE   venlafaxine    Past Medical History:     311   Allergic rhinitis, cause unspecified   Arthritis  Migraine, unspecified, without mention of intractable migraine without mention of status migrainosus  Rapid weight loss   Drug dependence  Hyperlipidemia LDL goal <160  Joint pain  PTSD (post-traumatic stress disorder)  Advanced directives, counseling/discussion  Suicidal ideation  Syncope  Major depressive disorder, severe   CKD (chronic kidney disease) stage 4, GFR 15-29 ml/min      Past Surgical History:    Appendectomy   section  Foot surgery  Herniorrhaphy umbilical  Orthopedic surgery     Family History:    Dementia - Father  Depression - Father  Depression - Daughter  Heart - Father    Social History:  The patient presents alone.  The patient is not a smoker.    Physical Exam     Patient Vitals for the past 24 hrs:   BP Temp Temp src Pulse Resp SpO2 Height Weight   22 1359 (!) 182/82 -- -- 89 -- -- -- --   22 0811 (!) 153/100 98.7  F (37.1  C) Oral 105 18 96 % 1.6 m (5' 3\") 68 kg (150 lb)     Physical Exam  SKIN:  Warm, dry.  No rash.  No jaundice  HEMATOLOGIC/IMMUNOLOGIC/LYMPHATIC:  No pallor.  No limb edema.  No cervical adenopathy.  HENT: Normal phonation.  No anterior neck inflammation/mass.  No stridor.  Faint erythema of the posterior oropharynx.  No oral mucosal lesions/ulcerations.  No exam findings of dental " infection.  EYES:  Conjunctivae normal.  Anicteric.  CARDIOVASCULAR: Tachycardic rate with regular rhythm.  No murmur.  RESPIRATORY:  No respiratory distress, breath sounds equal and normal.  GASTROINTESTINAL:  Soft, nontender abdomen with active bowel sounds.  No palpable mass.  No distention.  MUSCULOSKELETAL: Normal body habitus.  NEUROLOGIC:  Alert, conversant.  Oriented to self place and time.  No aphasia.  No dysarthria.  No gross motor deficit.  No tactile sensory deficit.  No limb ataxia.  PSYCHIATRIC: No psychotic features.  Depressed mood with bland affect.  Normal eye contact.    Emergency Department Course   ECG  ECG obtained at 0956, ECG read at 1020  Normal sinus rhythm  Possible Left atrial enlargement  Borderline ECG   No significant changes as compared to prior, dated 06/25/2019.  Rate 87 bpm. CT interval 136 ms. QRS duration 80 ms. QT/QTc 372/447 ms. P-R-T axes 68 27 68.     Laboratory:  Labs Ordered and Resulted from Time of ED Arrival to Time of ED Departure   COMPREHENSIVE METABOLIC PANEL - Abnormal       Result Value    Sodium 138      Potassium 3.9      Chloride 106      Carbon Dioxide (CO2) 27      Anion Gap 5      Urea Nitrogen 24      Creatinine 1.26 (*)     Calcium 8.8      Glucose 101 (*)     Alkaline Phosphatase 72      AST 14      ALT 23      Protein Total 6.7 (*)     Albumin 3.3 (*)     Bilirubin Total 0.4      GFR Estimate 48 (*)    ROUTINE UA WITH MICROSCOPIC REFLEX TO CULTURE - Abnormal    Color Urine Light Yellow      Appearance Urine Clear      Glucose Urine 30  (*)     Bilirubin Urine Negative      Ketones Urine Negative      Specific Gravity Urine 1.011      Blood Urine Negative      pH Urine 6.0      Protein Albumin Urine 20  (*)     Urobilinogen Urine Normal      Nitrite Urine Negative      Leukocyte Esterase Urine Negative      Mucus Urine Present (*)     RBC Urine 6 (*)     WBC Urine 1      Hyaline Casts Urine 50 (*)    INFLUENZA A/B & SARS-COV2 PCR MULTIPLEX - Normal     Influenza A PCR Negative      Influenza B PCR Negative      SARS CoV2 PCR Negative     STREPTOCOCCUS A RAPID SCREEN W REFELX TO PCR - Normal    Group A Strep antigen Negative     GROUP A STREPTOCOCCUS PCR THROAT SWAB - Normal    Group A strep by PCR Not Detected     CBC WITH PLATELETS AND DIFFERENTIAL    WBC Count 5.0      RBC Count 4.74      Hemoglobin 14.3      Hematocrit 42.8      MCV 90      MCH 30.2      MCHC 33.4      RDW 12.9      Platelet Count 265      % Neutrophils 65      % Lymphocytes 23      % Monocytes 11      % Eosinophils 1      % Basophils 0      % Immature Granulocytes 0      NRBCs per 100 WBC 0      Absolute Neutrophils 3.2      Absolute Lymphocytes 1.1      Absolute Monocytes 0.6      Absolute Eosinophils 0.1      Absolute Basophils 0.0      Absolute Immature Granulocytes 0.0      Absolute NRBCs 0.0        Emergency Department Course:  Reviewed:  I reviewed nursing notes, vitals, past medical history, Care Everywhere and [facility]    Assessments:  0900 I obtained history and examined the patient as noted above. The medical student conducted the consultation.  0943 I conducted the exam.  1115 I rechecked the patient and explained findings.     Consults:  1145 I consulted with Dr. Haylee DO from Hospital[facility].    Interventions:  1012 0.9% sodium chloride BOLUS, IV    Disposition:  The patient was admitted to the hospital under the care of Dr. Haylee DO from [facility].     Impression & Plan     CMS Diagnoses: None.    Medical Decision Making:  This patient presents with concern of ongoing sore throat and oral pain and now is feeling so weak she is having trouble functioning at home.  Unclear exactly what is generating the patient's symptoms.  She had an evaluation here on December 20, 2021 which included CT scan neck.  Given the findings on the CT scan she did follow-up with ENT as advised and was given a second round of antibiotic.  She has not improved.  She feels convinced she has some sort of  unspecified bacterial infection causing her symptoms.  Clinically well-appearing in the context of infectious disease.  Given her malaise and poor function she will be admitted for further monitoring and testing.    Diagnosis:    ICD-10-CM    1. Generalized weakness  R53.1    2. Sore throat  J02.9        Discharge Medications:  New Prescriptions    No medications on file       Scribe Disclosure:  Mekhi WEST, am serving as a scribe at 9:08 AM on 1/16/2022 to document services personally performed by John Wang MD based on my observations and the provider's statements to me.      John Wang MD  01/16/22 [id number]

## 2022-01-16 NOTE — PLAN OF CARE
Observation goals  PRIOR TO DISCHARGE        Comments: ENT consult complete, not met symptoms improved,  not met safe for outpatient work up  not met  Nurse to notify provider when observation goals have been met and patient is ready for discharge.

## 2022-01-16 NOTE — ED TRIAGE NOTES
"Patient presents to the ED after leaving her car by the entry way, stating she is unable to park it.  Patient is wearing a full length robe and shoes that do not match saying she is \"sick\" Patient has difficulty elaborating on exactly what her symptoms are, but adds that her lips are swollen and its hard to drink through a straw. Patient goes on to speak about a recent ER visit where she was referred to ENT. Patient states \"they wanted me to get blood but I just kept getting sicker\".  Patient seems confused.   "

## 2022-01-16 NOTE — H&P
[facility]    History and Physical - Hospitalist Service       Date of Admission:  1/16/2022  Dictation #: [id number]  Brief Summary (see dictation for more details): 61F hx Dep and CKD presents with ED with 4-6 weeks of fatigue, sinus pain, throat discomfort, skin lesions and generally feeling unwell with chills, sweats.  Unclear etiology but checking inflammatory markers, blood cultures and ANCA.  Repeat ENT consult.  Monitor for fever or new symptoms. TTE for newly reported murmur.      Clinically Significant Risk Factors Present on Admission                # Overweight: last Body mass index is 26.57 kg/m .        DO [facility]  Securely message with the Vocera Web Console (learn more here)  Text page via [employer] Paging/Directory

## 2022-01-16 NOTE — H&P
Admitted: 01/16/2022    REVISED REPORT     PRIMARY CARE PHYSICIAN:  None currently.   CODE STATUS:  Full Code.  Discussed with the patient.    CHIEF COMPLAINT:  Ongoing weakness, fatigue, nasal pain.    HISTORY OF PRESENT ILLNESS:  Ms. Ashley is a 61-year-old female with a past medical history significant for depression, dyslipidemia, chronic kidney disease, who presents to the Emergency Department with the above concerns.  History is obtained through discussion with the ED physician as well as the patient.  The patient has had upwards of 4-6 weeks of symptoms including nasal discomfort, some skin findings, generalized weakness, fatigue and just generally feeling unwell.  She presented to the ED back in December on the 20th and had already had what she believes was a couple of weeks of symptoms.  She had a workup at that time, which was unrevealing from a laboratory perspective, but because of her nasal and throat symptoms, she had a CT of the neck, which did show some effacement of the left piriformis sinus and an enlarged lymph node on the left side.  She was referred to ENT and followed up with them in the days after her ED visit.  She did have a laryngoscopy in the ED and it sounds like another laryngoscopy in the ENT clinic and the patient reports to me that they were able to see some sort of mass in the back of her throat, but were uncertain exactly what it represented.  With the skin manifestations there was thought that it was potentially some sort of inflammatory process, so recommendation from ENT at that point was to have her follow up with Infectious Disease and possibly Rheumatology.    The patient describes having feelings of generalized weakness and fatigue and low energy.  She also has pain in her left sinus area and the back of her throat, predominantly on the left.  She also has bilateral jaw pain and notes this is worse when she tries to talk or eat.  She has not had any fevers that were measured,  but has intermittently felt chilled and cold and hot.  She does have sweats at times, particularly at night and notes that she has to take off her covers in the middle of the night.  She is coughing at times, which feels like it is coming from the back of her throat.  At times, this is yellow mucus, but there has also been what she describes as either blood or brownish discoloration.  She is otherwise not short of breath nor does she have any chest pain or significant abdominal pain.  Over the past 4-5 days she has had some loose stools and thus has very mild lower abdominal cramping at times.    In addition to the above symptoms, when she first presented back in December she also had skin manifestations including multiple lesions throughout her legs, on her arms and face.  This was thought to be something like folliculitis and Staph aureus and so she was treated with clindamycin and steroids at the recommendation of ENT.  It sounds like these medications did help the symptoms to improve from a dermatologic perspective and now all the lesions are healing though some are still present.  She does not believe she has had any new lesions since that time.  The patient did mention having to go to a rheumatologist, but she believes this is because of the jaw involvement.  She does note some ankle swelling at times, but otherwise has not had any specific arthralgias or joint swelling.  She does have some hip discomfort, which has been going on for many months and not new.    The patient has not yet followed up with Infectious Disease, although, so I do see a referral for them in the chart.    Here in our ED, repeat laboratory analysis has been unremarkable and stable from prior.  UA did show some hyaline casts, a few WBCs and RBCs and some protein.  COVID screen and influenza screen are negative.  Rapid strep is also negative, culture is pending.    PAST MEDICAL HISTORY:     1.  Depression.  2.  Migraine headaches.  3.   Allergic rhinitis.  4.  Dyslipidemia.  5.  PTSD.  6.  Syncope.  7.  Chronic kidney disease stage III with a baseline creatinine of 1.2.    MEDICATIONS:    Prior to Admission medications    Medication Sig Last Dose Taking? Auth Provider   ARIPiprazole (ABILIFY) 10 MG tablet Take 10 mg by mouth daily Past Month at Unknown time Yes Unknown, Entered By History   venlafaxine (EFFEXOR-XR) 150 MG 24 hr capsule Take 150 mg by mouth daily 1/14/2022 at PM Yes Unknown, Entered By History         SOCIAL HISTORY:  The patient denies any use of tobacco or alcohol.    FAMILY HISTORY:  Father had dementia.  Daughter had depression.  No family history of autoimmune disorders.    ALLERGIES:  PHENOTHIAZINES.    REVIEW OF SYSTEMS:  A complete review of systems reviewed and negative, save for the pertinent positives which are recorded in the HPI.    PHYSICAL EXAMINATION:    VITAL SIGNS:  Show blood pressure of 153/100, heart rate 105, respirations 18, satting 96% on room air, temperature 98.7 degrees Fahrenheit.  GENERAL:  The patient is lying in bed and appears generally unwell.  HEENT:  Pupils equal, round, reactive to light, extraocular muscle function intact.  No scleral icterus.  Oropharynx shows what appears to be a little bit of irritation in the back of her throat, but no other lesions.  NECK:  No lymphadenopathy or thyromegaly.  CARDIOVASCULAR:  Heart is regular rate and rhythm, but I do hear a 2/6 systolic murmur at the right sternal border.  PULMONARY:  Lungs are clear to auscultation bilaterally without wheeze or rhonchi.  GASTROINTESTINAL:  Positive bowel sounds, soft, minimal tenderness in the right lower quadrant near the pelvis, but no rebound or guarding.  SKIN:  She has what appears to be some healing lesions that are scabbed over without any weeping or surrounding erythema.  No induration.  There are no apparent new lesions.  LYMPHATICS:  No peripheral edema.  PSYCHIATRIC:  The patient is alert and oriented, with a  somewhat depressed affect.  NEUROLOGIC:  Cranial nerves II through XII are grossly intact without any focal deficits.    LABORATORY DATA:  CBC, BMP, LFTs all unremarkable save for a creatinine of 1.26, which is at her baseline. COVID and influenza are negative as is rapid Strep.  UA shows 50 hyaline casts, 6 RBCs, 1 WBC, 20 protein, 30 glucose.    EKG is normal sinus rhythm.    ASSESSMENT AND PLAN:  Ms. Ashley is a 61-year-old female with a past medical history significant for depression, allergic rhinitis, dyslipidemia, and chronic kidney disease, who presents to the Emergency Department with roughly 6 weeks of lingering symptoms including fatigue, generalized weakness, throat and sinus pain and resolving skin lesions.  1.  Sinus and throat discomfort with productive cough, jaw pain and skin lesions:  The etiology of all this is not entirely clear to me currently, but I do note that the CT of the neck showed effacement of the left piriform sinus with an enlarged lymph node at that point and there is reported mass noted on laryngoscopy from ENT, though I do not have access to those records.  Multiple things potentially come to mind including inflammatory processes, malignancy as well as potential rheumatologic issues versus infection.  She does not have a white count or fever at this point and previously had clindamycin, which helped with her cutaneous manifestations, but not the ENT manifestations.  At this point, I will check a sed rate, CRP, SHELL. I am also going to check an ANCA to assess for Wegener's granulomatosis.  With the generalized symptoms including night sweats and variable symptoms, I am going to obtain blood cultures to assess for subacute bacterial endocarditis, particularly given the murmur I heard.  I will ask ENT to see her again and will also consider Infectious Disease consult pending their evaluation and some laboratory analysis.  I do not feel strongly about an infectious etiology at this  point, thus I am not going to give any antibiotics for now.  We will wait for ENT consult to see if they could directly visualize this area again, but may also consider repeat CT of the neck.  2.  Murmur:  This is new, per the patient as she does not recall ever being told she had a murmur prior.  Unclear if it is potentially related to the above, but we will be obtaining blood cultures and an echocardiogram.  3.  Depression and PTSD:  We will continue with her PTA regimen of Abilify and Effexor.  4.  Chronic kidney disease stage III:  Creatinine is roughly at her baseline of 1.2, though I do note a bunch of hyalin casts so I will give some IV fluids.  Unclear if this is potentially related the overarching process or is just due to the poor p.o. intake and recent diarrhea.  5.  Disposition:  The patient was brought under observation status for now to try to expedite some of this initial workup, though the patient potentially would advance to inpatient tomorrow if she is unable to be discharged.  6.  Thyroid nodule:  Noted on CT of the neck back in 2021.  Recommendation is for a thyroid ultrasound, which can be arranged via her PCP.    Addendum TID [id number] aurea Leach DO        D: 2022   T: 2022   MT: NEETA    Name:     NANCI SANDOVAL  MRN:      [id number]        Account:     [id number]   :      1960           Admitted:    2022       Document: [id number]

## 2022-01-17 ENCOUNTER — APPOINTMENT (OUTPATIENT)
Dept: CARDIOLOGY | Facility: CLINIC | Age: 62
End: 2022-01-17
Attending: INTERNAL MEDICINE
Payer: COMMERCIAL

## 2022-01-17 VITALS
HEART RATE: 79 BPM | BODY MASS INDEX: 29.16 KG/M2 | SYSTOLIC BLOOD PRESSURE: 172 MMHG | RESPIRATION RATE: 22 BRPM | TEMPERATURE: 98.1 F | OXYGEN SATURATION: 96 % | WEIGHT: 164.6 LBS | DIASTOLIC BLOOD PRESSURE: 78 MMHG | HEIGHT: 63 IN

## 2022-01-17 LAB
ANION GAP SERPL CALCULATED.3IONS-SCNC: 4 MMOL/L (ref 3–14)
BUN SERPL-MCNC: 16 MG/DL (ref 7–30)
CALCIUM SERPL-MCNC: 8.2 MG/DL (ref 8.5–10.1)
CHLORIDE BLD-SCNC: 109 MMOL/L (ref 94–109)
CO2 SERPL-SCNC: 26 MMOL/L (ref 20–32)
CREAT SERPL-MCNC: 1.08 MG/DL (ref 0.52–1.04)
ERYTHROCYTE [DISTWIDTH] IN BLOOD BY AUTOMATED COUNT: 12.8 % (ref 10–15)
GFR SERPL CREATININE-BSD FRML MDRD: 57 ML/MIN/1.73M2
GLUCOSE BLD-MCNC: 102 MG/DL (ref 70–99)
HCT VFR BLD AUTO: 39 % (ref 35–47)
HGB BLD-MCNC: 12.7 G/DL (ref 11.7–15.7)
LVEF ECHO: NORMAL
MCH RBC QN AUTO: 29.9 PG (ref 26.5–33)
MCHC RBC AUTO-ENTMCNC: 32.6 G/DL (ref 31.5–36.5)
MCV RBC AUTO: 92 FL (ref 78–100)
PLATELET # BLD AUTO: 230 10E3/UL (ref 150–450)
POTASSIUM BLD-SCNC: 4 MMOL/L (ref 3.4–5.3)
RBC # BLD AUTO: 4.25 10E6/UL (ref 3.8–5.2)
SODIUM SERPL-SCNC: 139 MMOL/L (ref 133–144)
WBC # BLD AUTO: 3.3 10E3/UL (ref 4–11)

## 2022-01-17 PROCEDURE — 93306 TTE W/DOPPLER COMPLETE: CPT

## 2022-01-17 PROCEDURE — 258N000003 HC RX IP 258 OP 636: Performed by: INTERNAL MEDICINE

## 2022-01-17 PROCEDURE — 85014 HEMATOCRIT: CPT | Performed by: INTERNAL MEDICINE

## 2022-01-17 PROCEDURE — 82310 ASSAY OF CALCIUM: CPT | Performed by: INTERNAL MEDICINE

## 2022-01-17 PROCEDURE — 250N000013 HC RX MED GY IP 250 OP 250 PS 637: Performed by: INTERNAL MEDICINE

## 2022-01-17 PROCEDURE — 83876 ASSAY MYELOPEROXIDASE: CPT

## 2022-01-17 PROCEDURE — G0378 HOSPITAL OBSERVATION PER HR: HCPCS

## 2022-01-17 PROCEDURE — 87389 HIV-1 AG W/HIV-1&-2 AB AG IA: CPT | Performed by: INTERNAL MEDICINE

## 2022-01-17 PROCEDURE — 99217 PR OBSERVATION CARE DISCHARGE: CPT | Performed by: PHYSICIAN ASSISTANT

## 2022-01-17 PROCEDURE — 36415 COLL VENOUS BLD VENIPUNCTURE: CPT | Performed by: INTERNAL MEDICINE

## 2022-01-17 PROCEDURE — 93306 TTE W/DOPPLER COMPLETE: CPT | Mod: 26 | Performed by: INTERNAL MEDICINE

## 2022-01-17 RX ORDER — CHLORHEXIDINE GLUCONATE ORAL RINSE 1.2 MG/ML
15 SOLUTION DENTAL 2 TIMES DAILY
Qty: 473 ML | Refills: 0 | Status: SHIPPED | OUTPATIENT
Start: 2022-01-17 | End: 2022-01-27

## 2022-01-17 RX ADMIN — SODIUM CHLORIDE: 9 INJECTION, SOLUTION INTRAVENOUS at 04:27

## 2022-01-17 RX ADMIN — ACETAMINOPHEN 650 MG: 325 TABLET, FILM COATED ORAL at 07:48

## 2022-01-17 RX ADMIN — ARIPIPRAZOLE 10 MG: 10 TABLET ORAL at 11:52

## 2022-01-17 ASSESSMENT — MIFFLIN-ST. JEOR: SCORE: 1270.75

## 2022-01-17 NOTE — DISCHARGE SUMMARY
[facility]  Hospitalist Discharge Summary       Date of Admission:  1/16/2022  Date of Discharge:  1/17/2022  5:16 PM  Discharging Provider: JoAnna K. Barthell, PA-C    Discharge Diagnoses   Malaise.  Fatigue.  Generalized weakness.  Periodontal disease.  Elevated blood pressure.  Nodular calcification of anterior mitral valve leaflet.  Mild tricuspid and mitral regurgitation.  Indeterminate right thyroid nodule.  Mild left-sided single cervical lymphadenopathy, level 2.    Secondary discharge diagnoses.  Depression.  PTSD.  CKD stage III.  Follow-ups Needed After Discharge   Follow-up Appointments     Follow-up and recommended labs and tests      Recommend regular follow up visits with primary care provider every 1-2   weeks until concerns addressed.  Follow-up with primary provider as scheduled.    Recommend dental evaluation as soon as able. We were unable to locate MA -   accepting dental providers. Recommend starting with the dentist that your   daughter has scheduled an appointment with. When you call to schedule,   inform you were hospitalized with concerns for a dental infection and   started on antibiotics and prescription mouth wash -- this may help to be   seen sooner than for routine maintenance evaluation.    Is possible you may need speciality evaluation with provider like   rheumatologist. Your primary can help make referrals as appropriate.           Unresulted Labs Ordered in the Past 30 Days of this Admission     Date and Time Order Name Status Description    1/17/2022  1:46 PM HIV Antigen Antibody Combo In process     1/16/2022 10:47 PM Swab Aerobic Bacterial Culture Routine Preliminary     1/16/2022  4:07 PM Blood Culture Hand, Left Preliminary     1/16/2022  4:07 PM Blood Culture Peripheral Blood Preliminary     1/16/2022  4:07 PM Anti Nuclear Aileen IgG by IFA with Reflex In process     1/16/2022  4:07 PM ANCA IgG by IFA with Reflex to Titer In process       These results  will be followed up by primary care provider and hospitalist service (blood culture and bacterial nasal culture).    Discharge Disposition   Discharged to home  Condition at discharge: Stable    Hospital Course   Daysi Ashley is a 61 year old female with pmhx depression, allx rhinitis,and ckd who presented with several complaints including fatigue, generalized weakness, throat / mouth pain x 6-8 weeks.  Also endorses skin lesions that are improving since clindamycin antibiotic course, intermittent subjective fever and chills, night sweats, coughing, pelvic pain, soft stools, among others.    Basic labs obtained and unrevealing.  COVID, strep, and flu PCRs negative.  CRP mildly elevated 9.4.  ESR WNL.  EKG shows sinus rhythm.  Echocardiogram obtained after new murmur heard on physical exam and shows EF 60-65%, nodular calcification on anterior leaflet of mitral valve (discussed with cardiology and imaging consistent with calcification; clinically very low suspicion subclinical endocarditis and ZURDO not obtained), mild tricuspid regurg, mild mitral regurgitation, and a trivial pericardial effusion.  CT sinuses showed mild paranasal sinus mucosal thickening and partial opacification of the right ethmoid infundibulum otherwise patent drainage pathways.  CT neck showed effacement of the left piriform sinus, a mildly enlarged left L2 lymph node (possibly reactive) and an indeterminate right thyroid nodule.    Patient's symptom descriptions are vague and nonspecific. In review of chart, appears she has had several urgent care / ED visits for sore throat.  At one point she was seen by ENT provider and ED provider who performed laryngoscopy which was reportedly unrevealing.  There does not necessarily seem to be any associated or unifying components to her symptoms.    Differential is broad and includes inflammatory processes, malignancy, rheumatologic, and infectious processes.    Discussed non-specific nature of her  constellation of symptoms and overall overall reassuring evaluation thus far, but diagnostic uncertainty remains.  Discussed integral to her care will be close follow up and continuity of care under a primary provider umbrella.    General rheumatologic labs and infectious labs (blood culture and right nasal culture) pending.  ENT evaluated and nasal endoscopy and laryngoscopy reported as unremarkable.  Patient reports mouth pain with chewing and that symptoms abated while on clindamycin and returned worse after antibiotic finished.  She has evidence of periodontal disease - no evidence abscess on evaluation. ?At some point the patient understood she had an infection of her nose and throat that may be affecting her gut; she seems to perseverate on this. Unable to find clear documentation. Was on clindamycin for folliculitis in December.    Discussed above with patient's daughter Bessy over phone.  She expresses some exhaustion and burnout trying to help her mother navigate her concerns as well as frustration about her mother's perceived fatigue negatively impacting her ability to follow-up.    - Etiology remains unclear. Clinically, this provider with impression that constellation of symptoms not unified by single diagnosis. Possible some cognitive impairment and / or under treated mental illness contributing. Home care RN arranged.  - Augmentin twice daily x7 days and chlorhexidine mouthwash twice daily x10 days.  Follow-up with dentist as soon as able.  - Recommend checking TSH, B12, folate labs in follow up; attempted to add on to morning labs but not enough sample remaining. Consider neuropsychiatric evaluation.  - PCP to follow-up on SHELL, ANCA, HIV antigen antibody.  Consider referral to rheumatology and/or infectious disease as appropriate.  - Consider GI eval if ongoing throat complaints.  - Consider thyroid ultrasound for further eval of indeterminate right thyroid nodule.  - Consider serial CT imaging in 4-6  weeks of CT neck for stability / resolution of mildly enlarged left level II lymph node.  - Serial echocardiogram in 6-12 months.  - Serial eval blood pressure.    Consultations This Hospital Stay   ENT IP CONSULT  INFECTIOUS DISEASES IP CONSULT    Code Status   Full Code    Time Spent on this Encounter   I, JoAnna K. Barthell, PA-C, personally saw the patient today and spent greater than 30 minutes discharging this patient.     This patient was discussed with Dr. Alvarez of the Hospitalist Service who agrees with current plans as outlined above.    JoAnna K. Barthell, PA-C  [facility]  ______________________________________________________________________  Physical Exam   Temp: 98.1  F (36.7  C) Temp src: Oral BP: (!) 172/78 Pulse: 79   Resp: 22 SpO2: 96 % O2 Device: None (Room air)    Constitutional: Appears stated age, no acute distress. Initially irritable and refuses discussion of symptoms. Ambulating in room.  Oropharynx: Airway widely patent. No erythema, swelling, or lesions. Poor dentition and periodontal disease. No discrete area gingival swelling, erythema, discharge. Posterior oropharynx without swelling, redness, pain.  Respiratory: Breath sounds CTA. No increased work of breathing.  Cardiovascular: RRR, no rub or murmur. No peripheral edema.  GI: Soft, non-tender, non-distended.  Skin: Warm, dry. Several excoriations on forehead, upper and lower bilat extremities. No apparent evidence infection.       Primary Care Physician   Melonie Srivastava    Discharge Orders      Home care nursing referral      Reason for your hospital stay    Further evaluation and management of feeling unwell and mouth / jaw pain.     Activity    Your activity upon discharge: activity as tolerated     Follow-up and recommended labs and tests    Recommend regular follow up visits with primary care provider every 1-2 weeks until concerns addressed.  Follow-up with primary provider as  scheduled.    Recommend dental evaluation as soon as able. We were unable to locate MA - accepting dental providers. Recommend starting with the dentist that your daughter has scheduled an appointment with. When you call to schedule, inform you were hospitalized with concerns for a dental infection and started on antibiotics and prescription mouth wash -- this may help to be seen sooner than for routine maintenance evaluation.    Is possible you may need speciality evaluation with provider like rheumatologist. Your primary can help make referrals as appropriate.     MD face to face encounter    Documentation of Face to Face and Certification for Home Health Services    I certify that patient: Daysi Ashley is under my care and that I, or a nurse practitioner or physician's assistant working with me, had a face-to-face encounter that meets the physician face-to-face encounter requirements with this patient on: 1/17/2022.    This encounter with the patient was in whole, or in part, for the following medical condition, which is the primary reason for home health care: concerns for safety.    I certify that, based on my findings, the following services are medically necessary home health services: Nursing, Occupational Therapy and Physical Therapy.    My clinical findings support the need for the above services because: Nurse is needed: To assess generalized weakness.    Further, I certify that my clinical findings support that this patient is homebound (i.e. absences from home require considerable and taxing effort and are for medical reasons or [facility] services or infrequently or of short duration when for other reasons) because: Requires assistance of another person or specialized equipment to access medical services because patient: Is unable to exit home safely on own due to: weakness...    Based on the above findings. I certify that this patient is confined to the home and needs intermittent skilled nursing care,  physical therapy and/or speech therapy.  The patient is under my care, and I have initiated the establishment of the plan of care.  This patient will be followed by a physician who will periodically review the plan of care.  Physician/Provider to provide follow up care: Melonie Srivastava    Attending hospital physician (the Medicare certified [place] provider): Dr. Alvarez  Physician Signature: See electronic signature associated with these discharge orders.  Date: 1/17/2022     Full Code     Diet    Follow this diet upon discharge: Orders Placed This Encounter      Regular Diet Adult         Significant Results and Procedures   Most Recent 3 CBC's:Recent Labs   Lab Test 01/17/22  0720 01/16/22  1004 12/20/21  0704   WBC 3.3* 5.0 4.2   HGB 12.7 14.3 12.9   MCV 92 90 92    265 265     Most Recent 3 BMP's:Recent Labs   Lab Test 01/17/22  0720 01/16/22  1004 12/20/21  0704    138 137   POTASSIUM 4.0 3.9 3.8   CHLORIDE 109 106 105   CO2 26 27 27   BUN 16 24 29   CR 1.08* 1.26* 1.25*   ANIONGAP 4 5 5   MAME 8.2* 8.8 9.1   * 101* 99     Most Recent 2 LFT's:Recent Labs   Lab Test 01/16/22  1004 06/26/19  0600   AST 14 8   ALT 23 15   ALKPHOS 72 66   BILITOTAL 0.4 0.2     Most Recent 3 Troponin's:Recent Labs   Lab Test 06/26/19  0131 06/25/19 2057 06/25/19  1838   TROPI <0.015 <0.015 <0.015     Most Recent 6 Bacteria Isolates From Any Culture (See EPIC Reports for Culture Details):No lab results found.  Most Recent ESR & CRP:Recent Labs   Lab Test 01/16/22  1848 01/16/22  1004   SED 18  --    CRP  --  9.4*   ,   Results for orders placed or performed during the hospital encounter of 01/16/22   CT Sinus w/o Contrast    Narrative    EXAM: CT SINUS W/O CONTRAST  LOCATION: [facility]  DATE/TIME: 1/16/2022 7:48 PM    INDICATION: Sinusitis, chronic or recurrent.  COMPARISON: None.  TECHNIQUE: Routine without contrast. Multiplanar reformats. Dose reduction techniques were  used.    FINDINGS:  POST SURGICAL CHANGE: None.    FRONTAL SINUSES: Minimally pneumatized right frontal sinus. Small left frontal sinus. Minimal bilateral frontal sinus mucosal thickening. Frontal nasal ducts are patent bilaterally.    ETHMOID SINUSES: Minimal right anterior ethmoid sinus mucosal thickening. Partial opacification right ethmoid infundibulum. Left ethmoid infundibulum is widely patent. Lamina papyracea are intact.    MAXILLARY SINUSES: Mild right and minimal left maxillary sinus mucosal thickening. Partially aerated secretions are seen within the right maxillary sinus. No air-fluid levels. Floors of both maxillary sinuses are intact.    SPHENOID SINUS: Small amount of partially aerated secretions are seen within the right sphenoid sinus. Minimal left and mild right sphenoid sinus mucosal thickening. Anterior clinoid processes are not pneumatized.    NASAL CAVITY: Clear.    REMAINDER: Orbits and visualized portion of the brain are unremarkable. Middle ear cavities and visualized mastoid air cells are clear.      Impression    IMPRESSION:   1. Mild paranasal sinus mucosal thickening as noted. Partial opacification of the right ethmoid infundibulum. Major sinus drainage pathways are otherwise patent. No air-fluid levels.   Echocardiogram Complete     Value    LVEF  60-65%    Narrative    [id number]  [id number]  [id number]  [id number]^LUISA     [facility]  Echocardiography Laboratory  [address] [place] [address]     Name: NANCI SANDOVAL  MRN: [id number]  : 1960  Study Date: 2022 08:50 AM  Age: 61 yrs  Gender: Female  Patient Location: [facility]  Reason For Study: Cardiac Murmur  Ordering Physician: LUISA ESCALANTE  Referring Physician: Melonie Srivastava  Performed By: Silvana Wilson     BSA: 1.7 m2  Height: 63 in  Weight: 150 lb  HR: 79  BP: 157/82 mmHg  ______________________________________________________________________________  Procedure  Complete Portable Echo  Adult.  ______________________________________________________________________________  Interpretation Summary     The visual ejection fraction is 60-65%.  nodular calcification on the anterior leaflet of the mitral valve  There is mild (1+) tricuspid regurgitation.  Trivial pericardial effusion  There is mild (1+) mitral regurgitation.  Compared to prior study, changes are noted.  ______________________________________________________________________________  Left Ventricle  The left ventricle is normal in structure, function and size. There is normal  left ventricular wall thickness. The visual ejection fraction is 60-65%. Grade  I or early diastolic dysfunction. No regional wall motion abnormalities noted.     Right Ventricle  The right ventricle is normal in structure, function and size.     Atria  The left atrium is borderline dilated. Right atrial size is normal. There is  no color Doppler evidence of an atrial shunt.     Mitral Valve  There is mild mitral annular calcification. nodular calcification on the  anterior leaflet of the mitral valve. There is mild (1+) mitral regurgitation.     Tricuspid Valve  The tricuspid valve is normal in structure and function. There is mild (1+)  tricuspid regurgitation. The right ventricular systolic pressure is  approximated at 21.1 mmHg plus the right atrial pressure.     Aortic Valve  There is mild trileaflet aortic sclerosis. No aortic stenosis is present.     Pulmonic Valve  The pulmonic valve is not well seen, but is grossly normal.     Vessels  Normal size aorta. Normal size ascending aorta.     Pericardium  Trivial pericardial effusion. There are no echocardiographic indications of  cardiac tamponade.     Rhythm  Sinus rhythm was noted.  ______________________________________________________________________________  MMode/2D Measurements & Calculations  IVSd: 0.90 cm     LVIDd: 3.9 cm  LVIDs: 2.7 cm  LVPWd: 0.96 cm  FS: 30.9 %  LV mass(C)d: 109.2 grams  LV  "mass(C)dI: 63.8 grams/m2  Ao root diam: 3.0 cm  asc Aorta Diam: 3.3 cm  LVOT diam: 2.1 cm  LVOT area: 3.4 cm2  RWT: 0.49     Doppler Measurements & Calculations  MV E max yvette: 54.8 cm/sec  MV A max yvette: 82.9 cm/sec  MV E/A: 0.66  MV dec slope: 207.4 cm/sec2  TR max yvette: 229.3 cm/sec  TR max P.1 mmHg  E/E' avg: 10.5  Lateral E/e': 11.1  Medial E/e': 10.0     ______________________________________________________________________________  Report approved by: Rosa Blackwood 2022 11:06 AM               Discharge Medications   Discharge Medication List as of 2022  4:49 PM      START taking these medications    Details   amoxicillin-clavulanate (AUGMENTIN) 875-125 MG tablet Take 1 tablet by mouth 2 times daily, Disp-14 tablet, R-0, E-Prescribe      chlorhexidine (PERIDEX) 0.12 % solution Swish and spit 15 mLs in mouth 2 times daily for 10 days, Disp-473 mL, R-0, E-Prescribe         CONTINUE these medications which have NOT CHANGED    Details   ARIPiprazole (ABILIFY) 10 MG tablet Take 10 mg by mouth daily, Historical      venlafaxine (EFFEXOR-XR) 150 MG 24 hr capsule Take 150 mg by mouth daily, Historical           Allergies   Allergies   Allergen Reactions     Phenothiazines Other (See Comments)     Compazine. \"My tongue goes back\"       "

## 2022-01-17 NOTE — PLAN OF CARE
Observation goals PRIOR TO DISCHARGE         ENT consult complete, symptoms improved, safe for outpatient work up: NOT MET    Nurse to notify provider when observation goals have been met and patient is ready for discharge.

## 2022-01-17 NOTE — PLAN OF CARE
Observation goals  PRIOR TO DISCHARGE        Comments: ENT consult complete, met symptoms improved,  not metsafe for outpatient work up ,met  Pt oriented toplan of care, aler and oriented, vss c/o of sore lips.  Difficult to eat acidy foods. Needss to have soft foods. No diff, swallowing. Uo independently to restroom.  Stating how lonely and depressed she is about lifes situation. ENT here, to ct for sinus ct.            Nurse to notify provider when observation goals have been met and patient is ready for discharge.

## 2022-01-17 NOTE — PLAN OF CARE
Alert and oriented x's 4.  Up independently to the bathroom.  IV infusing today.  Probable discharge to home per hospitalist PA, waiting for orders.

## 2022-01-17 NOTE — CONSULTS
"[facility] Consultation by ENT Specialty Care    Daysi Ashley MRN# [id number]   Age: 61 year old YOB: 1960     Date of Admission:  2022  Date of Consult:    22    Reason for consult: Weakness, malaise, nasal drainage, jaw pain, oral pain       Requesting physician: Michael Leach DO                           Chief Complaint:   Weakness, malaise, nasal drainage, jaw pain, oral pain            HPI:      HPI:     Ms. Ashley is a 61-year-old female with a past medical history significant for depression, dyslipidemia, chronic kidney disease, who presents to the Emergency Department with malaise, fatigue, nasal drainage, jaw pain, oral pain.  She was seen in the [facility] ED 21 with similar complaints but also cutaneous lesions and dyspnea and neck CT finding concerning for swelling of the piriform sinus.  She was treated with oral antibiotics and steroids with resolution of her respiratory symptoms and improvement of her skin lesions.  She was seen in clinic by one of my partners, Dr. Jason and had a laryngoscopic exam with no concerning findings noted.  He ordered bloodwork screening for autoimmune/inflammatory pathology and referred her to rheumatology and infectious disease.  Daysi states \"I got too sick and confused to follow through\" and had neither the bloodwork or the consults done.    Previous tests and diagnostic procedures: see \"Tests and Procedures\" and \"PFSH\".               Past Medical History:     Past Medical History:   Diagnosis Date     311      Allergic rhinitis, cause unspecified      Arthritis      Depressive disorder      Migraine, unspecified, without mention of intractable migraine without mention of status migrainosus      Rapid weight loss 2013               Past Surgical History:     Past Surgical History:   Procedure Laterality Date     APPENDECTOMY        SECTION   &      FOOT SURGERY      right     HERNIORRHAPHY UMBILICAL       " ORTHOPEDIC SURGERY       ZZC NONSPECIFIC PROCEDURE  12/00    Appendectomy               Social History:     Social History     Socioeconomic History     Marital status:      Spouse name: Not on file     Number of children: Not on file     Years of education: Not on file     Highest education level: Not on file   Occupational History     Not on file   Tobacco Use     Smoking status: Never Smoker     Smokeless tobacco: Never Used   Substance and Sexual Activity     Alcohol use: No     Drug use: No     Sexual activity: Yes     Partners: Male   Other Topics Concern     Parent/sibling w/ CABG, MI or angioplasty before 65F 55M? Not Asked   Social History Narrative     Not on file     Social Determinants of Health     Financial Resource Strain: Not on file   Food Insecurity: Not on file   Transportation Needs: Not on file   Physical Activity: Not on file   Stress: Not on file   Social Connections: Not on file   Intimate Partner Violence: Not on file   Housing Stability: Not on file               Family History:     Family History   Problem Relation Age of Onset     Dementia Father      Depression Father      Depression Daughter                Immunizations:     Immunization History   Administered Date(s) Administered     HepB 06/17/1994, 07/15/1994, 02/01/1995     Influenza (H1N1) 01/27/2010     Influenza (IIV3) PF 11/17/2000, 10/19/2004, 11/05/2007, 01/22/2013     Influenza Quad, Recombinant, pf(RIV4) (Flublok) 12/02/2018     Pneumococcal 23 valent 10/25/1996     TD (ADULT, 7+) 03/17/2002     TDAP Vaccine (Adacel) 04/20/2012               Allergies:   Phenothiazines          Medications:     Current Facility-Administered Medications:      acetaminophen (TYLENOL) tablet 650 mg, 650 mg, Oral, Q6H PRN **OR** acetaminophen (TYLENOL) Suppository 650 mg, 650 mg, Rectal, Q6H PRN, Michael Leach DO     [START ON 1/17/2022] ARIPiprazole (ABILIFY) tablet 10 mg, 10 mg, Oral, Daily, Michael Leach DO     HYDROmorphone  "(DILAUDID) tablet 2 mg, 2 mg, Oral, Q4H PRN, Michael Leach DO     melatonin tablet 1 mg, 1 mg, Oral, At Bedtime PRN, Michael Leach DO     naloxone (NARCAN) injection 0.2 mg, 0.2 mg, Intravenous, Q2 Min PRN **OR** naloxone (NARCAN) injection 0.4 mg, 0.4 mg, Intravenous, Q2 Min PRN **OR** naloxone (NARCAN) injection 0.2 mg, 0.2 mg, Intramuscular, Q2 Min PRN **OR** naloxone (NARCAN) injection 0.4 mg, 0.4 mg, Intramuscular, Q2 Min PRN, Michael Leach DO     ondansetron (ZOFRAN-ODT) ODT tab 4 mg, 4 mg, Oral, Q6H PRN **OR** ondansetron (ZOFRAN) injection 4 mg, 4 mg, Intravenous, Q6H PRN, Michael Leach DO     polyethylene glycol (MIRALAX) Packet 17 g, 17 g, Oral, Daily PRN, Michael Leach DO     senna-docusate (SENOKOT-S/PERICOLACE) 8.6-50 MG per tablet 1 tablet, 1 tablet, Oral, BID PRN **OR** senna-docusate (SENOKOT-S/PERICOLACE) 8.6-50 MG per tablet 2 tablet, 2 tablet, Oral, BID PRN, Michael Leach DO     sodium chloride (PF) 0.9% PF flush 3 mL, 3 mL, Intracatheter, Q8H, Michael Leach DO     sodium chloride (PF) 0.9% PF flush 3 mL, 3 mL, Intracatheter, q1 min prn, Michael Leach DO     sodium chloride 0.9% infusion, , Intravenous, Continuous, Michael Leach DO, Last Rate: 75 mL/hr at 01/16/22 1740, 75 mL/hr at 01/16/22 1740     venlafaxine (EFFEXOR-XR) 24 hr capsule 150 mg, 150 mg, Oral, Daily, Michael Leach DO          Review of Systems:   Review Of Systems  Nasal drainage, right maxillary and mandibular jaw pain, pain of the soft tissue lingual to her right posterior mandibular molar.          Physical exam:   CONSTITUTION:    BP (!) 157/82   Pulse 89   Temp 98.4  F (36.9  C) (Oral)   Resp 18   Ht 1.6 m (5' 3\")   Wt (!) 160.1 kg (352 lb 15.3 oz)   LMP 04/04/2012   SpO2 96%   BMI 62.52 kg/m       General appearance:  Sitting up in bed.  No acute distress.  Respirations quiet.  Voice normal.    Nose: mucoid secretions bilaterally.  Culture taken on right.  No polyps or masses.  No significant " edema.    OC/OP:  Several small aphthous ulcerations of oral mucosa.  Pharynx clear.  Complains of pain of soft tissue medial to right posterior maxillary molar.  Several decayed appearing teeth.  Floor of mouth soft.    Neck without masses or adenopathy.    Nasal endoscopy and laryngoscopy:  Afrin and lidocaine applied to nares bilaterally.  Nasal endoscopy performed bilaterally.   Mucoid secretions diffusely bilaterally but no discrete masses or polyps or swelling seen.  Tongue base, epiglottis, and vocal cords and piriform sinuses normal in appearance.          Data:     Results for orders placed or performed during the hospital encounter of 01/16/22   Comprehensive metabolic panel     Status: Abnormal   Result Value Ref Range    Sodium 138 133 - 144 mmol/L    Potassium 3.9 3.4 - 5.3 mmol/L    Chloride 106 94 - 109 mmol/L    Carbon Dioxide (CO2) 27 20 - 32 mmol/L    Anion Gap 5 3 - 14 mmol/L    Urea Nitrogen 24 7 - 30 mg/dL    Creatinine 1.26 (H) 0.52 - 1.04 mg/dL    Calcium 8.8 8.5 - 10.1 mg/dL    Glucose 101 (H) 70 - 99 mg/dL    Alkaline Phosphatase 72 40 - 150 U/L    AST 14 0 - 45 U/L    ALT 23 0 - 50 U/L    Protein Total 6.7 (L) 6.8 - 8.8 g/dL    Albumin 3.3 (L) 3.4 - 5.0 g/dL    Bilirubin Total 0.4 0.2 - 1.3 mg/dL    GFR Estimate 48 (L) >60 mL/min/1.73m2   UA with Microscopic reflex to Culture     Status: Abnormal    Specimen: Urine, Midstream   Result Value Ref Range    Color Urine Light Yellow Colorless, Straw, Light Yellow, Yellow    Appearance Urine Clear Clear    Glucose Urine 30  (A) Negative mg/dL    Bilirubin Urine Negative Negative    Ketones Urine Negative Negative mg/dL    Specific Gravity Urine 1.011 1.003 - 1.035    Blood Urine Negative Negative    pH Urine 6.0 5.0 - 7.0    Protein Albumin Urine 20  (A) Negative mg/dL    Urobilinogen Urine Normal Normal, 2.0 mg/dL    Nitrite Urine Negative Negative    Leukocyte Esterase Urine Negative Negative    Mucus Urine Present (A) None Seen /LPF    RBC  Urine 6 (H) <=2 /HPF    WBC Urine 1 <=5 /HPF    Hyaline Casts Urine 50 (H) <=2 /LPF    Narrative    Urine Culture not indicated   Symptomatic; Yes; 12/20/2021 Influenza A/B & SARS-CoV2 (COVID-19) Virus PCR Multiplex Nasopharyngeal     Status: Normal    Specimen: Nasopharyngeal; Swab   Result Value Ref Range    Influenza A PCR Negative Negative    Influenza B PCR Negative Negative    SARS CoV2 PCR Negative Negative    Narrative    Testing was performed using the alley SARS-CoV-2 & Influenza A/B Assay on the alley [employer] System. This test should be ordered for the detection of SARS-CoV-2 and influenza viruses in individuals who meet clinical and/or epidemiological criteria. Test performance is unknown in asymptomatic patients. This test is for in vitro diagnostic use under the FDA EUA for laboratories certified under CLIA to perform moderate and/or high complexity testing. This test has not been FDA cleared or approved. A negative result does not rule out the presence of PCR inhibitors in the specimen or target RNA in concentration below the limit of detection for the assay. If only one viral target is positive but coinfection with multiple targets is suspected, the sample should be re-tested with another FDA cleared, approved or authorized test, if coinfection would change clinical management. [facility] are certified under the Clinical Laboratory Improvement Amendments of 1988 (CLIA-88) as  qualified to perform moderate and/or high complexity laboratory testing.   CBC with platelets and differential     Status: None   Result Value Ref Range    WBC Count 5.0 4.0 - 11.0 10e3/uL    RBC Count 4.74 3.80 - 5.20 10e6/uL    Hemoglobin 14.3 11.7 - 15.7 g/dL    Hematocrit 42.8 35.0 - 47.0 %    MCV 90 78 - 100 fL    MCH 30.2 26.5 - 33.0 pg    MCHC 33.4 31.5 - 36.5 g/dL    RDW 12.9 10.0 - 15.0 %    Platelet Count 265 150 - 450 10e3/uL    % Neutrophils 65 %    % Lymphocytes 23 %    % Monocytes 11 %    %  Eosinophils 1 %    % Basophils 0 %    % Immature Granulocytes 0 %    NRBCs per 100 WBC 0 <1 /100    Absolute Neutrophils 3.2 1.6 - 8.3 10e3/uL    Absolute Lymphocytes 1.1 0.8 - 5.3 10e3/uL    Absolute Monocytes 0.6 0.0 - 1.3 10e3/uL    Absolute Eosinophils 0.1 0.0 - 0.7 10e3/uL    Absolute Basophils 0.0 0.0 - 0.2 10e3/uL    Absolute Immature Granulocytes 0.0 <=0.4 10e3/uL    Absolute NRBCs 0.0 10e3/uL   CRP inflammation     Status: Abnormal   Result Value Ref Range    CRP Inflammation 9.4 (H) 0.0 - 8.0 mg/L   EKG 12-lead, tracing only     Status: None   Result Value Ref Range    Systolic Blood Pressure  mmHg    Diastolic Blood Pressure  mmHg    Ventricular Rate 87 BPM    Atrial Rate 87 BPM    CO Interval 136 ms    QRS Duration 80 ms     ms    QTc 447 ms    P Axis 68 degrees    R AXIS 27 degrees    T Axis 68 degrees    Interpretation ECG       Sinus rhythm  Possible Left atrial enlargement  Borderline ECG  When compared with ECG of 25-JUN-2019 14:16,  No significant change was found  Confirmed by GENERATED REPORT, COMPUTER ([id number]),  LARRY WEI ([id number]) on 1/16/2022 10:00:21 AM     Streptococcus A Rapid Scr w Reflx to PCR     Status: Normal    Specimen: Throat; Swab   Result Value Ref Range    Group A Strep antigen Negative Negative   Group A Streptococcus PCR Throat Swab     Status: Normal    Specimen: Throat; Swab   Result Value Ref Range    Group A strep by PCR Not Detected Not Detected    Narrative    The Xpert Xpress Strep A test, performed on the "[employer] Systems, is a rapid, qualitative in vitro diagnostic test for the detection of Streptococcus pyogenes (Group A ß-hemolytic Streptococcus, Strep A) in throat swab specimens from patients with signs and symptoms of pharyngitis. The Xpert Xpress Strep A test can be used as an aid in the diagnosis of Group A Streptococcal pharyngitis. The assay is not intended to monitor treatment for Group A Streptococcus infections. The Xpert Xpress Strep A  test utilizes an automated real-time polymerase chain reaction (PCR) to detect Streptococcus pyogenes DNA.   CBC with platelets differential     Status: None    Narrative    The following orders were created for panel order CBC with platelets differential.  Procedure                               Abnormality         Status                     ---------                               -----------         ------                     CBC with platelets and d...[[id number]]                      Final result                 Please view results for these tests on the individual orders.        Assessment and Plan:   History as above.  Agree with labwork orderd by Dr. Leach screening for autoimmune/inflammatory conditions.  Given jaw pain/nasal drainage, would recommend ordering a sinus CT.  Right nasal culture taken.  Would suggest ID consult and eventually rheumatology input as well.  Nasal endoscopy and laryngoscopy fairly unremarkable.    Attestation:  I have reviewed today's vital signs, notes, medications, medication allergies, and PFSH/ROSlabs and imaging.    Minerva Mckeon MD

## 2022-01-18 ENCOUNTER — OFFICE VISIT (OUTPATIENT)
Dept: FAMILY MEDICINE | Facility: CLINIC | Age: 62
End: 2022-01-18
Payer: COMMERCIAL

## 2022-01-18 ENCOUNTER — PATIENT OUTREACH (OUTPATIENT)
Dept: CARE COORDINATION | Facility: CLINIC | Age: 62
End: 2022-01-18

## 2022-01-18 VITALS
OXYGEN SATURATION: 100 % | SYSTOLIC BLOOD PRESSURE: 155 MMHG | TEMPERATURE: 97.7 F | WEIGHT: 162.1 LBS | HEART RATE: 96 BPM | BODY MASS INDEX: 28.72 KG/M2 | DIASTOLIC BLOOD PRESSURE: 90 MMHG | HEIGHT: 63 IN

## 2022-01-18 DIAGNOSIS — E78.5 HYPERLIPIDEMIA LDL GOAL <160: ICD-10-CM

## 2022-01-18 DIAGNOSIS — F39 EPISODIC MOOD DISORDER (H): ICD-10-CM

## 2022-01-18 DIAGNOSIS — Z12.11 COLON CANCER SCREENING: ICD-10-CM

## 2022-01-18 DIAGNOSIS — R53.83 OTHER FATIGUE: ICD-10-CM

## 2022-01-18 DIAGNOSIS — R76.8 P-ANCA TITER POSITIVE: Primary | ICD-10-CM

## 2022-01-18 DIAGNOSIS — Z71.89 OTHER SPECIFIED COUNSELING: ICD-10-CM

## 2022-01-18 DIAGNOSIS — N18.31 CHRONIC KIDNEY DISEASE, STAGE 3A (H): ICD-10-CM

## 2022-01-18 DIAGNOSIS — E04.1 THYROID NODULE: ICD-10-CM

## 2022-01-18 DIAGNOSIS — Z11.4 ENCOUNTER FOR SCREENING FOR HIV: ICD-10-CM

## 2022-01-18 DIAGNOSIS — Z12.31 ENCOUNTER FOR SCREENING MAMMOGRAM FOR MALIGNANT NEOPLASM OF BREAST: ICD-10-CM

## 2022-01-18 DIAGNOSIS — I10 BENIGN ESSENTIAL HYPERTENSION: ICD-10-CM

## 2022-01-18 DIAGNOSIS — R04.0 EPISTAXIS: ICD-10-CM

## 2022-01-18 LAB
ANA SER QL IF: NEGATIVE
ANCA AB PATTERN SER IF-IMP: ABNORMAL
BACTERIA SPEC CULT: ABNORMAL
C-ANCA TITR SER IF: ABNORMAL {TITER}
HIV 1+2 AB+HIV1 P24 AG SERPL QL IA: NONREACTIVE

## 2022-01-18 PROCEDURE — 99496 TRANSJ CARE MGMT HIGH F2F 7D: CPT | Performed by: FAMILY MEDICINE

## 2022-01-18 PROCEDURE — 99207 E-CONSULT TO PULMONARY (ADULT OUTPT PROVIDER TO SPECIALIST WRITTEN QUESTION & RESPONSE): CPT | Performed by: FAMILY MEDICINE

## 2022-01-18 RX ORDER — METOPROLOL SUCCINATE 50 MG/1
50 TABLET, EXTENDED RELEASE ORAL DAILY
Qty: 30 TABLET | Refills: 1 | Status: ON HOLD | OUTPATIENT
Start: 2022-01-18 | End: 2022-03-09

## 2022-01-18 ASSESSMENT — MIFFLIN-ST. JEOR: SCORE: 1259.41

## 2022-01-18 NOTE — PATIENT INSTRUCTIONS
Call to schedule your imaging:  [place] or [facility] [phone]  Or   [facility] [phone]

## 2022-01-18 NOTE — PLAN OF CARE
Pt is A+O x 4. Anxious, eager to discharge to home. VSS on RA. Independent. Denied pain. PIV removed. Belongings returned to pt from security. Discharge instructions followed, questions answered. Discharged med handed to pt, education provided. Pt discharged to home.

## 2022-01-18 NOTE — PROGRESS NOTES
Post-discharge note.  This CM-RN received notification 1/18/2022 that no home care agency has been identified to service pt's Home RN orders.  IP CM-RN attempting to reach homecare agencies today;     [facility] - denied  [facility] - takes the insurance but is at capacity  [facility] - doesn't take the insurance  [facility] - doesn't take [employer] PM[employer] for RN services     A clinic care coordination referral was sent but it appears the case was closed.      Clinic Care Coordination Contact     Background: Care Coordination referral placed from [facility] discharge report for reason of patient meeting criteria for a TCM outreach call by [facility] team.     Assessment: Upon chart review, [facility] Team member will cancel/close the referral for TCM outreach due to reason below:     Patient has a follow up appointment with an appropriate provider today for hospital discharge     Plan: Care Coordination referral for TCM outreach canceled.     Bina Cavazos  Community Health Worker  [facility]  [phone]      CM-RN left message for Bina.   PCP / Clinic team please continue to follow pt and help with home care needs.  Pt DOES have PCP followup today at 1340.      Gia Baeza RN, BSN, PHN  [facility]  Inpatient Care Management - FLOAT  Mobile: [phone] 01/18/22 until 4pm  (after today's date, please call the patient's unit)

## 2022-01-18 NOTE — PROGRESS NOTES
Assessment & Plan     P-ANCA titer positive.  Epistasis/ recurrent sinusitis  Chronic Kidney Disease- stage 3a   Possibility of sandra's granulomatosis in 61 year old female with long standing history of easy fatigability , worse  Since a yr with constant rhinorrhea  epistaxis , and hip / jaw arthralgia. She is positive for ANCA      - Adult Pulmonary Medicine Referral; Future  - Myeloperoxidase Antibody IgG; Future  - E-CONSULT TO PULMONARY (ADULT OUTPT PROVIDER TO SPECIALIST WRITTEN QUESTION & RESPONSE)  - XR Chest 2 Views; Future- patient unfortunately left prior to Chest xray  And blood test.    - predniSONE (DELTASONE) 20 MG tablet; Take 3 tabs by mouth daily x 3 days, then 2 tabs daily x 3 days, then 1 tab daily x 3 days, then 1/2 tab daily x 3 days.    Epistaxis  Had been seen by ENT- at [facility]- no pathology found   - Adult Pulmonary Medicine Referral; Future  - Myeloperoxidase Antibody IgG; Future    Other fatigue  Wants to see rheumatologist was hoping to see one- in the hospital- till told she was being discharged   - TSH with free T4 reflex  - CBC with platelets  - Folate  - Vitamin B12    Episodic mood disorder (H)  Patient left without completing additional blood test  Complex mental health ? Recurring major depression vx BPD-  Advised to continue Abilify and Effexor and see/ follow up with her psychiatrist     Thyroid nodule    - US Thyroid; Future    Benign essential hypertension  New diagnoses   - metoprolol succinate ER (TOPROL-XL) 50 MG 24 hr tablet; Take 1 tablet (50 mg) by mouth daily  - TSH with free T4 reflex    Encounter for screening mammogram for malignant neoplasm of breast  Options and guudelines discussed  - MA Screen Bilateral w/Chai; Future    Colon cancer screening    - COLOGUARD(EXACT SCIENCES)    Hyperlipidemia LDL goal <160    - Lipid Profile (Chol, Trig, HDL, LDL calc)    Encounter for screening for HIV    - HIV Antigen Antibody Combo    Ordering of each unique test  Prescription  "drug management  78 minutes spent on the date of the encounter doing chart review, history and exam, documentation and further activities per the note       BMI:   Estimated body mass index is 28.71 kg/m  as calculated from the following:    Height as of this encounter: 1.6 m (5' 3\").    Weight as of this encounter: 73.5 kg (162 lb 1.6 oz).           Return in about 1 week (around 2022) for concerns,unresolved.    Melonie Srivastava MD  [facility]  Body aches in jaw, hips mostly.  Episodes of intense fatigue   And nose runny all the time and sometimes bleeds  Subjective   Daysi is a 61 year old who presents for the following health issues     Her most urgent issues are fluctuating easy fatigue , shortness of breath- episodic  Constant rhinorrhea  Epistaxis- stains the Pillow with blood  Upset that seen by ENT- but was told all ok- and see dentist- due to infection in the mouth    Often jaw pain/ hip pain- wants to see rheumatologist was told she will see one in [facility] but then discharged quickly.  She lives independantly .    Complex mental health issues.  History of post partum depression -since   Followed by DR Rafal Mcgowan for many yrs - but has not seen for > 2 yrs- does not like certain things but never specified why not seeing them. Considering finding new one but hard to find psychiatrist on medical assitance.  Has been getting Abilify and Effexor from them  Took herself off clonidine & felt too many medications    Fluctuating mood and energy for yrs.  Denies manic episodes but does report significant fluctuation of the mood    Multiple emergency department visit for minor ailments as well to major fatigue episodes  Convinced has mouth infection- is taking antibiotic.    Feels sick- & convinced that her immune system is poor.      Stopped working since   2nd marriage traumatizing, daughters estranged since then . She finally - -& later he  but a lot of " "accumulated stress from that.    History of cocaine abuse but clean since 5 yrs  Almost 5 months. Denies alcohol abuse   She has never held a job for a long time.  Spends to,e reading, taking care of her 89 yp mom- [place] too [place] independently [place] .  Daysi has sedentary life style.  Cooks out of box or cans- pre made meals.  Lives by herself in [facility]. Cleaning lady cleans for her and she asked for refill on chlorhexidine because after all the silverware washed by the cleaning lady she likes to further sanitize them using chlorhexidine - that is prescribed for her mouth      Wt Readings from Last 5 Encounters:   01/18/22 73.5 kg (162 lb 1.6 oz)   01/17/22 74.7 kg (164 lb 9.6 oz)   07/16/19 79.4 kg (175 lb)   06/25/19 77.8 kg (171 lb 8 oz)   05/02/19 78.9 kg (174 lb)         [facility]       Hospital Follow-up Visit:    Hospital/Nursing Home/ Rehab Facility: [facility]  Date of Admission: 1/16/2022  Date of Discharge: 1/17/2022  Reason(s) for Admission: Fatigue, weakness      Was your hospitalization related to COVID-19? No   Problems taking medications regularly:  None  Medication changes since discharge: Augmentin and peridex  Problems adhering to non-medication therapy:  None    Summary of hospitalization:  CareEverywhere information obtained and reviewed  Diagnostic Tests/Treatments reviewed.  Follow up needed: yes pulmonology  Other Healthcare Providers Involved in Patient s Care:         None  Update since discharge: fluctuating course.   Post Discharge Medication Reconciliation: discharge medications reconciled and changed, per note/orders.  Plan of care communicated with patient              Review of Systems   Constitutional, HEENT, cardiovascular, pulmonary, GI, , musculoskeletal, neuro, skin, endocrine and psych systems are negative, except as otherwise noted.      Objective    BP (!) 155/90   Pulse 96   Temp 97.7  F (36.5  C)   Ht 1.6 m (5' 3\")   Wt 73.5 kg (162 lb 1.6 oz)  "  LMP 04/04/2012   SpO2 100%   BMI 28.71 kg/m    Body mass index is 28.71 kg/m .  Physical Exam   GENERAL: healthy, alert and no distress  HENT: ear canals and TM's normal, nose and mouth without ulcers or lesions  NECK: no adenopathy, no asymmetry, masses, or scars and thyroid normal to palpation  RESP: lungs clear to auscultation - no rales, rhonchi or wheezes  CV: regular rate and rhythm, normal S1 S2, no S3 or S4, no murmur, click or rub, no peripheral edema and peripheral pulses strong  ABDOMEN: soft, nontender, no hepatosplenomegaly, no masses and bowel sounds normal  MS: no gross musculoskeletal defects noted, no edema

## 2022-01-19 ENCOUNTER — PATIENT OUTREACH (OUTPATIENT)
Dept: CARE COORDINATION | Facility: CLINIC | Age: 62
End: 2022-01-19
Payer: COMMERCIAL

## 2022-01-19 ENCOUNTER — E-CONSULT (OUTPATIENT)
Dept: PULMONOLOGY | Facility: CLINIC | Age: 62
End: 2022-01-19
Payer: COMMERCIAL

## 2022-01-19 PROBLEM — I10 BENIGN ESSENTIAL HYPERTENSION: Status: ACTIVE | Noted: 2022-01-19

## 2022-01-19 PROBLEM — R04.0 EPISTAXIS: Status: ACTIVE | Noted: 2022-01-19

## 2022-01-19 PROBLEM — R76.8 P-ANCA TITER POSITIVE: Status: ACTIVE | Noted: 2022-01-19

## 2022-01-19 PROBLEM — R53.83 OTHER FATIGUE: Status: ACTIVE | Noted: 2022-01-16

## 2022-01-19 PROBLEM — F39 EPISODIC MOOD DISORDER (H): Status: ACTIVE | Noted: 2022-01-19

## 2022-01-19 PROBLEM — E04.1 THYROID NODULE: Status: ACTIVE | Noted: 2022-01-19

## 2022-01-19 RX ORDER — PREDNISONE 20 MG/1
TABLET ORAL
Qty: 20 TABLET | Refills: 0 | Status: SHIPPED | OUTPATIENT
Start: 2022-01-19 | End: 2022-01-31

## 2022-01-19 NOTE — PROGRESS NOTES
Clinic Care Coordination Contact  [facility]    Chart review: IP referral: Care Transitions: Hospitalized at [facility] under OBS status, 1/16/22-1/17/22 for fatigue, generalized weakness, and throat/mouth pain for 6-8 weeks. F/U with Dr. Srivastava, her PCP, occurred 1/18/22, with another visit with Dr. Srivastava scheduled 1/22/22. Hospital staff could not find a dentist who accepts MA patients currently. Pt has been scheduled with daughter's dentist for initial consultation.    Reason for Referral: Care Transition     Transition: Home care discharge unable to located home agency yet    Clinical Staff have discussed the Care Coordination Referral with the patient and/or caregiver: No     Additional Information: financial concerns, needs weekly followup with PCP, needs a dentist     Notes     Clinical Data: Care Coordinator Outreach  Outreach attempted x 1.  Unable to leave  as pt's mailbox has not been set up.  Plan: Care Coordinator will try to reach patient again in 1-2 business days.    Jessika Sharma  Community Health Worker  [facility]  [phone]

## 2022-01-19 NOTE — RESULT ENCOUNTER NOTE
Forwarded result to Dr. Srivastava.  Also called Daysi and discussed results with her via phone that this positive test could suggest something like Wegener's granulomatosis.  Recommended that Daysi call Dr. Srivastava's office to discuss next steps including possible referral for additional work up and treatment.      Michael Leach D.O.   1/19/2022

## 2022-01-19 NOTE — PROGRESS NOTES
ALL SMARTFIELDS MUST BE COMPLETED FOR PATIENT CARE AND BILLING    1/19/2022     E-Consult has been Accepted    Interprofessional consultation requested by:  Melonie Srivastava MD      Clinical Question/Purpose: MY CLINICAL QUESTION IS: ? Possibility of sandra's granulomatosis in 61 year old female with long standing history of easy fatigability , worse  Since a yr with constant rhinorrhea  epistaxis , and hip / jaw arthralgia. She is positive for ANCA    Patient assessment and information reviewed:  Positive P-ANCA on 1/16/2022.  She has a history of CKD.  [place] [facility] [place] - I did not find note in CareEverywhere, but apparently no pathology found, per Dr. Srivastava's note.  In general, P-ANCA is not associated with granulomatosis with polyangiites (previously Wegener's).    Recommendations:  Agree with full Pulmonary consult referral.  Evaluation of positive P-ANCA cannot be adequately performed via e- consult.      The recommendations provided in this E-Consult are based on the clinical data available to me in the medical record, and are furnished without the benefit of a comprehensive in-person or virtual patient evaluation.  This consultation should not replace the clinical judgement and evaluation of the provider ordering this E-Consult. Any new clinical issues, or changes in patient status since the filing of this E-Consult will need to be taken into account when assessing these recommendations. Please contact me if you have further questions.    My total time spent reviewing clinical information and formulating assessment was 10 minutes.    Report sent automatically to requesting provider once signed.   [id number]}  Freda Pina MD

## 2022-01-20 NOTE — PROGRESS NOTES
"Clinic Care Coordination Contact  Community Health Worker Initial Outreach    Spoke with pt this afternoon.  Chart review: IP referral: Care transitions:  Pt recently hospitalized at [facility] from 1/16/22-1/17/22 for Malaise.Fatigue.Generalized weakness.Periodontal disease. Elevated blood pressure. F/U with Dr. Srivastava on 1/18/22 (thus TCM questions not asked). E-consult from note yesterday states: Possibility of sandra's granulomatosis in 61 year old female with long standing history of easy fatigability , worse  Since a yr with constant rhinorrhea  epistaxis , and hip / jaw arthralgia. She is positive for ANCA    Reason for Referral: Care Transition    Transition: Home care discharge unable to located home agency yet    Clinical Staff have discussed the Care Coordination Referral with the patient and/or caregiver: No    Additional Information: financial concerns, needs weekly followup with PCP, needs a dentist        CHW introduced Care Coordination and assessment for additional support/resources.  Pt reports she has had fatigue for the last 6-8 months and nobody can figure out what she has. \"I have 2-3 good hours/day.\" \"I am always fatigued.\"  \"I would like someone to guide me to my appointments, sit with me, and be a listening ear.\" Pt reports she just is not thinking correctly so organizing her appointments and understanding her appointments is difficult. Pt reports she needs help organizing her house; still unpacking from a move 2 years ago. Just this week they might have found what is wrong with me.\"    CHW Initial Information Gathering:  Referral Source: PCP  Preferred Hospital: [facility]  [phone]  Current living arrangement:: I live alone  Type of residence:: Private home - stairs  Community Resources: None  Supplies used at home:: None  Equipment Currently Used at Home: none  Informal Support system:: Friends (2 friends)  No PCP office visit in Past Year: No  Transportation " means:: Regular car  CHW Additional Questions  If ED/Hospital discharge, follow-up appointment scheduled as recommended?: N/A  Medication changes made following ED/Hospital discharge?: N/A  MyChart active?: No  Patient agreeable to assistance with activating MyChart?: No    Patient accepts CC: Yes. Patient scheduled for assessment with LUCAS Murray RN, on 1/21/22 at 3:00pm\. Patient noted desire to discuss assistance organizing appointments, assistance with house cleaning/organization, advocate to attend appointments with her to help her understand her medical condition.     Jessika Sharma  Community Health Worker  [facility]  [phone]

## 2022-01-21 LAB
BACTERIA BLD CULT: NO GROWTH
BACTERIA BLD CULT: NO GROWTH

## 2022-01-24 LAB
MYELOPEROXIDASE AB SER IA-ACNC: 3.3 U/ML
MYELOPEROXIDASE AB SER IA-ACNC: NEGATIVE

## 2022-01-25 ENCOUNTER — TELEPHONE (OUTPATIENT)
Dept: PULMONOLOGY | Facility: CLINIC | Age: 62
End: 2022-01-25
Payer: COMMERCIAL

## 2022-01-25 ENCOUNTER — TELEPHONE (OUTPATIENT)
Dept: FAMILY MEDICINE | Facility: CLINIC | Age: 62
End: 2022-01-25
Payer: COMMERCIAL

## 2022-01-25 DIAGNOSIS — I78.0 HEREDITARY EPISTAXIS (H): Primary | ICD-10-CM

## 2022-01-25 NOTE — TELEPHONE ENCOUNTER
Pt returned call and able to arrange a NEW appt with new available general provider, Dr. Emanuel at the end of March. Pt is scheduled for CXR on 2/2 and this will be in 3 month window. FPFT also scheduled, details confirmed with pt who requested hard copy of appt be mailed to her; mailing address verified.

## 2022-01-25 NOTE — TELEPHONE ENCOUNTER
A.S  Patient calling in, would like to get a referral to [facility] based on her lab results.  Referral and/or orders for further testing?    Pt was told she has Wegener's (see lab note from 1/16/22 from MARTINA LOPEZ).  She has questions regarding this and wondering if you would be able to call her? [phone]    Thank you,  Julianna Richardson RN  [facility]

## 2022-01-25 NOTE — TELEPHONE ENCOUNTER
Attempted to contact pt regarding new pulm referral placed; Dr. Pina, ILD provider, had e-consult and believes pt should start in general pulm. Unable to leave message as no voicemail box and no MyChart. Will try again in a few days.

## 2022-01-26 ENCOUNTER — PATIENT OUTREACH (OUTPATIENT)
Dept: NURSING | Facility: CLINIC | Age: 62
End: 2022-01-26
Payer: COMMERCIAL

## 2022-01-26 NOTE — PROGRESS NOTES
Clinic Care Coordination Contact  Community Health Worker Initial Outreach       Called to reschedule missed 1/21/22 initial CC RN assessment with Terri at 3:00pm. Pt states she heard the phone ring one time and then did not know how to reach out to Terri after that.     Patient accepts CC: Yes. Patient scheduled for assessment with Terri, CC RN, on 1/28/22 at 3:00pm. Patient noted desire to discuss organizing her house and organizing her appointments..     Jessika Sharma  Community Health Worker  [facility]  [phone]

## 2022-01-28 ENCOUNTER — PATIENT OUTREACH (OUTPATIENT)
Dept: NURSING | Facility: CLINIC | Age: 62
End: 2022-01-28
Payer: COMMERCIAL

## 2022-01-28 NOTE — LETTER
[facility]  Patient Centered Plan of Care  About Me:        Patient Name:  Daysi Ashley    YOB: 1960  Age:         61 year old   Chanell MRN:    [id number] Telephone Information:  Home Phone [phone]   Mobile [phone]       Address:  [address] [place] [address] Email address:  [email or web address]      Emergency Contact(s)    Name Relationship Lgl Grd Work Phone Home Phone Mobile Phone   Sejal. ANTHONY RAMIREZ Other   [phone]           Primary language:  English     needed? No   [employer] [employer]:  [phone] op. 1  Other communication barriers:None    Preferred Method of Communication:  Mail  Current living arrangement: I live alone    Mobility Status/ Medical Equipment: No data recorded      Health Maintenance  Health Maintenance Reviewed: Due/Overdue       My Access Plan  Medical Emergency 911   Primary [facility] [address] [facility] [phone]   24 Hour Appointment Line [phone] or  [phone]) (toll-free)   24 Hour Nurse Line [phone] (toll-free)   Preferred Urgent Care No data recorded   Preferred Hospital [facility]  [phone]     Preferred Pharmacy [id number] [address] AT [address] [facility] [address]     Behavioral Health Crisis Line The National Suicide Prevention Lifeline at 1-888.942.3084 or 465             My Care Team Members  Patient Care Team       Relationship Specialty Notifications Start End    Melonie Srivastava MD PCP - General Family Practice  7/11/19     Phone: [phone] Fax: [phone] [address] [address]    Melonie Srivastava MD Assigned PCP   8/6/17     Phone: [phone] Fax: [phone] [address] [facility] [address]    eTrri Montgomery, RN Lead Care Coordinator  Admissions 1/28/22             My Care Plans  Self Management and Treatment  Plan  Goals and (Comments)  Goals        General     Resources (pt-stated)      Notes - Note edited  1/28/2022  3:22 PM by Terri Montgomery RN     Goal Statement: I will reach out to [facility] for a MnChoice assessment and the Senior Linkage line regarding resources for me in the home.   Date goal set: 1/28/22    Date to Achieve By: 4/28/22  Patient expressed understanding of goal: Yes    Action steps to achieve this goal  1. I will call [facility] front door at [phone]  2. I will call the Senior Linkage Line at [phone]  3. I will follow up with the care coordination team in 2 weeks.                My Medical and Care Information  Problem List   Patient Active Problem List   Diagnosis     Migraine     Allergic rhinitis     Drug dependence (H)     Dysthymia     Hyperlipidemia LDL goal <160     Joint pain     Rapid weight loss     PTSD (post-traumatic stress disorder)     Advanced directives, counseling/discussion     Depression with suicidal ideation     Severe recurrent major depression without psychotic features (H)     Suicidal ideation     Syncope     Major depressive disorder, severe (H)     CKD (chronic kidney disease) stage 4, GFR 15-29 ml/min (H)     Sore throat     Other fatigue     P-ANCA titer positive     Epistaxis     Episodic mood disorder (H)     Thyroid nodule     Benign essential hypertension      Current Medications and Allergies:  See printed Medication Report.  Current Outpatient Medications   Medication     amoxicillin-clavulanate (AUGMENTIN) 875-125 MG tablet     ARIPiprazole (ABILIFY) 10 MG tablet     metoprolol succinate ER (TOPROL-XL) 50 MG 24 hr tablet     predniSONE (DELTASONE) 20 MG tablet     venlafaxine (EFFEXOR-XR) 150 MG 24 hr capsule     No current facility-administered medications for this visit.       Care Coordination Start Date: 1/28/2022   Frequency of Care Coordination: 2 weeks     Form Last Updated: 01/28/2022

## 2022-01-28 NOTE — LETTER
[facility] CARE COORDINATION  [address] [address]  January 28, 2022    Daysi Ashley  [address] [address]      Dear Daysi,    I am a clinic care coordinator who works with Melonie Srivastava MD at [facility]. I wanted to thank you for spending the time to talk with me.  Below is a description of clinic care coordination and how I can further assist you.      The clinic care coordination team is made up of a registered nurse,  and community health worker who understand the health care system. The goal of clinic care coordination is to help you manage your health and improve access to the health care system in the most efficient manner. The team can assist you in meeting your health care goals by providing education, coordinating services, strengthening the communication among your providers and supporting you with any resource needs.    Please feel free to contact me at [phone] with any questions or concerns. We are focused on providing you with the highest-quality healthcare experience possible and that all starts with you.     Sincerely,     Terri Montgomery, RN Care Coordinator     [facility] Ambulatory Care Management  [facility] Family and   [email or web address]    Office: [phone]

## 2022-01-28 NOTE — PROGRESS NOTES
Clinic Care Coordination Contact  OUTREACH    Referral Information:  Referral Source: PCP    Primary Diagnosis: Other (include Comment box) (new dx of wegeners disease)    Chief Complaint   Patient presents with     Clinic Care Coordination - Initial     RN CC initial outreach        [place] Utilization:   Clinic Utilization  No PCP office visit in Past Year: No  Utilization    Hospital Admissions  1             ED Visits  2             No Show Count (past year)  1                Current as of: 1/26/2022  2:12 PM              Clinical Concerns:  Current Medical Concerns:   Patient Active Problem List   Diagnosis     Migraine     Allergic rhinitis     Drug dependence (H)     Dysthymia     Hyperlipidemia LDL goal <160     Joint pain     Rapid weight loss     PTSD (post-traumatic stress disorder)     Advanced directives, counseling/discussion     Depression with suicidal ideation     Severe recurrent major depression without psychotic features (H)     Suicidal ideation     Syncope     Major depressive disorder, severe (H)     CKD (chronic kidney disease) stage 4, GFR 15-29 ml/min (H)     Sore throat     Other fatigue     P-ANCA titer positive     Epistaxis     Episodic mood disorder (H)     Thyroid nodule     Benign essential hypertension     Current Behavioral Concerns: None identified    Assessment:    Patient shares she has stress related to her new diagnosis of Wegener's disease. She shares she does not know what the future will hold as far as treatment. She has an appoint with PCP on 2/22/22 to discuss.     Patient declined to have writer search for home care services for patient. Patient is aware that home care for skilled nursing was ordered in the hospital,she does not feel a nurse would be beneficial at this time and declines to accept home care services.     Patient shares she lives alone and has no support. She shares her two daughters are not helpful and her friends are busy with work.     Patient shares  "that she drives to appointments or takes an Uber.     Patient shares her disease causes \"lots of little unpleasant things\" for her to deal with. She described fatigue, spots on her skin, temperature changes. She shares she only has \"2 or 3 good hours\" per day.     Patient denies new or worsening symptoms of depression and denies suicidal thoughts at this time.     Patient shares that she does not work. She has income from her [employer], medical assistance, and social security.     Patient shares she would like some help around the house.       Education Provided to patient:     Writer provided patient with the phone number for [facility] front door and the [employer]Space Linkage Line for her to reach out to and see what kind of resources she may be eligible for.     Writer reviewed role of care coordination.     Writer reviewed upcoming appointments with patient.     Writer declined writer's offer to request counseling referral from PCP.     Writer recommended patient apply for metro mobility. Patient will discuss with PCP.     Education provided on signs and symptoms to report to care team.     Pain  Pain (GOAL):: No  Health Maintenance Reviewed: Due/Overdue   Clinical Pathway: None    Medication Management:  Medication review status: Medications reviewed and no changes reported per patient.             Functional Status:       Living Situation:  Current living arrangement:: I live alone  Type of residence:: Private home - stairs    Lifestyle & Psychosocial Needs:    Social Determinants of Health     Tobacco Use: Low Risk      Smoking Tobacco Use: Never Smoker     Smokeless Tobacco Use: Never Used   Alcohol Use: Not on file   Financial Resource Strain: Not on file   Food Insecurity: Not on file   Transportation Needs: Not on file   Physical Activity: Not on file   Stress: Not on file   Social Connections: Not on file   Intimate Partner Violence: Not on file   Depression: Not on file   Housing Stability: Not on file "     Diet:: Regular  Inadequate nutrition (GOAL):: No  Tube Feeding: No  Significant changes in sleep pattern (GOAL): No  Transportation means:: Regular car     Jew or spiritual beliefs that impact treatment:: No  Mental health DX:: Yes  Mental health DX how managed:: Medication  Mental health management concern (GOAL):: No  Chemical Dependency Status: Past Concern  Informal Support system:: Friends (2 friends)        Resources and Interventions:  Current Resources:      Community Resources: None  Supplies used at home:: None  Equipment Currently Used at Home: none     Referrals Placed: None     Goals:   Goals        General     Resources (pt-stated)      Notes - Note edited  1/28/2022  3:22 PM by Terri Montgomery RN     Goal Statement: I will reach out to [facility] for a MnChoice assessment and the Senior Linkage line regarding resources for me in the home.   Date goal set: 1/28/22    Date to Achieve By: 4/28/22  Patient expressed understanding of goal: Yes    Action steps to achieve this goal  1. I will call [facility] front door at [phone]  2. I will call the Senior Linkage Line at [phone]  3. I will follow up with the care coordination team in 2 weeks.             Patient/Caregiver understanding: Patient verbalizes understanding of follow up plan.     Outreach Frequency: 2 weeks  Future Appointments              In 5 days SHXR3 [facility] [place] [facility]    In 5 days SHUS5 [facility] [place] [facility]    In 5 days SHBCMA4 [facility] [place] [facility]    In 3 weeks Melonie Srivastava MD [facility] [place] [place]    In 1 month  PFL D [facility] Pulmonary Function Testing [facility]    In 1 month Nelson Emanuel MD [facility]          Plan: Writer will route to CHW and request CHW to follow up with patient in 2  weeks regarding reaching out to [employer] and the Senior Linkage Line.     CHW will:  CHW Delegation:   1)  Due Date:  Around 2/10/22       Delegation: Please call patient in 2 weeks and ask if she has reached out to [facility] Front door for MNchoice assessment and the Senior Linkage Line for help in her home and offer support or assistance as needed.     Terri Montgomery, RN Care Coordinator     [facility] Ambulatory Care Management  [facility] Family [facility] OB  [email or web address]    Office: [phone]

## 2022-01-31 ENCOUNTER — TELEPHONE (OUTPATIENT)
Dept: FAMILY MEDICINE | Facility: CLINIC | Age: 62
End: 2022-01-31
Payer: COMMERCIAL

## 2022-01-31 ENCOUNTER — NURSE TRIAGE (OUTPATIENT)
Dept: NURSING | Facility: CLINIC | Age: 62
End: 2022-01-31
Payer: COMMERCIAL

## 2022-01-31 DIAGNOSIS — K08.89 PAIN, DENTAL: Primary | ICD-10-CM

## 2022-01-31 DIAGNOSIS — K08.89 PAIN, DENTAL: ICD-10-CM

## 2022-01-31 DIAGNOSIS — R76.8 P-ANCA TITER POSITIVE: ICD-10-CM

## 2022-01-31 RX ORDER — PREDNISONE 20 MG/1
TABLET ORAL
Qty: 30 TABLET | Refills: 0 | Status: SHIPPED | OUTPATIENT
Start: 2022-01-31 | End: 2022-03-08

## 2022-01-31 NOTE — TELEPHONE ENCOUNTER
I sent in medications    Gave her a slightly longer taper with a 2 week course of 10 mg prednisone at the end to see if this keeps symptoms. At bay    SN

## 2022-01-31 NOTE — TELEPHONE ENCOUNTER
SN,    Please advise in As's absence.  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    Current symptoms are throat/mouth pain, throat/toungue swelling, day/night sweating and mouth sores.    She says this is the third time this has happened.  She says As is working on a referral to [facility] and thinks she will be possibly be able to go there March.     Requesting refills on med and also requesting magic mouthwash for mouth sores (unsure what med to pended).      Pharm, axb and prednisone pended.  Please auth if appropriate and add  Magic mouthwash also if appropriate.  Thanks,  Kaitlin Canas RN

## 2022-01-31 NOTE — TELEPHONE ENCOUNTER
SN,  FYI  Pt wanted to say thank you to you.      Informed pt below.  Encouraged calling if she needs anything else.  Kaitlin Canas RN

## 2022-02-01 NOTE — TELEPHONE ENCOUNTER
SN  Please see below message  Did pend new magic mouthwash recipe, but the recipe pended DOES include lidocaine and should equal 180 mls.   Please approve if appropriate  Julianna Richardson RN

## 2022-02-01 NOTE — TELEPHONE ENCOUNTER
Lit from [employer] is calling.    They are needing to know the quantity of each ingredient in the Magic Mouthwash that was sent over.  The way is reads does not make sense to the pharmacist, and he stated that it does not equal 180 mL.  12.5 mg of Benadryl only equals 1 mL.    Pharmacist is unable to fill this tonight. Ok to send to clinic for the AM.    Shauna Stiles RN  [facility] Nurse Advisor  1/31/2022 at 6:29 PM      Reason for Disposition    [1] Pharmacy calling with prescription questions AND [2] triager unable to answer question    Additional Information    Negative: Drug overdose and triager unable to answer question    Negative: Caller requesting information unrelated to medicine    Negative: Caller requesting a prescription for Strep throat and has a positive culture result    Negative: Rash while taking a medication or within 3 days of stopping it    Negative: Immunization reaction suspected    Negative: [1] Asthma and [2] having symptoms of asthma (cough, wheezing, etc.)    Negative: [1] Influenza symptoms AND [2] anti-viral med prescription request, such as Tamiflu    Negative: [1] Symptom of illness (e.g., headache, abdominal pain, earache, vomiting) AND [2] more than mild    Negative: MORE THAN A DOUBLE DOSE of a prescription or over-the-counter (OTC) drug    Negative: [1] DOUBLE DOSE (an extra dose or lesser amount) of over-the-counter (OTC) drug AND [2] any symptoms (e.g., dizziness, nausea, pain, sleepiness)    Negative: [1] DOUBLE DOSE (an extra dose or lesser amount) of prescription drug AND [2] any symptoms (e.g., dizziness, nausea, pain, sleepiness)    Negative: Took another person's prescription drug    Negative: [1] DOUBLE DOSE (an extra dose or lesser amount) of prescription drug AND [2] NO symptoms (Exception: a double dose of antibiotics)    Negative: Diabetes drug error or overdose (e.g., took wrong type of insulin or took extra dose)    Negative: [1] Request for URGENT new  "prescription or refill of \"essential\" medication (i.e., likelihood of harm to patient if not taken) AND [2] triager unable to fill per unit policy    Negative: [1] Prescription not at pharmacy AND [2] was prescribed by PCP recently    Protocols used: MEDICATION QUESTION CALL-A-AH      "

## 2022-02-03 ENCOUNTER — TELEPHONE (OUTPATIENT)
Dept: FAMILY MEDICINE | Facility: CLINIC | Age: 62
End: 2022-02-03

## 2022-02-03 ENCOUNTER — VIRTUAL VISIT (OUTPATIENT)
Dept: FAMILY MEDICINE | Facility: CLINIC | Age: 62
End: 2022-02-03
Payer: COMMERCIAL

## 2022-02-03 DIAGNOSIS — R76.8 P-ANCA TITER POSITIVE: Primary | ICD-10-CM

## 2022-02-03 DIAGNOSIS — K08.89 PAIN, DENTAL: Primary | ICD-10-CM

## 2022-02-03 DIAGNOSIS — N18.31 STAGE 3A CHRONIC KIDNEY DISEASE (H): ICD-10-CM

## 2022-02-03 DIAGNOSIS — R53.83 OTHER FATIGUE: ICD-10-CM

## 2022-02-03 DIAGNOSIS — F39 EPISODIC MOOD DISORDER (H): ICD-10-CM

## 2022-02-03 PROBLEM — N18.30 CKD (CHRONIC KIDNEY DISEASE) STAGE 3, GFR 30-59 ML/MIN (H): Status: ACTIVE | Noted: 2019-07-25

## 2022-02-03 PROCEDURE — 99215 OFFICE O/P EST HI 40 MIN: CPT | Mod: 95 | Performed by: FAMILY MEDICINE

## 2022-02-03 NOTE — TELEPHONE ENCOUNTER
Nurse from [facility] calling requesting a VV for pt today for mental health issues.    She has a new Gates's Syndrome diagnosis and is referred to [facility] and has apt scheduled but wanting apt to discuss.    Scheduled pt in today.    Kaitlin Canas RN

## 2022-02-03 NOTE — TELEPHONE ENCOUNTER
"SN,    Patient called.  States she would like to have a mouthwash that has an antibiotic and steroid in it.    States she receive this once in the past when seen in ED (thinks was [facility])  Not sure of ED visit date    Nothing found on medication history list    \"The oral medication is ok but mouthwash would really work well\"    Did not  the magic mouthwash with Lidocaine. Insurance does not cover    Please advise.  Thanks,  Lawanda Childers RN    "

## 2022-02-03 NOTE — PROGRESS NOTES
Daysi is a 61 year old who is being evaluated via a billable video visit.      How would you like to obtain your AVS? MyChart  If the video visit is dropped, the invitation should be resent by: Text to cell phone: [phone]  Will anyone else be joining your video visit? No    Video Start Time: 5:48 PM    Assessment & Plan     P-ANCA titer positive    Other fatigue    Stage 3a chronic kidney disease (H)  Certainly should have a workup, referral to nephrology is indicated given this and chronic kidney disease    - Adult Nephrology  Referral; Future    Episodic mood disorder (H)  Very anxious today, I was reassuring and firm that she needs to have patience and that she does not have a certain diagnosis of Granulomatosis with polyangiitis (GPA) and microscopic polyangiitis (MPA)   (Formerly Wegener's Disease)    Recommended getting testing done that is previous ordered  And also setting up mychart to help with care coordination    Return for Lab Work, CXR.      45 minutes spent on the date of the encounter doing chart review, review of outside records, review of test results, patient visit and documentation       Tu Camacho MD  [facility]   Daysi is a 61 year old who presents for the following health issues     HPI     Patient would like to talk to provider about recent diagnosis of Gates with provider     Reviewed records:  Basically, hospital workup from admission on 1/16/2022 included workup for causes of chronic kidney disease.    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-    We discussed that a positive p-ANCA means that a vasculitis is possible but she needs additional testing    She does have some chronic ENT symptom    No known interstitial lung disease (a CXR is pending)  No known glomerulonephritis, but she does have chronic kidney disease    GFR Estimate   Date Value Ref Range Status   01/17/2022 57 (L) >60 mL/min/1.73m2 Final      Comment:     Effective December 21, 2021 eGFRcr in adults is calculated using the 2021 CKD-EPI creatinine equation which includes age and gender (Bora henderson al., [facility], DOI: 10.1056/JULGwb3460606)   01/16/2022 48 (L) >60 mL/min/1.73m2 Final     Comment:     Effective December 21, 2021 eGFRcr in adults is calculated using the 2021 CKD-EPI creatinine equation which includes age and gender (Bora et al., [facility], DOI: 10.1056/GOBOku5958429)   12/20/2021 46 (L) >60 mL/min/1.73m2 Final     Comment:     As of July 11, 2021, eGFR is calculated by the CKD-EPI creatinine equation, without race adjustment. eGFR can be influenced by muscle mass, exercise, and diet. The reported eGFR is an estimation only and is only applicable if the renal function is stable.   06/26/2019 49 (L) >60 mL/min/[1.73_m2] Final     Comment:     Non  GFR Calc  Starting 12/18/2018, serum creatinine based estimated GFR (eGFR) will be   calculated using the Chronic Kidney Disease Epidemiology Collaboration   (CKD-EPI) equation.     06/25/2019 51 (L) >60 mL/min/[1.73_m2] Final     Comment:     Non  GFR Calc  Starting 12/18/2018, serum creatinine based estimated GFR (eGFR) will be   calculated using the Chronic Kidney Disease Epidemiology Collaboration   (CKD-EPI) equation.     04/27/2019 62 >60 mL/min/[1.73_m2] Final     Comment:     Non  GFR Calc  Starting 12/18/2018, serum creatinine based estimated GFR (eGFR) will be   calculated using the Chronic Kidney Disease Epidemiology Collaboration   (CKD-EPI) equation.     12/06/2018 49 (L) >60 mL/min/1.7m2 Final     Comment:     Non  GFR Calc   12/05/2018 47 (L) >60 mL/min/1.7m2 Final     Comment:     Non  GFR Calc     GFR Estimate If Black   Date Value Ref Range Status   06/26/2019 56 (L) >60 mL/min/[1.73_m2] Final     Comment:      GFR Calc  Starting 12/18/2018, serum creatinine based estimated GFR (eGFR) will be    calculated using the Chronic Kidney Disease Epidemiology Collaboration   (CKD-EPI) equation.     06/25/2019 59 (L) >60 mL/min/[1.73_m2] Final     Comment:      GFR Calc  Starting 12/18/2018, serum creatinine based estimated GFR (eGFR) will be   calculated using the Chronic Kidney Disease Epidemiology Collaboration   (CKD-EPI) equation.     04/27/2019 72 >60 mL/min/[1.73_m2] Final     Comment:      GFR Calc  Starting 12/18/2018, serum creatinine based estimated GFR (eGFR) will be   calculated using the Chronic Kidney Disease Epidemiology Collaboration   (CKD-EPI) equation.     12/06/2018 59 (L) >60 mL/min/1.7m2 Final     Comment:      GFR Calc   12/05/2018 57 (L) >60 mL/min/1.7m2 Final     Comment:      GFR Calc         Review of Systems   CONSTITUTIONAL: NEGATIVE for fever, chills, change in weight      Objective           Vitals:  No vitals were obtained today due to virtual visit.    Physical Exam   GENERAL: Healthy, alert and no distress  EYES: Eyes grossly normal to inspection.  No discharge or erythema, or obvious scleral/conjunctival abnormalities.  RESP: No audible wheeze, cough, or visible cyanosis.  No visible retractions or increased work of breathing.    SKIN: Visible skin clear. No significant rash, abnormal pigmentation or lesions.  NEURO: Cranial nerves grossly intact.  Mentation and speech appropriate for age.  PSYCH: tangential, anxious and speech pressured    Admission on 01/16/2022, Discharged on 01/17/2022   Component Date Value Ref Range Status     Sodium 01/16/2022 138  133 - 144 mmol/L Final     Potassium 01/16/2022 3.9  3.4 - 5.3 mmol/L Final     Chloride 01/16/2022 106  94 - 109 mmol/L Final     Carbon Dioxide (CO2) 01/16/2022 27  20 - 32 mmol/L Final     Anion Gap 01/16/2022 5  3 - 14 mmol/L Final     Urea Nitrogen 01/16/2022 24  7 - 30 mg/dL Final     Creatinine 01/16/2022 1.26* 0.52 - 1.04 mg/dL Final     Calcium 01/16/2022  8.8  8.5 - 10.1 mg/dL Final     Glucose 01/16/2022 101* 70 - 99 mg/dL Final     Alkaline Phosphatase 01/16/2022 72  40 - 150 U/L Final     AST 01/16/2022 14  0 - 45 U/L Final     ALT 01/16/2022 23  0 - 50 U/L Final     Protein Total 01/16/2022 6.7* 6.8 - 8.8 g/dL Final     Albumin 01/16/2022 3.3* 3.4 - 5.0 g/dL Final     Bilirubin Total 01/16/2022 0.4  0.2 - 1.3 mg/dL Final     GFR Estimate 01/16/2022 48* >60 mL/min/1.73m2 Final    Effective December 21, 2021 eGFRcr in adults is calculated using the 2021 CKD-EPI creatinine equation which includes age and gender (Bora et al., [place], DOI: 10.1056/XWAGoc4123641)     Color Urine 01/16/2022 Light Yellow  Colorless, Straw, Light Yellow, Yellow Final     Appearance Urine 01/16/2022 Clear  Clear Final     Glucose Urine 01/16/2022 30 * Negative mg/dL Final     Bilirubin Urine 01/16/2022 Negative  Negative Final     Ketones Urine 01/16/2022 Negative  Negative mg/dL Final     Specific Gravity Urine 01/16/2022 1.011  1.003 - 1.035 Final     Blood Urine 01/16/2022 Negative  Negative Final     pH Urine 01/16/2022 6.0  5.0 - 7.0 Final     Protein Albumin Urine 01/16/2022 20 * Negative mg/dL Final     Urobilinogen Urine 01/16/2022 Normal  Normal, 2.0 mg/dL Final     Nitrite Urine 01/16/2022 Negative  Negative Final     Leukocyte Esterase Urine 01/16/2022 Negative  Negative Final     Mucus Urine 01/16/2022 Present* None Seen /LPF Final     RBC Urine 01/16/2022 6* <=2 /HPF Final     WBC Urine 01/16/2022 1  <=5 /HPF Final     Hyaline Casts Urine 01/16/2022 50* <=2 /LPF Final     Ventricular Rate 01/16/2022 87  BPM Final     Atrial Rate 01/16/2022 87  BPM Final     KS Interval 01/16/2022 136  ms Final     QRS Duration 01/16/2022 80  ms Final     QT 01/16/2022 372  ms Final     QTc 01/16/2022 447  ms Final     P Axis 01/16/2022 68  degrees Final     R AXIS 01/16/2022 27  degrees Final     T Axis 01/16/2022 68  degrees Final     Interpretation ECG 01/16/2022    Final                     Value:Sinus rhythm  Possible Left atrial enlargement  Borderline ECG  When compared with ECG of 25-JUN-2019 14:16,  No significant change was found  Confirmed by GENERATED REPORT, COMPUTER ([id number]),  LARRY WEI ([id number]) on 1/16/2022 10:00:21 AM       Group A Strep antigen 01/16/2022 Negative  Negative Final     Influenza A PCR 01/16/2022 Negative  Negative Final     Influenza B PCR 01/16/2022 Negative  Negative Final     SARS CoV2 PCR 01/16/2022 Negative  Negative Final    NEGATIVE: SARS-CoV-2 (COVID-19) RNA not detected, presumed negative.     WBC Count 01/16/2022 5.0  4.0 - 11.0 10e3/uL Final     RBC Count 01/16/2022 4.74  3.80 - 5.20 10e6/uL Final     Hemoglobin 01/16/2022 14.3  11.7 - 15.7 g/dL Final     Hematocrit 01/16/2022 42.8  35.0 - 47.0 % Final     MCV 01/16/2022 90  78 - 100 fL Final     MCH 01/16/2022 30.2  26.5 - 33.0 pg Final     MCHC 01/16/2022 33.4  31.5 - 36.5 g/dL Final     RDW 01/16/2022 12.9  10.0 - 15.0 % Final     Platelet Count 01/16/2022 265  150 - 450 10e3/uL Final     % Neutrophils 01/16/2022 65  % Final     % Lymphocytes 01/16/2022 23  % Final     % Monocytes 01/16/2022 11  % Final     % Eosinophils 01/16/2022 1  % Final     % Basophils 01/16/2022 0  % Final     % Immature Granulocytes 01/16/2022 0  % Final     NRBCs per 100 WBC 01/16/2022 0  <1 /100 Final     Absolute Neutrophils 01/16/2022 3.2  1.6 - 8.3 10e3/uL Final     Absolute Lymphocytes 01/16/2022 1.1  0.8 - 5.3 10e3/uL Final     Absolute Monocytes 01/16/2022 0.6  0.0 - 1.3 10e3/uL Final     Absolute Eosinophils 01/16/2022 0.1  0.0 - 0.7 10e3/uL Final     Absolute Basophils 01/16/2022 0.0  0.0 - 0.2 10e3/uL Final     Absolute Immature Granulocytes 01/16/2022 0.0  <=0.4 10e3/uL Final     Absolute NRBCs 01/16/2022 0.0  10e3/uL Final     Group A strep by PCR 01/16/2022 Not Detected  Not Detected Final     CRP Inflammation 01/16/2022 9.4* 0.0 - 8.0 mg/L Final     Erythrocyte Sedimentation Rate 01/16/2022 18  0 - 30 mm/hr Final      Neutrophil Cytoplasmic Antibody 01/16/2022 1:640* <1:10 Final     Neutrophil Cytoplasmic Antibody Pa* 01/16/2022 Perinuclear ANCA (p-ANCA) staining pattern observed and confirmed on formalin-fixed neutrophils.   Final     SHELL interpretation 01/16/2022 Negative  Negative Final      Negative:              <1:40  Borderline Positive:   1:40 - 1:80  Positive:              >1:80     Culture 01/16/2022 No Growth   Final     Culture 01/16/2022 No Growth   Final     LVEF  01/17/2022 60-65%   Final     Culture 01/16/2022 1+ Staphylococcus hominis*  Final    Susceptibilities not routinely done     Sodium 01/17/2022 139  133 - 144 mmol/L Final     Potassium 01/17/2022 4.0  3.4 - 5.3 mmol/L Final     Chloride 01/17/2022 109  94 - 109 mmol/L Final     Carbon Dioxide (CO2) 01/17/2022 26  20 - 32 mmol/L Final     Anion Gap 01/17/2022 4  3 - 14 mmol/L Final     Urea Nitrogen 01/17/2022 16  7 - 30 mg/dL Final     Creatinine 01/17/2022 1.08* 0.52 - 1.04 mg/dL Final     Calcium 01/17/2022 8.2* 8.5 - 10.1 mg/dL Final     Glucose 01/17/2022 102* 70 - 99 mg/dL Final     GFR Estimate 01/17/2022 57* >60 mL/min/1.73m2 Final    Effective December 21, 2021 eGFRcr in adults is calculated using the 2021 CKD-EPI creatinine equation which includes age and gender (Bora et al., [place], DOI: 10.1056/KNCFez0496359)     WBC Count 01/17/2022 3.3* 4.0 - 11.0 10e3/uL Final     RBC Count 01/17/2022 4.25  3.80 - 5.20 10e6/uL Final     Hemoglobin 01/17/2022 12.7  11.7 - 15.7 g/dL Final     Hematocrit 01/17/2022 39.0  35.0 - 47.0 % Final     MCV 01/17/2022 92  78 - 100 fL Final     MCH 01/17/2022 29.9  26.5 - 33.0 pg Final     MCHC 01/17/2022 32.6  31.5 - 36.5 g/dL Final     RDW 01/17/2022 12.8  10.0 - 15.0 % Final     Platelet Count 01/17/2022 230  150 - 450 10e3/uL Final     HIV Antigen Antibody Combo 01/17/2022 Nonreactive  Nonreactive Final    HIV-1 p24 Ag & HIV-1/HIV-2 Ab Not Detected     MPO Aileen IgG Instrument Value 01/17/2022 3.3  <3.5 U/mL Final      Myeloperoxidase Antibody IgG 01/17/2022 Negative  Negative Final               Video-Visit Details    Type of service:  Video Visit    Video End Time:6:25 PM    Originating Location (pt. Location): Home    Distant Location (provider location):  [employer]     Platform [facility] Visit[facility]

## 2022-02-04 RX ORDER — CHLORHEXIDINE GLUCONATE ORAL RINSE 1.2 MG/ML
15 SOLUTION DENTAL 2 TIMES DAILY
Qty: 473 ML | Refills: 0 | Status: SHIPPED | OUTPATIENT
Start: 2022-02-04 | End: 2022-03-08

## 2022-02-04 NOTE — TELEPHONE ENCOUNTER
PN (DOD),    Patient called.  See message below. Hasn't been addressed yet    Patient states she needs a mouth wash with antibiotics    Please advise.  Thanks,  Lawanda Childers RN

## 2022-02-04 NOTE — TELEPHONE ENCOUNTER
PN,      Called [facility] and spoke with pharmacist.  States he is not aware of any mouthwash with abx in it.    States what is usually used to Peridex which is prescribed by dentist post dental procedures, anti-bacterial mouth wash    I called patient. States she thinks that might be what she had.    Informed patient would call her back when Rx sent to pharmacy.    Thanks,  Lawanda Childers RN

## 2022-02-07 ENCOUNTER — TRANSFERRED RECORDS (OUTPATIENT)
Dept: HEALTH INFORMATION MANAGEMENT | Facility: CLINIC | Age: 62
End: 2022-02-07
Payer: COMMERCIAL

## 2022-02-08 ENCOUNTER — TELEPHONE (OUTPATIENT)
Dept: PULMONOLOGY | Facility: CLINIC | Age: 62
End: 2022-02-08
Payer: COMMERCIAL

## 2022-02-08 NOTE — TELEPHONE ENCOUNTER
Tried calling pt about scheduling chest xray prior to visit with Dr. Emanuel on 3/23. Pt did not answer, could not LVM due to mailbox not setup yet. Going to send same details to pt on Me[employer].

## 2022-02-08 NOTE — TELEPHONE ENCOUNTER
RECORDS RECEIVED FROM: internal/ce    DATE RECEIVED: 3.23.22    NOTES STATUS DETAILS   OFFICE NOTE from referring provider internal  Melonie Srivastava MD   OFFICE NOTE from other specialist     DISCHARGE SUMMARY from hospital     DISCHARGE REPORT from the ER ce  - 9/2/21 Alexandra   MEDICATION LIST internal     IMAGING  (NEED IMAGES AND REPORTS)     CT SCAN     CHEST XRAY (CXR) In process     TESTS     PULMONARY FUNCTION TESTING (PFT) internal  Scheduled 3.23.22       Action 2/8/22 sv    Action Taken Message sent to CC pool to help schedule pt CXR order

## 2022-02-09 ENCOUNTER — TELEPHONE (OUTPATIENT)
Dept: NEPHROLOGY | Facility: CLINIC | Age: 62
End: 2022-02-09
Payer: COMMERCIAL

## 2022-02-09 NOTE — TELEPHONE ENCOUNTER
[facility]    Phone Message    May a detailed message be left on voicemail: yes     Reason for Call: Order(s): Other:   Reason for requested: lab orders for 3/16/22 appt.  Pt referred for CKD, Fatigue, and P-ANCA.  She was also told by ER doctor from January 2022 visit that she has Wegeners Disease, but her referring provider said she didn't and wouldn't include that on the referral.  Date needed: ASA  Provider name: Dr. Hawkins      Action Taken: Message routed to:  [facility] neph    Travel Screening: Not Applicable

## 2022-02-10 ENCOUNTER — PATIENT OUTREACH (OUTPATIENT)
Dept: CARE COORDINATION | Facility: CLINIC | Age: 62
End: 2022-02-10
Payer: COMMERCIAL

## 2022-02-10 NOTE — PROGRESS NOTES
Clinic Care Coordination Contact  [facility]/Jason      CHW delegation completed 2/10/22.  CHW Delegation:   1)  Due Date:  Around 2/10/22        Delegation: Please call patient in 2 weeks and ask if she has reached out to [facility] Front door for [place] assessment and the Senior Linkage Line for help in her home and offer support or assistance as needed.     Clinical Data: Care Coordinator Outreach  Outreach attempted x 1. Mailbox is full; unable to leave VM  Plan:  Care Coordinator will try to reach patient again in one week.    Jessika Sharma  Community Health Worker  [facility]  [phone]    Have you contacted [facility] Door regarding [place] Cho[place] Assessment?  Have you contacted . Linkage Line for assistance finding help with homecare?

## 2022-02-11 DIAGNOSIS — J34.89 NASAL SEPTAL PERFORATION: Primary | ICD-10-CM

## 2022-02-12 ENCOUNTER — HEALTH MAINTENANCE LETTER (OUTPATIENT)
Age: 62
End: 2022-02-12

## 2022-02-14 NOTE — TELEPHONE ENCOUNTER
Pt has been given an appt with Dr. Hawkins on 3/16.  Review of chart shows a +pANCA, -MPO, -SHELL.  Pt does have decreased GFR, has also been referred for CKD.      CT of Sinus 1/16/2022:  FRONTAL SINUSES: Minimally pneumatized right frontal sinus. Small left frontal sinus. Minimal bilateral frontal sinus mucosal thickening. Frontal nasal ducts are patent bilaterally.     ETHMOID SINUSES: Minimal right anterior ethmoid sinus mucosal thickening. Partial opacification right ethmoid infundibulum. Left ethmoid infundibulum is widely patent. Lamina papyracea are intact.     MAXILLARY SINUSES: Mild right and minimal left maxillary sinus mucosal thickening. Partially aerated secretions are seen within the right maxillary sinus. No air-fluid levels. Floors of both maxillary sinuses are intact.     SPHENOID SINUS: Small amount of partially aerated secretions are seen within the right sphenoid sinus. Minimal left and mild right sphenoid sinus mucosal thickening. Anterior clinoid processes are not pneumatized    Attempted to call pt, no answer, unable to leave voicemail.    Lisa Nials RN  Rheumatology Clinic

## 2022-02-15 NOTE — TELEPHONE ENCOUNTER
She will see nephrology and pulmonology & no need for [place] yet- she tried & aggreeable with plan

## 2022-02-17 NOTE — TELEPHONE ENCOUNTER
RECORDS RECEIVED FROM: Internal   DATE RECEIVED: 03.16.2022    NOTES STATUS DETAILS   OFFICE NOTE from referring provider Internal 02.03.2022 Tu Camacho MD    OFFICE NOTE from other specialist  Internal  01.18.2022 Melonie Srivastava MD   DISCHARGE SUMMARY from hospital Internal  01.16.2022 Michael Leach DO   MEDICATION LIST Internal    LABS     CBC Internal 01.18.2022   CMP Internal 01.18.2022   BMP Internal 01.17.2022   UA Internal 06.25.2019   URINE PROTEIN Internal 06.25.2019

## 2022-02-18 ENCOUNTER — PATIENT OUTREACH (OUTPATIENT)
Dept: CARE COORDINATION | Facility: CLINIC | Age: 62
End: 2022-02-18
Payer: COMMERCIAL

## 2022-02-18 NOTE — PROGRESS NOTES
Clinic Care Coordination Contact  [facility]/Voicemail      Clinical Data: Care Coordinator Outreach  Outreach attempted x 2.  Left message on patient's voicemail with call back information and requested return call.  Plan: CHW will route to LUCAS Murray RN, to review for disenrollment and/or further direction per standard work. CHW sent unable to contact letter to pt via mail. No outreaches scheduled at this time.    Jessika Sharma  Community Health Worker  [facility]  [phone]    Have you contacted [facility] Door regarding [place] Cho[place]?  Have you contacted Sr. Linkage Line for assistance finding help with homecare?

## 2022-02-18 NOTE — LETTER
[facility] CARE COORDINATION  [facility]  [address] [address]    February 18, 2022    Daysi Ashley  [address] [address]      Dear Daysi,    I am a clinic community health worker who works with Melonie Srivastava MD at the [facility]. I recently tried to call and was unable to reach you. Below is a description of clinic care coordination and how I can further assist you.      The clinic care coordination team is made up of a registered nurse,  and community health worker who understand the health care system. The goal of clinic care coordination is to help you manage your health and improve access to the health care system in the most efficient manner. The team can assist you in meeting your health care goals by providing education, coordinating services, strengthening the communication among your providers and supporting you with any resource needs.    Please feel free to contact me at [phone] with any questions or concerns. We are focused on providing you with the highest-quality healthcare experience possible and that all starts with you.     Sincerely,     Jessika Sharma  Community Health Worker  [facility] [place] [facility]  [phone]

## 2022-02-18 NOTE — TELEPHONE ENCOUNTER
Pt does have frequent bloody noses x 1 year.  Pt states that she has unknown bacteria growing in her throat and nose, and she takes steroids for it.  Slightly elevated CRP.     Pt is being seen by ENT, she get very frustrated as she feels she doesn't know what is going on. She is struggling as she is concerned that her kidney disease has been going on for so many years and she didn't know about it.      We discussed what Vasculitis is, the definition of it, that it is not clear that it is what she has, as though her ANCA is positive, the other marker, MPO, is negative.     Will discuss with Dr. Yap and let pt know if her appt in March with Dr. Hawkins is ok, or if she needs to be seen sooner.    Lisa Nails RN  Rheumatology Clinic

## 2022-02-21 ENCOUNTER — TELEPHONE (OUTPATIENT)
Dept: FAMILY MEDICINE | Facility: CLINIC | Age: 62
End: 2022-02-21
Payer: COMMERCIAL

## 2022-02-21 NOTE — TELEPHONE ENCOUNTER
As,    Informed pt below.    She is scheduled tomorrow for a physical and was wanting to know if ok to wait until tomorrow.  Then pt wanting to know if she should go to ER or come in here. Advised ER. Pt says she does not want to go into ER due to wait times and requesting apt today.  No openings here today (except VV at end of day with DE)  Did you want to add her in or ok to wait until tomorrow?    Please advise.  Thanks,  Kaitlin Canas RN

## 2022-02-21 NOTE — TELEPHONE ENCOUNTER
Offer OV & if able to wait for OV, offer her a visit  .  If unable to wait till OV- see any avaliable provider as suits her.

## 2022-02-21 NOTE — TELEPHONE ENCOUNTER
"A.S.    Patient called.  States today she won't up and was confused.  \"I didn't recognize things on my night stand, I forgot to rinse my hair after shampooing while in shower\".    \"I kept messing up words when I answered the phone\"    Denies any weakness in her arms and legs, facial drooping.  States she is better now.    Reports this has happened a few time before.  Patient does not want to go to ED.    Do you want to see patient?  Virtual visit?    Lawanda Childers RN    "

## 2022-02-22 ENCOUNTER — TELEPHONE (OUTPATIENT)
Dept: PULMONOLOGY | Facility: CLINIC | Age: 62
End: 2022-02-22
Payer: COMMERCIAL

## 2022-02-22 ENCOUNTER — PATIENT OUTREACH (OUTPATIENT)
Dept: CARE COORDINATION | Facility: CLINIC | Age: 62
End: 2022-02-22
Payer: COMMERCIAL

## 2022-02-22 NOTE — TELEPHONE ENCOUNTER
Pt called back and scheduled CXR with me for appptmnt with Dr. Emanuel 3.23.22 details confirmed with pt

## 2022-02-22 NOTE — PROGRESS NOTES
Clinic Care Coordination Contact    Situation: Patient chart reviewed by care coordinator.    Background: Patient enrolled in care coordination and due for outreach from RN CC.    Assessment: Per chart review, CHW unable to contact patient for 2 week follow up regarding services from [facility] or [facility] line. CHW has attempted to call patient twice and voicemail box is full. Writer asked CHW to try again in 1 month .    Plan/Recommendations: Writer will follow up with CHW in 1 month on status of patient.     Terri Montgomery RN Care Coordinator     [facility] Ambulatory Care Management  [facility]  [email or web address]    Office: [phone]

## 2022-02-23 NOTE — TELEPHONE ENCOUNTER
RECORDS RECEIVED FROM: internal /ce    DATE RECEIVED: 3.23.22    NOTES STATUS DETAILS   OFFICE NOTE from referring provider internal  Melonie Srivastava MD   OFFICE NOTE from other specialist     DISCHARGE SUMMARY from hospital     DISCHARGE REPORT from the ER ce - 9/2/21 Alexandra   MEDICATION LIST internal     IMAGING  (NEED IMAGES AND REPORTS)     CT SCAN     CHEST XRAY (CXR) internal  Scheduled 3.23.22   TESTS     PULMONARY FUNCTION TESTING (PFT) internal  Scheduled 3.23.22

## 2022-03-03 ENCOUNTER — TRANSCRIBE ORDERS (OUTPATIENT)
Dept: OTHER | Age: 62
End: 2022-03-03
Payer: COMMERCIAL

## 2022-03-03 DIAGNOSIS — J34.89 NASAL SEPTAL PERFORATION: Primary | ICD-10-CM

## 2022-03-06 ENCOUNTER — HOSPITAL ENCOUNTER (EMERGENCY)
Facility: CLINIC | Age: 62
Discharge: HOME | End: 2022-03-06
Attending: EMERGENCY MEDICINE | Admitting: EMERGENCY MEDICINE
Payer: COMMERCIAL

## 2022-03-06 VITALS
RESPIRATION RATE: 20 BRPM | SYSTOLIC BLOOD PRESSURE: 181 MMHG | HEART RATE: 100 BPM | BODY MASS INDEX: 26.58 KG/M2 | HEIGHT: 63 IN | DIASTOLIC BLOOD PRESSURE: 106 MMHG | TEMPERATURE: 98.3 F | OXYGEN SATURATION: 95 % | WEIGHT: 150 LBS

## 2022-03-06 DIAGNOSIS — R04.0 EPISTAXIS: ICD-10-CM

## 2022-03-06 PROCEDURE — 99282 EMERGENCY DEPT VISIT SF MDM: CPT

## 2022-03-06 PROCEDURE — 99283 EMERGENCY DEPT VISIT LOW MDM: CPT

## 2022-03-06 ASSESSMENT — ENCOUNTER SYMPTOMS: DIZZINESS: 1

## 2022-03-07 ENCOUNTER — PATIENT OUTREACH (OUTPATIENT)
Dept: CARE COORDINATION | Facility: CLINIC | Age: 62
End: 2022-03-07
Payer: COMMERCIAL

## 2022-03-07 NOTE — PROGRESS NOTES
Clinic Care Coordination Contact  [facility]       Clinical Data: Care Coordinator Outreach  Outreach attempted x 1.   Plan:Care Coordinator will try to reach patient again in 1-2 business days.    Terri Montgomery, RN Care Coordinator     [facility] Ambulatory Care Management  [facility] and   [email or web address]    Office: [phone]

## 2022-03-07 NOTE — ED NOTES
Bed: ED20  Expected date:   Expected time:   Means of arrival:   Comments:  Raysa 61F sinus infection eta 6722
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
## 2022-03-08 ENCOUNTER — APPOINTMENT (OUTPATIENT)
Dept: MRI IMAGING | Facility: CLINIC | Age: 62
End: 2022-03-08
Attending: EMERGENCY MEDICINE
Payer: COMMERCIAL

## 2022-03-08 ENCOUNTER — APPOINTMENT (OUTPATIENT)
Dept: GENERAL RADIOLOGY | Facility: CLINIC | Age: 62
End: 2022-03-08
Attending: EMERGENCY MEDICINE
Payer: COMMERCIAL

## 2022-03-08 ENCOUNTER — APPOINTMENT (OUTPATIENT)
Dept: CT IMAGING | Facility: CLINIC | Age: 62
End: 2022-03-08
Attending: EMERGENCY MEDICINE
Payer: COMMERCIAL

## 2022-03-08 ENCOUNTER — HOSPITAL ENCOUNTER (OUTPATIENT)
Facility: CLINIC | Age: 62
Setting detail: OBSERVATION
Discharge: HOME | End: 2022-03-09
Attending: NURSE PRACTITIONER | Admitting: INTERNAL MEDICINE
Payer: COMMERCIAL

## 2022-03-08 ENCOUNTER — PATIENT OUTREACH (OUTPATIENT)
Dept: CARE COORDINATION | Facility: CLINIC | Age: 62
End: 2022-03-08
Payer: COMMERCIAL
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
## 2022-03-08 DIAGNOSIS — I10 BENIGN ESSENTIAL HYPERTENSION: ICD-10-CM

## 2022-03-08 DIAGNOSIS — R41.82 ALTERED MENTAL STATUS, UNSPECIFIED ALTERED MENTAL STATUS TYPE: ICD-10-CM

## 2022-03-08 DIAGNOSIS — S92.354A CLOSED NONDISPLACED FRACTURE OF FIFTH METATARSAL BONE OF RIGHT FOOT, INITIAL ENCOUNTER: ICD-10-CM

## 2022-03-08 LAB
ALBUMIN SERPL-MCNC: 3.3 G/DL (ref 3.4–5)
ALBUMIN UR-MCNC: 20 MG/DL
ALP SERPL-CCNC: 80 U/L (ref 40–150)
ALT SERPL W P-5'-P-CCNC: 17 U/L (ref 0–50)
AMMONIA PLAS-SCNC: 15 UMOL/L (ref 10–50)
AMPHETAMINES UR QL SCN: ABNORMAL
ANION GAP SERPL CALCULATED.3IONS-SCNC: 2 MMOL/L (ref 3–14)
APPEARANCE UR: CLEAR
APTT PPP: 33 SECONDS (ref 22–38)
AST SERPL W P-5'-P-CCNC: 10 U/L (ref 0–45)
ATRIAL RATE - MUSE: 81 BPM
BARBITURATES UR QL: ABNORMAL
BASOPHILS # BLD AUTO: 0 10E3/UL (ref 0–0.2)
BASOPHILS NFR BLD AUTO: 0 %
BENZODIAZ UR QL: ABNORMAL
BILIRUB DIRECT SERPL-MCNC: <0.1 MG/DL (ref 0–0.2)
BILIRUB SERPL-MCNC: 0.3 MG/DL (ref 0.2–1.3)
BILIRUB UR QL STRIP: NEGATIVE
BUN SERPL-MCNC: 22 MG/DL (ref 7–30)
CALCIUM SERPL-MCNC: 8.9 MG/DL (ref 8.5–10.1)
CANNABINOIDS UR QL SCN: ABNORMAL
CHLORIDE BLD-SCNC: 108 MMOL/L (ref 94–109)
CO2 SERPL-SCNC: 28 MMOL/L (ref 20–32)
COCAINE UR QL: ABNORMAL
COLOR UR AUTO: ABNORMAL
CREAT SERPL-MCNC: 1.31 MG/DL (ref 0.52–1.04)
DIASTOLIC BLOOD PRESSURE - MUSE: NORMAL MMHG
EOSINOPHIL # BLD AUTO: 0.1 10E3/UL (ref 0–0.7)
EOSINOPHIL NFR BLD AUTO: 1 %
ERYTHROCYTE [DISTWIDTH] IN BLOOD BY AUTOMATED COUNT: 13.8 % (ref 10–15)
GFR SERPL CREATININE-BSD FRML MDRD: 46 ML/MIN/1.73M2
GLUCOSE BLD-MCNC: 130 MG/DL (ref 70–99)
GLUCOSE UR STRIP-MCNC: NEGATIVE MG/DL
HCT VFR BLD AUTO: 41.3 % (ref 35–47)
HGB BLD-MCNC: 13.4 G/DL (ref 11.7–15.7)
HGB UR QL STRIP: NEGATIVE
HOLD SPECIMEN: NORMAL
IMM GRANULOCYTES # BLD: 0 10E3/UL
IMM GRANULOCYTES NFR BLD: 1 %
INR PPP: 1.09 (ref 0.85–1.15)
INTERPRETATION ECG - MUSE: NORMAL
KETONES UR STRIP-MCNC: NEGATIVE MG/DL
LEUKOCYTE ESTERASE UR QL STRIP: NEGATIVE
LYMPHOCYTES # BLD AUTO: 1.3 10E3/UL (ref 0.8–5.3)
LYMPHOCYTES NFR BLD AUTO: 28 %
MCH RBC QN AUTO: 29.3 PG (ref 26.5–33)
MCHC RBC AUTO-ENTMCNC: 32.4 G/DL (ref 31.5–36.5)
MCV RBC AUTO: 90 FL (ref 78–100)
MONOCYTES # BLD AUTO: 0.5 10E3/UL (ref 0–1.3)
MONOCYTES NFR BLD AUTO: 10 %
NEUTROPHILS # BLD AUTO: 2.8 10E3/UL (ref 1.6–8.3)
NEUTROPHILS NFR BLD AUTO: 60 %
NITRATE UR QL: NEGATIVE
NRBC # BLD AUTO: 0 10E3/UL
NRBC BLD AUTO-RTO: 0 /100
OPIATES UR QL SCN: ABNORMAL
P AXIS - MUSE: 59 DEGREES
PCP UR QL SCN: ABNORMAL
PH UR STRIP: 7.5 [PH] (ref 5–7)
PLATELET # BLD AUTO: 278 10E3/UL (ref 150–450)
POTASSIUM BLD-SCNC: 4.1 MMOL/L (ref 3.4–5.3)
PR INTERVAL - MUSE: 136 MS
PROT SERPL-MCNC: 6 G/DL (ref 6.8–8.8)
QRS DURATION - MUSE: 86 MS
QT - MUSE: 406 MS
QTC - MUSE: 471 MS
R AXIS - MUSE: 21 DEGREES
RBC # BLD AUTO: 4.57 10E6/UL (ref 3.8–5.2)
RBC URINE: 0 /HPF
SARS-COV-2 RNA RESP QL NAA+PROBE: NEGATIVE
SODIUM SERPL-SCNC: 138 MMOL/L (ref 133–144)
SP GR UR STRIP: 1 (ref 1–1.03)
SQUAMOUS EPITHELIAL: <1 /HPF
SYSTOLIC BLOOD PRESSURE - MUSE: NORMAL MMHG
T AXIS - MUSE: 78 DEGREES
TROPONIN I SERPL HS-MCNC: 16 NG/L
UROBILINOGEN UR STRIP-MCNC: NORMAL MG/DL
VENTRICULAR RATE- MUSE: 81 BPM
WBC # BLD AUTO: 4.7 10E3/UL (ref 4–11)
WBC URINE: 1 /HPF

## 2022-03-08 PROCEDURE — 99285 EMERGENCY DEPT VISIT HI MDM: CPT | Mod: 25

## 2022-03-08 PROCEDURE — 87635 SARS-COV-2 COVID-19 AMP PRB: CPT | Performed by: EMERGENCY MEDICINE

## 2022-03-08 PROCEDURE — 85730 THROMBOPLASTIN TIME PARTIAL: CPT | Performed by: EMERGENCY MEDICINE

## 2022-03-08 PROCEDURE — 250N000009 HC RX 250: Performed by: EMERGENCY MEDICINE

## 2022-03-08 PROCEDURE — 0042T CT HEAD PERFUSION WITH CONTRAST: CPT

## 2022-03-08 PROCEDURE — 73630 X-RAY EXAM OF FOOT: CPT | Mod: RT

## 2022-03-08 PROCEDURE — G0378 HOSPITAL OBSERVATION PER HR: HCPCS

## 2022-03-08 PROCEDURE — 36415 COLL VENOUS BLD VENIPUNCTURE: CPT | Performed by: EMERGENCY MEDICINE

## 2022-03-08 PROCEDURE — 81001 URINALYSIS AUTO W/SCOPE: CPT | Performed by: EMERGENCY MEDICINE

## 2022-03-08 PROCEDURE — 258N000003 HC RX IP 258 OP 636: Performed by: INTERNAL MEDICINE

## 2022-03-08 PROCEDURE — 82140 ASSAY OF AMMONIA: CPT | Performed by: EMERGENCY MEDICINE

## 2022-03-08 PROCEDURE — 36415 COLL VENOUS BLD VENIPUNCTURE: CPT | Performed by: NURSE PRACTITIONER

## 2022-03-08 PROCEDURE — 96360 HYDRATION IV INFUSION INIT: CPT | Mod: 59

## 2022-03-08 PROCEDURE — 84484 ASSAY OF TROPONIN QUANT: CPT | Performed by: EMERGENCY MEDICINE

## 2022-03-08 PROCEDURE — 96361 HYDRATE IV INFUSION ADD-ON: CPT

## 2022-03-08 PROCEDURE — 70553 MRI BRAIN STEM W/O & W/DYE: CPT

## 2022-03-08 PROCEDURE — A9585 GADOBUTROL INJECTION: HCPCS | Performed by: INTERNAL MEDICINE

## 2022-03-08 PROCEDURE — 85025 COMPLETE CBC W/AUTO DIFF WBC: CPT | Performed by: EMERGENCY MEDICINE

## 2022-03-08 PROCEDURE — 70450 CT HEAD/BRAIN W/O DYE: CPT

## 2022-03-08 PROCEDURE — 99220 PR INITIAL OBSERVATION CARE,LEVEL III: CPT | Performed by: INTERNAL MEDICINE

## 2022-03-08 PROCEDURE — 99219 PR INITIAL OBSERVATION CARE,LEVEL II: CPT | Performed by: NURSE PRACTITIONER

## 2022-03-08 PROCEDURE — 93005 ELECTROCARDIOGRAM TRACING: CPT

## 2022-03-08 PROCEDURE — 85610 PROTHROMBIN TIME: CPT | Performed by: EMERGENCY MEDICINE

## 2022-03-08 PROCEDURE — 70496 CT ANGIOGRAPHY HEAD: CPT

## 2022-03-08 PROCEDURE — 82248 BILIRUBIN DIRECT: CPT | Performed by: EMERGENCY MEDICINE

## 2022-03-08 PROCEDURE — C9803 HOPD COVID-19 SPEC COLLECT: HCPCS

## 2022-03-08 PROCEDURE — 250N000011 HC RX IP 250 OP 636: Performed by: EMERGENCY MEDICINE

## 2022-03-08 PROCEDURE — 80053 COMPREHEN METABOLIC PANEL: CPT | Performed by: EMERGENCY MEDICINE

## 2022-03-08 PROCEDURE — 255N000002 HC RX 255 OP 636: Performed by: INTERNAL MEDICINE

## 2022-03-08 PROCEDURE — 80307 DRUG TEST PRSMV CHEM ANLYZR: CPT | Performed by: INTERNAL MEDICINE

## 2022-03-08 PROCEDURE — 71046 X-RAY EXAM CHEST 2 VIEWS: CPT

## 2022-03-08 RX ORDER — SODIUM CHLORIDE 9 MG/ML
INJECTION, SOLUTION INTRAVENOUS CONTINUOUS
Status: DISCONTINUED | OUTPATIENT
Start: 2022-03-08 | End: 2022-03-09

## 2022-03-08 RX ORDER — ONDANSETRON 4 MG/1
4 TABLET, ORALLY DISINTEGRATING ORAL EVERY 6 HOURS PRN
Status: DISCONTINUED | OUTPATIENT
Start: 2022-03-08 | End: 2022-03-09 | Stop reason: HOSPADM

## 2022-03-08 RX ORDER — IOPAMIDOL 755 MG/ML
120 INJECTION, SOLUTION INTRAVASCULAR ONCE
Status: COMPLETED | OUTPATIENT
Start: 2022-03-08 | End: 2022-03-08

## 2022-03-08 RX ORDER — ONDANSETRON 2 MG/ML
4 INJECTION INTRAMUSCULAR; INTRAVENOUS EVERY 6 HOURS PRN
Status: DISCONTINUED | OUTPATIENT
Start: 2022-03-08 | End: 2022-03-09 | Stop reason: HOSPADM

## 2022-03-08 RX ORDER — GADOBUTROL 604.72 MG/ML
7 INJECTION INTRAVENOUS ONCE
Status: COMPLETED | OUTPATIENT
Start: 2022-03-08 | End: 2022-03-08

## 2022-03-08 RX ADMIN — GADOBUTROL 7 ML: 604.72 INJECTION INTRAVENOUS at 21:18

## 2022-03-08 RX ADMIN — SODIUM CHLORIDE: 9 INJECTION, SOLUTION INTRAVENOUS at 17:32

## 2022-03-08 RX ADMIN — IOPAMIDOL 120 ML: 755 INJECTION, SOLUTION INTRAVENOUS at 14:18

## 2022-03-08 RX ADMIN — SODIUM CHLORIDE 100 ML: 900 INJECTION INTRAVENOUS at 14:18
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
## 2022-03-08 NOTE — PROGRESS NOTES
Clinic Care Coordination Contact  [facility]/Voicemail    Referral Source: PCP  Clinical Data: Care Coordinator Outreach  Outreach attempted x 2. Unable to leave voicemail.   Plan:Care Coordinator will chart review in 1 month. CHW to follow up a scheduled.     Terri Montgomery, RN Care Coordinator     [facility]  [facility] and   [email or web address]    Office: [phone]

## 2022-03-08 NOTE — ED PROVIDER NOTES
History     Chief Complaint:    Slurred Speech      HPI   Daysi Ashley is a 61 year old female who presents with fall.  Patient reported falling yesterday.  She reports falling out of the blue.  Initially thought that she may be tripped but she is not quite certain about this.  She lost consciousness for unknown amount of time but did not think it was long.  She called her doctor and left a message and they encouraged her to be seen in the ER.  Currently she feels like her right leg feels weird.  She does not remember everything that happened last night.  She otherwise denies chest pain, shortness of breath, abdominal pain, nausea, vomiting or diarrhea.  She denies alcohol or street drug use.    Review of Systems   All other systems reviewed and are negative.        Allergies:    Compazine [Prochlorperazine]  Phenothiazines      Medications:      ARIPiprazole (ABILIFY) 10 MG tablet  metoprolol succinate ER (TOPROL-XL) 50 MG 24 hr tablet  venlafaxine (EFFEXOR-XR) 150 MG 24 hr capsule        Past Medical History:      Past Medical History:   Diagnosis Date     311      Allergic rhinitis, cause unspecified      Arthritis      Depressive disorder      Migraine, unspecified, without mention of intractable migraine without mention of status migrainosus      Rapid weight loss 1/22/2013     Patient Active Problem List    Diagnosis Date Noted     P-ANCA titer positive 01/19/2022     Priority: Medium     Epistaxis 01/19/2022     Priority: Medium     Episodic mood disorder (H) 01/19/2022     Priority: Medium     Thyroid nodule 01/19/2022     Priority: Medium     Benign essential hypertension 01/19/2022     Priority: Medium     Sore throat 01/16/2022     Priority: Medium     Other fatigue 01/16/2022     Priority: Medium     CKD (chronic kidney disease) stage 3, GFR 30-59 ml/min (H) 07/25/2019     Priority: Medium     Major depressive disorder, severe (H) 07/16/2019     Priority: Medium     Syncope 06/25/2019     Priority:  Medium     Suicidal ideation 2019     Priority: Medium     Severe recurrent major depression without psychotic features (H) 2018     Priority: Medium     Depression with suicidal ideation 2018     Priority: Medium     Advanced directives, counseling/discussion 10/14/2013     Priority: Medium     Advance Care Planning:   ACP Review and Resources Provided:  Reviewed chart for advance care plan.  Daysi Ashley has no plan or code status on file. Discussed available resources and provided with information. Confirmed code status reflects current choices pending further ACP discussions.    Added by Renetta Alvarez on 10/14/2013               PTSD (post-traumatic stress disorder) 2013     Priority: Medium     Rapid weight loss 2013     Priority: Medium     Joint pain 2012     Priority: Medium     Dysthymia 2010     Priority: Medium     Hyperlipidemia LDL goal <160 2010     Priority: Medium     Drug dependence (H) 2006     Priority: Medium     PT HAS HAD ADDICTION PROBLEMS FROM BENZODIAZEPINES AND BARBITUATES, CD RX SUCCESSFUL. NO PROB HISTORICALLY FROM ETOH OR NARCOTICS  Problem list name updated by automated process. Provider to review       Migraine 2004     Priority: Medium     Problem list name updated by automated process. Provider to review       Allergic rhinitis 2004     Priority: Medium     Problem list name updated by automated process. Provider to review          Past Surgical History:      Past Surgical History:   Procedure Laterality Date     APPENDECTOMY        SECTION   &      FOOT SURGERY      right     HERNIORRHAPHY UMBILICAL  2000     ORTHOPEDIC SURGERY       ZC NONSPECIFIC PROCEDURE      Appendectomy       Family History:      Family History   Problem Relation Age of Onset     Dementia Father      Depression Father      Depression Daughter        Social History:  Presents alone.  Her PCP is Dr Roa    Physical Exam  "    Patient Vitals for the past 24 hrs:   BP Temp Temp src Pulse Resp SpO2 Height Weight   03/08/22 1630 (!) 149/79 -- -- 96 12 94 % -- --   03/08/22 1530 (!) 151/89 -- -- 82 14 99 % -- --   03/08/22 1430 (!) 188/87 -- -- 78 19 96 % -- --   03/08/22 1400 (!) 133/101 -- -- 81 12 96 % -- --   03/08/22 1341 (!) 124/90 98.8  F (37.1  C) Oral 87 16 97 % 1.6 m (5' 3\") 68 kg (150 lb)       Physical Exam  General: Resting on the bed.  Head: No obvious trauma to head.  Ears, Nose, Throat:  External ears normal.  Nose normal.  No pharyngeal erythema, swelling or exudate.  Midline uvula.  Clear TMs.  Eyes:  Conjunctivae clear.  Pupils are equal, round, and reactive. Extraocular eye movements intact.  Hematoma of the right upper eyelid.  Neck: Normal range of motion.  Neck supple. Nontender C-spine.  CV: Regular rate and rhythm.  No murmurs.      Respiratory: Effort normal and breath sounds normal.  No wheezing or crackles.   Gastrointestinal: Soft.  No distension. There is no tenderness.  There is no rigidity, no rebound and no guarding.   Musculoskeletal: Normal range of motion.  Non tender extremities to palpations.  Nontender thoracic, lumbar spine without step-off department.  Tenderness to the right foot along the plantar aspect.  Neuro: Alert. Moving all extremities appropriately.  Normal speech.    Skin: Skin is warm and dry.  Abrasion to the left forehead, bruising to the dorsum of the right foot.      Emergency Department Course   ECG:  ECG taken at 1440, ECG read at 1442  Normal sinus rhythm  Septal infarct, age undetermined  Abnormal ECG   No signidficant changes as compared to prior, dated 1/16/2022.  Rate 81 bpm. SC interval 136 ms. QRS duration 86 ms. QT/QTc 406/471 ms. P-R-T axes 59 21 78.     Imaging:  Foot  XR, G/E 3 views, right   Final Result      XR Chest 2 Views   Final Result   IMPRESSION: No focal airspace opacities, pleural effusion or   pneumothorax. The cardiac and mediastinal silhouettes are normal. " Old   healed left clavicle fracture deformity. No displaced rib fractures.      MEGAN VALLE MD            SYSTEM ID:  [id number]      CT Head Perfusion w Contrast   Final Result   IMPRESSION:    1. Patent arteries in the neck without evidence of dissection. Mild   atherosclerotic disease in the carotid arteries bilaterally without   significant stenosis by NASCET criteria.   2. Patent proximal major intracranial arteries without vascular   cutoff. No significant stenosis. No aneurysm identified.   3. Unremarkable CT perfusion images of the head.      Results discussed with Aidee Merino at 2:35 PM on 3/8/2022.       ANDREW HYDE MD            SYSTEM ID:  [id number]      CTA Head Neck with Contrast   Final Result   IMPRESSION:    1. Patent arteries in the neck without evidence of dissection. Mild   atherosclerotic disease in the carotid arteries bilaterally without   significant stenosis by NASCET criteria.   2. Patent proximal major intracranial arteries without vascular   cutoff. No significant stenosis. No aneurysm identified.   3. Unremarkable CT perfusion images of the head.      Results discussed with Aidee Merino at 2:35 PM on 3/8/2022.       ANDREW HYDE MD            SYSTEM ID:  [id number]      CT Head w/o Contrast   Final Result   IMPRESSION:      1. No evidence of acute intracranial hemorrhage, mass, or herniation.   2. Left frontal scalp soft tissue swelling. No underlying calvarial   fracture.      ANDREW HYDE MD            SYSTEM ID:  [id number]      MR Brain w/o & w Contrast    (Results Pending)       Laboratory:  Labs Ordered and Resulted from Time of ED Arrival to Time of ED Departure   BASIC METABOLIC PANEL - Abnormal       Result Value    Sodium 138      Potassium 4.1      Chloride 108      Carbon Dioxide (CO2) 28      Anion Gap 2 (*)     Urea Nitrogen 22      Creatinine 1.31 (*)     Calcium 8.9      Glucose 130 (*)     GFR Estimate 46 (*)    HEPATIC FUNCTION PANEL - Abnormal    Bilirubin  Total 0.3      Bilirubin Direct <0.1      Protein Total 6.0 (*)     Albumin 3.3 (*)     Alkaline Phosphatase 80      AST 10      ALT 17     INR - Normal    INR 1.09     PARTIAL THROMBOPLASTIN TIME - Normal    aPTT 33     TROPONIN I - Normal    Troponin I High Sensitivity 16     AMMONIA - Normal    Ammonia 15     CBC WITH PLATELETS AND DIFFERENTIAL    WBC Count 4.7      RBC Count 4.57      Hemoglobin 13.4      Hematocrit 41.3      MCV 90      MCH 29.3      MCHC 32.4      RDW 13.8      Platelet Count 278      % Neutrophils 60      % Lymphocytes 28      % Monocytes 10      % Eosinophils 1      % Basophils 0      % Immature Granulocytes 1      NRBCs per 100 WBC 0      Absolute Neutrophils 2.8      Absolute Lymphocytes 1.3      Absolute Monocytes 0.5      Absolute Eosinophils 0.1      Absolute Basophils 0.0      Absolute Immature Granulocytes 0.0      Absolute NRBCs 0.0     GLUCOSE MONITOR NURSING POCT   ROUTINE UA WITH MICROSCOPIC REFLEX TO CULTURE   COVID-19 VIRUS (CORONAVIRUS) BY PCR       Emergency Department Course:           Reviewed:  I reviewed nursing notes, vitals, past history and care everywhere    Assessments:  1400 I obtained history and examined the patient as noted above.    I rechecked the patient and explained findings.     Consults:   1437 I spoke with radiology about patient   1626 I spoke with Amina JOINER from orthopedics        Interventions:  Medications   100mL Saline Flush (100 mLs Intravenous Given 3/8/22 1418)   iopamidol (ISOVUE-370) solution 120 mL (120 mLs Intravenous Given 3/8/22 1418)       Disposition:  The patient was admitted to the hospital under the care of Dr. Greer .    Impression & Plan      Medical Decision Makin-year-old female presents with fall.  Vital signs were reassuring.  Broad differential was pursued include not limited to stroke, intracranial hemorrhage, concussion, contusion, electrolyte, metabolic, renal dysfunction, infection, dehydration, etc.  CBC  shows no leukocytosis or anemia.  BMP shows no acute electrolyte, metabolic or renal dysfunction.  Kidney function appears to be near baseline.  Coags normal.  EKG shows sinus rhythm, no acute ischemic changes.  Troponin within normal limits.  No signs of ACS or arrhythmia.  Code stroke was called given unclear etiology of patient falling and having some slurred speech and right-sided weakness.  CT head, angio and perfusion are all unremarkable.  No evidence of stroke, bleed, hemorrhage.  CTA without dissection or significant stenosis.  CT perfusion unremarkable.  Stroke neurology recommends MRI.  Chest x-ray without acute pneumonia, pneumothorax or effusion.  X-ray of the foot does show evidence of a mildly displaced fracture of the neck of the fifth metatarsal.  Spoke with orthopedics, they recommend cam boot and will evaluate the patient when she is admitted.  Ammonia and LFTs are reassuring.  No seizure activity by history.  UA pending.  Unclear etiology of the fall.  Tetanus is up-to-date.  Plan for admission for ongoing confusion.    Patient was admitted.      Covid-19  Daysi Ashley was evaluated during a global COVID-19 pandemic, which necessitated consideration that the patient might be at risk for infection with the SARS-CoV-2 virus that causes COVID-19.   Applicable protocols for evaluation were followed during the patient's care.   COVID-19 was considered as part of the patient's evaluation. The plan for testing is:  a test was obtained during this visit.    Diagnosis:    ICD-10-CM    1. Altered mental status, unspecified altered mental status type  R41.82    2. Fall, initial encounter  W19.XXXA    3. Closed nondisplaced fracture of fifth metatarsal bone of right foot, initial encounter  S92.354A        Discharge Medications:  New Prescriptions    No medications on file        Aidee Merino MD  03/08/22 0062
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
## 2022-03-08 NOTE — PHARMACY-ADMISSION MEDICATION HISTORY
Pharmacy Medication History  Admission medication history interview status for the 3/8/2022  admission is complete. See EPIC admission navigator for prior to admission medications     Location of Interview: Phone  Medication history sources: Patient    Significant changes made to the medication list:  Removed Augmentin & Prednisone.    In the past week, patient estimated taking medication this percent of the time: 50-90% due to other    Additional medication history information:       Medication reconciliation completed by provider prior to medication history? No    Time spent in this activity: 12min    Prior to Admission medications    Medication Sig Last Dose Taking? Auth Provider   ARIPiprazole (ABILIFY) 10 MG tablet Take 10 mg by mouth daily 3/7/2022 at Unknown time Yes Unknown, Entered By History   metoprolol succinate ER (TOPROL-XL) 50 MG 24 hr tablet Take 1 tablet (50 mg) by mouth daily Past Week at Unknown time Yes Melonie Srivastava MD   venlafaxine (EFFEXOR-XR) 150 MG 24 hr capsule Take 150 mg by mouth daily 3/7/2022 at Unknown time Yes Unknown, Entered By History       The information provided in this note is only as accurate as the sources available at the time of update(s)

## 2022-03-08 NOTE — ED NOTES
Bed: ED22  Expected date:   Expected time:   Means of arrival:   Comments:  Raysa - 61 F facial injury fall confusion eta 0540

## 2022-03-08 NOTE — CONSULTS
"[facility]    Stroke Consult Note    Reason for Consult: Stroke Code     Chief Complaint: Slurred Speech      HPI  Daysi Ashley is a 61 year old female with PHM of migraines and depression who presented to [facility] ED on 3/8/2022 after a fall that occurred around 3:00 pm the day prior, 3/7/2022. Patient is answering all question appropriately, however LKW unclear and she is unsure what happened directly before the fall and what happened the day prior. She does remember her  waking her up and then she went to bed. She woke up this morning and called her PCP regarding the fall and her PCP instructed her to come to the ED for evaluation. ED BP: 133/101    Imaging Findings  CT head: No evidence of acute intracranial hemorrhage, mass, or herniation. Left frontal scalp soft tissue swelling. No underlying calvarial fracture.  CTA head: Negative for occlusion and significant stenosis   CTA neck: No occlusion or dissection. Mild atherosclerotic disease in the carotid arteries bilaterally without significant stenosis   CTP head:  Unremarkable CT perfusion images of the head      Thrombolytic Treatment   Not given due to unclear or unfavorable risk-benefit profile for extended window thrombolysis beyond the conventional 4.5 hour time window.    Endovascular Treatment  Not initiated due to absence of proximal vessel occlusion    Impression   61 year old female who presented after fall. CT head negative for acute infarct or hemorrhage. Given story, possibly concussion. Recommend MRI brain for further evaluation.     Recommendations  - MRI brain with and without contrast, please consult stroke neuro if acute infarct on MRI        JAKOB Costello, CNP  Vascular Neurology  To page me or covering stroke neurology team member, click here: [facility]COM   Choose \"On Call\" tab at top, then search dropdown box for \"Neurology Adult\", select location, press Enter, then look for stroke/neuro " ICU/telestroke.    ______________________________________________________    Clinically Significant Risk Factors Present on Admission                 # CKD, Stage 3a (GFR 45-59): Will monitor and treat as appropriate  - last Cr =  1.31 mg/dL (Ref range: 0.52 - 1.04 mg/dL)  - last GFR = 46 mL/min/1.73m2 (Ref range: >60 mL/min/1.73m2)    Past Medical History   Past Medical History:   Diagnosis Date     311      Allergic rhinitis, cause unspecified      Arthritis      Depressive disorder      Migraine, unspecified, without mention of intractable migraine without mention of status migrainosus      Rapid weight loss 2013     Past Surgical History   Past Surgical History:   Procedure Laterality Date     APPENDECTOMY        SECTION   &      FOOT SURGERY      right     HERNIORRHAPHY UMBILICAL  2000     ORTHOPEDIC SURGERY       ZC NONSPECIFIC PROCEDURE      Appendectomy     Medications   Home Meds  Prior to Admission medications    Medication Sig Start Date End Date Taking? Authorizing Provider   amoxicillin-clavulanate (AUGMENTIN) 875-125 MG tablet Take 1 tablet by mouth 2 times daily 22   Manuela Ortega MD   ARIPiprazole (ABILIFY) 10 MG tablet Take 10 mg by mouth daily    Unknown, Entered By History   chlorhexidine (PERIDEX) 0.12 % solution Swish and spit 15 mLs in mouth 2 times daily 22   Eulalia Rao DO   magic mouthwash (ENTER INGREDIENTS IN COMMENTS) suspension Take 5-10 mLs by mouth every 4 hours as needed (dental pain) Repeat as needed. 22   Manuela Ortega MD   magic mouthwash (ENTER INGREDIENTS IN COMMENTS) suspension Take 5-10 mLs by mouth every 4 hours as needed (pain) Repeat as needed 22   Manuela Ortega MD   metoprolol succinate ER (TOPROL-XL) 50 MG 24 hr tablet Take 1 tablet (50 mg) by mouth daily 22   Melonie Srivastava MD   predniSONE (DELTASONE) 20 MG tablet Take 3 tabs by mouth daily x 3 days, then 2 tabs daily x 3 days, then  "1 tab daily x 3 days, then 1/2 tab daily x 2 weeks 1/31/22   Manuela Ortega MD   venlafaxine (EFFEXOR-XR) 150 MG 24 hr capsule Take 150 mg by mouth daily    Unknown, Entered By History       Scheduled Meds      Infusion Meds      PRN Meds      Allergies   Allergies   Allergen Reactions     Compazine [Prochlorperazine] Other (See Comments)     My head stretches back      Phenothiazines Other (See Comments)     Compazine. \"My tongue goes back\"     Family History   Family History   Problem Relation Age of Onset     Dementia Father      Depression Father      Depression Daughter      Social History   Social History     Tobacco Use     Smoking status: Never Smoker     Smokeless tobacco: Never Used   Substance Use Topics     Alcohol use: No     Drug use: No       Review of Systems   The 10 point Review of Systems is negative other than noted in the HPI or here.        PHYSICAL EXAMINATION  Temp:  [98.8  F (37.1  C)] 98.8  F (37.1  C)  Pulse:  [81-87] 81  Resp:  [12-16] 12  BP: (124-133)/() 133/101  SpO2:  [96 %-97 %] 96 %     General Exam  General:  patient lying in bed without any acute distress, bruises on face  HEENT:  bruises on face, swollen R eye   Pulmonary:  no respiratory distress    Neuro Exam  Mental Status:  alert, oriented x 3, follows commands, naming and repetition normal, speech clear but slowed   Cranial Nerves:  visual fields intact, PERRL, EOMI with normal smooth pursuit, facial sensation intact and symmetric, facial movements symmetric, hearing not formally tested but intact to conversation, no dysarthria, shoulder shrug strong bilaterally, tongue protrusion midline  Motor:  normal muscle tone and bulk, no abnormal movements, able to move all limbs spontaneously, no pronator drift  Reflexes:  toes down-going  Sensory:  light touch sensation intact and symmetric throughout upper and lower extremities, no extinction on double simultaneous stimulation   Coordination:  normal finger-to-nose " and heel-to-shin bilaterally without dysmetria  Station/Gait:  deferred    Dysphagia Screen  Per Nursing    Stroke Scales    NIHSS  Interval     Interval Comments stroke neuro exam 3/8/2022 (03/08/22 1501)   1a. Level of Consciousness 0-->Alert, keenly responsive   1b. LOC Questions 0-->Answers both questions correctly   1c. LOC Commands 0-->Performs both tasks correctly   2.   Best Gaze 0-->Normal   3.   Visual 0-->No visual loss   4.   Facial Palsy 0-->Normal symmetrical movements   5a. Motor Arm, Left 0-->No drift, limb holds 90 (or 45) degrees for full 10 secs   5b. Motor Arm, Right 0-->No drift, limb holds 90 (or 45) degrees for full 10 secs   6a. Motor Leg, Left 0-->No drift, leg holds 30 degree position for full 5 secs   6b. Motor Leg, right 0-->No drift, leg holds 30 degree position for full 5 secs   7.   Limb Ataxia 0-->Absent   8.   Sensory 0-->Normal, no sensory loss   9.   Best Language 0-->No aphasia, normal   10. Dysarthria 0-->Normal   11. Extinction and Inattention  0-->No abnormality   Total 0 (03/08/22 1501)       Modified [place] Score (Pre-morbid)  0-No deficits    Imaging  I personally reviewed all imaging; relevant findings per HPI.     Lab Results Data   CBC  Recent Labs   Lab 03/08/22  1353   WBC 4.7   RBC 4.57   HGB 13.4   HCT 41.3        Basic Metabolic Panel    Recent Labs   Lab 03/08/22  1353      POTASSIUM 4.1   CHLORIDE 108   CO2 28   BUN 22   CR 1.31*   *   MAME 8.9     Liver Panel  No results for input(s): PROTTOTAL, ALBUMIN, BILITOTAL, ALKPHOS, AST, ALT, BILIDIRECT in the last 168 hours.  INR    Recent Labs   Lab Test 03/08/22  1353   INR 1.09      Lipid Profile    Recent Labs   Lab Test 04/27/19  0737   CHOL 230*   HDL 42*   *   TRIG 103     A1C  No lab results found.  Troponin I  No results for input(s): TROPONIN, GHTROP in the last 168 hours.       Stroke Code Data Data   Stroke Code Data  (for stroke code without tele)  Stroke code activated 03/08/22    1408   First stroke provider response 03/08/22   1411   Last known normal 03/06/22    (unknown)   Time of discovery   (or onset of symptoms) 03/08/22    (unknown, patient does not remember and she lives alone)   Head CT read by Stroke Neuro Dr/Provider 03/08/22   1437 (waiting for CT perfusion)   Was stroke code de-escalated? Yes 03/08/22 1444  patient is outside emergent treatment time parameters

## 2022-03-08 NOTE — ED NOTES
"[facility]  ED Nurse Handoff Report    ED Chief complaint: Slurred Speech      ED Diagnosis:   Final diagnoses:   Altered mental status, unspecified altered mental status type   Fall, initial encounter       Code Status: Full Code    Allergies:   Allergies   Allergen Reactions     Compazine [Prochlorperazine] Other (See Comments)     My head stretches back      Phenothiazines Other (See Comments)     Compazine. \"My tongue goes back\"       Patient Story: Fall  Focused Assessment:  Pt fell yesterday around 1500 striking head/face on coffee table. Bruising/swelling to right eyelid, left forehead and top of right foot. Some slurred/muffled speech and reported confusion. She denies alcohol use. Unsure if loc. Did place call light later asking why she was here.     Treatments and/or interventions provided: CT/MRI/blood work  Patient's response to treatments and/or interventions: Good    To be done/followed up on inpatient unit:  Unknown    Does this patient have any cognitive concerns?: Initially alert and oriented x3, later had call light on asking why she was here.     Activity level - Baseline/Home:  Independent  Activity Level - Current:   Independent    Patient's Preferred language: English   Needed?: No    Isolation: None  Infection: Not Applicable  Patient tested for COVID 19 prior to admission: YES  Bariatric?: No    Vital Signs:   Vitals:    03/08/22 1341 03/08/22 1400   BP: (!) 124/90 (!) 133/101   Pulse: 87 81   Resp: 16 12   Temp: 98.8  F (37.1  C)    TempSrc: Oral    SpO2: 97% 96%   Weight: 68 kg (150 lb)    Height: 1.6 m (5' 3\")        Cardiac Rhythm:     Was the PSS-3 completed:   Yes  What interventions are required if any?               Family Comments: Not present  OBS brochure/video discussed/provided to patient/family: Yes              Name of person given brochure if not patient: NA              Relationship to patient: Na    For the majority of the shift this patient's " behavior was Green.   Behavioral interventions performed were NA.    ED NURSE PHONE NUMBER: [phone]

## 2022-03-08 NOTE — PLAN OF CARE
RECEIVING UNIT ED HANDOFF REVIEW    ED Nurse Handoff Report was reviewed by: Kristen Adame RN on March 8, 2022 at 4:41 PM

## 2022-03-09 VITALS
WEIGHT: 158.4 LBS | RESPIRATION RATE: 17 BRPM | TEMPERATURE: 98.5 F | OXYGEN SATURATION: 96 % | HEART RATE: 72 BPM | SYSTOLIC BLOOD PRESSURE: 146 MMHG | BODY MASS INDEX: 27.04 KG/M2 | HEIGHT: 64 IN | DIASTOLIC BLOOD PRESSURE: 70 MMHG

## 2022-03-09 PROCEDURE — G0378 HOSPITAL OBSERVATION PER HR: HCPCS

## 2022-03-09 PROCEDURE — 250N000013 HC RX MED GY IP 250 OP 250 PS 637: Performed by: INTERNAL MEDICINE

## 2022-03-09 PROCEDURE — 99217 PR OBSERVATION CARE DISCHARGE: CPT | Performed by: HOSPITALIST

## 2022-03-09 PROCEDURE — 96361 HYDRATE IV INFUSION ADD-ON: CPT

## 2022-03-09 RX ORDER — ARIPIPRAZOLE 10 MG/1
10 TABLET ORAL DAILY
Status: DISCONTINUED | OUTPATIENT
Start: 2022-03-09 | End: 2022-03-09 | Stop reason: HOSPADM

## 2022-03-09 RX ORDER — METOPROLOL SUCCINATE 50 MG/1
50 TABLET, EXTENDED RELEASE ORAL DAILY
Status: DISCONTINUED | OUTPATIENT
Start: 2022-03-09 | End: 2022-03-09 | Stop reason: HOSPADM

## 2022-03-09 RX ORDER — VENLAFAXINE HYDROCHLORIDE 150 MG/1
150 CAPSULE, EXTENDED RELEASE ORAL DAILY
Status: DISCONTINUED | OUTPATIENT
Start: 2022-03-09 | End: 2022-03-09 | Stop reason: HOSPADM

## 2022-03-09 RX ORDER — METOPROLOL SUCCINATE 50 MG/1
50 TABLET, EXTENDED RELEASE ORAL DAILY
Qty: 30 TABLET | Refills: 0 | Status: SHIPPED | OUTPATIENT
Start: 2022-03-09 | End: 2022-10-12

## 2022-03-09 RX ADMIN — METOPROLOL SUCCINATE 50 MG: 50 TABLET, EXTENDED RELEASE ORAL at 08:41

## 2022-03-09 NOTE — PROGRESS NOTES
Observation goals  PRIOR TO DISCHARGE       Comments:   -diagnostic tests and consults completed and resulted-Partially met   -vital signs normal or at patient baseline- Partially met

## 2022-03-09 NOTE — PROVIDER NOTIFICATION
MD Notification    Notified Person: MD    Notified Person Name: Dr. Greer    Notification Date/Time:    Notification Interaction:    Purpose of Notification:  Short stay room 23  Could you please review the drug screen result.  Thank you,  Eleanor DAMIAN   Orders Received:    Comments: Continue to monitor

## 2022-03-09 NOTE — PLAN OF CARE
Discharge instructions and education reviewed, medication schedule and follow up appts discussed. Pt had no questions. All belongings sent with pt.

## 2022-03-09 NOTE — PROVIDER NOTIFICATION
MD Notification    Notified Person: MD    Notified Person Name: Winter    Notification Date/Time:    Notification Interaction:    Purpose of Notification:  Short stay room 23  MRI is completed. Could you please start her PTA metoprolol.  Thank you  Eleanor DAMIAN.   Orders Received:    Comments:
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
## 2022-03-09 NOTE — PLAN OF CARE
Goal Outcome Evaluation:  Orientation/Cognitive: A&Ox4  Observation Goals (Met/ Not Met): Partially met  Mobility Level/Assist Equipment: Assist x1, GB/Walker with CAM boot on  Fall Risk (Y/N): Y  Behavior Concerns: none  Pain Management: Denies  Tele/VS/O2: Tele- SR, VSS on RA except HTN  ABNL Lab/BG: Urine positive for cocaine  Diet: Clears- ADAT  Bowel/Bladder: Continent  Skin Concerns: Right eye swollen and bruised, +1 RLE edema  Drains/Devices: CAM boot  Tests/Procedures for next shift: Ortho and neuro consult   Anticipated DC date: 3/9  Patient Stated Goal for Today: rest    Observation goals  PRIOR TO DISCHARGE       Comments:   -diagnostic tests and consults completed and resulted-Partially met   -vital signs normal or at patient baseline- Partially met

## 2022-03-09 NOTE — PROVIDER NOTIFICATION
MD Notification    Notified Person: MD    Notified Person Name:  Dr. Greer  Notification Date/Time:    Notification Interaction:    Purpose of Notification:  Short stay room 23  Could you please review the MRI result.  Thank you  Eleanor RN  Orders Received:    Comments:

## 2022-03-09 NOTE — PLAN OF CARE
Goals:  -diagnostic tests and consults completed and resulted- not met  -vital signs normal or at patient baseline- not met  Nurse to notify provider when observation goals have been met and patient is ready for discharge.    Orientation/Cognitive: A&Ox3, disoriented to situation. Drowsy  Observation Goals (Met/ Not Met): Not met  Mobility Level/Assist Equipment: Assist x1, walker and GB with CAM boot on  Fall Risk (Y/N): Y  Behavior Concerns: lethargic  Pain Management: reports 6/10 RLE pain, declines medication  Tele/VS/O2: Tele- SR w/occasional PVCs, BP elevated (168/84)  ABNL Lab/BG: Urine positive for cocaine  Diet: Clears- ADAT  Bowel/Bladder: Continent  Skin Concerns: Right eye swollen and bruised, scattered bruising on extremities, +1 RLE edema  Drains/Devices: CAM boot, PIV infusing  Tests/Procedures for next shift: Ortho consult, MRI (checklist faxed), neuro checks   Anticipated DC date: 3/9  Patient Stated Goal for Today: rest

## 2022-03-09 NOTE — PLAN OF CARE
Orientation/Cognitive: A&Ox4. Drowsy  Observation Goals (Met/ Not Met): Met  Mobility Level/Assist Equipment: SBA  Fall Risk (Y/N): Y  Behavior Concerns: none  Pain Management: denies pain  Tele/VS/O2: tele- SR w/occasional PVCs. VSS  ABNL Lab/BG: urine positive for cocaine  Diet: regular  Bowel/Bladder: continent  Skin Concerns: bruising right eye, scattered bruising on extemities  Drains/Devices: CAM boot  Tests/Procedures for next shift: none  Anticipated DC date: 3/9  Patient Stated Goal for Today: rest and eat

## 2022-03-09 NOTE — PLAN OF CARE
RN from [facility] called for update on pt. States she was called by the patient yesterday and was the one that called 911 for pt. Pt's psychiatrist, Dr. Roa, is requesting update from provider.   Dr. Roa: [phone]

## 2022-03-09 NOTE — PROVIDER NOTIFICATION
MD Notification    Notified Person: MD    Notified Person Name:     Notification Date/Time:    Notification Interaction:    Purpose of Notification:FYI  Short stay room 23  MRI is completed   Thank you  Dinil RN     Orders Received:    Comments:

## 2022-03-09 NOTE — PLAN OF CARE
Observation goals  PRIOR TO DISCHARGE       Comments:   -diagnostic tests and consults completed and resulted- Not met   -vital signs normal or at patient baseline- Met

## 2022-03-09 NOTE — CONSULTS
[facility]    Orthopedic Consultation    Daysi Ashley MRN# [id number]   Age: 61 year old YOB: 1960     Date of Admission: 3/8/2022    Reason for consult: Right 5th metatarsal head fracture        Requesting provider: Dr Greer       Level of consult: One-time consult to assist in determining a diagnosis, recommend an appropriate treatment plan and place orders           Assessment and Plan:   Assessment:   Right 5th metatarsal head fracture       Plan:   Non-operative management recommended   CAM boot to be applied right lower extremity when ambulating.  Ok to have off in bed  WBAT in CAM boot  Elevate leg when in bed  Follow-up with foot/ankle provider in 2 weeks for recheck.               Chief Complaint:   Right foot pain          History of Present Illness:   Daysi Ashley is a 61 year old female who presented to the ED with confusion and fall. Patient apparently had a fall on 2022 around 3 PM.  She is unclear about the circumstances that led to the fall but she feels that she tripped on and fell and hit the coffee table.  She also had a bruise on the left side of her forehead and right eyelid in the process.  Subsequent to that she felt that she was a little confused and not her usual self.  Yesterday, she presented to the ED.  Xrays revealed a right 5th metatarsal head fracture.  She had a screw placed in her proximal 5th MT in .            Past Medical History:     Past Medical History:   Diagnosis Date     311      Allergic rhinitis, cause unspecified      Arthritis      Depressive disorder      Migraine, unspecified, without mention of intractable migraine without mention of status migrainosus      Rapid weight loss 2013             Past Surgical History:     Past Surgical History:   Procedure Laterality Date     APPENDECTOMY        SECTION   &      FOOT SURGERY      right     HERNIORRHAPHY UMBILICAL  2000     ORTHOPEDIC SURGERY        "ZZC NONSPECIFIC PROCEDURE  12/00    Appendectomy             Social History:     Social History     Tobacco Use     Smoking status: Never Smoker     Smokeless tobacco: Never Used   Substance Use Topics     Alcohol use: No             Family History:     Family History   Problem Relation Age of Onset     Dementia Father      Depression Father      Depression Daughter              Immunizations:     VACCINE/DOSE   Diptheria   DPT   DTAP   HBIG   Hepatitis A   Hepatitis B   HIB   Influenza   Measles   Meningococcal   MMR   Mumps   Pneumococcal   Polio   Rubella   Small Pox   TDAP   Varicella   Zoster             Allergies:     Allergies   Allergen Reactions     Compazine [Prochlorperazine] Other (See Comments)     My head stretches back      Phenothiazines Other (See Comments)     Compazine. \"My tongue goes back\"             Medications:     Current Facility-Administered Medications   Medication     melatonin tablet 1 mg     metoprolol succinate ER (TOPROL-XL) 24 hr tablet 50 mg     ondansetron (ZOFRAN-ODT) ODT tab 4 mg    Or     ondansetron (ZOFRAN) injection 4 mg     sodium chloride (PF) 0.9% PF flush 10 mL     venlafaxine (EFFEXOR-XR) 24 hr capsule 150 mg             Review of Systems:   ROS:  10 point ROS neg other than the symptoms noted above in the HPI.            Physical Exam:   All vitals have been reviewed  Patient Vitals for the past 24 hrs:   BP Temp Temp src Pulse Resp SpO2 Height Weight   03/09/22 1109 (!) 146/70 98.5  F (36.9  C) Oral 72 17 96 % -- --   03/09/22 0726 (!) 153/70 98.5  F (36.9  C) Oral 81 16 96 % -- --   03/09/22 0354 (!) 155/69 98.4  F (36.9  C) Oral 62 18 94 % -- --   03/08/22 2330 (!) 163/70 98  F (36.7  C) Oral 60 18 95 % -- --   03/1960 (!) 152/95 -- -- -- -- -- -- --   03/08/22 1945 (!) 183/108 -- -- -- -- -- -- --   03/08/22 1944 (!) 178/94 98.3  F (36.8  C) Oral 81 18 94 % -- --   03/08/22 1717 (!) 168/84 98  F (36.7  C) Oral 85 12 95 % 1.626 m (5' 4\") 71.8 kg (158 lb 6.4 " "oz)   03/08/22 1630 (!) 149/79 -- -- 96 12 94 % -- --   03/08/22 1530 (!) 151/89 -- -- 82 14 99 % -- --   03/08/22 1430 (!) 188/87 -- -- 78 19 96 % -- --   03/08/22 1400 (!) 133/101 -- -- 81 12 96 % -- --   03/08/22 1341 (!) 124/90 98.8  F (37.1  C) Oral 87 16 97 % 1.6 m (5' 3\") 68 kg (150 lb)       Intake/Output Summary (Last 24 hours) at 3/9/2022 1117  Last data filed at 3/8/2022 1852  Gross per 24 hour   Intake 360 ml   Output 400 ml   Net -40 ml         Physical Exam   Temp: 98.5  F (36.9  C) Temp src: Oral BP: (!) 146/70 Pulse: 72   Resp: 17 SpO2: 96 % O2 Device: None (Room air)    Vital Signs with Ranges  Temp:  [98  F (36.7  C)-98.8  F (37.1  C)] 98.5  F (36.9  C)  Pulse:  [60-96] 72  Resp:  [12-19] 17  BP: (124-188)/() 146/70  SpO2:  [94 %-99 %] 96 %  158 lbs 6.4 oz    Constitutional: Alert, appropriate, following commands.  HEENT: Ecchymosis bilateral eyes.    Respiratory: Unlabored breathing no audible wheeze.   Cardiovascular: Regular rate and rhythm per pulses  GI: Abdomen non-distended.  Lymph/Hematologic: No lymphadenopathy in areas examined  Genitourinary:  No toribio  Skin: No rashes, no cyanosis, no edema.  Musculoskeletal:  On exam of right foot: CAM boot in place.  Removed for exam.  Swelling and mild ecchymosis along lateral foot.  Tender to palp along distal 5th MT.  Pulses and sensation intact.  Able to flex and extend toes and ankle.  Brisk cap refill.    Neurologic: normal without focal findings.             Data:   All laboratory data reviewed  Results for orders placed or performed during the hospital encounter of 03/08/22   CTA Head Neck with Contrast     Status: None    Narrative    CT ANGIOGRAM OF THE HEAD AND NECK WITH CONTRAST  CT HEAD PERFUSION WITH CONTRAST  3/8/2022 2:22 PM     HISTORY: Transient ischemic attack (TIA); Code stroke.    TECHNIQUE: CT angiography with an injection of 70 mL Isovue-370  (accession [id number]), 50 mL Isovue-370 (accession [id number]) IV with  scans " through the head and neck. Images were transferred to a separate  3-D workstation where multiplanar reformations and 3-D images were  created. Estimates of carotid stenoses are made relative to the distal  internal carotid artery diameters except as noted. Radiation dose for  this scan was reduced using automated exposure control, adjustment of  the mA and/or kV according to patient size, or iterative  reconstruction technique.     Perfusion scans were performed with injection of additional IV  contrast. These images were processed on a separate 3-D workstation.     COMPARISON: None.     CT HEAD FINDINGS: No contrast enhancing lesions. CT perfusion images  of the head are unremarkable.     CT ANGIOGRAM HEAD FINDINGS:  The major intracranial arteries including  the proximal branches of the anterior cerebral, middle cerebral, and  posterior cerebral arteries appear patent without vascular cutoff. No  aneurysm identified. No significant stenosis. Venous circulation is  unremarkable.     The P1 segment of the left posterior cerebral artery is not visualized  and is likely hypoplastic or congenitally absent. The left posterior  cerebral artery is supplied by the patent left posterior communicating  artery.    CT ANGIOGRAM NECK FINDINGS:   Normal origin of the great vessels from the aortic arch.     Right carotid artery: The right common and internal carotid arteries  are patent. Mild atherosclerotic disease at the carotid bifurcation  and proximal internal carotid artery without stenosis.     Left carotid artery: The left common and internal carotid arteries are  patent. Mild atherosclerotic disease at the carotid bifurcation and  proximal internal carotid artery without stenosis.     Vertebral arteries: Vertebral arteries are patent without evidence of  dissection. No significant stenosis.     Other findings: Multilevel degenerative changes in the spine,  particularly with facet hypertrophy at multiple levels.       Impression    IMPRESSION:   1. Patent arteries in the neck without evidence of dissection. Mild  atherosclerotic disease in the carotid arteries bilaterally without  significant stenosis by NASCET criteria.  2. Patent proximal major intracranial arteries without vascular  cutoff. No significant stenosis. No aneurysm identified.  3. Unremarkable CT perfusion images of the head.    Results discussed with Aidee Merino at 2:35 PM on 3/8/2022.     ANDREW HYDE MD         SYSTEM ID:  [id number]   CT Head Perfusion w Contrast     Status: None    Narrative    CT ANGIOGRAM OF THE HEAD AND NECK WITH CONTRAST  CT HEAD PERFUSION WITH CONTRAST  3/8/2022 2:22 PM     HISTORY: Transient ischemic attack (TIA); Code stroke.    TECHNIQUE: CT angiography with an injection of 70 mL Isovue-370  (accession [id number]), 50 mL Isovue-370 (accession [id number]) IV with  scans through the head and neck. Images were transferred to a separate  3-D workstation where multiplanar reformations and 3-D images were  created. Estimates of carotid stenoses are made relative to the distal  internal carotid artery diameters except as noted. Radiation dose for  this scan was reduced using automated exposure control, adjustment of  the mA and/or kV according to patient size, or iterative  reconstruction technique.     Perfusion scans were performed with injection of additional IV  contrast. These images were processed on a separate 3-D workstation.     COMPARISON: None.     CT HEAD FINDINGS: No contrast enhancing lesions. CT perfusion images  of the head are unremarkable.     CT ANGIOGRAM HEAD FINDINGS:  The major intracranial arteries including  the proximal branches of the anterior cerebral, middle cerebral, and  posterior cerebral arteries appear patent without vascular cutoff. No  aneurysm identified. No significant stenosis. Venous circulation is  unremarkable.     The P1 segment of the left posterior cerebral artery is not visualized  and is likely  hypoplastic or congenitally absent. The left posterior  cerebral artery is supplied by the patent left posterior communicating  artery.    CT ANGIOGRAM NECK FINDINGS:   Normal origin of the great vessels from the aortic arch.     Right carotid artery: The right common and internal carotid arteries  are patent. Mild atherosclerotic disease at the carotid bifurcation  and proximal internal carotid artery without stenosis.     Left carotid artery: The left common and internal carotid arteries are  patent. Mild atherosclerotic disease at the carotid bifurcation and  proximal internal carotid artery without stenosis.     Vertebral arteries: Vertebral arteries are patent without evidence of  dissection. No significant stenosis.     Other findings: Multilevel degenerative changes in the spine,  particularly with facet hypertrophy at multiple levels.      Impression    IMPRESSION:   1. Patent arteries in the neck without evidence of dissection. Mild  atherosclerotic disease in the carotid arteries bilaterally without  significant stenosis by NASCET criteria.  2. Patent proximal major intracranial arteries without vascular  cutoff. No significant stenosis. No aneurysm identified.  3. Unremarkable CT perfusion images of the head.    Results discussed with Aidee Merino at 2:35 PM on 3/8/2022.     ANDREW HYDE MD         SYSTEM ID:  [id number]   CT Head w/o Contrast     Status: None    Narrative    CT SCAN OF THE HEAD WITHOUT CONTRAST   3/8/2022 2:20 PM     HISTORY: Transient ischemic attack (TIA). Code Stroke. Right-sided  weakness.    TECHNIQUE:  Axial images of the head and coronal reformations without  IV contrast material. Radiation dose for this scan was reduced using  automated exposure control, adjustment of the mA and/or kV according  to patient size, or iterative reconstruction technique.    COMPARISON: None.    FINDINGS: There is no evidence of intracranial hemorrhage, mass, acute  infarct or anomaly. The  ventricles are normal in size, shape and  configuration. The brain parenchyma and subarachnoid spaces are  normal.     The visualized portions of the sinuses and mastoids appear normal.    Left frontal scalp soft tissue swelling. No underlying calvarial  fracture appreciated.      Impression    IMPRESSION:     1. No evidence of acute intracranial hemorrhage, mass, or herniation.  2. Left frontal scalp soft tissue swelling. No underlying calvarial  fracture.    ANDREW HYDE MD         SYSTEM ID:  [id number]   Foot  XR, G/E 3 views, right     Status: None    Narrative    XR FOOT RIGHT G/E 3 VIEWS 3/8/2022 4:14 PM    HISTORY: pain swelling    COMPARISON: None.    FINDINGS: Acute mildly displaced fracture of the neck of the fifth  metatarsal. Cannulated screw fixation of an old healed fracture in the  proximal portion of the fifth metatarsal shaft. Small accessory  navicular. Mild degenerative changes at the first TMT joint.    KAYLEE MAYO MD         SYSTEM ID:  [id number]   MR Brain w/o & w Contrast     Status: None    Narrative    EXAM: MR BRAIN W/O and W CONTRAST  LOCATION: [facility]  DATE/TIME: 3/8/2022 8:29 PM    INDICATION: Altered mental status. Status post fall.  COMPARISON: None.  CONTRAST: 7mL Gadavist  TECHNIQUE: Routine multiplanar multisequence head MRI without and with intravenous contrast.    FINDINGS:  INTRACRANIAL CONTENTS: No acute or subacute infarct. No mass, acute hemorrhage, or extra-axial fluid collections. Scattered nonspecific T2/FLAIR hyperintensities within the cerebral white matter most consistent with mild chronic microvascular ischemic   change. Normal ventricles and sulci. Normal position of the cerebellar tonsils. No pathologic contrast enhancement.    SELLA: No abnormality accounting for technique.    OSSEOUS STRUCTURES/SOFT TISSUES: Normal marrow signal. The major intracranial vascular flow voids are maintained. Moderate left frontal scalp  hematoma.    ORBITS: No abnormality accounting for technique.     SINUSES/MASTOIDS: No paranasal sinus mucosal disease. No middle ear or mastoid effusion.       Impression    IMPRESSION:  1.  Normal intracranial contents.  2.  No mass, hemorrhage or stroke.  3.  Moderate left frontal scalp hematoma correlating with the history of a fall.   XR Chest 2 Views     Status: None    Narrative    XR CHEST 2 VW 3/8/2022 4:12 PM    HISTORY: fall    COMPARISON: None.      Impression    IMPRESSION: No focal airspace opacities, pleural effusion or  pneumothorax. The cardiac and mediastinal silhouettes are normal. Old  healed left clavicle fracture deformity. No displaced rib fractures.    MEGAN VALLE MD         SYSTEM ID:  [id number]   Basic metabolic panel     Status: Abnormal   Result Value Ref Range    Sodium 138 133 - 144 mmol/L    Potassium 4.1 3.4 - 5.3 mmol/L    Chloride 108 94 - 109 mmol/L    Carbon Dioxide (CO2) 28 20 - 32 mmol/L    Anion Gap 2 (L) 3 - 14 mmol/L    Urea Nitrogen 22 7 - 30 mg/dL    Creatinine 1.31 (H) 0.52 - 1.04 mg/dL    Calcium 8.9 8.5 - 10.1 mg/dL    Glucose 130 (H) 70 - 99 mg/dL    GFR Estimate 46 (L) >60 mL/min/1.73m2   INR     Status: Normal   Result Value Ref Range    INR 1.09 0.85 - 1.15   Partial thromboplastin time     Status: Normal   Result Value Ref Range    aPTT 33 22 - 38 Seconds   Troponin I     Status: Normal   Result Value Ref Range    Troponin I High Sensitivity 16 <54 ng/L   CBC with platelets and differential     Status: None   Result Value Ref Range    WBC Count 4.7 4.0 - 11.0 10e3/uL    RBC Count 4.57 3.80 - 5.20 10e6/uL    Hemoglobin 13.4 11.7 - 15.7 g/dL    Hematocrit 41.3 35.0 - 47.0 %    MCV 90 78 - 100 fL    MCH 29.3 26.5 - 33.0 pg    MCHC 32.4 31.5 - 36.5 g/dL    RDW 13.8 10.0 - 15.0 %    Platelet Count 278 150 - 450 10e3/uL    % Neutrophils 60 %    % Lymphocytes 28 %    % Monocytes 10 %    % Eosinophils 1 %    % Basophils 0 %    % Immature Granulocytes 1 %    NRBCs per 100  WBC 0 <1 /100    Absolute Neutrophils 2.8 1.6 - 8.3 10e3/uL    Absolute Lymphocytes 1.3 0.8 - 5.3 10e3/uL    Absolute Monocytes 0.5 0.0 - 1.3 10e3/uL    Absolute Eosinophils 0.1 0.0 - 0.7 10e3/uL    Absolute Basophils 0.0 0.0 - 0.2 10e3/uL    Absolute Immature Granulocytes 0.0 <=0.4 10e3/uL    Absolute NRBCs 0.0 10e3/uL   UA with Microscopic reflex to Culture     Status: Abnormal    Specimen: Urine, Clean Catch   Result Value Ref Range    Color Urine Light Yellow Colorless, Straw, Light Yellow, Yellow    Appearance Urine Clear Clear    Glucose Urine Negative Negative mg/dL    Bilirubin Urine Negative Negative    Ketones Urine Negative Negative mg/dL    Specific Gravity Urine 1.005 1.003 - 1.035    Blood Urine Negative Negative    pH Urine 7.5 (H) 5.0 - 7.0    Protein Albumin Urine 20  (A) Negative mg/dL    Urobilinogen Urine Normal Normal, 2.0 mg/dL    Nitrite Urine Negative Negative    Leukocyte Esterase Urine Negative Negative    RBC Urine 0 <=2 /HPF    WBC Urine 1 <=5 /HPF    Squamous Epithelials Urine <1 <=1 /HPF    Narrative    Urine Culture not indicated   Hepatic panel     Status: Abnormal   Result Value Ref Range    Bilirubin Total 0.3 0.2 - 1.3 mg/dL    Bilirubin Direct <0.1 0.0 - 0.2 mg/dL    Protein Total 6.0 (L) 6.8 - 8.8 g/dL    Albumin 3.3 (L) 3.4 - 5.0 g/dL    Alkaline Phosphatase 80 40 - 150 U/L    AST 10 0 - 45 U/L    ALT 17 0 - 50 U/L   Ammonia     Status: Normal   Result Value Ref Range    Ammonia 15 10 - 50 umol/L   Asymptomatic COVID-19 Virus (Coronavirus) by PCR Nasopharyngeal     Status: Normal    Specimen: Nasopharyngeal; Swab   Result Value Ref Range    SARS CoV2 PCR Negative Negative    Narrative    Testing was performed using the alley  SARS-CoV-2 & Influenza A/B Assay on the alley  [employer]  System.  This test should be ordered for the detection of SARS-COV-2 in individuals who meet SARS-CoV-2 clinical and/or epidemiological criteria. Test performance is unknown in asymptomatic patients.   This test is for in vitro diagnostic use under the FDA EUA for laboratories certified under CLIA to perform moderate and/or high complexity testing. This test has not been FDA cleared or approved.  A negative test does not rule out the presence of PCR inhibitors in the specimen or target RNA in concentration below the limit of detection for the assay. The possibility of a false negative should be considered if the patient's recent exposure or clinical presentation suggests COVID-19.  [facility] are certified under the Clinical Laboratory Improvement Amendments of 1988 (CLIA-88) as qualified to perform moderate and/or high complexity laboratory testing.   Drug abuse screen 77 urine (FL, RH, SH)     Status: Abnormal   Result Value Ref Range    Amphetamines Urine Screen Negative Screen Negative    Barbiturates Urine Screen Negative Screen Negative    Benzodiazepines Urine Screen Negative Screen Negative    Cannabinoids Urine Screen Negative Screen Negative    Cocaine Urine Screen Positive (A) Screen Negative    Opiates Urine Screen Negative Screen Negative    PCP Urine Screen Negative Screen Negative   EKG 12-lead, tracing only     Status: None   Result Value Ref Range    Systolic Blood Pressure  mmHg    Diastolic Blood Pressure  mmHg    Ventricular Rate 81 BPM    Atrial Rate 81 BPM    MO Interval 136 ms    QRS Duration 86 ms     ms    QTc 471 ms    P Axis 59 degrees    R AXIS 21 degrees    T Axis 78 degrees    Interpretation ECG       Sinus rhythm  Septal infarct , age undetermined  Abnormal ECG  When compared with ECG of 16-JAN-2022 09:56,  No significant change was found  Confirmed by GENERATED REPORT, COMPUTER ([id number]),  Aasen, Bradley ([id number]) on 3/8/2022 2:47:13 PM     CBC with Platelets & Differential     Status: None    Narrative    The following orders were created for panel order CBC with Platelets & Differential.  Procedure                               Abnormality         Status                      ---------                               -----------         ------                     CBC with platelets and d...[[id number]]                      Final result                 Please view results for these tests on the individual orders.   Urine Drugs of Abuse Screen     Status: Abnormal    Narrative    The following orders were created for panel order Urine Drugs of Abuse Screen.  Procedure                               Abnormality         Status                     ---------                               -----------         ------                     Drug abuse screen 77 uri...[[id number]]  Abnormal            Final result                 Please view results for these tests on the individual orders.          Attestation:  I have reviewed today's vital signs, notes, medications, labs and imaging with Dr. Giang.  Amount of time performed on this consult: 30 minutes.    Whit Pompa PA-C

## 2022-03-11 ENCOUNTER — PATIENT OUTREACH (OUTPATIENT)
Dept: CARE COORDINATION | Facility: CLINIC | Age: 62
End: 2022-03-11
Payer: COMMERCIAL

## 2022-03-11 NOTE — PROGRESS NOTES
Clinic Care Coordination Contact  [facility]    Referral Source: PCP  Clinical Data: Care Coordinator Outreach  Outreach attempted x 3.   Plan:Writer will send unable to contact letter via mail. Writer will do no further outreaches at this time.     .  Terri Montgomery, RN Care Coordinator     [facility] Ambulatory Care Management  [facility] and   [email or web address]    Office: [phone]

## 2022-03-11 NOTE — TELEPHONE ENCOUNTER
Reviewed by Dr. Yap, pt is ok to be seen by Dr. Hawkins, in person would be better, but there are no in person appts.    iLsa Nails RN  Rheumatology Clinic

## 2022-03-11 NOTE — LETTER
[facility] CARE COORDINATION  [address] [address]  April 5, 2022    Daysi Ashley  [address] [address]      Dear Daysi,    I have been unsuccessful in reaching you since our last contact. At this time the Care Coordination team will make no further attempts to reach you, however this does not change your ability to continue receiving care from your providers at your primary care clinic. If you need additional support from a care coordinator in the future please contact Terri at [phone].    All of us at [facility] are invested in your health and are here to assist you in meeting your goals.     Sincerely,    Terri Montgomery, RN Care Coordinator     [facility] Ambulatory Care Management  [facility] and   [email or web address]    Office: [phone]
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
## 2022-03-16 ENCOUNTER — PRE VISIT (OUTPATIENT)
Dept: NEPHROLOGY | Facility: CLINIC | Age: 62
End: 2022-03-16
Payer: COMMERCIAL

## 2022-03-16 ENCOUNTER — VIRTUAL VISIT (OUTPATIENT)
Dept: NEPHROLOGY | Facility: CLINIC | Age: 62
End: 2022-03-16
Attending: FAMILY MEDICINE
Payer: COMMERCIAL

## 2022-03-16 DIAGNOSIS — R53.83 OTHER FATIGUE: ICD-10-CM

## 2022-03-16 DIAGNOSIS — F14.10 COCAINE USE DISORDER (H): ICD-10-CM

## 2022-03-16 DIAGNOSIS — R04.0 EPISTAXIS: ICD-10-CM

## 2022-03-16 DIAGNOSIS — N18.31 STAGE 3A CHRONIC KIDNEY DISEASE (H): ICD-10-CM

## 2022-03-16 DIAGNOSIS — R76.8 P-ANCA TITER POSITIVE: Primary | ICD-10-CM

## 2022-03-16 DIAGNOSIS — F33.2 SEVERE RECURRENT MAJOR DEPRESSION WITHOUT PSYCHOTIC FEATURES (H): ICD-10-CM

## 2022-03-16 PROCEDURE — G0463 HOSPITAL OUTPT CLINIC VISIT: HCPCS | Mod: PN,RTG | Performed by: STUDENT IN AN ORGANIZED HEALTH CARE EDUCATION/TRAINING PROGRAM

## 2022-03-16 PROCEDURE — 99205 OFFICE O/P NEW HI 60 MIN: CPT | Mod: 95 | Performed by: STUDENT IN AN ORGANIZED HEALTH CARE EDUCATION/TRAINING PROGRAM
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
## 2022-03-17 ENCOUNTER — LAB (OUTPATIENT)
Dept: LAB | Facility: CLINIC | Age: 62
End: 2022-03-17
Payer: COMMERCIAL

## 2022-03-17 DIAGNOSIS — R76.8 P-ANCA TITER POSITIVE: ICD-10-CM

## 2022-03-17 DIAGNOSIS — N18.31 STAGE 3A CHRONIC KIDNEY DISEASE (H): ICD-10-CM

## 2022-03-17 DIAGNOSIS — J34.89 NASAL SEPTAL PERFORATION: ICD-10-CM

## 2022-03-17 LAB
ALBUMIN UR-MCNC: 30 MG/DL
ALBUMIN UR-MCNC: 30 MG/DL
APPEARANCE UR: CLEAR
APPEARANCE UR: CLEAR
BACTERIA #/AREA URNS HPF: ABNORMAL /HPF
BASOPHILS # BLD AUTO: 0 10E3/UL (ref 0–0.2)
BASOPHILS NFR BLD AUTO: 1 %
BILIRUB UR QL STRIP: NEGATIVE
BILIRUB UR QL STRIP: NEGATIVE
COLOR UR AUTO: YELLOW
COLOR UR AUTO: YELLOW
CREAT UR-MCNC: 126 MG/DL
EOSINOPHIL # BLD AUTO: 0.1 10E3/UL (ref 0–0.7)
EOSINOPHIL NFR BLD AUTO: 2 %
ERYTHROCYTE [DISTWIDTH] IN BLOOD BY AUTOMATED COUNT: 14.2 % (ref 10–15)
GLUCOSE UR STRIP-MCNC: NEGATIVE MG/DL
GLUCOSE UR STRIP-MCNC: NEGATIVE MG/DL
HCT VFR BLD AUTO: 40.6 % (ref 35–47)
HGB BLD-MCNC: 13.7 G/DL (ref 11.7–15.7)
HGB UR QL STRIP: NEGATIVE
HGB UR QL STRIP: NEGATIVE
KETONES UR STRIP-MCNC: NEGATIVE MG/DL
KETONES UR STRIP-MCNC: NEGATIVE MG/DL
LEUKOCYTE ESTERASE UR QL STRIP: NEGATIVE
LEUKOCYTE ESTERASE UR QL STRIP: NEGATIVE
LYMPHOCYTES # BLD AUTO: 1.5 10E3/UL (ref 0.8–5.3)
LYMPHOCYTES NFR BLD AUTO: 24 %
MCH RBC QN AUTO: 30.6 PG (ref 26.5–33)
MCHC RBC AUTO-ENTMCNC: 33.7 G/DL (ref 31.5–36.5)
MCV RBC AUTO: 91 FL (ref 78–100)
MONOCYTES # BLD AUTO: 0.4 10E3/UL (ref 0–1.3)
MONOCYTES NFR BLD AUTO: 7 %
NEUTROPHILS # BLD AUTO: 4.4 10E3/UL (ref 1.6–8.3)
NEUTROPHILS NFR BLD AUTO: 68 %
NITRATE UR QL: NEGATIVE
NITRATE UR QL: NEGATIVE
PH UR STRIP: 6 [PH] (ref 5–7)
PH UR STRIP: 6 [PH] (ref 5–7)
PLATELET # BLD AUTO: 337 10E3/UL (ref 150–450)
PROT UR-MCNC: 0.46 G/L
PROT/CREAT 24H UR: 0.37 G/G CR (ref 0–0.2)
RBC # BLD AUTO: 4.47 10E6/UL (ref 3.8–5.2)
RBC #/AREA URNS AUTO: ABNORMAL /HPF
SP GR UR STRIP: >=1.03 (ref 1–1.03)
SP GR UR STRIP: >=1.03 (ref 1–1.03)
SQUAMOUS #/AREA URNS AUTO: ABNORMAL /LPF
UROBILINOGEN UR STRIP-ACNC: 0.2 E.U./DL
UROBILINOGEN UR STRIP-ACNC: 0.2 E.U./DL
WBC # BLD AUTO: 6.4 10E3/UL (ref 4–11)
WBC #/AREA URNS AUTO: ABNORMAL /HPF

## 2022-03-17 PROCEDURE — 84156 ASSAY OF PROTEIN URINE: CPT

## 2022-03-17 PROCEDURE — 86256 FLUORESCENT ANTIBODY TITER: CPT

## 2022-03-17 PROCEDURE — 86481 TB AG RESPONSE T-CELL SUSP: CPT

## 2022-03-17 PROCEDURE — 87340 HEPATITIS B SURFACE AG IA: CPT

## 2022-03-17 PROCEDURE — 83876 ASSAY MYELOPEROXIDASE: CPT

## 2022-03-17 PROCEDURE — 80048 BASIC METABOLIC PNL TOTAL CA: CPT

## 2022-03-17 PROCEDURE — 86706 HEP B SURFACE ANTIBODY: CPT

## 2022-03-17 PROCEDURE — 86036 ANCA SCREEN EACH ANTIBODY: CPT

## 2022-03-17 PROCEDURE — 86704 HEP B CORE ANTIBODY TOTAL: CPT

## 2022-03-17 PROCEDURE — 86803 HEPATITIS C AB TEST: CPT

## 2022-03-17 PROCEDURE — 81001 URINALYSIS AUTO W/SCOPE: CPT

## 2022-03-17 PROCEDURE — 81003 URINALYSIS AUTO W/O SCOPE: CPT | Mod: 59

## 2022-03-17 PROCEDURE — 36415 COLL VENOUS BLD VENIPUNCTURE: CPT

## 2022-03-17 PROCEDURE — 86038 ANTINUCLEAR ANTIBODIES: CPT

## 2022-03-17 PROCEDURE — 85025 COMPLETE CBC W/AUTO DIFF WBC: CPT

## 2022-03-17 PROCEDURE — 86780 TREPONEMA PALLIDUM: CPT

## 2022-03-18 LAB
ANA SER QL IF: NEGATIVE
HBV CORE AB SERPL QL IA: NONREACTIVE
HBV SURFACE AB SERPL IA-ACNC: 39.24 M[IU]/ML
HBV SURFACE AG SERPL QL IA: NONREACTIVE
HCV AB SERPL QL IA: NONREACTIVE
MYELOPEROXIDASE AB SER IA-ACNC: 8.5 U/ML
MYELOPEROXIDASE AB SER IA-ACNC: POSITIVE
T PALLIDUM AB SER QL: NONREACTIVE

## 2022-03-19 LAB
ANCA AB PATTERN SER IF-IMP: ABNORMAL
C-ANCA TITR SER IF: ABNORMAL {TITER}
GAMMA INTERFERON BACKGROUND BLD IA-ACNC: 0.06 IU/ML
M TB IFN-G BLD-IMP: NEGATIVE
M TB IFN-G CD4+ BCKGRND COR BLD-ACNC: 9.94 IU/ML
MITOGEN IGNF BCKGRD COR BLD-ACNC: 0.01 IU/ML
MITOGEN IGNF BCKGRD COR BLD-ACNC: 0.03 IU/ML
QUANTIFERON MITOGEN: 10 IU/ML
QUANTIFERON NIL TUBE: 0.06 IU/ML
QUANTIFERON TB1 TUBE: 0.09 IU/ML
QUANTIFERON TB2 TUBE: 0.07

## 2022-03-20 LAB
ANION GAP SERPL CALCULATED.3IONS-SCNC: 10 MMOL/L (ref 3–14)
BUN SERPL-MCNC: 32 MG/DL (ref 7–30)
CALCIUM SERPL-MCNC: 8.6 MG/DL (ref 8.5–10.1)
CHLORIDE BLD-SCNC: 110 MMOL/L (ref 94–109)
CO2 SERPL-SCNC: 23 MMOL/L (ref 20–32)
CREAT SERPL-MCNC: 1.27 MG/DL (ref 0.52–1.04)
GFR SERPL CREATININE-BSD FRML MDRD: 47 ML/MIN/1.73M2
GLUCOSE BLD-MCNC: 100 MG/DL (ref 70–99)
POTASSIUM BLD-SCNC: 4.7 MMOL/L (ref 3.4–5.3)
SODIUM SERPL-SCNC: 143 MMOL/L (ref 133–144)

## 2022-03-21 ENCOUNTER — TELEPHONE (OUTPATIENT)
Dept: RHEUMATOLOGY | Facility: CLINIC | Age: 62
End: 2022-03-21
Payer: COMMERCIAL

## 2022-03-22 NOTE — TELEPHONE ENCOUNTER
Left message requesting return call to discuss return appt with Dr. Dhaliwal, for Thursday 3/24 at 11:30 am in person.    Lisa Nails RN  Rheumatology Clinic

## 2022-03-23 ENCOUNTER — PRE VISIT (OUTPATIENT)
Dept: PULMONOLOGY | Facility: CLINIC | Age: 62
End: 2022-03-23

## 2022-03-23 ENCOUNTER — OFFICE VISIT (OUTPATIENT)
Dept: PULMONOLOGY | Facility: CLINIC | Age: 62
End: 2022-03-23
Attending: STUDENT IN AN ORGANIZED HEALTH CARE EDUCATION/TRAINING PROGRAM
Payer: COMMERCIAL

## 2022-03-23 ENCOUNTER — ANCILLARY PROCEDURE (OUTPATIENT)
Dept: GENERAL RADIOLOGY | Facility: CLINIC | Age: 62
End: 2022-03-23
Attending: FAMILY MEDICINE
Payer: COMMERCIAL

## 2022-03-23 VITALS
HEART RATE: 74 BPM | WEIGHT: 157 LBS | SYSTOLIC BLOOD PRESSURE: 177 MMHG | RESPIRATION RATE: 17 BRPM | BODY MASS INDEX: 26.8 KG/M2 | DIASTOLIC BLOOD PRESSURE: 88 MMHG | OXYGEN SATURATION: 100 % | HEIGHT: 64 IN

## 2022-03-23 DIAGNOSIS — I77.6 VASCULITIS (H): Primary | ICD-10-CM

## 2022-03-23 DIAGNOSIS — I78.0 HEREDITARY EPISTAXIS (H): ICD-10-CM

## 2022-03-23 DIAGNOSIS — R76.8 P-ANCA TITER POSITIVE: ICD-10-CM

## 2022-03-23 PROCEDURE — 94727 GAS DIL/WSHOT DETER LNG VOL: CPT | Performed by: INTERNAL MEDICINE

## 2022-03-23 PROCEDURE — 94375 RESPIRATORY FLOW VOLUME LOOP: CPT | Performed by: INTERNAL MEDICINE

## 2022-03-23 PROCEDURE — 71046 X-RAY EXAM CHEST 2 VIEWS: CPT | Mod: GC | Performed by: RADIOLOGY

## 2022-03-23 PROCEDURE — G0463 HOSPITAL OUTPT CLINIC VISIT: HCPCS | Mod: 25

## 2022-03-23 PROCEDURE — 99204 OFFICE O/P NEW MOD 45 MIN: CPT | Mod: 25 | Performed by: STUDENT IN AN ORGANIZED HEALTH CARE EDUCATION/TRAINING PROGRAM

## 2022-03-23 PROCEDURE — 94729 DIFFUSING CAPACITY: CPT | Performed by: INTERNAL MEDICINE

## 2022-03-23 ASSESSMENT — PAIN SCALES - GENERAL: PAINLEVEL: NO PAIN (0)

## 2022-03-23 NOTE — PROGRESS NOTES
[facility]   Pulmonary   Clinic Consult Note    Reason for Visit  Consult (New visit)          Assessment and plan:    Cocaine dependence  History of epistaxis  History of positive p-ANCA  ANCA associated vasculitis    Patient with normal pulmonary assessment including complete PFTs and clear chest x-ray.  Besides epistaxis history and rare episodes of dark-tinged sputum which she thinks is due to her epistaxis, no pulmonary symptoms.  We are hopeful that patient will be able to achieve abstinence from cocaine especially with the support of her daughter and upcoming plan for rehab.  Chest x-ray is clear but there may be more subtle changes that would be seen on CT.  We would like to obtain cross-sectional imaging in the future once she is abstinent for a number of months and plan for pulmonary follow-up at that time  -No evidence of pulmonary involvement of her presumed cocaine related vasculitis  -CT chest in 6 months ideally she will be no longer using inhaled cocaine at a time  -Appreciate and agree with further consultation with vasculitis providers including rheumatology and nephrology    RTC 6 months with CT chest      Patient seen and or discussed with Dr Montero who agrees with assessment and plan detailed above        ==============================================================  HPI  Daysi Ashley is a 61 year old year old female who is being seen for Consult (New visit)    Past medical history of extensive use of smoked cocaine, depression.  Recent relevant history including an episode at [facility] ED December 2021 for throat swelling.  Additionally really noted in January for similar symptoms which include an autoimmune testing that had included a positive ANCA.     She denies any respiratory symptoms besides occasional dark-tinged sputum that she thinks is related to her epistaxis episodes.  Denies wheeze or history of asthma.  Minimal smoking history.  No additional respiratory exposures  "besides cocaine.  Does not vape.  Currently not working and lives independently      Current Outpatient Medications   Medication     ARIPiprazole (ABILIFY) 10 MG tablet     metoprolol succinate ER (TOPROL-XL) 50 MG 24 hr tablet     venlafaxine (EFFEXOR-XR) 150 MG 24 hr capsule     No current facility-administered medications for this visit.     Past Medical History:   Diagnosis Date     311      Allergic rhinitis, cause unspecified      Arthritis      Depressive disorder      Migraine, unspecified, without mention of intractable migraine without mention of status migrainosus      Rapid weight loss 2013     Allergies   Allergen Reactions     Compazine [Prochlorperazine] Other (See Comments)     My head stretches back      Phenothiazines Other (See Comments)     Compazine. \"My tongue goes back\"     Past Surgical History:   Procedure Laterality Date     APPENDECTOMY        SECTION   &      FOOT SURGERY      right     HERNIORRHAPHY UMBILICAL  2000     ORTHOPEDIC SURGERY       ZZC NONSPECIFIC PROCEDURE      Appendectomy     Social History     Socioeconomic History     Marital status:      Spouse name: Not on file     Number of children: Not on file     Years of education: Not on file     Highest education level: Not on file   Occupational History     Not on file   Tobacco Use     Smoking status: Never Smoker     Smokeless tobacco: Never Used   Substance and Sexual Activity     Alcohol use: No     Drug use: No     Sexual activity: Yes     Partners: Male   Other Topics Concern     Parent/sibling w/ CABG, MI or angioplasty before 65F 55M? Not Asked   Social History Narrative     Not on file     Social Determinants of Health     Financial Resource Strain: Not on file   Food Insecurity: Not on file   Transportation Needs: Not on file   Physical Activity: Not on file   Stress: Not on file   Social Connections: Not on file   Intimate Partner Violence: Not on file   Housing Stability: Not on " "file       Family History   Problem Relation Age of Onset     Dementia Father      Depression Father      Depression Daughter        ROS Pulmonary    A complete ROS was otherwise negative except as noted in the HPI.      Vitals:    03/23/22 1325   BP: (!) 177/88   Pulse: 74   Resp: 17   SpO2: 100%   Weight: 71.2 kg (157 lb)   Height: 1.626 m (5' 4\")     Exam:   GENERAL APPEARANCE: Well developed, well nourished, alert, and in no apparent distress.    MOUTH: Oral mucosa is moist, without any lesions  NECK: supple, no masses, no thyromegaly.  RESP: good  air flow throughout, - no crackles, rhonchi or wheezes.   CV: Normal S1, S2, regular rhythm, normal rate, no LE edema.   ABDOMEN:  Bowel sounds normal  MS: extremities normal-  no clubbing, no cyanosis.  SKIN: no rash on limited exam  NEURO: Mentation intact, speech normal,  PSYCH: mentation appears normal.     Results: I have reviewed all imaging, PFTs and other relavent tests, please see below for details, PFT and imaging results were reviewed with the patient.    CBC   Recent Labs   Lab Test 03/17/22  1443 03/08/22  1353   RBC 4.47 4.57   HGB 13.7 13.4   HCT 40.6 41.3    278       Basic Metabolic Panel  Recent Labs   Lab Test 03/17/22  1443 03/08/22  1353    138   POTASSIUM 4.7 4.1   CHLORIDE 110* 108   CO2 23 28   BUN 32* 22   * 130*   MAME 8.6 8.9       INR  Recent Labs   Lab Test 03/08/22  1353   INR 1.09       PFT  PFT Latest Ref Rng & Units 3/23/2022   FVC L 4.11   FEV1 L 3.19   FVC% % 133   FEV1% % 132           "

## 2022-03-23 NOTE — NURSING NOTE
Chief Complaint   Patient presents with     Consult     New visit    Medications reviewed and vital signs taken.   Bertha Whiting CMA

## 2022-03-23 NOTE — LETTER
3/23/2022     RE: Daysi Ashley  [address] [address]    Dear Colleague,    Thank you for referring your patient, Daysi Ashley, to the [facility] FOR LUNG [facility]ENCE AND [facility] [place]. Please see a copy of my visit note below.    [facility]   Pulmonary   Clinic Consult Note    Reason for Visit  Consult (New visit)          Assessment and plan:    Cocaine dependence  History of epistaxis  History of positive p-ANCA  ANCA associated vasculitis    Patient with normal pulmonary assessment including complete PFTs and clear chest x-ray.  Besides epistaxis history and rare episodes of dark-tinged sputum which she thinks is due to her epistaxis, no pulmonary symptoms.  We are hopeful that patient will be able to achieve abstinence from cocaine especially with the support of her daughter and upcoming plan for rehab.  Chest x-ray is clear but there may be more subtle changes that would be seen on CT.  We would like to obtain cross-sectional imaging in the future once she is abstinent for a number of months and plan for pulmonary follow-up at that time  -No evidence of pulmonary involvement of her presumed cocaine related vasculitis  -CT chest in 6 months ideally she will be no longer using inhaled cocaine at a time  -Appreciate and agree with further consultation with vasculitis providers including rheumatology and nephrology    RTC 6 months with CT chest      Patient seen and or discussed with Dr Montero who agrees with assessment and plan detailed above        ==============================================================  HPI  Daysi Ashley is a 61 year old year old female who is being seen for Consult (New visit)    Past medical history of extensive use of smoked cocaine, depression.  Recent relevant history including an episode at [facility] ED December 2021 for throat swelling.  Additionally really noted in January for similar symptoms which include an autoimmune testing that  "had included a positive ANCA.     She denies any respiratory symptoms besides occasional dark-tinged sputum that she thinks is related to her epistaxis episodes.  Denies wheeze or history of asthma.  Minimal smoking history.  No additional respiratory exposures besides cocaine.  Does not vape.  Currently not working and lives independently      Current Outpatient Medications   Medication     ARIPiprazole (ABILIFY) 10 MG tablet     metoprolol succinate ER (TOPROL-XL) 50 MG 24 hr tablet     venlafaxine (EFFEXOR-XR) 150 MG 24 hr capsule     No current facility-administered medications for this visit.     Past Medical History:   Diagnosis Date     311      Allergic rhinitis, cause unspecified      Arthritis      Depressive disorder      Migraine, unspecified, without mention of intractable migraine without mention of status migrainosus      Rapid weight loss 2013     Allergies   Allergen Reactions     Compazine [Prochlorperazine] Other (See Comments)     My head stretches back      Phenothiazines Other (See Comments)     Compazine. \"My tongue goes back\"     Past Surgical History:   Procedure Laterality Date     APPENDECTOMY        SECTION   &      FOOT SURGERY      right     HERNIORRHAPHY UMBILICAL  2000     ORTHOPEDIC SURGERY       ZZC NONSPECIFIC PROCEDURE      Appendectomy     Social History     Socioeconomic History     Marital status:      Spouse name: Not on file     Number of children: Not on file     Years of education: Not on file     Highest education level: Not on file   Occupational History     Not on file   Tobacco Use     Smoking status: Never Smoker     Smokeless tobacco: Never Used   Substance and Sexual Activity     Alcohol use: No     Drug use: No     Sexual activity: Yes     Partners: Male   Other Topics Concern     Parent/sibling w/ CABG, MI or angioplasty before 65F 55M? Not Asked   Social History Narrative     Not on file     Social Determinants of Health " "    Financial Resource Strain: Not on file   Food Insecurity: Not on file   Transportation Needs: Not on file   Physical Activity: Not on file   Stress: Not on file   Social Connections: Not on file   Intimate Partner Violence: Not on file   Housing Stability: Not on file       Family History   Problem Relation Age of Onset     Dementia Father      Depression Father      Depression Daughter        ROS Pulmonary    A complete ROS was otherwise negative except as noted in the HPI.      Vitals:    03/23/22 1325   BP: (!) 177/88   Pulse: 74   Resp: 17   SpO2: 100%   Weight: 71.2 kg (157 lb)   Height: 1.626 m (5' 4\")     Exam:   GENERAL APPEARANCE: Well developed, well nourished, alert, and in no apparent distress.    MOUTH: Oral mucosa is moist, without any lesions  NECK: supple, no masses, no thyromegaly.  RESP: good  air flow throughout, - no crackles, rhonchi or wheezes.   CV: Normal S1, S2, regular rhythm, normal rate, no LE edema.   ABDOMEN:  Bowel sounds normal  MS: extremities normal-  no clubbing, no cyanosis.  SKIN: no rash on limited exam  NEURO: Mentation intact, speech normal,  PSYCH: mentation appears normal.     Results: I have reviewed all imaging, PFTs and other relavent tests, please see below for details, PFT and imaging results were reviewed with the patient.    CBC   Recent Labs   Lab Test 03/17/22  1443 03/08/22  1353   RBC 4.47 4.57   HGB 13.7 13.4   HCT 40.6 41.3    278       Basic Metabolic Panel  Recent Labs   Lab Test 03/17/22  1443 03/08/22  1353    138   POTASSIUM 4.7 4.1   CHLORIDE 110* 108   CO2 23 28   BUN 32* 22   * 130*   MAME 8.6 8.9       INR  Recent Labs   Lab Test 03/08/22  1353   INR 1.09       PFT  PFT Latest Ref Rng & Units 3/23/2022   FVC L 4.11   FEV1 L 3.19   FVC% % 133   FEV1% % 132     Attestation signed by Manuela Montero MD at 4/4/2022  7:26 PM:  Physician Attestation   I, Manuela Montero MD, saw this patient and agree with the findings and plan of " care as documented in the note.      Items personally reviewed/procedural attestation: vitals and imaging and agree with the interpretation documented in the note.    Manuela Montero MD     Again, thank you for allowing me to participate in the care of your patient.      Sincerely,    Nelson Emanuel MD

## 2022-03-23 NOTE — PATIENT INSTRUCTIONS
Thank you for visiting us in the Pulmonary clinic today.      Our plan is :    Get a CT scan today and once sober (will order it for 6 months)      Follow up   6 months     Nelson Emanuel MD   Pulmonary Fellow     Please call pulmonary clinic if you have concerns or questions.  #[phone]

## 2022-03-24 LAB
DLCOCOR-%PRED-PRE: 72 %
DLCOCOR-PRE: 14.47 ML/MIN/MMHG
DLCOUNC-%PRED-PRE: 73 %
DLCOUNC-PRE: 14.6 ML/MIN/MMHG
DLCOUNC-PRED: 19.99 ML/MIN/MMHG
ERV-%PRED-PRE: 187 %
ERV-PRE: 1.32 L
ERV-PRED: 0.71 L
EXPTIME-PRE: 8.85 SEC
FEF2575-%PRED-PRE: 107 %
FEF2575-PRE: 2.31 L/SEC
FEF2575-PRED: 2.14 L/SEC
FEFMAX-%PRED-PRE: 91 %
FEFMAX-PRE: 5.57 L/SEC
FEFMAX-PRED: 6.12 L/SEC
FEV1-%PRED-PRE: 132 %
FEV1-PRE: 3.19 L
FEV1FEV6-PRE: 76 %
FEV1FEV6-PRED: 80 %
FEV1FVC-PRE: 78 %
FEV1FVC-PRED: 79 %
FEV1SVC-PRE: 81 %
FEV1SVC-PRED: 75 %
FIFMAX-PRE: 4.18 L/SEC
FRCN2-%PRED-PRE: 114 %
FRCN2-PRE: 3.09 L
FRCN2-PRED: 2.7 L
FVC-%PRED-PRE: 133 %
FVC-PRE: 4.11 L
FVC-PRED: 3.07 L
IC-%PRED-PRE: 104 %
IC-PRE: 2.6 L
IC-PRED: 2.5 L
RVN2-%PRED-PRE: 90 %
RVN2-PRE: 1.77 L
RVN2-PRED: 1.95 L
TLCN2-%PRED-PRE: 115 %
TLCN2-PRE: 5.7 L
TLCN2-PRED: 4.94 L
VA-%PRED-PRE: 105 %
VA-PRE: 5.04 L
VC-%PRED-PRE: 122 %
VC-PRE: 3.92 L
VC-PRED: 3.21 L

## 2022-03-31 ENCOUNTER — TELEPHONE (OUTPATIENT)
Dept: PULMONOLOGY | Facility: CLINIC | Age: 62
End: 2022-03-31
Payer: COMMERCIAL

## 2022-03-31 NOTE — TELEPHONE ENCOUNTER
Need to reschedule patient to either 2 PM or 2:30 PM on 6/23/22 with Dr. Amezquita due to scheduling change. Left direct call back phone number.

## 2022-03-31 NOTE — TELEPHONE ENCOUNTER
Patient called back and we rescheduled her to 2 Pm on 6/23/22 with Dr. Amezquita instead of 2:20 PM. Details of new appointment time confirmed with patient.

## 2022-04-11 ENCOUNTER — TRANSFERRED RECORDS (OUTPATIENT)
Dept: HEALTH INFORMATION MANAGEMENT | Facility: CLINIC | Age: 62
End: 2022-04-11
Payer: COMMERCIAL

## 2022-04-15 ENCOUNTER — TELEPHONE (OUTPATIENT)
Dept: FAMILY MEDICINE | Facility: CLINIC | Age: 62
End: 2022-04-15
Payer: COMMERCIAL

## 2022-04-15 DIAGNOSIS — N18.31 STAGE 3A CHRONIC KIDNEY DISEASE (H): ICD-10-CM

## 2022-04-15 DIAGNOSIS — R76.8 P-ANCA TITER POSITIVE: Primary | ICD-10-CM

## 2022-04-15 NOTE — TELEPHONE ENCOUNTER
Spoke with patient.  Scheduled her to see A.SKim Tuesday 4/19 at 11:00 am.    Lawanda Childers RN

## 2022-04-15 NOTE — TELEPHONE ENCOUNTER
"CW,    Spoke with patient states she does not want to go to urgent care.    \"I feel fine, I'm comfortable\"  \" I was uncomfortable before I had the bm  \"I don't need to go to urgent care\"  I want to wait until I can see Dr. Srivastava or someone else at [facility]\"      Ok to schedule her to be seen in clinic next week?  Lawanda Childers RN            "

## 2022-04-15 NOTE — TELEPHONE ENCOUNTER
Yes, if she's feeling okay, would try and have her seen here next week (with labs/exam- not virtual). If she's worsening, would be seen this weekend- urgent care likely okay.  Thanks- CW

## 2022-04-15 NOTE — TELEPHONE ENCOUNTER
CW ([employer]),    Please address due to A.S. absence    Patient called.  States past 1.5 days she has been feeling very fatigued, lying down a lot which is unusual for her.    Early this morning at 0100 had a bm the was all white  States it looked like concrete.    Denies any abdominal discomfort.  Hasn't had bm since.  No recent GI testing, no diet changes.    Please advise.    Thanks,  Lawanda Childers RN

## 2022-04-25 NOTE — TELEPHONE ENCOUNTER
Attempted to call pt to schedule follow up appt, no answer and voicemail is full.    My chart message sent.    Lisa Nails RN  Rheumatology Clinic

## 2022-04-27 NOTE — ED NOTES
Bed: ED14  Expected date: 12/20/21  Expected time:   Means of arrival:   Comments:  triage  
DISPLAY PLAN FREE TEXT

## 2022-05-06 NOTE — TELEPHONE ENCOUNTER
Attempted to call pt, no answer and voicemail remains full. Will close encounter at this time.  If pt returns call, can be scheduled with a GN provider.    Lisa Nails RN  Rheumatology Clinic

## 2022-05-19 ENCOUNTER — TELEPHONE (OUTPATIENT)
Dept: FAMILY MEDICINE | Facility: CLINIC | Age: 62
End: 2022-05-19

## 2022-05-19 NOTE — TELEPHONE ENCOUNTER
Patient calling in, would also like to speak with DR. Zaman, scheduled phone visit for next week    Deneen Quintanilla RN

## 2022-05-19 NOTE — TELEPHONE ENCOUNTER
Jessika,    Patient called.  States she needs a.r.m.s worker.    Looks like CC has tried to reach out to patient on 1/26/22.    Is this something you can help with ?    Lawanda Childers RN  [facility]

## 2022-05-20 NOTE — TELEPHONE ENCOUNTER
Sure, I can reach out to her right now. I know we were [facility] pt after multiple attempts in Jan-March 2022.     Thanks for letting me know she called and wants to work with us again.    Jessika Sharma  Community Health Worker  [facility]  [phone]

## 2022-05-24 ENCOUNTER — PATIENT OUTREACH (OUTPATIENT)
Dept: NURSING | Facility: CLINIC | Age: 62
End: 2022-05-24
Payer: COMMERCIAL

## 2022-05-24 ENCOUNTER — PATIENT OUTREACH (OUTPATIENT)
Dept: CARE COORDINATION | Facility: CLINIC | Age: 62
End: 2022-05-24
Payer: COMMERCIAL

## 2022-05-24 SDOH — ECONOMIC STABILITY: TRANSPORTATION INSECURITY
IN THE PAST 12 MONTHS, HAS THE LACK OF TRANSPORTATION KEPT YOU FROM MEDICAL APPOINTMENTS OR FROM GETTING MEDICATIONS?: NO

## 2022-05-24 SDOH — ECONOMIC STABILITY: FOOD INSECURITY: WITHIN THE PAST 12 MONTHS, YOU WORRIED THAT YOUR FOOD WOULD RUN OUT BEFORE YOU GOT MONEY TO BUY MORE.: NEVER TRUE

## 2022-05-24 SDOH — ECONOMIC STABILITY: TRANSPORTATION INSECURITY
IN THE PAST 12 MONTHS, HAS LACK OF TRANSPORTATION KEPT YOU FROM MEETINGS, WORK, OR FROM GETTING THINGS NEEDED FOR DAILY LIVING?: NO

## 2022-05-24 SDOH — ECONOMIC STABILITY: FOOD INSECURITY: WITHIN THE PAST 12 MONTHS, THE FOOD YOU BOUGHT JUST DIDN'T LAST AND YOU DIDN'T HAVE MONEY TO GET MORE.: NEVER TRUE

## 2022-05-24 ASSESSMENT — LIFESTYLE VARIABLES
SKIP TO QUESTIONS 9-10: 1
HOW OFTEN DO YOU HAVE SIX OR MORE DRINKS ON ONE OCCASION: NEVER
HOW OFTEN DO YOU HAVE A DRINK CONTAINING ALCOHOL: NEVER
HOW MANY STANDARD DRINKS CONTAINING ALCOHOL DO YOU HAVE ON A TYPICAL DAY: PATIENT DOES NOT DRINK
AUDIT-C TOTAL SCORE: 0

## 2022-05-24 ASSESSMENT — SOCIAL DETERMINANTS OF HEALTH (SDOH): HOW HARD IS IT FOR YOU TO PAY FOR THE VERY BASICS LIKE FOOD, HOUSING, MEDICAL CARE, AND HEATING?: NOT VERY HARD

## 2022-05-24 NOTE — LETTER
[facility]  Patient Centered Plan of Care  About Me:        Patient Name:  Daysi Ashley    YOB: 1960  Age:         61 year old   Chanell MRN:    [id number] Telephone Information:  Home Phone [phone]   Mobile [phone]       Address:  [address] [address] Email address:  [email or web address]      Emergency Contact(s)    Name Relationship Lgl Grd Work Phone Home Phone Mobile Phone   1. CHIKI Daughter    [phone]   2. ANTHONY RAMIREZ Other   [phone]   3. JULIA ASHLEY Daughter    [phone]           Primary language:  English     needed? No   [employer]:  [phone] op. 1  Other communication barriers:Lack of coping    Preferred Method of Communication:  Mail  Current living arrangement: I live alone    Mobility Status/ Medical Equipment: No data recorded      Health Maintenance  Health Maintenance Reviewed: Due/Overdue   Health Maintenance Due   Topic Date Due     PREVENTIVE CARE VISIT  Never done     COLORECTAL CANCER SCREENING  Never done     ZOSTER IMMUNIZATION (1 of 2) Never done     MAMMO SCREENING  12/08/2013     PAP  01/25/2015     PHQ-9  01/26/2018     ADVANCE CARE PLANNING  10/14/2018     LIPID  04/27/2020         My Access Plan  Medical Emergency 911   Primary Clinic Line [facility] [phone]   24 Hour Appointment Line [phone] or  [phone]) (toll-free)   24 Hour Nurse Line [phone] (toll-free)   Preferred Urgent Care No data recorded   Preferred Hospital [facility]  [phone]     Preferred Pharmacy [id number] - [place], [place] - [address] AT [address] [place] [address]     Behavioral Health Crisis Line The National Suicide Prevention Lifeline at 1-635.226.3980 or 911             My Care Team Members  Patient Care Team       Relationship Specialty Notifications Start End    Melonie Srivastava MD PCP - General Family  Practice  7/11/19     Phone: [phone] Fax: [phone]         [address] [address]    Melonie Srivastava MD Assigned PCP   8/6/17     Phone: [phone] Fax: [phone] [address] [address]    Salena Hawkins MD MD Nephrology  2/9/22     Phone: [phone] Fax: [phone] [address] [facility] [address]            My Care Plans  Self Management and Treatment Plan  Goals and (Comments)   Goals        General     Resources (pt-stated)      Notes - Note edited  5/24/2022  2:25 PM by Amina Tomas     Goal Statement: I will obtain an [facility] worker to improve my life  Date goal set: 5/24/22    Date to Achieve By: 8/24/22  Patient expressed understanding of goal: Yes    Action steps to achieve this goal  1. I will review resources sent by [facility]  2. I will call [facility] provider to find the right agency for me  3. I will work with my [facility] worker to make changes in my life  4. I will follow up with the care coordination team               Action Plans on File:                       Advance Care Plans/Directives Type:   No data recorded    My Medical and Care Information  Problem List   Patient Active Problem List   Diagnosis     Migraine     Allergic rhinitis     Drug dependence (H)     Dysthymia     Hyperlipidemia LDL goal <160     Joint pain     Rapid weight loss     PTSD (post-traumatic stress disorder)     Advanced directives, counseling/discussion     Depression with suicidal ideation     Severe recurrent major depression without psychotic features (H)     Suicidal ideation     Syncope     Major depressive disorder, severe (H)     CKD (chronic kidney disease) stage 3, GFR 30-59 ml/min (H)     Sore throat     Other fatigue     P-ANCA titer positive     Epistaxis     Episodic mood disorder (H)     Thyroid nodule     Benign essential hypertension     Fall, initial encounter     Altered mental status, unspecified altered mental status type       Current Medications and Allergies:  See printed Medication Report.    Care Coordination Start Date: 5/24/2022   Frequency of Care Coordination: monthly     Form Last Updated: 05/24/2022

## 2022-05-24 NOTE — PROGRESS NOTES
Clinic Care Coordination Contact  Care Team Conversations    Pt left  3:06pm, 5/23/22, requesting a return phone call to ask a quick question.    CHW called pt on 5/24/22 at 9:58am. Pt was wondering what she might expect when she gets an [facility] worker. CHW briefly explained that applications for a DA (diagnostic assessment) are placed to organizations who perform them. The organization will call to schedule a time for a DA. During the DA, pt will be asked questions to determine her MH needs to determine how many hours of [facility] services are needed. Pt verbalizes understanding.    Pt also asks how long has the [facility] program been in [place] and why did Dr. Srivastava want me to get an [facility]? CHW encourages pt to ask these questions of LUCAS Huynh, when she has her initial CC assessment this afternoon at 2:00pm. Pt verbalizes understanding.    Jessika Sharma  Community Health Worker  [facility]  [phone]

## 2022-05-24 NOTE — LETTER
[facility] CARE COORDINATION  [address] [address]  May 24, 2022    Daysi Ashley  [address] [address]      Dear Daysi,    I am a clinic care coordinator who works with Melonie Srivastava MD with the [facility] Clinics. I wanted to thank you for spending the time to talk with me.  Below is a description of clinic care coordination and how I can further assist you.    The clinic care coordination team is made up of a registered nurse, , financial resource worker and community health worker who understand the health care system. The goal of clinic care coordination is to help you manage your health and improve access to the health care system. Our team works alongside your provider to assist you in determining your health and social needs. We can help you obtain health care and community resources, providing you with necessary information and education. We can work with you through any barriers and develop a care plan that helps coordinate and strengthen the communication between you and your care team.    Please feel free to contact me with any questions or concerns care coordination and what we can offer.      We are focused on providing you with the highest-quality healthcare experience possible.    Sincerely,     CALIN Garcia   [facility] Primary Care - Clinic Care Coordination  [phone]      Enclosed: I have enclosed a copy of the Patient Centered Plan of Care. This has helpful information and goals that we have talked about. Please keep this in an easy to access place to use as needed.

## 2022-06-02 ENCOUNTER — PATIENT OUTREACH (OUTPATIENT)
Dept: CARE COORDINATION | Facility: CLINIC | Age: 62
End: 2022-06-02
Payer: COMMERCIAL

## 2022-06-02 NOTE — PROGRESS NOTES
Clinic Care Coordination Contact  [facility]/Voicemail       Clinical Data: Care Coordinator Outreach  Outreach attempted x 1.  Left message on patient's voicemail with call back information and requested return call.  Plan: C Care Coordinator will try to reach patient again in 10 business days.    CALIN Garcia   [facility] Primary Care - Clinic Care Coordination  [phone]

## 2022-06-07 ENCOUNTER — HOSPITAL ENCOUNTER (EMERGENCY)
Facility: CLINIC | Age: 62
Discharge: HOME | End: 2022-06-07
Attending: EMERGENCY MEDICINE | Admitting: EMERGENCY MEDICINE
Payer: COMMERCIAL

## 2022-06-07 VITALS
SYSTOLIC BLOOD PRESSURE: 163 MMHG | HEART RATE: 68 BPM | DIASTOLIC BLOOD PRESSURE: 64 MMHG | TEMPERATURE: 96.8 F | HEIGHT: 63 IN | WEIGHT: 145 LBS | RESPIRATION RATE: 20 BRPM | OXYGEN SATURATION: 97 % | BODY MASS INDEX: 25.69 KG/M2

## 2022-06-07 DIAGNOSIS — F22 DELUSIONS (H): ICD-10-CM

## 2022-06-07 DIAGNOSIS — S00.81XA EXCORIATION OF FACE, INITIAL ENCOUNTER: Primary | ICD-10-CM

## 2022-06-07 LAB
ATRIAL RATE - MUSE: 64 BPM
DIASTOLIC BLOOD PRESSURE - MUSE: NORMAL MMHG
INTERPRETATION ECG - MUSE: NORMAL
P AXIS - MUSE: 53 DEGREES
PR INTERVAL - MUSE: 142 MS
QRS DURATION - MUSE: 88 MS
QT - MUSE: 406 MS
QTC - MUSE: 418 MS
R AXIS - MUSE: -5 DEGREES
SYSTOLIC BLOOD PRESSURE - MUSE: NORMAL MMHG
T AXIS - MUSE: 44 DEGREES
VENTRICULAR RATE- MUSE: 64 BPM

## 2022-06-07 PROCEDURE — 93005 ELECTROCARDIOGRAM TRACING: CPT | Mod: RTG

## 2022-06-07 PROCEDURE — 99282 EMERGENCY DEPT VISIT SF MDM: CPT

## 2022-06-07 ASSESSMENT — ENCOUNTER SYMPTOMS
SHORTNESS OF BREATH: 0
FEVER: 0

## 2022-06-07 NOTE — DISCHARGE INSTRUCTIONS
Recommend Vaseline or Aquaphor multiple times daily to keep the area protected.  Can consider over-the-counter Neosporin or bacitracin ointment.  Recommend close follow-up with primary care doctor

## 2022-06-07 NOTE — ED PROVIDER NOTES
"  History   Chief Complaint:  Shortness of Breath       HPI   Daysi Ashley is a 62 year old female with history of vasculitis and cocaine abuse who presents with skin rash. The patient states that she has had a rash on her face, gumline, and back which she states is more like hair that she used to pull out with tweezers.  Rash has been present since before [place] per her report.  It does itch a little and feels like she has strings coming from the wounds and feels there is something in the wound. She notes when she moves her face it tightens up and is uncomfortable. She tried to use lotions and neosporin but it makes it worse. She denies fever, chest pain, or shortness of breath. She notes she has stopped cocaine abuse as of April 4. She denies the use of alcohol.    Review of Systems   Constitutional: Negative for fever.   Respiratory: Negative for shortness of breath.    Cardiovascular: Negative for chest pain.   Skin: Positive for rash.   All other systems reviewed and are negative.    Allergies:  Compazine [Prochlorperazine]  Phenothiazines    Medications:  ARIPiprazole  metoprolol succinate  venlafaxine     Past Medical History:     Allergic rhinitis  Arthritis  Depression  Rapid weight loss  Vasculitis  Cocaine abuse  CKD  PTSD    Past Surgical History:    Appendectomy  C section  Right foot surgery  Herniorrhaphy umbilical     Family History:    Father-Dementia, depression, HTN, cataract    Social History:  The patient presents to the ED by herself.    Physical Exam     Patient Vitals for the past 24 hrs:   BP Temp Temp src Pulse Resp SpO2 Height Weight   06/07/22 0846 (!) 163/64 96.8  F (36  C) Temporal 68 20 97 % 1.6 m (5' 3\") 65.8 kg (145 lb)       Physical Exam  General: Alert and cooperative with exam. Patient in mild distress. Normal mentation.  Head:  Scalp is NC/AT  Eyes:  No scleral icterus, PERRL  ENT:  The external nose and ears are normal. The oropharynx is normal and without erythema; mucus " membranes are moist.  No evidence of intraoral lesions.  Neck:  Normal range of motion without rigidity.  CV:  Regular rate and rhythm    No pathologic murmur   Resp:  Breath sounds are clear bilaterally    Non-labored, no retractions or accessory muscle use  GI:  Abdomen is soft, no distension, no tenderness. No peritoneal signs  MS:  No lower extremity edema   Skin:  Warm and dry, has excoriation to gum and face as pictured; there are few lesions on the other side of the face and upper back making shingles less likely.  No evidence of superimposed infection.   Neuro: Oriented x 3. No gross motor deficits.                Emergency Department Course     Emergency Department Course:         Reviewed:  I reviewed nursing notes, vitals, past medical history and Care Everywhere    Assessments:  0905 I obtained history and examined the patient as noted above.     Disposition:  The patient was discharged to home.     Impression & Plan       Medical Decision Making:  Patient is a 62-year-old female who presents with reported chronic migratory rash which is currently present to face and back.  Patient's medical history and records were reviewed.  On evaluation patient has evidence of excoriations to her face bilaterally as well as her upper back; possibly secondary to skin picking.  There is no evidence of superimposed bacterial infection or parasitic infection.  Rash at this time does not seem consistent with shingles or other emergent process.  No indication for any labs or imaging at this time.  No intraoral lesions or evidence of ocular involvement.  Patient does describe having strings coming from the wounds and feeling like something is in the wound raising concern for potential delusions.  I recommended continued supportive care including avoidance of picking and application of Vaseline/Aquaphor or bacitracin daily.  Initially patient was very upset that there was no additional medical intervention to provide from  the emergency department, was agitated, and left the emergency department.  No indication for mental health hold.  I later called the patient to reiterate my recommendations and recommended close follow-up with PCP for continued evaluation.  Return precautions discussed.       Diagnosis:    ICD-10-CM    1. Excoriation of face, initial encounter  S00.81XA    2. Delusions (H)  F22        Scribe Disclosure:  Augustine WEST, am serving as a scribe at 9:05 AM on 6/7/2022 to document services personally performed by Kevin Deluca DO based on my observations and the provider's statements to me.            Kevin Deluca DO  06/07/22 2107

## 2022-06-07 NOTE — ED TRIAGE NOTES
Pt states she has been having shortness of breath and facial wounds on and off for 8 months. Pt. States there are strings coming out of wounds on face. She has told the DR that in the past and they told her to get a psych eval. Pt. States she is weak and nauseated every 2 days and cant function.      Triage Assessment     Row Name 06/07/22 0848       Triage Assessment (Adult)    Airway WDL WDL       Respiratory WDL    Respiratory WDL X  shortness of breath       Skin Circulation/Temperature WDL    Skin Circulation/Temperature WDL X  facial wounds on and off for 8 months        Cardiac WDL    Cardiac WDL WDL       Peripheral/Neurovascular WDL    Peripheral Neurovascular WDL WDL       Cognitive/Neuro/Behavioral WDL    Cognitive/Neuro/Behavioral WDL WDL

## 2022-06-08 ENCOUNTER — VIRTUAL VISIT (OUTPATIENT)
Dept: FAMILY MEDICINE | Facility: CLINIC | Age: 62
End: 2022-06-08
Payer: COMMERCIAL

## 2022-06-08 VITALS — BODY MASS INDEX: 24.8 KG/M2 | HEIGHT: 63 IN | WEIGHT: 140 LBS

## 2022-06-08 DIAGNOSIS — N18.31 STAGE 3A CHRONIC KIDNEY DISEASE (H): ICD-10-CM

## 2022-06-08 DIAGNOSIS — R21 RASH AND NONSPECIFIC SKIN ERUPTION: Primary | ICD-10-CM

## 2022-06-08 DIAGNOSIS — R76.8 P-ANCA TITER POSITIVE: ICD-10-CM

## 2022-06-08 DIAGNOSIS — S00.81XD EXCORIATION OF FACE, SUBSEQUENT ENCOUNTER: ICD-10-CM

## 2022-06-08 PROCEDURE — 99214 OFFICE O/P EST MOD 30 MIN: CPT | Mod: 95 | Performed by: FAMILY MEDICINE

## 2022-06-08 NOTE — NURSING NOTE
"Chief Complaint   Patient presents with     Derm Problem     Itchy blisters on face for 1 week     initial Ht 1.6 m (5' 3\")   Wt 63.5 kg (140 lb)   LMP 04/04/2012   BMI 24.80 kg/m   Estimated body mass index is 24.8 kg/m  as calculated from the following:    Height as of this encounter: 1.6 m (5' 3\").    Weight as of this encounter: 63.5 kg (140 lb).  BP completed using cuff size: .  L  R arm      Health Maintenance that is potentially due pending provider review:      Satnam Duckworth ma  "

## 2022-06-08 NOTE — PROGRESS NOTES
Daysi is a 62 year old who is being evaluated via a billable telephone visit.      What phone number would you like to be contacted at? [phone]  How would you like to obtain your AVS? Mail a copy    Assessment & Plan     Rash and nonspecific skin eruption  The underlying cause of this facial rash is not clear.  It seems less likely that this would be shingles given that it has is present on both sides of her face.  I personally reviewed the emergency department records from her visit yesterday as well as the photos that they took.  I recommended she continue the topical Vaseline and she was given a referral to see dermatology.  I gave her phone numbers as well so that she can call and schedule an appointment.  - Adult Dermatology Referral; Future    Excoriation of face, subsequent encounter  - Adult Dermatology Referral; Future    P-ANCA titer positive  She will continue to follow-up with nephrology and she was given a referral to rheumatology    Stage 3a chronic kidney disease (H)  I recommended she continue to follow with nephrology.                   Return in about 4 weeks (around 7/6/2022) for Follow up if symptoms not improving..    Aman Gonsalez, [facility]   Daysi is a 62 year old who presents for the following health issues     HPI     Rash  Onset/Duration: 1 week  Description  Location: face  Character: round, raised  Itching: no  Intensity:  moderate  Progression of Symptoms:  same  Accompanying signs and symptoms:   Fever: no  Body aches or joint pain: no  Sore throat symptoms: YES  Recent cold symptoms: YES  History:           Previous episodes of similar rash: None  New exposures:  None  Recent travel: no  Exposure to similar rash: no  Precipitating or alleviating factors:   Therapies tried and outcome: none        She is seen in the emergency department yesterday for this rash and was diagnosed with excoriations of the skin.  There are photographs  available in the chart.  She has excoriations on the cheeks and around the nose on both sides of her face.  The records indicate that she also has some excoriations in the upper back that may be due to skin picking.  She denies any worsening of the rash since she was seen yesterday.  In the emergency department they did not see any indication of a bacterial or parasitic infection or any evidence of shingles.    Review of Systems         Objective       Vitals:  No vitals were obtained today due to virtual visit.    Physical Exam   healthy, alert and no distress  PSYCH: Alert and oriented times 3; coherent speech, normal   rate and volume, able to articulate logical thoughts, able   to abstract reason, no tangential thoughts, no hallucinations   or delusions  Her affect is normal  RESP: No cough, no audible wheezing, able to talk in full sentences  Remainder of exam unable to be completed due to telephone visits                Phone call duration: 13 minutes

## 2022-06-17 ENCOUNTER — PATIENT OUTREACH (OUTPATIENT)
Dept: CARE COORDINATION | Facility: CLINIC | Age: 62
End: 2022-06-17
Payer: COMMERCIAL

## 2022-06-17 NOTE — PROGRESS NOTES
Clinic Care Coordination Contact  [facility]       Clinical Data: Care Coordinator Outreach  Outreach attempted x 2.  Care Coordinator was unable to leave a message as mailbox was full.   Plan: Care Coordinator will mail unable to contact letter and try to reach patient again in 3 weeks.    CALIN Garcia   [facility] Primary Care - Clinic Care Coordination  [phone]

## 2022-06-17 NOTE — LETTER
[facility] CARE COORDINATION  [address] [address]  June 17, 2022    Daysi Ashley  [address] [address]      Dear Daysi,    I have been attempting to reach you since our last contact. I would like to continue to work with you and provide any additional support you may need on achieving your health care related goals. I would appreciate if you would give me a call at [phone] to let me know if you would like to continue working together. I know that there are many things that can affect our ability to communicate and I hope we can continue to work together.    All of us at the [facility] are invested in your health and are here to assist you in meeting your goals.     Sincerely,    Amina Tomas

## 2022-07-15 ENCOUNTER — PATIENT OUTREACH (OUTPATIENT)
Dept: CARE COORDINATION | Facility: CLINIC | Age: 62
End: 2022-07-15

## 2022-07-15 NOTE — PROGRESS NOTES
Clinic Care Coordination Contact    Situation: Patient chart reviewed by care coordinator.    Background: Patient enrolled in CC in May of 2022. CC has not been able to reach patient since the initial phone call.     Assessment: Patient is not participating in CC at this time.     Enrollment status: Disenrollment.      Plan: No further outreaches at this time. If new needs arise a new Care Coordination referral may be placed. [facility]    CALIN Garcia   [facility] Primary Care - Clinic Care Coordination  [phone]

## 2022-07-28 ENCOUNTER — HOSPITAL ENCOUNTER (EMERGENCY)
Facility: CLINIC | Age: 62
Discharge: LEFT BEFORE DISCHARGE | End: 2022-07-28
Attending: EMERGENCY MEDICINE
Payer: COMMERCIAL

## 2022-07-28 VITALS
HEIGHT: 63 IN | DIASTOLIC BLOOD PRESSURE: 82 MMHG | BODY MASS INDEX: 24.45 KG/M2 | OXYGEN SATURATION: 98 % | TEMPERATURE: 97.3 F | RESPIRATION RATE: 18 BRPM | WEIGHT: 138 LBS | SYSTOLIC BLOOD PRESSURE: 175 MMHG | HEART RATE: 71 BPM

## 2022-07-28 NOTE — ED PROVIDER NOTES
Patient triaged, I assigned myself to see patient. She left before being brought back to a room and I was unable to see the patient.     Gary Arauz, DO  07/28/22 1850

## 2022-07-28 NOTE — ED TRIAGE NOTES
Pt complains of a rash/ sores on the left side of her face, her back, arms and legs for the last 2 months.  States she has been unable to see a dermatologist.  Pt also complains of lower abd pain for the last 4 days with nausea and diarrhea.     Triage Assessment     Row Name 07/28/22 0926       Triage Assessment (Adult)    Airway WDL WDL       Respiratory WDL    Respiratory WDL WDL       Skin Circulation/Temperature WDL    Skin Circulation/Temperature WDL WDL       Cardiac WDL    Cardiac WDL WDL       Peripheral/Neurovascular WDL    Peripheral Neurovascular WDL WDL       Cognitive/Neuro/Behavioral WDL    Cognitive/Neuro/Behavioral WDL WDL

## 2022-09-06 ENCOUNTER — TELEPHONE (OUTPATIENT)
Dept: FAMILY MEDICINE | Facility: CLINIC | Age: 62
End: 2022-09-06

## 2022-09-06 NOTE — TELEPHONE ENCOUNTER
No call from Ed dr. WEST did share e-consult recommendations for her to be seen by the pulmonologist.      She does need to see them.  She also should have follow up appointment for high blood pressure.  thanks

## 2022-09-06 NOTE — TELEPHONE ENCOUNTER
A.S,   Patient calling about Sherly's disease  Wants to know if you were ever contacted by Dr Wang January 2022  Told pt could have been staff message that you wouldn't have anymore likely from January  Trying to reach Dr Wang but he won't return call to her  Wants to make sure you were contacted by ER back then  Please advise   Thanks,  Abbi CAST RN

## 2022-09-15 ENCOUNTER — TELEPHONE (OUTPATIENT)
Dept: FAMILY MEDICINE | Facility: CLINIC | Age: 62
End: 2022-09-15

## 2022-09-15 DIAGNOSIS — R21 RASH AND NONSPECIFIC SKIN ERUPTION: Primary | ICD-10-CM

## 2022-09-15 NOTE — TELEPHONE ENCOUNTER
Triage,   Patient called.   Requesting a referral to infectious disease at [facility].   Wants to be seen asap - prior to rheumatologist appt on the 9/23/22.  Patient feels she is immunocompromised and wants to see a specialist.   States she spots all over her body - redness, pink to dark red spots.   Please advise.   Thanks!  Whit GLASS

## 2022-09-15 NOTE — TELEPHONE ENCOUNTER
Please let this patient know that I put in the infectious disease referral.  She will need to call the ID clinic at [facility] at [phone] to schedule an appointment.  I am not sure how far out they are booking.  Thank you, DE

## 2022-09-15 NOTE — TELEPHONE ENCOUNTER
DD,   Please see below   Patient saw you 6/8/2022  Saw derm - they told pt she just picks at skin  Pt has therapist she talked to - she doesn't have picking at skin type habit or personality per pt   Would like referral to infectious disease  Wants to see them preferably before rheumatology  Please advise  Thanks,  Abbi CAST RN

## 2022-09-19 ENCOUNTER — NURSE TRIAGE (OUTPATIENT)
Dept: FAMILY MEDICINE | Facility: CLINIC | Age: 62
End: 2022-09-19

## 2022-09-19 NOTE — TELEPHONE ENCOUNTER
Makenzie calling from ID  Phone [phone]  Fax [phone]    She needs labs and office visit notes faxed over so they can review pt's case to determine if appropriate for ID    Faxed via Epic  Abbi CAST RN

## 2022-09-20 NOTE — TELEPHONE ENCOUNTER
Please let the patient know about this.  I agree that it would be helpful for her to see dermatology if she has not already.  I put in a dermatology referral after the visit she had with me back in June.  I do not see any notes from dermatology though, so I am not sure if she has seen them yet.

## 2022-09-20 NOTE — TELEPHONE ENCOUNTER
Left message for patient to call [facility] back  When patient calls back please transfer to an RN  Samantha BRUNSON RN      Number for dermatology [phone]

## 2022-09-20 NOTE — TELEPHONE ENCOUNTER
Rubi DAMIAN [place] [facility] [place].  Reviewed referral and notes sent over, does not think pt would benefit from ID consult unless she sees derm first and they were to culture something. Thinks pt would most benefit from dermatology referral at this time.     Her direct number to call back with concerns/questions or to discuss this is [phone].    Stephanie PRICE RN

## 2022-09-21 ENCOUNTER — VIRTUAL VISIT (OUTPATIENT)
Dept: FAMILY MEDICINE | Facility: CLINIC | Age: 62
End: 2022-09-21
Payer: COMMERCIAL

## 2022-09-21 DIAGNOSIS — L02.02 CARBUNCLE AND FURUNCLE OF FACE: ICD-10-CM

## 2022-09-21 DIAGNOSIS — R21 RASH: Primary | ICD-10-CM

## 2022-09-21 DIAGNOSIS — L02.03 CARBUNCLE AND FURUNCLE OF FACE: ICD-10-CM

## 2022-09-21 PROCEDURE — 99215 OFFICE O/P EST HI 40 MIN: CPT | Mod: 95 | Performed by: FAMILY MEDICINE

## 2022-09-21 RX ORDER — TRIAMCINOLONE ACETONIDE 1 MG/G
CREAM TOPICAL 2 TIMES DAILY
Qty: 15 G | Refills: 1 | Status: SHIPPED | OUTPATIENT
Start: 2022-09-21 | End: 2023-10-12

## 2022-09-21 RX ORDER — HYDROXYZINE HYDROCHLORIDE 10 MG/1
10 TABLET, FILM COATED ORAL 3 TIMES DAILY PRN
Qty: 30 TABLET | Refills: 0 | Status: SHIPPED | OUTPATIENT
Start: 2022-09-21 | End: 2023-10-19

## 2022-09-21 ASSESSMENT — PATIENT HEALTH QUESTIONNAIRE - PHQ9: SUM OF ALL RESPONSES TO PHQ QUESTIONS 1-9: 24

## 2022-09-21 NOTE — PROGRESS NOTES
Daysi is a 62 year old who is being evaluated via a billable video visit.      How would you like to obtain your AVS? MyChart  If the video visit is dropped, the invitation should be resent by: Text to cell phone: [phone]  Will anyone else be joining your video visit? No          Assessment & Plan     Rash  Plan: I do recommend to take hydroxyzine so that it helps itching and mild anxiety.  It will cause sedation.  She can use it only as needed.  We also discussed to use steroid cream and that is prescribed as well.  - hydrOXYzine (ATARAX) 10 MG tablet; Take 1 tablet (10 mg) by mouth 3 times daily as needed for itching  - triamcinolone (KENALOG) 0.1 % external cream; Apply topically 2 times daily    Carbuncle and furuncle of face  Plan.  There are multiple crusted scab wound on her face.  I am concerned about superadded bacterial infection.  I do recommend antibiotic twice a day for 10 days.  - amoxicillin-clavulanate (AUGMENTIN) 875-125 MG tablet; Take 1 tablet by mouth 2 times daily      She needed significant counseling on phone, unfortunately there is definitely a problem with communication as well.  I have had only a single office visit with her in January.  She did have a follow-up in April, for unknown reason it was not completed.    P-ANCA titer positive.  Epistasis/ recurrent sinusitis  Chronic Kidney Disease- stage 3a   Possibility of sandra's granulomatosis in 61 year old female with long standing history of easy fatigability , worse  Since a yr with constant rhinorrhea  epistaxis , and hip / jaw arthralgia. She is positive for ANCA   - Adult Pulmonary Medicine Referral; Future  - Myeloperoxidase Antibody IgG; positive at 8.5, upper normal is < 3.5      - XR Chest 2 Views: normal 03/2022  -nephrology VV//2022/ Salena Hawkins MD      unfortunately left prior to Chest xray  And blood test.  Prescription drug management  I spent a total of 44 minutes on the day of the visit.   Time spent doing chart  review, history and exam, documentation and further activities per the note               Melonie Srivastava MD  [facility] [place]    Kathi Tavarez is a 62 year old presenting for the following health issues:  Derm Problem      HPI     Rash  Skin/rash- acne-now becoming wound.  She initially called triage nurse concern about rash because they may be parasite describes them black and red lines all over the face, they are itchy she tries not to scratch them but they are itchy.  She feels that there traveling down from her scalp down on her face.    Rash was on face initially limited to cheeks and nose.  However now they have spread all over including inside the mouth over the scalp.  She also had some spots on her eyes.  She has been seen by ophthalmologist.      She reports distress and displeasure with the dermatologist, that she consulted, they found them very dismissive and the only recommendation provided was to avoid picking on the wound.    She became frustrated and cried that no one believes her and she does not know what to do with her rash on face.  She has been emergency room multiple times.  There are pictures of the wound as well.  I reviewed most recent dermatology consultation.  It seems on 26 August it was no-show and she was not able to see the dermatology as follow-up because she was not aware that she had an appointment.  I believe there has been some miscommunication that is complicating her care.    She is very upset that upcoming rheumatology appointment on September 23 they will not be able to address her skin condition.  She does want medication to be given today so that she starts feeling better now.  She also demanding diagnosis.    It took us a while to get her on video visit.  I was able to see her face with multiple wounds specially over nose scalp and cheek.  They appear to be scabbed wound.  Unfortunately video quality was poor.        current skin condition has been  since at least ~April 2021  She had periods of clear skin such as an December and January 2022.  She believes she needs prednisone and antibiotic, given by ? ENT Dr Amanda but they will not give it back or follow-up further..    Also seen eye doctor because it spread to eye  Bleeding from skin spots as well   Washing clothes and sheets often  Has someone come in to clean home        Review of Systems   Constitutional, HEENT, cardiovascular, pulmonary, GI, , musculoskeletal, neuro, skin, endocrine and psych systems are negative, except as otherwise noted.      Objective           Vitals:  No vitals were obtained today due to virtual visit.    Physical Exam   GENERAL: Healthy, alert and no distress  EYES: Eyes grossly normal to inspection.  No discharge or erythema, or obvious scleral/conjunctival abnormalities.  RESP: No audible wheeze, cough, or visible cyanosis.  No visible retractions or increased work of breathing.    SKIN: excoriated wounds on face. Erythematous wound on the nose- with honey color crusting. Similar wounds on both sides of the cheeks  NEURO: Cranial nerves grossly intact.  Mentation and speech appropriate for age.  PSYCH: Mentation appears normal, affect normal/bright, judgement and insight intact, normal speech and appearance well-groomed.          Video-Visit Details    Video Start Time: 5:08 PM    Type of service:  Video Visit    Video End Time:5:33 PM    Originating Location (pt. Location): Home    Distant Location (provider location):  [facility]     Platform used for Video Visit: [employer]

## 2022-09-23 NOTE — ED PROVIDER NOTES
"  History     Chief Complaint   Patient presents with     Depression     \"I'm feeling depressed an my doctor told me to come in.\" Denies SI.     HPI  Daysi Ashley is a 58 year old female who is here referred in by her psychiatrist (Dr Rafal Roa) whom she saw through the [facility]. Patient has history of recurrent MDD. She has history of hospitalizations. She has history of suicide attempt by overdose. She has history of ECT. Patient reports that she has been feeling depressed and has not been able to function. She is not attending to her ADLs. She has no history of eating disorder. She followed Dr Roa to the [facility] when he started work there. Dr Roa had talked to Dr Arthur about taking care of patient on his unit. Dr Arthur accepted patient. She came here for medical clearance. Patient reports no acute medical concerns. She has been sleeping excessively. There is no drug use. She has been taking her meds. There has been discussion about ECT. Patient is agreeable to being admitted for stabilization.    Please see DEC Crisis Assessment on 18 in [facility] for further details.    PERSONAL MEDICAL HISTORY  Past Medical History:   Diagnosis Date     311      Allergic rhinitis, cause unspecified      Migraine, unspecified, without mention of intractable migraine without mention of status migrainosus      Rapid weight loss 2013     PAST SURGICAL HISTORY  Past Surgical History:   Procedure Laterality Date     APPENDECTOMY       C NONSPECIFIC PROCEDURE      Appendectomy      SECTION   &      FOOT SURGERY      right     HERNIORRHAPHY UMBILICAL  2000     ORTHOPEDIC SURGERY       FAMILY HISTORY  No family history on file.  SOCIAL HISTORY  Social History   Substance Use Topics     Smoking status: Never Smoker     Smokeless tobacco: Never Used     Alcohol use No     MEDICATIONS  No current facility-administered medications for this encounter.      Current Outpatient Prescriptions " How Severe Is It?: mild   Medication     ABILIFY 15 MG OR TABS     clonazePAM (KLONOPIN) 2 MG tablet     venlafaxine (EFFEXOR-XR) 75 MG 24 hr capsule     ALLERGIES  Allergies   Allergen Reactions     Phenothiazines      Compazine         I have reviewed the Medications, Allergies, Past Medical and Surgical History, and Social History in the Epic system.    Review of Systems   Constitutional: Positive for activity change and appetite change.   HENT: Negative.    Eyes: Negative.    Respiratory: Negative.    Cardiovascular: Negative.    Gastrointestinal: Negative.    Endocrine: Negative.    Genitourinary: Negative.    Musculoskeletal: Negative.    Skin: Negative.    Neurological: Negative.    Hematological: Negative.    Psychiatric/Behavioral: Positive for decreased concentration, sleep disturbance and suicidal ideas. Negative for hallucinations. The patient is nervous/anxious. The patient is not hyperactive.    All other systems reviewed and are negative.      Physical Exam   BP: 116/55  Pulse: 65  Temp: 98.5  F (36.9  C)  Resp: 16  Weight: 81.1 kg (178 lb 12.8 oz)  SpO2: 96 %      Physical Exam   Constitutional: She appears well-developed and well-nourished.   HENT:   Head: Normocephalic.   Eyes: Pupils are equal, round, and reactive to light.   Neck: Normal range of motion.   Cardiovascular: Normal rate.    Pulmonary/Chest: Effort normal.   Abdominal: Soft.   Musculoskeletal: Normal range of motion.   Neurological: She is alert.   Skin: Skin is warm.   Psychiatric: Her speech is normal and behavior is normal. Judgment normal. Her affect is blunt. She is not agitated, not aggressive, not hyperactive, not actively hallucinating and not combative. Thought content is not paranoid and not delusional. Cognition and memory are normal. She exhibits a depressed mood. She expresses suicidal ideation. She expresses no homicidal ideation. She expresses no suicidal plans.   Nursing note and vitals reviewed.      ED Course     ED Course  Is This A New Presentation, Or A Follow-Up?: Follow Up Isotretinoin     Procedures    Labs Ordered and Resulted from Time of ED Arrival Up to the Time of Departure from the ED - No data to display         Assessments & Plan (with Medical Decision Making)   Patient with history of PTSD and recurrent depression who is struggling with her functioning. Her doctor has arranged for her to be admitted to Dr Arthur's service. She is medically cleared for admission.    I have reviewed the nursing notes.    I have reviewed the findings, diagnosis, plan and need for follow up with the patient.    New Prescriptions    No medications on file       Final diagnoses:   PTSD (post-traumatic stress disorder)   Major depressive disorder, recurrent episode, moderate (H)       11/30/2018   [facility], [place], EMERGENCY DEPARTMENT     Gopi Pierre MD  11/30/18 2026     Display Cumulative Dose In The Note?: Yes Patient Reported Weight In Pounds (Required For Calculation): 169

## 2022-10-12 ENCOUNTER — VIRTUAL VISIT (OUTPATIENT)
Dept: FAMILY MEDICINE | Facility: CLINIC | Age: 62
End: 2022-10-12
Payer: COMMERCIAL

## 2022-10-12 DIAGNOSIS — I10 BENIGN ESSENTIAL HYPERTENSION: ICD-10-CM

## 2022-10-12 PROCEDURE — 99213 OFFICE O/P EST LOW 20 MIN: CPT | Mod: 95 | Performed by: FAMILY MEDICINE

## 2022-10-12 RX ORDER — METOPROLOL SUCCINATE 50 MG/1
50 TABLET, EXTENDED RELEASE ORAL DAILY
Qty: 30 TABLET | Refills: 1 | Status: SHIPPED | OUTPATIENT
Start: 2022-10-12 | End: 2023-07-27

## 2022-10-12 NOTE — PROGRESS NOTES
Andrew is a 62 year old who is being evaluated via a billable video visit.      How would you like to obtain your AVS? MyChart  If the video visit is dropped, the invitation should be resent by:   Will anyone else be joining your video visit? No        Assessment & Plan   And is 61 yo female with sicca syndrome hypertension hyperlipidemia chronic kidney disease and recent multiple emergency rooms visit for evaluation of rash.    Appointment changed to from office visit to virtual visit as per patient.  She had difficulty getting on [facility].  Was changed to telephone only appointment.  Total time on phone is 7 minutes.    Benign essential hypertension- uncontrolled  Plan:  she has not been taking her metoprolol consistently.  Refill for metoprolol 50 mg once a day is sent with 30 tablets and a refill.  - metoprolol succinate ER (TOPROL XL) 50 MG 24 hr tablet; Take 1 tablet (50 mg) by mouth daily  She is waiting for biopsy report to be finalized and has plans to discuss it with rheumatologist.  Today appointment initially was for office visit, that was changed to video visit and finally a telephone visit as above.    Follow-up appointment in 2 weeks is made for her.  Prescription drug management           Return in about 2 weeks (around 10/26/2022) for BP Recheck/ HTN, concerns,unresolved.    Melonie Srivastava MD  [facility]    Kathi Tavarez is a 62 year old presenting for the following health issues:  Regarding skin condition and history of sicca syndrome she has been seen by rheumatologist total of 3 times and is being evaluated for lupus.  She had dermatology consultation as well and recent biopsy and expect biopsy result in the next few days.  Pending biopsy reports she is expected to see the rheumatologist  Rhianna Patrick MD     - HPI   Has been taking metoprolol once a day and ran low , so infact not been taking metoprolol.  Yesterday seen in emergency room department for rash  that feels like the whole skin is shrunk as per patient.  She was given another Medrol pack.      Hypertension Follow-up      Do you check your blood pressure regularly outside of the clinic? No     Are you following a low salt diet? Yes    Are your blood pressures ever more than 140 on the top number (systolic) OR more   than 90 on the bottom number (diastolic), for example 140/90?       How many servings of fruits and vegetables do you eat daily?  2-3    On average, how many sweetened beverages do you drink each day (Examples: soda, juice, sweet tea, etc.  Do NOT count diet or artificially sweetened beverages)?   1    How many days per week do you exercise enough to make your heart beat faster? 3 or less    How many minutes a day do you exercise enough to make your heart beat faster? 9 or less    How many days per week do you miss taking your medication? 0    Rash  Onset/Duration: 1 year  Description  Location: all over body  Character: flakey, painful  Itching: mild  Intensity:  moderate  Progression of Symptoms:  same  Accompanying signs and symptoms:   Fever: No  Body aches or joint pain: No  Sore throat symptoms: No  Recent cold symptoms: No  History:           Previous episodes of similar rash: None  New exposures:  None  Recent travel: No  Exposure to similar rash: No  Precipitating or alleviating factors:     Therapies tried and outcome:       Review of Systems         Objective    Vitals - Patient Reported  Weight (Patient Reported): 60.3 kg (133 lb)        Physical Exam   Exam is limited because of telephone only appointment.  She has clear speech.  Clear mentation.  No coughing no wheezing.

## 2022-10-12 NOTE — Clinical Note
Please give patient 3:40 PM office visit for me on 26 October for uncontrolled hypertension.    Thank you

## 2022-11-10 ENCOUNTER — VIRTUAL VISIT (OUTPATIENT)
Dept: FAMILY MEDICINE | Facility: CLINIC | Age: 62
End: 2022-11-10
Payer: COMMERCIAL

## 2022-11-10 DIAGNOSIS — K11.7 DROOLING: ICD-10-CM

## 2022-11-10 DIAGNOSIS — J02.9 SORE THROAT: Primary | ICD-10-CM

## 2022-11-10 DIAGNOSIS — R50.9 FEVER, UNSPECIFIED FEVER CAUSE: ICD-10-CM

## 2022-11-10 PROCEDURE — 99213 OFFICE O/P EST LOW 20 MIN: CPT | Mod: 95 | Performed by: NURSE PRACTITIONER

## 2022-11-10 ASSESSMENT — ENCOUNTER SYMPTOMS
EYE DISCHARGE: 0
DIZZINESS: 0
NAUSEA: 1
WHEEZING: 0
RHINORRHEA: 1
APPETITE CHANGE: 1
TROUBLE SWALLOWING: 1
SHORTNESS OF BREATH: 0
MYALGIAS: 1
VOICE CHANGE: 1
DIARRHEA: 0
HEADACHES: 0
SINUS PRESSURE: 0
DIAPHORESIS: 1
COUGH: 1
FEVER: 1
CONSTIPATION: 0
EYE ITCHING: 0
FATIGUE: 0
SORE THROAT: 1
CHILLS: 1
LIGHT-HEADEDNESS: 0

## 2022-11-10 NOTE — PROGRESS NOTES
Daysi is a 62 year old who is being evaluated via a billable telephone visit.      What phone number would you like to be contacted at? [phone]  How would you like to obtain your AVS? Mail a copy    Assessment & Plan     Sore throat  Drooling  Fever, unspecified fever cause  Much concern for tonsillar abscess/peritonsillar abscess due to severe worsening sore throat, new onset drooling, inability to open mouth, difficulty swallowing and fever.  Encouraged to go directly to emergency department.  Declines.  Much education given regarding my concern.  Offered ambulance-declines.  Patient reports has seen ENT in the past, plans to call to see if can get office visit appointment with established ENT.  This provider continues to recommend evaluation in the emergency department which patient continues to decline.    16 minutes spent on the date of the encounter doing chart review, history and exam, documentation and further activities per the note       No follow-ups on file.    JAKOB Epps CNP  [facility] DAVIDA Tavarez is a 62 year old, presenting for the following health issues:  Telephone      HPI     Acute Illness  Acute illness concerns: Has not yet taken COVID test but has a home test  Onset/Duration: 5 days  Symptoms:  Fever: YES- 101 at highest  Chills/Sweats: YES  Headache (location?): No  Sinus Pressure: No  Conjunctivitis:  No  Ear Pain: no  Rhinorrhea: YES  Congestion: YES  Sore Throat: YES; hard to swallow  Cough: YES on occasion-productive of clear sputum  Wheeze: No  Decreased Appetite: YES  Nausea: YES  Vomiting: No  Diarrhea: No  Dysuria/Freq.: No  Hematuria: No  Fatigue/Achiness: YES  Sick Exposure: No  Therapies tried and outcome: jerry [facility]      Phone call visit completed due to COVID 19 outbreak.     Has not been feeling well since Sunday.  Voice has changed.  Sore thorat, gradually getting worse. Has been having fevers at home up to 101.0 on  Tues. Is taking aleve at home. Aleve helps to decrease fever but does not help to decrease pain and throat. Throat is very painful. Is able to drink. Only eating ice cream.  Yesterday and day before was able to eat baked beans.  Has not looked in back of throat, difficulty opening mouth. Is drooling, this started today.  No shortness of breath, wheezing or difficulty breathing.  Requesting antibiotic.    Review of Systems   Constitutional: Positive for appetite change (decreased), chills, diaphoresis and fever. Negative for fatigue.   HENT: Positive for congestion, rhinorrhea, sore throat, trouble swallowing and voice change. Negative for ear discharge, ear pain, hearing loss and sinus pressure.    Eyes: Negative for discharge and itching.   Respiratory: Positive for cough (Mostly dry, occasionally productive of clear sputum). Negative for shortness of breath and wheezing.    Gastrointestinal: Positive for nausea. Negative for constipation and diarrhea.   Musculoskeletal: Positive for myalgias.   Skin: Negative for rash.   Neurological: Negative for dizziness, light-headedness and headaches.          Objective           Vitals:  No vitals were obtained today due to virtual visit.    Physical Exam   healthy, alert, mild distress and cooperative  Thick, Hoarse voice  PSYCH: Alert and oriented times 3; coherent speech, normal   rate and volume, able to articulate logical thoughts, able   to abstract reason, no tangential thoughts, no hallucinations   or delusions  Her affect is normal and pleasant  RESP: No cough, no audible wheezing, able to talk in full sentences  Remainder of exam unable to be completed due to telephone visits          Phone call duration: 8 minutes

## 2022-11-11 ENCOUNTER — TELEPHONE (OUTPATIENT)
Dept: OTOLARYNGOLOGY | Facility: CLINIC | Age: 62
End: 2022-11-11

## 2022-11-11 NOTE — TELEPHONE ENCOUNTER
Called pt and explained she needs to go to the ED to have it drained as we don't do them in the clinic anymore. Pt was understanding. She may come here or go to the [facility] in [place] if they do them. No further questions.    Neva Wilkins LPN

## 2022-11-11 NOTE — TELEPHONE ENCOUNTER
Soonest appointment available in [employer] is 1/12/22. Routing to ENT provider pool to see about possible work in. Please see Sarah Zhao CNP's note from 11/10/22 for additional details.     Corina Casper RN   [facility]

## 2022-11-11 NOTE — TELEPHONE ENCOUNTER
[facility]    Phone Message    May a detailed message be left on voicemail: yes     Reason for Call: Other: Pt states that her provider told her to see ENT for a Peritonsillar Abscess that needs to be drained . Per protocols send an HP TE to clinical pool . pt did not have a preference of where to be seen , so sending to all locations . Thanks      Action Taken: Message routed to:  Other: ALL ENT     Travel Screening: Not Applicable

## 2022-11-15 ENCOUNTER — TELEPHONE (OUTPATIENT)
Dept: FAMILY MEDICINE | Facility: CLINIC | Age: 62
End: 2022-11-15

## 2022-11-15 NOTE — TELEPHONE ENCOUNTER
Patient called  Wants to have 'throat cyst' drained at a clinic  Notified unable to do this at [facility] clinic  Has been seen at [facility]  Diagnosed with mouth ulcer  Notes dysphagia  Is able to carry on conversation appropriately  Does not sound SOB  Continues to decline ED  Advised to go to ED if symptoms worsen or develops trouble breathing  Requests to see ENT  Provided number from [facility] [facility] referral   Clair BURTON RN

## 2022-11-21 ENCOUNTER — HOSPITAL ENCOUNTER (EMERGENCY)
Facility: CLINIC | Age: 62
Discharge: HOME | End: 2022-11-21
Payer: COMMERCIAL

## 2022-11-21 VITALS
DIASTOLIC BLOOD PRESSURE: 124 MMHG | SYSTOLIC BLOOD PRESSURE: 171 MMHG | OXYGEN SATURATION: 98 % | TEMPERATURE: 97.4 F | HEART RATE: 99 BPM | RESPIRATION RATE: 16 BRPM

## 2022-11-21 NOTE — ED TRIAGE NOTES
Pt BIBA. Called 911 x2 and hung up. EMS/police showed up at pts house. Pt states she has not been eating or sleeping. Uses crack cocaine, last use was 4 days ago. Found w/ an empty hydroxyzine bottle in house. Not taking prescribed medications. Pt refusing to answer triage questions and allow vitals. Requesting to leave.

## 2022-11-21 NOTE — ED TRIAGE NOTES
Triage Assessment     Row Name 11/21/22 0732       Triage Assessment (Adult)    Airway WDL WDL       Respiratory WDL    Respiratory WDL WDL       Skin Circulation/Temperature WDL    Skin Circulation/Temperature WDL WDL       Cardiac WDL    Cardiac WDL WDL       Peripheral/Neurovascular WDL    Peripheral Neurovascular WDL WDL       Cognitive/Neuro/Behavioral WDL    Cognitive/Neuro/Behavioral WDL WDL

## 2022-11-21 NOTE — ED NOTES
Bed: HW07UR  Expected date: 11/21/22  Expected time: 6:49 AM  Means of arrival:   Comments:  Raysa 1  62 F   Mental health

## 2023-01-05 ENCOUNTER — TELEPHONE (OUTPATIENT)
Dept: FAMILY MEDICINE | Facility: CLINIC | Age: 63
End: 2023-01-05

## 2023-01-05 NOTE — TELEPHONE ENCOUNTER
Patient called   Reports rash for past 2 years  Also 'black sharp things that fall off my skin'  Unable to diagnose at various healthcare systems  States she was in the ED and requested parasite workup   Upset because 'the doctor laughed in my face'  Offered to schedule appointment at [facility] to review options/plan of care  States she would rather schedule at [place] as it is closer to her home  Writer unable to provide much information (I.e. to seek care if symptoms progress), patient very talkative  Requested infectious disease referral  Attempted to provide information below:  [facility]   [address]   ATTN: [place] [address]   Phone: [phone]     Call was disconnected  Information written above to provide, if pt calls back  Clair BURTON RN

## 2023-01-10 ENCOUNTER — TELEPHONE (OUTPATIENT)
Dept: FAMILY MEDICINE | Facility: CLINIC | Age: 63
End: 2023-01-10

## 2023-01-10 DIAGNOSIS — L98.9 SKIN LESION: Primary | ICD-10-CM

## 2023-01-10 NOTE — TELEPHONE ENCOUNTER
"A.S,  Patient calling to follow-up on concerns for skin condition that has been going on for years now.  Patient has seen many different specialists.  Most recently, rheumatologist diagnosed her with lupus.  Provider \"shoved it down my throat\"    Patient states she has always thought it was a parasite.  Specifically refers to \"nematodes. This is somewhat like malaria\"  Per patient, ENT said it was a parasite right from the beginning, but then switched to saying it was a autoimmune condition  Patient states this is usually found in third world countries, but second  was a bad man. Was trafficking children and thinks he could have picked up the parasite from this activity, [facility] was after him.    Patient mentions having a biopsy taken at [facility], but rheumatologist \"walked off the job\" and went to [facility].  Rash is not gone no matter what  Does not react at all to prednisone or other steroids  Antibiotics impact a bit more  Patient feels it is a good match.  Patient has to go to [place] tomorrow to see mom    Used to have an iron stomach- now gets diarrhea and gas, gets feverish, doesn't sleep well    Summary: patient would like to be tested for nematodes. Willing to come in for appointment if needed.  Please advise.  Samantha BRUNSON RN    "

## 2023-01-11 ENCOUNTER — TELEPHONE (OUTPATIENT)
Dept: FAMILY MEDICINE | Facility: CLINIC | Age: 63
End: 2023-01-11
Payer: COMMERCIAL

## 2023-01-11 NOTE — TELEPHONE ENCOUNTER
Pt calling regarding her rash. She has been seeing derm, ENT, kidney doctors. Pt believes she has CLM. Pt stated that she has been doing a lot of research of her own and feels like this could be a possibility. Pt adopted a cat and soon after she adopted the cat she developed the rash. This is how it can be transmitted per the pt.    There is a topical cream called thiabendazole that has a cure rate of 98% in 10 days. There is also a oral medication called albendaole and ivermectin.    Pt requesting that we look in to this being a option.   Pt is willing to come in to the clinic.     Digna Young RN  [facility]

## 2023-01-11 NOTE — TELEPHONE ENCOUNTER
Patient called back, RN relayed information about dermatology referral and provided her with the phone number she could use to contact them.     Kaylene Al RN  [facility]

## 2023-01-11 NOTE — TELEPHONE ENCOUNTER
Patient called back again and Would Like Our Nurse to call her back she has Questions?? [phone]  [facility] Unit Coordinator

## 2023-01-11 NOTE — TELEPHONE ENCOUNTER
1. Skin lesion  - Adult Dermatology Referral; Future    I recommend derm consultation for extensive history of skin condition.  They can offer skin biopsy based on the assessment as need  Kin biopsy will reveal possible causes.    Hope that helps    Thanks

## 2023-01-11 NOTE — TELEPHONE ENCOUNTER
A.S.  See message below from patient      Left non detailed VM for pt asking that they callback and offered to schedule follow up appointment  Could bring all outside records to clinic for review/to create POC  Clair BURTON RN

## 2023-01-12 NOTE — TELEPHONE ENCOUNTER
Hi    I am unclear what the ask is from me ?    Please clarify with patient that due to her highly specialized condition- she should be under care of skin specialist. I do not see any derm consultation in her records and or media.    Thanks

## 2023-01-17 NOTE — TELEPHONE ENCOUNTER
Left non detailed voicemail for patient to return call to [facility]  Also see duplicate TE from 1/11/23  Clair BURTON RN

## 2023-01-19 ENCOUNTER — TELEPHONE (OUTPATIENT)
Dept: FAMILY MEDICINE | Facility: CLINIC | Age: 63
End: 2023-01-19
Payer: COMMERCIAL

## 2023-01-19 NOTE — TELEPHONE ENCOUNTER
"Patient calling back requesting  - Appt with PCP to evaluate rash (on upper body) that she has had >2 years.  - Get cholesterol checked  - Stool sample or whatever other exams to rule out parasites     Rash is tchy, red spots/flat lesions on face, arms, shoulders that have now spread to scalp.  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 Patrick again if you think she should see rheumatology again    Please advise  Maxine PRICE RN  [facility]      "

## 2023-01-20 NOTE — TELEPHONE ENCOUNTER
Spoke to patient  Offered to schedule  Patient prefers afternoon  Plans to call back to schedule  60 min spot found on 2/16/23 at 140PM arrival time  Clair BURTON RN

## 2023-01-20 NOTE — LETTER
[facility]  [address] [facility] [facility] [address]  July 15, 2022    Daysi Ashley  [address] [facility] [address]      Dear Daysi,    I have been unsuccessful in reaching you since our last contact. At this time the Care Coordination team will make no further attempts to reach you, however this does not change your ability to continue receiving care from your providers at your primary care clinic. If you need additional support from a care coordinator in the future please contact Jessika at [phone].    All of us at [facility] are invested in your health and are here to assist you in meeting your goals.     Sincerely,    CALIN Garcia   [facility] Care Coordination  [phone]     Ms. Frankel is a 23 year old  who presents for preventive health visit.     Patient's last menstrual period was 01/15/2023.  Menstrual Tracking  Period Cycle (Days): 28  Period Duration (Days): 5  Period Pattern: Regular  Menstrual Flow: Moderate  Menstrual Control: Thin pad   Last pap:  Ascus  Hx of STDS: Denies  STI Screening: Accepts  Contraception: None  Has pcp, states she is cold all the time and wants to screen for anemia.    ROS  Constitutional:  No fever, No chills.    Eye:  No blurring, No double vision.    Ear/Nose/Mouth/Throat:  No nasal congestion, No sore throat.    Respiratory:  No SOB, No cough.    Cardiovascular:  No chest pain, No palpitations.    Gastrointestinal:  Denies N/V/D  Genitourinary:  No dysuria, No hematuria.    Neurologic:  Alert and oriented X4.      Med/surg/soc/family history, medication list reviewed.  Past Medical History:   Diagnosis Date   • Anemia    • ASCUS with positive high risk HPV cervical    • Marijuana use     quit with pregnancy    • Status post  section 11/15/2021     Past Surgical History:   Procedure Laterality Date   • No past surgeries     • No past surgeries     • [place] tooth extraction       Outpatient Medications Marked as Taking for the 23 encounter (Office Visit) with Geeta Nieves CNP   Medication Sig Dispense Refill   • norethindrone (MICRONOR) 0.35 MG tablet TAKE 1 TABLET BY MOUTH EVERY DAY 84 tablet 0     ALLERGIES:  No Known Allergies  Social History     Socioeconomic History   • Marital status: Single     Spouse name: Not on file   • Number of children: 0   • Years of education: Not on file   • Highest education level: Not on file   Occupational History   • Occupation:  at [employer] in [employer]   Tobacco Use   • Smoking status: Never   • Smokeless tobacco: Never   Vaping Use   • Vaping Use: never used   Substance and Sexual Activity   • Alcohol use: Not Currently   • Drug use: Yes     Types: Marijuana      Comment: quit with pregnancy, 1-2/day prior   • Sexual activity: Not Currently   Other Topics Concern   •  Service No   • Blood Transfusions No   • Caffeine Concern No   • Occupational Exposure No   • Hobby Hazards No   • Sleep Concern No   • Stress Concern No   • Weight Concern No   • Special Diet No   • Back Care No   • Exercise No   • Bike Helmet No   • Seat Belt Yes   • Self-Exams No   Social History Narrative   • Not on file     Social Determinants of Health     Financial Resource Strain: Low Risk    • Social Determinants: Financial Resource Strain: None   Food Insecurity: No Food Insecurity   • Social Determinants: Food Insecurity: Never   Transportation Needs: No Transportation Needs   • Lack of Transportation (Medical): No   • Lack of Transportation (Non-Medical): No   Physical Activity: Inactive   • Days of Exercise per Week: 0 days   • Minutes of Exercise per Session: 0 min   Stress: Low Risk    • Social Determinants: Stress: Not at all   Social Connections: Socially Integrated   • Social Determinants: Social Connections: 5 or more times a week   Intimate Partner Violence: Not At Risk   • Social Determinants: Intimate Partner Violence Past Fear: No   • Social Determinants: Intimate Partner Violence Current Fear: No     Family History   Problem Relation Age of Onset   • Patient is unaware of any medical problems Mother    • Patient is unaware of any medical problems Father    • Patient is unaware of any medical problems Maternal Grandmother    • Heart disease Paternal Grandmother    • Patient is unaware of any medical problems Maternal Grandfather    • Dementia/Alzheimers Paternal Grandfather    • Patient is unaware of any medical problems Sister    • Patient is unaware of any medical problems Sister    • Diabetes Neg Hx    • Hypertension Neg Hx    • Stroke/TIA Neg Hx        PE:  General: well-appearing, NAD  Neuro/psych: grossly intact; mood and affect appropriate  Head:  normocephalic, without  obvious abnormality, atraumatic.   Eyes:  PERRLA, conjunctiva/corneas clear  Neck: no evidence of thyromegaly or thyroid nodules, no lymphadenopathy  Breasts: No palpable masses or lumps. No dimpling. Symmetric. Non-tender, no nipple discharge or lymphadenopathy. Taught how to perform monthly exams at home.  Lungs: Normal effort. No observations of respiratory distress, appears well.   CV: No SOB. No obvious chest deformations.  Abd: soft, non-tender, non-distended  Extremities: well perfused  Skin: color and turgor normal, no rashes or lesions.  External genitalia: WNL, no lesions noted  Urethra: w/o lesions or erythema  Vagina: well rugated, w/o lesions or abnormal discharge  Cervix: os w/ no lesions, no cmt  Uterus: size grossly wnl, mobile, non-tender  Adnexa: non-tender, no masses bilaterally    A/P  23 year old  who presents for preventive health services.   Gyne & breast exam normal  healthy lifestyle, diet, exercise & self breast exam recommended  Next pap due per ASCCP guidelines:     1. Gynecologic exam normal- PAP ORDER  2. Screening for iron deficiency anemia  - CBC NO DIFFERENTIAL; Future  3. Screen breast exam      Follow up in one year for next annual gyne exam.    The  [place] Cures Act makes medical notes available to patients in the interest of transparency. This is a medical document. It is intended as peer to peer communication. It is written in medical language and may contain abbreviations or verbiage that are unfamiliar. It may appear blunt or direct. Medical documents are intended to carry relevant information, facts as evident, and the clinical opinion of the practitioner.

## 2023-01-20 NOTE — TELEPHONE ENCOUNTER
Please help her with OV. Ideally 60 mins. If none avaliable than 30  mins ideally prior to VV at the end of the day. thanks

## 2023-01-23 ENCOUNTER — NURSE TRIAGE (OUTPATIENT)
Dept: NURSING | Facility: CLINIC | Age: 63
End: 2023-01-23
Payer: COMMERCIAL

## 2023-01-23 NOTE — TELEPHONE ENCOUNTER
A.S: patient calling asking to specifically speak with you.  Wants to know:  How likely it is that she has the problem she thinks she has?  How she will be treated if she comes in?  Wondering if you have looked at all the stuff that the specialists have done?    Has done every test that has ever been suggested, and no one has ever really come up with an answer.   Getting frustrated with process, likes that you get to the point.  Samantha BRUNSON RN

## 2023-01-23 NOTE — TELEPHONE ENCOUNTER
"Patient calling again and wants to speak to Dr. Srivastava directly.       She said she doesn't want to come in if \"all she is going to tell me is that I don't have a parasite\".    She said she doesn't want to be laughed at or made fun of and trusts that Dr. Srivastava won't beat around the bush - she is very concerned about not being taken seriously.     Kaylene Al RN on 1/23/2023 at 4:23 PM    "

## 2023-01-23 NOTE — TELEPHONE ENCOUNTER
"EBER.S  Patient concerned about having someone look at her scalp  States that during her last visit, EBER.S. stated that she could not see anything.  Patient states she has seen dermatology, infectious disease, rheumatology, etc, no one has been able to give her an answer.  Does not want to come in to the clinic to be told that she is crazy and have people refuse to look at her scalp.  States \"I can bring in the things that fall off me so she can see them\"  Patient ends call with stating \"sorry I am bitching at you, I have to go now\" and hung up phone.  Not sure where to go from here?  Samantha BRUNSON RN    "

## 2023-01-23 NOTE — TELEPHONE ENCOUNTER
Nurse Triage SBAR    Situation: Rash    Background: Patient calling. Started 2 years ago. She has already seen a dermatologist. Hx of cocaine use - per patient. She stated she was diagnosed with Lupus.     Assessment: Scalp itches. Wide spread. Black straight things fall off. She thinks she has Nematode.     She wants to be tested for Nematodes.       Recommendation: Patient began to be argumentative when RN informed her that any lab requests would need to be sent to her provider. Patient hung up on RN.     PCP or covering provider please advise.     Arlene Lozano RN Nursing Advisor 1/23/2023 5:27 PM     Reason for Disposition    [1] Caller requesting NON-URGENT health information AND [2] PCP's office is the best resource    Protocols used: INFORMATION ONLY CALL - NO TRIAGE-A-

## 2023-01-23 NOTE — TELEPHONE ENCOUNTER
My last OV with patient was 01/2022 for fatigue .  No other office visits at all in 2022  Since - than- we had VV for hypertension 10/2022  Another VV 9/2022 for rash  In care everywhere- I  can see derm visit 10/07/22 - that's the last one.  I have seen her emergency department visit-multiple.    I am sorry without appointment - I am unable to assist her specific questions about  We can send her to be seen by dermatologist- happy to give referral.  If infectious disease will see her- that referral can be started as well.    I am unsure- how to help her virtually.

## 2023-01-23 NOTE — TELEPHONE ENCOUNTER
Patient calls back again.  Patient again discussed her extensive history, which has been heavily documented through previous calls and past encounters.    States she wants just wants to be tested for parasites, specifically nematodes.  Wants a blood test done.    RN informed this likely cannot happen without a visit.  Patient expressed appreciation for the honesty, states that she does not want to put Dr. Srivastava in a spot she is not comfortable with, but would like to ask.  Samantha BRUNSON RN

## 2023-01-24 NOTE — TELEPHONE ENCOUNTER
Please call patient  I am not familiar with rash and nematode conditions without exam.OK if she not able or want an exam appointment at [facility]. Ok to see another provider- [facility] dermatologist  in fact-this is a direct good question for the dermatologist.  Would she like to see a new dermatologist? Either she can call insurance to find who is covered or we can send a new referral.    I also reviewed her Health Maintenance reviewed - whats her plan to prevent colon cancer , breast cancer-  We can offer annual physical to discuss options  ? She has not had a  pap smear, as well.      Thanks

## 2023-01-25 ENCOUNTER — TELEPHONE (OUTPATIENT)
Dept: FAMILY MEDICINE | Facility: CLINIC | Age: 63
End: 2023-01-25
Payer: COMMERCIAL

## 2023-01-25 NOTE — TELEPHONE ENCOUNTER
Left non detailed VM for pt asking that they callback and schedule physical with Dr. Srivastava  When patient calls back, please assist in scheduling physical  Also provide scheduling number for dermatology referral: [phone]  Thanks,  Clair BURTON RN

## 2023-01-25 NOTE — TELEPHONE ENCOUNTER
"Relayed provider msg below  Patient would to schedule appt for annual, breast exam, FIT test reset to home and new referral for an \"older, experienced, MD that knows the tracks of a nematode parasite infection well\"    Please call back to schedule - patient will be home for next hour    Maxine PRICE RN  [facility]    "

## 2023-01-26 NOTE — TELEPHONE ENCOUNTER
Patient calling again.   Mom is in hospital with c diff, critical illness.  Patient would like to go visit her, but does not think the doctor will let her in with this rash even if she wears a mask.  RN advised that guidance from A.S. continues to be that she needs an appointment.  Patient declines to schedule at [place], will seek care from urgent care.  Samantha BRUNSON RN

## 2023-01-30 ENCOUNTER — HOSPITAL ENCOUNTER (EMERGENCY)
Facility: CLINIC | Age: 63
Discharge: HOME | End: 2023-01-30
Attending: EMERGENCY MEDICINE | Admitting: EMERGENCY MEDICINE
Payer: COMMERCIAL

## 2023-01-30 VITALS
RESPIRATION RATE: 18 BRPM | HEIGHT: 63 IN | TEMPERATURE: 97.8 F | OXYGEN SATURATION: 99 % | HEART RATE: 86 BPM | WEIGHT: 140 LBS | SYSTOLIC BLOOD PRESSURE: 232 MMHG | DIASTOLIC BLOOD PRESSURE: 121 MMHG | BODY MASS INDEX: 24.8 KG/M2

## 2023-01-30 DIAGNOSIS — R21 RASH: ICD-10-CM

## 2023-01-30 PROCEDURE — 99282 EMERGENCY DEPT VISIT SF MDM: CPT

## 2023-01-30 NOTE — ED PROVIDER NOTES
"  History     Chief Complaint:  Pruritis and Rash       HPI   Daysi Ashley is a 62 year old female who presents with itching and rash.  She reports that she has had this for well over a year.  She reports that she is seeing multiple specialists and has tried multiple things without relief.  She states that she is seeing dermatology, rheumatology, ENT and her primary care doctor.  She does endorse a history of cocaine use with the last use being at the end of summer.        Review of External Notes: Rheumatology 10/19/22, Dermatology 10/7/22    ROS:  Review of Systems    Allergies:  Compazine [Prochlorperazine]  Phenothiazines     Medications:    hydrOXYzine (ATARAX) 10 MG tablet  triamcinolone (KENALOG) 0.1 % external cream  ARIPiprazole (ABILIFY) 10 MG tablet  metoprolol succinate ER (TOPROL XL) 50 MG 24 hr tablet  venlafaxine (EFFEXOR-XR) 150 MG 24 hr capsule        Past Medical History:    Past Medical History:   Diagnosis Date     311      Allergic rhinitis, cause unspecified      Arthritis      Depressive disorder      Migraine, unspecified, without mention of intractable migraine without mention of status migrainosus      Rapid weight loss 2013       Past Surgical History:    Past Surgical History:   Procedure Laterality Date     APPENDECTOMY        SECTION   &      FOOT SURGERY      right     HERNIORRHAPHY UMBILICAL  2000     ORTHOPEDIC SURGERY       ZZC NONSPECIFIC PROCEDURE      Appendectomy        Family History:    family history includes Dementia in her father; Depression in her daughter and father.    Social History:   reports that she has never smoked. She has never used smokeless tobacco. She reports that she does not drink alcohol and does not use drugs.  PCP: Melonie Srivastava     Physical Exam     Patient Vitals for the past 24 hrs:   Temp Temp src Pulse Resp SpO2 Height Weight   23 0528 97.8  F (36.6  C) Oral -- -- -- 1.6 m (5' 3\") 63.5 kg (140 lb)   23 0527 " -- Oral 95 18 99 % -- --        Physical Exam  General:  Alert, nontoxic in appearance  CV:  Appears well perfused  Lungs:  No obvious respiratory distress  Neuro:  Speaking clearly, no slurred speech  MSK:  Ambulatory  Skin:  Diffuse areas of excoriations mostly around face and upper back and upper extremities.  No concerning discharge or erythema.        Emergency Department Course     Emergency Department Course & Assessments:             Interventions:  Medications - No data to display     Social Determinants of Health affecting care:  +history of cocaine use      Disposition:  The patient was discharged to home.     Impression & Plan      Medical Decision Making:  Patient presents due to pruritic rash.  She reports that this has been going on for quite a long time, which is confirmed on chart review.  She states that the itching has continued to bother her prompting her to come to the hospital.  She states that she has tried all kinds of things without relief.  On my evaluation she did have many areas of apparent  excoriation.  There is no concerning signs of acute infection.  I was able to review the patient's prior work-up including from dermatology and rheumatology.  Patient has had biopsies done and per the documentation, there is a high concern that these rashes are actually related to patient's history of cocaine use.  I would refer you to the rheumatology and dermatology notes from October 2022.  The rheumatology note specifically recommends against continued steroid use given its limited likelihood of benefit and known potential risks.  I did discuss with the patient the prior recommendations, which she was aware of.  I apologized that I do not have any further emergent recommendations.  I stressed the importance of continuing to work with her primary care doctor in the clinic system given the chronicity of the symptoms.  At this time I do not see any emergency concerns.      Diagnosis:    ICD-10-CM     1. Rash  R21                     John Jones MD  01/30/23 0603

## 2023-01-30 NOTE — ED TRIAGE NOTES
States has history of rash, but tonight states has itching with rash red and open wounds on scalp arms back      Triage Assessment     Row Name 01/30/23 0595       Triage Assessment (Adult)    Airway WDL WDL       Respiratory WDL    Respiratory WDL WDL       Skin Circulation/Temperature WDL    Skin Circulation/Temperature WDL X  pt has rash with open spots, c/o itching.       Cardiac WDL    Cardiac WDL WDL       Peripheral/Neurovascular WDL    Peripheral Neurovascular WDL WDL       Cognitive/Neuro/Behavioral WDL    Cognitive/Neuro/Behavioral WDL WDL

## 2023-01-30 NOTE — ED NOTES
MD went in room and spoke with pt.   MD came out of room and said pt left. Pt left ED without discharge paperwork.    MD aware of hypertension.

## 2023-02-14 ENCOUNTER — NURSE TRIAGE (OUTPATIENT)
Dept: NURSING | Facility: CLINIC | Age: 63
End: 2023-02-14
Payer: COMMERCIAL

## 2023-02-15 NOTE — TELEPHONE ENCOUNTER
"Had stomach trouble for about 10 days  -had stomach cramps and really bad diarrhea  -never had this before    Has been around mom who has C. Diff    Tonight took a big drink of water and coughed and then a large worm that came out of her mouth   -lumpy about 1.5 inches long  -long  -dark in color  -kind of looks like a clear noodle   -identified as a member of the hook worm family    [place] movement in mouth and told rheumatologist   Has been losing weight   Lumps on face and lesions in mouth   Scalp is lumpy and is having sores on her scalp as well   Has thin red squiggly lines all over body    Triaged to a disposition of See a provider within 4 hours. Patient would like to just wait and see provider in the morning.     Worm is currently in a baggie with a tissue and some saline, but she will move it to water bottle with a pinch of salt in the water, so that she can bring it in for examination and testing.     Reason for Disposition    Passed a \"worm\" by the mouth or nose (such as vomited, regurgitated)    Additional Information    Negative: White, threadlike 1/4 to 1/2 inch worm (6 to 12 mm)    Negative: [1] Vomited 2 or more times AND [2] passed a large worm (8-10 inches; 20-25 cm) recently    Negative: Patient sounds very sick or weak to the triager    Protocols used: WORMS - OTHER THAN [id number]    Edda Loo RN on 2/15/2023 at 12:28 AM    "

## 2023-03-16 ENCOUNTER — NURSE TRIAGE (OUTPATIENT)
Dept: NURSING | Facility: CLINIC | Age: 63
End: 2023-03-16
Payer: COMMERCIAL

## 2023-03-16 ENCOUNTER — TELEPHONE (OUTPATIENT)
Dept: FAMILY MEDICINE | Facility: CLINIC | Age: 63
End: 2023-03-16
Payer: COMMERCIAL

## 2023-03-16 NOTE — TELEPHONE ENCOUNTER
Patient calling. Since January 2022, she's been trying to figure out the sensation she has that things are crawling around on her skin. She's seen many specialists, and has been tested. She went to [facility] this week. She's been to ED 4 times and been asked to leave, because the provider said that he wasn't trained to treat her. Provider at [place] told her that it's probably feline scabies, and her cat sleeps on her pillow. Her dermatologist with [facility] has retired. Patient wants an appointment, and none is available until 3/28 with dermatology. Is now wanting to know if [facility] can do a scrape test to determine what exactly it is that she has.     Patient requesting directions and wait times for [facility]. Given address and wait time for [facility]. No triage.    Carey Adame RN  [facility]  March 16, 2023, 12:05 PM    Additional Information    Negative: Nursing judgment    Negative: Nursing judgment    Negative: Nursing judgment    Negative: Nursing judgment    Negative: Information only question and nurse able to answer    Protocols used: INFORMATION ONLY CALL - NO TRIAGE-A-[place]

## 2023-03-16 NOTE — TELEPHONE ENCOUNTER
Patient calling asking where she can get tested for this feline scabies. Advised pt to keep calling UC and dermatology to check for appointment cancellations and to check who can test for scabies per nurse triage notes earlier today.    Michael Farley RN   [facility]

## 2023-05-25 NOTE — ED NOTES
Video Observation initiated, patient informed.     Susan Kendrick RN     Exam/Medical Decision Making

## 2023-07-08 ENCOUNTER — NURSE TRIAGE (OUTPATIENT)
Dept: NURSING | Facility: CLINIC | Age: 63
End: 2023-07-08
Payer: MEDICARE

## 2023-07-08 NOTE — TELEPHONE ENCOUNTER
Patient calling reports having episodes of confusion each morning where she doesn't recognize where she is. Reports previously knowing the date and time but over the past week or so no longer knowing these things. Patient reports being scared to the point of not sleeping. Patient reports a history of depression but reports this is different Reports feeling like her past has been erased. Patient reports waking up overnight without any issues, remembering where she is but when she wakes in the morning being confused. Paged Dr. Sebastian at 12:44 AM and 1:02 AM. No call back received. Advised the patient to see a health care provider within 4 hours per protocol with patient reporting she will go to the ER in the morning.     Gregoria Avalos RN 07/08/23 1:22 AM    Health Triage Nurse Advisor            Reason for Disposition    [1] Acting confused (e.g., disoriented, slurred speech) AND [2] brief (now gone)    Additional Information    Negative: [1] Difficult to awaken or acting confused (e.g., disoriented, slurred speech) AND [2] present now AND [3] diabetes mellitus    Negative: [1] Difficult to awaken or acting confused (e.g., disoriented, slurred speech) AND [2] present now AND [3] new-onset    Negative: [1] Numbness of the face, arm, or leg on one side of the body AND [2] new-onset    Negative: [1] Weakness of the face, arm, or leg on one side of the body AND [2] new-onset    Negative: Difficulty breathing or bluish lips    Negative: [1] Loss of speech or garbled speech AND [2] new-onset    Negative: Shock suspected (e.g., cold/pale/clammy skin, too weak to stand, low BP, rapid pulse)    Negative: Seeing, hearing, or feeling things that are not there (i.e., visual, auditory, or tactile hallucinations)    Negative: Followed a head injury    Negative: Drug overdose suspected    Negative: Sounds like a life-threatening emergency to the triager    Negative: Questions or concerns about alcohol use, unhealthy alcohol use,  binge drinking, intoxication, or withdrawal    Negative: Questions or concerns about substance use (drug use), unhealthy drug use, intoxication, or withdrawal    Negative: [1] Diabetes mellitus AND [2] confusion from low blood sugar (i.e., < 60 mg/dl or 3.5 mmol/l)    Negative: Headache or vomiting    Negative: Stiff neck (can't touch chin to chest)    Negative: Very strange or paranoid behavior    Negative: Fever > 100.4 F (38.0 C)    Negative: Patient sounds very sick or weak to the triager    Protocols used: CONFUSION - DELIRIUM-A-AH

## 2023-07-25 DIAGNOSIS — I10 BENIGN ESSENTIAL HYPERTENSION: ICD-10-CM

## 2023-07-25 RX ORDER — METOPROLOL SUCCINATE 50 MG/1
50 TABLET, EXTENDED RELEASE ORAL DAILY
Qty: 30 TABLET | Refills: 1 | Status: CANCELLED | OUTPATIENT
Start: 2023-07-25

## 2023-07-26 ENCOUNTER — NURSE TRIAGE (OUTPATIENT)
Dept: NURSING | Facility: CLINIC | Age: 63
End: 2023-07-26
Payer: MEDICARE

## 2023-07-26 NOTE — TELEPHONE ENCOUNTER
"Routing refill request to provider for review/approval because:  Labs out of range:  Blood pressure    Last Written Prescription Date:  10/12/22   Last Fill Quantity: 30,  # refills: 1   Last office visit provider:   11/10/22    Requested Prescriptions   Pending Prescriptions Disp Refills     metoprolol succinate ER (TOPROL XL) 50 MG 24 hr tablet 30 tablet 1     Sig: Take 1 tablet (50 mg) by mouth daily       Beta-Blockers Protocol Failed - 7/25/2023 11:20 PM        Failed - Blood pressure under 140/90 in past 12 months     BP Readings from Last 3 Encounters:   01/30/23 (!) 232/121   11/21/22 (!) 171/124   07/28/22 (!) 175/82                 Failed - Recent (12 mo) or future (30 days) visit within the authorizing provider's specialty     Patient has had an office visit with the authorizing provider or a provider within the authorizing providers department within the previous 12 mos or has a future within next 30 days. See \"Patient Info\" tab in inbasket, or \"Choose Columns\" in Meds & Orders section of the refill encounter.              Passed - Patient is age 6 or older        Passed - Medication is active on med list             Gregoria Avalos RN 07/25/23 11:24 PM  "

## 2023-07-26 NOTE — TELEPHONE ENCOUNTER
A.S.      Routing refill request to provider for review/approval because:  Seen at [facility] ED 7/24:  Cocaine withdrawal ([facility])   Uncontrolled hypertension ([facility])      No show to appointment with you 7/11/23  VV with you 10/12/22  Last refill 10/12/22 for #30 with 1 refill.    Thank you,  Lawanda Childers, RN

## 2023-07-26 NOTE — TELEPHONE ENCOUNTER
Patient calling reports being out of blood pressure medication. Reports leaving Religious AMA with high blood pressure today. Patient reports being given Metoprolol multiple times daily with current prescription for Metoprolol succinate daily. Advised per protocol unable to refill medication as the medication fails protocols due to blood pressure. Patient to go to the ER to check blood pressure and stay if blood pressure remains elevated. Patient did not have symptoms to triage.     Gregoria Avalos RN 07/25/23 11:33 PM   [facility] Triage Nurse Advisor

## 2023-07-27 RX ORDER — METOPROLOL SUCCINATE 50 MG/1
TABLET, EXTENDED RELEASE ORAL
Qty: 30 TABLET | Refills: 0 | Status: SHIPPED | OUTPATIENT
Start: 2023-07-27 | End: 2023-09-08

## 2023-07-27 NOTE — TELEPHONE ENCOUNTER
"Pt calling about hypertension and  BP med refill.   States she took a BP med at 4pm this afternoon  Pt reports Feeling tightening in throat, chest and upper body. Feels like she is having a cardiac problem.     Pt states she is not going to see PCP for office visit for BP meds refill. Requesting for PCP to refill med.    Advised pt will need to be evaluated immediately. Advised to call 911, pt refused. Pt became angry that PCP have not refill Rx. Pt states \"I'm not gonna drive to [facility] to see her for a pill! I'm not going back to the ED for a pill! Maybe I'll die and my family can alina her!\" And disconnected call.       Krystle Rhoades, RN, BSN  7/26/2023 at 9:58 PM  [place] [facility]    Reason for Disposition   Sounds like a life-threatening emergency to the triager    Additional Information   Negative: [1] Chest pain AND [2] took nitrogylcerin AND [3] pain was not relieved   Negative: [1] Numbness (i.e., loss of sensation) of the face, arm or leg on one side of the body AND [2] new-onset   Negative: [1] Chest pain lasts > 5 minutes AND [2] history of heart disease (i.e., heart attack, bypass surgery, angina, angioplasty, CHF)   Negative: SEVERE difficulty breathing (e.g., struggling for each breath, speaks in single words)   Negative: [1] Weakness of the face, arm or leg on one side of the body AND [2] new-onset   Negative: Difficult to awaken or acting confused (e.g., disoriented, slurred speech)    Protocols used: Blood Pressure - High-A-AH    "

## 2023-07-27 NOTE — TELEPHONE ENCOUNTER
"Called Dr Srivastava's office earlier today but she did not call patient back. Last time patient took BP was 4pm yesterday.   I\" am getting uncomfortable.\" She does not want to return to the ER, she was there last night. How about a partial prescription until she can see the Dr. She is angry, that Dr Srivastava did not return her call or order the medications.      She does not have a BP cuff @ home, but will get one when she picks up her prescription. She states she can tell by how she feels that it is high.   \"At least 1 pill\". She uses [facility] [address], [place] 24 hr.   ER: increased   Metoprolol succinate ER from 50mg daily to 50mg twice a day.  Will add Norvasc on 7/24 5mg daily  She left AMA so she was not given discharge prescriptions.   Would prefer  MD-- she had a virtual visit 10 days ago. She does not want to return to Dr Srivastava.   Advised to call [facility] and speak with triage nurse. Phone number given. She will request to contact oncall provider for medication orders. If oncall provider will not order them tonight, then recommended she get clinic visit for tomorrow to establish care with provider and get meds ordered. She refuses to go to the ER tonight.     Lynne Yun RN Triage Nurse Advisor 10:41 PM 7/26/2023  "

## 2023-08-23 ENCOUNTER — TRANSFERRED RECORDS (OUTPATIENT)
Dept: HEALTH INFORMATION MANAGEMENT | Facility: CLINIC | Age: 63
End: 2023-08-23
Payer: MEDICARE

## 2023-09-07 DIAGNOSIS — I10 BENIGN ESSENTIAL HYPERTENSION: ICD-10-CM

## 2023-09-08 RX ORDER — METOPROLOL SUCCINATE 50 MG/1
TABLET, EXTENDED RELEASE ORAL
Qty: 90 TABLET | Refills: 0 | Status: SHIPPED | OUTPATIENT
Start: 2023-09-08 | End: 2023-10-05

## 2023-09-08 NOTE — TELEPHONE ENCOUNTER
Spoke w/ patient.  Scheduled welcome to medicare visit w/ A.S on 9/14/23.  Out of medication.  Needs refill.  Thanks!  Whit GLASS

## 2023-09-08 NOTE — TELEPHONE ENCOUNTER
"Requested Prescriptions   Pending Prescriptions Disp Refills    metoprolol succinate ER (TOPROL XL) 50 MG 24 hr tablet [Pharmacy Med Name: METOPROLOL ER SUCCINATE 50MG TABS] 90 tablet 0     Sig: TAKE 1 TABLET(50 MG) BY MOUTH DAILY       Beta-Blockers Protocol Failed - 9/7/2023  6:13 PM        Failed - Blood pressure under 140/90 in past 12 months     BP Readings from Last 3 Encounters:   01/30/23 (!) 232/121   11/21/22 (!) 171/124   07/28/22 (!) 175/82                 Passed - Patient is age 6 or older        Passed - Recent (12 mo) or future (30 days) visit within the authorizing provider's specialty     Patient has had an office visit with the authorizing provider or a provider within the authorizing providers department within the previous 12 mos or has a future within next 30 days. See \"Patient Info\" tab in inbasket, or \"Choose Columns\" in Meds & Orders section of the refill encounter.              Passed - Medication is active on med list         Routing refill request to provider for review/approval because:  Patient needs to be seen because it has been more than 1 year since last office visit.     Will route to TC to assist in scheduling appointment. Pt does not have My chart.     Digna Young RN  [facility]   "

## 2023-10-05 ENCOUNTER — OFFICE VISIT (OUTPATIENT)
Dept: FAMILY MEDICINE | Facility: CLINIC | Age: 63
End: 2023-10-05
Payer: MEDICARE

## 2023-10-05 ENCOUNTER — TELEPHONE (OUTPATIENT)
Dept: FAMILY MEDICINE | Facility: CLINIC | Age: 63
End: 2023-10-05

## 2023-10-05 VITALS
SYSTOLIC BLOOD PRESSURE: 188 MMHG | HEART RATE: 80 BPM | WEIGHT: 140 LBS | RESPIRATION RATE: 17 BRPM | DIASTOLIC BLOOD PRESSURE: 92 MMHG | OXYGEN SATURATION: 97 % | TEMPERATURE: 97.6 F | HEIGHT: 63 IN | BODY MASS INDEX: 24.8 KG/M2

## 2023-10-05 DIAGNOSIS — I10 UNCONTROLLED HYPERTENSION: ICD-10-CM

## 2023-10-05 DIAGNOSIS — I10 BENIGN ESSENTIAL HYPERTENSION: ICD-10-CM

## 2023-10-05 DIAGNOSIS — F33.1 MODERATE RECURRENT MAJOR DEPRESSION (H): ICD-10-CM

## 2023-10-05 DIAGNOSIS — R35.0 FREQUENT URINATION: ICD-10-CM

## 2023-10-05 DIAGNOSIS — F19.20 CHEMICAL DEPENDENCY (H): ICD-10-CM

## 2023-10-05 DIAGNOSIS — I77.6 VASCULITIS (H): ICD-10-CM

## 2023-10-05 DIAGNOSIS — N18.4 CKD (CHRONIC KIDNEY DISEASE) STAGE 4, GFR 15-29 ML/MIN (H): Primary | ICD-10-CM

## 2023-10-05 PROBLEM — I77.82 ANCA-ASSOCIATED VASCULITIS (H): Status: ACTIVE | Noted: 2022-01-19

## 2023-10-05 LAB
ALBUMIN UR-MCNC: NEGATIVE MG/DL
APPEARANCE UR: CLEAR
BACTERIA #/AREA URNS HPF: ABNORMAL /HPF
BILIRUB UR QL STRIP: NEGATIVE
COLOR UR AUTO: YELLOW
CREAT UR-MCNC: 12.5 MG/DL
GLUCOSE UR STRIP-MCNC: NEGATIVE MG/DL
HGB UR QL STRIP: NEGATIVE
KETONES UR STRIP-MCNC: NEGATIVE MG/DL
LEUKOCYTE ESTERASE UR QL STRIP: ABNORMAL
MICROALBUMIN UR-MCNC: 25.4 MG/L
MICROALBUMIN/CREAT UR: 203.2 MG/G CR (ref 0–25)
NITRATE UR QL: POSITIVE
PH UR STRIP: 5.5 [PH] (ref 5–7)
RBC #/AREA URNS AUTO: ABNORMAL /HPF
SP GR UR STRIP: <=1.005 (ref 1–1.03)
UROBILINOGEN UR STRIP-ACNC: 0.2 E.U./DL
WBC #/AREA URNS AUTO: ABNORMAL /HPF

## 2023-10-05 PROCEDURE — 82570 ASSAY OF URINE CREATININE: CPT | Performed by: FAMILY MEDICINE

## 2023-10-05 PROCEDURE — 91320 SARSCV2 VAC 30MCG TRS-SUC IM: CPT | Performed by: FAMILY MEDICINE

## 2023-10-05 PROCEDURE — 99214 OFFICE O/P EST MOD 30 MIN: CPT | Mod: 25 | Performed by: FAMILY MEDICINE

## 2023-10-05 PROCEDURE — 82043 UR ALBUMIN QUANTITATIVE: CPT | Performed by: FAMILY MEDICINE

## 2023-10-05 PROCEDURE — 90480 ADMN SARSCOV2 VAC 1/ONLY CMP: CPT | Performed by: FAMILY MEDICINE

## 2023-10-05 PROCEDURE — 87086 URINE CULTURE/COLONY COUNT: CPT | Performed by: FAMILY MEDICINE

## 2023-10-05 PROCEDURE — G0009 ADMIN PNEUMOCOCCAL VACCINE: HCPCS | Performed by: FAMILY MEDICINE

## 2023-10-05 PROCEDURE — 90677 PCV20 VACCINE IM: CPT | Performed by: FAMILY MEDICINE

## 2023-10-05 PROCEDURE — 90662 IIV NO PRSV INCREASED AG IM: CPT | Performed by: FAMILY MEDICINE

## 2023-10-05 PROCEDURE — G0008 ADMIN INFLUENZA VIRUS VAC: HCPCS | Performed by: FAMILY MEDICINE

## 2023-10-05 PROCEDURE — 81001 URINALYSIS AUTO W/SCOPE: CPT | Performed by: FAMILY MEDICINE

## 2023-10-05 RX ORDER — PREDNISONE 20 MG/1
TABLET ORAL
COMMUNITY
End: 2023-10-12

## 2023-10-05 RX ORDER — METOPROLOL SUCCINATE 100 MG/1
100 TABLET, EXTENDED RELEASE ORAL DAILY
Qty: 90 TABLET | Refills: 0 | Status: SHIPPED | OUTPATIENT
Start: 2023-10-05 | End: 2024-03-18

## 2023-10-05 RX ORDER — AMLODIPINE BESYLATE 10 MG/1
10 TABLET ORAL DAILY
Qty: 30 TABLET | Refills: 0 | Status: SHIPPED | OUTPATIENT
Start: 2023-10-05 | End: 2023-10-19

## 2023-10-05 ASSESSMENT — PATIENT HEALTH QUESTIONNAIRE - PHQ9
10. IF YOU CHECKED OFF ANY PROBLEMS, HOW DIFFICULT HAVE THESE PROBLEMS MADE IT FOR YOU TO DO YOUR WORK, TAKE CARE OF THINGS AT HOME, OR GET ALONG WITH OTHER PEOPLE: VERY DIFFICULT
SUM OF ALL RESPONSES TO PHQ QUESTIONS 1-9: 15

## 2023-10-05 ASSESSMENT — PAIN SCALES - GENERAL: PAINLEVEL: NO PAIN (0)

## 2023-10-05 NOTE — NURSING NOTE
Prior to immunization administration, verified patients identity using patient s name and date of birth. Please see Immunization Activity for additional information.     Screening Questionnaire for Adult Immunization    Are you sick today?   No   Do you have allergies to medications, food, a vaccine component or latex?   No   Have you ever had a serious reaction after receiving a vaccination?   No   Do you have a long-term health problem with heart, lung, kidney, or metabolic disease (e.g., diabetes), asthma, a blood disorder, no spleen, complement component deficiency, a cochlear implant, or a spinal fluid leak?  Are you on long-term aspirin therapy?   No   Do you have cancer, leukemia, HIV/AIDS, or any other immune system problem?   No   Do you have a parent, brother, or sister with an immune system problem?   No   In the past 3 months, have you taken medications that affect  your immune system, such as prednisone, other steroids, or anticancer drugs; drugs for the treatment of rheumatoid arthritis, Crohn s disease, or psoriasis; or have you had radiation treatments?   No   Have you had a seizure, or a brain or other nervous system problem?   No   During the past year, have you received a transfusion of blood or blood    products, or been given immune (gamma) globulin or antiviral drug?   No   For women: Are you pregnant or is there a chance you could become       pregnant during the next month?   No   Have you received any vaccinations in the past 4 weeks?   No     Immunization questionnaire answers were all negative.      Patient instructed to remain in clinic for 15 minutes afterwards, and to report any adverse reactions.     Screening performed by Martin Wilson MA on 10/5/2023 at 12:40 PM.

## 2023-10-05 NOTE — TELEPHONE ENCOUNTER
Patient Returning Call    Reason for call:   left msg for pt daughter to get her phone fv [place]     Information relayed to patient:      Patient has additional questions:  No      Okay to leave a detailed message?: No at Other phone number:

## 2023-10-05 NOTE — PROGRESS NOTES
Assessment & Plan     CKD (chronic kidney disease) stage 4, GFR 15-29 ml/min (H)ANCA-associated  vasculitis  VV- nephro 3/16/22- and unfortunately she never followed up  Plan:  Commendable effort to avoid cociane- has cut back significantly  Has declined CD- eval-unfortunately  Understands to avoid NSAID as well & to proceed with nephrology consultation  Also discussed to improve Blood pressure control and medications changes made along with follow up       - Primary Care - Care Coordination Referral; Future to help with compliance with consultation and l  - Albumin Random Urine Quantitative with Creat Ratio    Vasculitis (H24)  - Adult Nephrology  Referral; Future    Moderate recurrent major depression (H)  - considered this problem when making today's plans  - no interventions today       -followed by specialist.       Chemical dependency (H)        Frequent urination      - UA Macroscopic with reflex to Microscopic and Culture - Lab Collect  - Urine Microscopic Exam  - Urine Culture    Uncontrolled hypertension  Plan:- metoprolol succinate ER (TOPROL XL) 100 MG 24 hr tablet; Take 1 tablet (100 mg) by mouth daily  - amLODIPine (NORVASC) 10 MG tablet; Take 1 tablet (10 mg) by mouth daily  Most likely will need a diuretic as well like chorthalidone  Can benefit from  ace as wlelll    Follow up appointment scheduled to discuss further medications changes     Ordering of each unique test  Prescription drug management  I spent a total of 36 minutes on the day of the visit.   Time spent by me doing chart review, history and exam, documentation and further activities per the note  {          10/5/2023    11:44 AM   PHQ   PHQ-9 Total Score 14   Q9: Thoughts of better off dead/self-harm past 2 weeks Not at all   F/U: Thoughts of suicide or self-harm No   F/U: Safety concerns No     Safety plan was reviewed; to the ER as needed or call after hours crisis line; [phone]  Sucide prevention life line  [phone]  Community out reach for psych emergencies [phone].  Education and counseling was done regarding use of medications, psychotherapy options.        Follow Up    Follow Up Actions Taken  Crisis resource information provided in the After Visit Summary    Discussed the following ways the patient can remain in a safe environment:  remove drugs      Melonie Srivastava MD  [facility]    Kathi Tavarez is a 63 year old, presenting for the following health issues:  Kidney Problem and Hospital F/U (Swelling in lips/Kidney diseaes)        10/5/2023    11:36 AM   Additional Questions   Roomed by robert ramirez   Accompanied by vinnie         10/5/2023    11:36 AM   Patient Reported Additional Medications   Patient reports taking the following new medications n/a     Wt Readings from Last 5 Encounters:   10/05/23 63.5 kg (140 lb)   01/30/23 63.5 kg (140 lb)   07/28/22 62.6 kg (138 lb)   06/08/22 63.5 kg (140 lb)   06/07/22 65.8 kg (145 lb)        Kidney Problem    History of Present Illness       [facility]    She eats 4 or more servings of fruits and vegetables daily.She consumes 3 sweetened beverage(s) daily.She exercises with enough effort to increase her heart rate 9 or less minutes per day.  She exercises with enough effort to increase her heart rate 3 or less days per week. She is missing 1 dose(s) of medications per week.  She is not taking prescribed medications regularly due to remembering to take.       ED/UC Followup:    Facility:  [facility] Emergency Department   Date of visit: 10/01/23  Reason for visit: swelling  Current Status: still swelling     Wants to know what is the cause of kidney damage and what pill will fix it  Admits cocaine use  Has cut back  Reports was buying 150$cocaine daily , that's about 1-1.5gm/day  Now busy and uses only 50$ twice /wk and that's down to - 1 gm/wk    She also complains of Bro, kids and here mom- dont like  "her  Everyone in family has drugs or alcohol abuse   She does not drink    Review of Systems   Constitutional, HEENT, cardiovascular, pulmonary, GI, , musculoskeletal, neuro, skin, endocrine and psych systems are negative, except as otherwise noted.      Objective    BP (!) 198/102 (BP Location: Left arm, Patient Position: Sitting, Cuff Size: Adult Regular)   Pulse 80   Temp 97.6  F (36.4  C) (Temporal)   Resp 17   Ht 1.6 m (5' 3\")   Wt 63.5 kg (140 lb)   LMP 04/04/2012   SpO2 97%   BMI 24.80 kg/m    Body mass index is 24.8 kg/m .  Physical Exam   GENERAL: healthy, alert and no distress  NECK: no adenopathy, no asymmetry, masses, or scars and thyroid normal to palpation  RESP: lungs clear to auscultation - no rales, rhonchi or wheezes  CV: regular rate and rhythm, normal S1 S2, no S3 or S4, no murmur, click or rub, no peripheral edema and peripheral pulses strong  ABDOMEN: soft, nontender, no hepatosplenomegaly, no masses and bowel sounds normal  MS: no gross musculoskeletal defects noted, no edema    No results found for this or any previous visit (from the past 24 hour(s)).                  "

## 2023-10-06 ENCOUNTER — TELEPHONE (OUTPATIENT)
Dept: FAMILY MEDICINE | Facility: CLINIC | Age: 63
End: 2023-10-06
Payer: MEDICARE

## 2023-10-06 ENCOUNTER — PATIENT OUTREACH (OUTPATIENT)
Dept: CARE COORDINATION | Facility: CLINIC | Age: 63
End: 2023-10-06
Payer: MEDICARE

## 2023-10-06 NOTE — PROGRESS NOTES
Clinic Care Coordination Contact  [facility]/Voicemail       Clinical Data: Care Coordinator Outreach  Outreach attempted x 1.  Patient's voicemail was full. CHW unable to leave message.  Plan: Care Coordinator will try to reach patient again in 1-2 business days.    OLIVIA Ferreira, B.A. [facility] Care Coordination  [facility]:   [facility]  [phone]

## 2023-10-06 NOTE — LETTER
[facility] CARE COORDINATION  [address]  [place] [id number]    October 10, 2023    Daysi Ashley  [address]  [place] [address]      Dear Daysi,        I am a  clinic community health worker who works with Melonie Srivastava MD with the [facility]. We have been trying to reach you recently to introduce Clinic Care Coordination. Below is a description of clinic care coordination and how I can further assist you.       The clinic care coordination team is made up of a registered nurse, , financial resource worker and community health worker who understand the health care system. The goal of clinic care coordination is to help you manage your health and improve access to the health care system. Our team works alongside your provider to assist you in determining your health and social needs. We can help you obtain health care and community resources, providing you with necessary information and education. We can work with you through any barriers and develop a care plan that helps coordinate and strengthen the communication between you and your care team.  Our services are voluntary and are offered without charge to you personally.    Please feel free to contact me with any questions or concerns regarding care coordination and what we can offer.      We are focused on providing you with the highest-quality healthcare experience possible.    Sincerely,     Jessika Sharma, OLIVIA   [phone]

## 2023-10-06 NOTE — TELEPHONE ENCOUNTER
Pt calling stating she had an appointment yesterday and was told a rx for magic mouthwash with a steroid and antifungal was going to be sent to the [facility] in [place] but the rx is not there.    I also do not see an order for it,    Please advise,    Pt is currently at pharmacy and would like refill.    Thanks!  Michael Farley RN   [facility]

## 2023-10-07 LAB — BACTERIA UR CULT: NO GROWTH

## 2023-10-09 NOTE — TELEPHONE ENCOUNTER
Attempt to reach patient.  Voice mail box full.    Will try to reach later.    Lawanda Childers RN

## 2023-10-09 NOTE — PROGRESS NOTES
Clinic Care Coordination Contact  [facility]/Voicemail      Clinical Data: CHW Outreach    Outreach attempted x 2 regarding CCC enrollment. CHW was unable to leave a message on patient's voicemail with call back information and requested return call. Due to Patient's Mailbox is not accepting new messages at this time.     Chart Review  Referral: PCP   If Patient is interested in CCC support, schedule Patient for an RN CCC Assessment with [facility] CCC RN (In-person/Clinic Visit)   Reason: Support with evaluation process for home health aide, or PCA - needs     Plan: CHW will attempt another outreach to the patient via phone regarding enrollment into CCC in 1-2 business days.    OLIVIA Kovacs  Clinic Care Coordination   [facility]   Phone: [phone]  [email or web address]

## 2023-10-09 NOTE — PROGRESS NOTES
Clinic Care Coordination Contact    Spoke to Patient (Daysi)  CHW Introduced intent of call regarding PCP referral  Patients reports that she is okay.   CHW introduce Clinic Care Coordination and Patient responded that she would be interested in speaking with RNCC, however would prefer for an in-person visit. CHW acknowledged and sent a message to RNCC. Patient requested for CHW to call Patient after RNCC responds.   RNCC indicated that Patient can meet with RNCC at [facility] on Wednesday (10/11/2023).     Care Team Conversations  RNCC indicated that she is only able to see Patient via in-person on Weds at [facility] ([address], [address], [place], [place], [address]) due to limited space at [facility]      OLIVIA Kovacs  Clinic Care Coordination   [facility]   Phone: [phone]  [email or web address]

## 2023-10-10 NOTE — PROGRESS NOTES
Clinic Care Coordination Contact  [facility]/Voicemail    Referral Source: PCP  Clinical Data: Care Coordinator Outreach  Outreach attempted x 3. Unable to leave voicemail    Plan: Care Coordinator will send care coordination introduction letter with care coordinator contact information and explanation of care coordination services via mail. Care Coordinator will do no further outreaches at this time.    OLIVIA Arriola, B.S. [facility] Care Coordination  [facility] Clinics:  [place], [place] and [place]  [phone]  [email or web address]

## 2023-10-12 ENCOUNTER — OFFICE VISIT (OUTPATIENT)
Dept: FAMILY MEDICINE | Facility: CLINIC | Age: 63
End: 2023-10-12
Payer: MEDICARE

## 2023-10-12 VITALS
SYSTOLIC BLOOD PRESSURE: 135 MMHG | WEIGHT: 142.2 LBS | HEIGHT: 61 IN | OXYGEN SATURATION: 98 % | DIASTOLIC BLOOD PRESSURE: 79 MMHG | TEMPERATURE: 98.5 F | RESPIRATION RATE: 16 BRPM | BODY MASS INDEX: 26.85 KG/M2 | HEART RATE: 71 BPM

## 2023-10-12 DIAGNOSIS — I10 HTN, GOAL BELOW 130/80: Primary | ICD-10-CM

## 2023-10-12 DIAGNOSIS — N18.4 CKD (CHRONIC KIDNEY DISEASE) STAGE 4, GFR 15-29 ML/MIN (H): ICD-10-CM

## 2023-10-12 DIAGNOSIS — I77.82 ANCA-ASSOCIATED VASCULITIS (H): ICD-10-CM

## 2023-10-12 PROCEDURE — 99213 OFFICE O/P EST LOW 20 MIN: CPT | Performed by: FAMILY MEDICINE

## 2023-10-12 ASSESSMENT — PAIN SCALES - GENERAL: PAINLEVEL: MODERATE PAIN (5)

## 2023-10-12 NOTE — PROGRESS NOTES
"  Assessment & Plan     HTN, goal below 130/80  Plan: she is currently on metoprolol 100 mg once daily  Blood pressure is controlled  Given her extensive vasculitis related ANCA  she does need close monitoring of Blood pressure and recheck labs-   I have tried to call her on her pone twice and unable to find her on phone. VM is not set up  I would like her to be reassess for Blood pressure control  Consider adding ace inhibitor for albuminuria   CKD (chronic kidney disease) stage 4, GFR 15-29 ml/min (H)  ANCA-associated vasculitis (H)  I am concerned that she has not made follow up appointment and today left before being seen .        I spent a total of 10 minutes on the day of the visit.   Time spent by me doing chart review, history and exam, documentation and further activities per the note          Melonie Srivastava MD  [facility]    Kathi Tavarez is a 63 year old, presenting for the following health issues:  Hypertension and Kidney Problem      Kidney Problem           Hypertension Follow-up    Do you check your blood pressure regularly outside of the clinic? No   Are you following a low salt diet? No  Are your blood pressures ever more than 140 on the top number (systolic) OR more   than 90 on the bottom number (diastolic), for example 140/90? No        Review of Systems         Objective    /79 (BP Location: Left arm, Patient Position: Sitting, Cuff Size: Adult Regular)   Pulse 71   Temp 98.5  F (36.9  C) (Temporal)   Resp 16   Ht 1.554 m (5' 1.2\")   Wt 64.5 kg (142 lb 3.2 oz)   LMP 04/04/2012   SpO2 98%   Breastfeeding No   BMI 26.69 kg/m    Body mass index is 26.69 kg/m .  Physical Exam                 "

## 2023-10-13 ENCOUNTER — TELEPHONE (OUTPATIENT)
Dept: FAMILY MEDICINE | Facility: CLINIC | Age: 63
End: 2023-10-13

## 2023-10-13 DIAGNOSIS — K12.1 MOUTH ULCERS: ICD-10-CM

## 2023-10-13 NOTE — TELEPHONE ENCOUNTER
A.S,  Spoke with patient.  BP today was 135/75.  RN schedule for BP follow-up.    States [facility] has filled this prescription for her in the past, would like it sent there.  Please advise.  Samantha BRUNSON RN

## 2023-10-13 NOTE — TELEPHONE ENCOUNTER
Patient calling because she wanted to say that she is sorry. She had to leave without being seen. She is pleased with her BP reading. Thank you for helping with that.     When should she come back to be seen?    She has purchased a BP monitor. If someone calls her she should be able to check BP now.     Prescription for Magis Mouthwash is still not at   [facility]. She is not sure if Dr. Srivastava still wants her to take that. It is still not at pharmacy if she does.    Lori De La Cruz RN  [facility]

## 2023-10-18 ENCOUNTER — TELEPHONE (OUTPATIENT)
Dept: FAMILY MEDICINE | Facility: CLINIC | Age: 63
End: 2023-10-18
Payer: MEDICARE

## 2023-10-18 NOTE — TELEPHONE ENCOUNTER
Attempted to return call to patient  Voicemail box full  Will re-try at a later time    No available appointments today at [facility]    Clair BURTON RN

## 2023-10-18 NOTE — TELEPHONE ENCOUNTER
Patient returned call  Scheduled for OV tomorrow  To discuss dermatology issues that is recurring today  Reports possible parasite infection or other issue involving areas on skin that shed  Has previously reported these concerns  But has not had an outbreak that has been seen by a provider yet  Clair BURTON RN

## 2023-10-18 NOTE — TELEPHONE ENCOUNTER
Patient called  Reporting facial swelling of the lips again  Requesting appointment  None available at [facility] today  Advised to go to ED, to be cautious regarding breathing difficulty  Denies at this time  Will call back to see if cancellations  States she will call 911 if breathing difficulty occurs  States it did not the previous occurrence of facial swelling  See ED encounter 10/1/23  Clair BURTON RN

## 2023-10-18 NOTE — TELEPHONE ENCOUNTER
Pt called stating she has Swollen again.   Itching on lips. On side of mouth hair/   Lower lip has started swelling again.  Tongue swelling, in gums and under lower lip;had similar s/s 10 days ago. Ed gave prednisone and mouthwash,   Pt feels like its the same.   Advised to go into Ed to rule out Allergic reaction.     Pt would like to see if Dr gray has any availability today.   Thanks,   Keenan Dow RN  [facility]

## 2023-10-19 ENCOUNTER — OFFICE VISIT (OUTPATIENT)
Dept: FAMILY MEDICINE | Facility: CLINIC | Age: 63
End: 2023-10-19
Payer: MEDICARE

## 2023-10-19 ENCOUNTER — LAB (OUTPATIENT)
Dept: FAMILY MEDICINE | Facility: CLINIC | Age: 63
End: 2023-10-19

## 2023-10-19 VITALS
HEART RATE: 63 BPM | SYSTOLIC BLOOD PRESSURE: 160 MMHG | RESPIRATION RATE: 16 BRPM | BODY MASS INDEX: 26.47 KG/M2 | TEMPERATURE: 97.9 F | DIASTOLIC BLOOD PRESSURE: 80 MMHG | OXYGEN SATURATION: 96 % | WEIGHT: 141 LBS

## 2023-10-19 DIAGNOSIS — L28.1 PRURIGO NODULARIS: Primary | ICD-10-CM

## 2023-10-19 DIAGNOSIS — I10 BENIGN ESSENTIAL HYPERTENSION: ICD-10-CM

## 2023-10-19 DIAGNOSIS — Z12.31 VISIT FOR SCREENING MAMMOGRAM: ICD-10-CM

## 2023-10-19 DIAGNOSIS — F40.218 FEAR OF PARASITES: ICD-10-CM

## 2023-10-19 DIAGNOSIS — Z12.11 SCREEN FOR COLON CANCER: ICD-10-CM

## 2023-10-19 DIAGNOSIS — E78.5 HYPERLIPIDEMIA LDL GOAL <160: ICD-10-CM

## 2023-10-19 DIAGNOSIS — I10 UNCONTROLLED HYPERTENSION: ICD-10-CM

## 2023-10-19 DIAGNOSIS — N18.4 CKD (CHRONIC KIDNEY DISEASE) STAGE 4, GFR 15-29 ML/MIN (H): ICD-10-CM

## 2023-10-19 DIAGNOSIS — F14.10 COCAINE ABUSE (H): ICD-10-CM

## 2023-10-19 DIAGNOSIS — R21 RASH: ICD-10-CM

## 2023-10-19 LAB
ANION GAP SERPL CALCULATED.3IONS-SCNC: 7 MMOL/L (ref 7–15)
BUN SERPL-MCNC: 36.8 MG/DL (ref 8–23)
CALCIUM SERPL-MCNC: 9 MG/DL (ref 8.8–10.2)
CHLORIDE SERPL-SCNC: 105 MMOL/L (ref 98–107)
CHOLEST SERPL-MCNC: 268 MG/DL
CREAT SERPL-MCNC: 1.89 MG/DL (ref 0.51–0.95)
DEPRECATED HCO3 PLAS-SCNC: 27 MMOL/L (ref 22–29)
EGFRCR SERPLBLD CKD-EPI 2021: 29 ML/MIN/1.73M2
GLUCOSE SERPL-MCNC: 103 MG/DL (ref 70–99)
HBA1C MFR BLD: 5.6 % (ref 0–5.6)
HDLC SERPL-MCNC: 60 MG/DL
LDLC SERPL CALC-MCNC: 190 MG/DL
NONHDLC SERPL-MCNC: 208 MG/DL
POTASSIUM SERPL-SCNC: 5.3 MMOL/L (ref 3.4–5.3)
SODIUM SERPL-SCNC: 139 MMOL/L (ref 135–145)
TRIGL SERPL-MCNC: 90 MG/DL

## 2023-10-19 PROCEDURE — 80061 LIPID PANEL: CPT | Performed by: FAMILY MEDICINE

## 2023-10-19 PROCEDURE — 83036 HEMOGLOBIN GLYCOSYLATED A1C: CPT | Performed by: FAMILY MEDICINE

## 2023-10-19 PROCEDURE — 80048 BASIC METABOLIC PNL TOTAL CA: CPT | Performed by: FAMILY MEDICINE

## 2023-10-19 PROCEDURE — 99215 OFFICE O/P EST HI 40 MIN: CPT | Performed by: FAMILY MEDICINE

## 2023-10-19 PROCEDURE — 36415 COLL VENOUS BLD VENIPUNCTURE: CPT | Performed by: FAMILY MEDICINE

## 2023-10-19 RX ORDER — AMLODIPINE BESYLATE 10 MG/1
10 TABLET ORAL DAILY
Qty: 90 TABLET | Refills: 1 | Status: SHIPPED | OUTPATIENT
Start: 2023-10-19

## 2023-10-19 RX ORDER — CALAMINE
LOTION (ML) TOPICAL
Qty: 180 ML | Refills: 4 | Status: SHIPPED | OUTPATIENT
Start: 2023-10-19

## 2023-10-19 RX ORDER — HYDROXYZINE HYDROCHLORIDE 10 MG/1
10 TABLET, FILM COATED ORAL
Qty: 30 TABLET | Refills: 3 | Status: SHIPPED | OUTPATIENT
Start: 2023-10-19

## 2023-10-19 RX ORDER — KETOCONAZOLE 20 MG/ML
SHAMPOO TOPICAL DAILY PRN
Qty: 100 ML | Refills: 1 | Status: SHIPPED | OUTPATIENT
Start: 2023-10-19 | End: 2023-10-19

## 2023-10-19 NOTE — PROGRESS NOTES
Assessment & Plan     Prurigo nodularis  Plan: pathophysiology discussed with patient.  Atopic dermatitis is often an underlying condition  Advised symptomatic treatment .  Trim the finger nails  Use lotion for itching as needed &  up to twice daily- &caution with sedation  See dermatologist & referral is given      - Calamine external lotion; Use as needed  once daily r twice daily , leesa at night over itchy area  - hydrOXYzine (ATARAX) 10 MG tablet; Take 1 tablet (10 mg) by mouth nightly as needed for itching    Rash  - Adult Dermatology  Referral; Future    Fear of parasites  - Adult Dermatology  Referral; Future    Hyperlipidemia LDL goal <160  Fasting for lab   - Lipid panel reflex to direct LDL Non-fasting; Future    Uncontrolled hypertension  Has not taken medications   Refill given for norvasc 10mg once daily  She has adequate refill on metoprolol 100 mg once daily .      Cocaine abuse (H)  No willingness for complete cessation    CKD (chronic kidney disease) stage 4, GFR 15-29 ml/min (H)  Plan: detail discussion about uncontrolled Blood pressure, Chronic Kidney Disease, history of vasculitis and need to be seen by the nephrologist.  [facility] will call you to coordinate your care as prescribed by the provider.  If you don t hear from a representative within 2 business days, please call [phone].   - BASIC METABOLIC PANEL; Future    Visit for screening mammogram  Reiterated the need for screening   - MA SCREENING DIGITAL BILAT - Future  (s+30); Future    Screen for colon cancer  Patient declined colonoscopy and willing to do stool test  - COLOGUARD(EXACT SCIENCES); Future    Ordering of each unique test  Prescription drug management  I spent a total of 45 minutes on the day of the visit.   Time spent by me doing chart review, history and exam, documentation and further activities per the note       BMI:   Estimated body mass index is 26.47 kg/m  as calculated from the  "following:    Height as of 10/12/23: 1.554 m (5' 1.2\").    Weight as of this encounter: 64 kg (141 lb).       Melonie Srivastava MD  [facility]    Kathi Tavarez is a 63 year old, presenting for the following health issues:  Derm Problem        10/19/2023     7:05 AM   Additional Questions   Roomed by Mikayla BOWMAN MA   Accompanied by self         10/19/2023     7:05 AM   Patient Reported Additional Medications   Patient reports taking the following new medications none       History of Present Illness       Reason for visit:  Rash    She eats 2-3 servings of fruits and vegetables daily.She consumes 2 sweetened beverage(s) daily.She exercises with enough effort to increase her heart rate 9 or less minutes per day.  She exercises with enough effort to increase her heart rate 3 or less days per week.   She is taking medications regularly.  Recurring tongue ulcers ,corners of mouth., eyebrows and scalp ,upper back for past 2.5 yrs & its been disregarded. She believes its parasite/worm infestation. They are itchhy, start as a nodule, and she picks on them.  She has multiple superficial wound on face because she pulls on \"skin bumps\" creating slef inflicting wound & cuurrently has  6 wounds on her face 2 on rt forehead near the scalp and a couple each on the cheeks , more on the left side of the face , than right. She has superficial wounds excoriation marks on upper back - all within the reach of her arm. She reports she feels the worms moving at night and scratches a lot in bed at night and skin & worms slough off & they been collected by her in ziploc bag & forgot to brig them.  On face eye brows are bad & bumpy from infestation.      Seen by dentist and they dismissed the tongue ulcer, did not fix the chipped molar- & told her she owed them a large amount. Seen by ovi dentist few weeks ago . They did not exam - her tongue. Does not trust that dentist either.     She was seeing an oral " "surgeon-Nicho Mack- friend's friend, maxofascial surgeon - 4 months ago- & they were not able to grind down the molar that was rubbing against the tongue. She reports that the tongue has been biopsy no diagnoses was made and she would like them to be repeated.       cocaine smoker this  week-3 days ago          Review of Systems   Constitutional, HEENT, cardiovascular, pulmonary, GI, , musculoskeletal, neuro, skin, endocrine and psych systems are negative, except as otherwise noted.      Objective    BP (!) 194/154   Pulse 63   Temp 97.9  F (36.6  C) (Temporal)   Resp 16   Wt 64 kg (141 lb)   LMP 04/04/2012   SpO2 96%   BMI 26.47 kg/m    Body mass index is 26.47 kg/m .  Physical Exam   GENERAL: healthy, alert and no distress  Skin: She has multiple superficial wound on face because she pulls on \"skin bumps\" creating slef inflicting wound & cuurrently has  6 wounds on her face 2 on rt forehead near the scalp and a couple each on the cheeks , more on the left side of the face , than right. She has superficial wounds excoriation marks on upper back - all within the reach of her arm  NECK: no adenopathy, no asymmetry, masses, or scars and thyroid normal to palpation  RESP: lungs clear to auscultation - no rales, rhonchi or wheezes  CV: regular rate and rhythm, normal S1 S2, no S3 or S4, no murmur, click or rub, no peripheral edema and peripheral pulses strong  ABDOMEN: soft, nontender, no hepatosplenomegaly, no masses and bowel sounds normal  MS: no gross musculoskeletal defects noted, no edema  PSYCH: mentation appears normal, affect normal/bright    Results for orders placed or performed in visit on 10/19/23 (from the past 24 hour(s))   Hemoglobin A1c   Result Value Ref Range    Hemoglobin A1C 5.6 0.0 - 5.6 %                   "

## 2023-10-19 NOTE — PATIENT INSTRUCTIONS
Patient scheduled to see LR today at 11.    Marisol Maciel RN  Triage [facility]     Prurigo nodularis  Trim the finger nails  Use lotion for itching as needed   Use oral medications bedtime for sure and as needed  up to twice daily  See dermatologist  - Calamine external lotion; Use as needed  once daily r twice daily , leesa at night over itchy area  - hydrOXYzine (ATARAX) 10 MG tablet; Take 1 tablet (10 mg) by mouth nightly as needed for itching

## 2023-10-20 ENCOUNTER — TELEPHONE (OUTPATIENT)
Dept: FAMILY MEDICINE | Facility: CLINIC | Age: 63
End: 2023-10-20
Payer: MEDICARE

## 2023-10-20 DIAGNOSIS — E78.2 MIXED HYPERLIPIDEMIA: Primary | ICD-10-CM

## 2023-10-20 NOTE — TELEPHONE ENCOUNTER
Test Results    Contacts         Type Contact Phone/Fax    10/20/2023 05:23 PM CDT Phone (Incoming) Daysi Ashley (Self) [phone] (M)            Who ordered the test:  PCP    Type of test: Lab    Date of test:  10/19/23    Where was the test performed:  [facility]    What are your questions/concerns?:  results    Okay to leave a detailed message?: Yes at Cell number on file:    Telephone Information:   Mobile [phone]

## 2023-10-23 NOTE — TELEPHONE ENCOUNTER
Triage,   Please see message below and advise.  Looks like on 10/19/23, patient had lipids, BMP, and A1C completed.   Thanks!  Whit GLASS

## 2023-10-26 ENCOUNTER — TELEPHONE (OUTPATIENT)
Dept: FAMILY MEDICINE | Facility: CLINIC | Age: 63
End: 2023-10-26
Payer: MEDICARE

## 2023-10-26 DIAGNOSIS — E78.2 MIXED HYPERLIPIDEMIA: ICD-10-CM

## 2023-10-26 RX ORDER — ATORVASTATIN CALCIUM 20 MG/1
20 TABLET, FILM COATED ORAL DAILY
Qty: 30 TABLET | Refills: 11 | Status: SHIPPED | OUTPATIENT
Start: 2023-10-26 | End: 2023-10-27

## 2023-10-26 NOTE — TELEPHONE ENCOUNTER
"A.S.      Patient saw you 10/19.  Called for follow up.    I have tiny black/red bumps on my eyebrows and eyelids that move at times.  \"I'm sure these bumps are mites. They enlarge and then split\"  \"No one believes me. I can see them but no one else can. I was there last week\"    States she wants some type of lotion/medication to take care of them    Lawanda Childers RN    "

## 2023-10-26 NOTE — TELEPHONE ENCOUNTER
Please call patient patient that for abnormal high fasting lipid- with LDL at 190, when it should be less than 130-avoid saturated fats and add excercise .  Her ten yr risk of fatal, nonfatal: cardiac and stroke  event is 12.8 % and that's very high.  I do recommend to start medications to lower cholesterol.      - atorvastatin (LIPITOR) 20 MG tablet; Take 1 tablet (20 mg) by mouth daily  Dispense: 30 tablet; Refill: 11    Recheck fasting lipid in 3 months      The kidney functions are abnormal and slightly worse.  I have contacted the nephrologist and they also have tried to contact her for follow up- if she has an appointment great- if not- that's the next very important step- the skin condition also may have relevance to declining Chronic Kidney Disease .    Please remind patient also to complete the colon cancer screening test- as cologaurd was order and ideally should have been delevered- if she completed that's great

## 2023-10-26 NOTE — TELEPHONE ENCOUNTER
Please call patient    In recent clinic encounter , we discussed that  she has definite skin condition-that needs attention from specialist.she was given a referral. Meanwhile she is prescribed calamine lotion.      Derm - information is this  [facility] [address]   Phone: [phone]   She can call them or any derm- covered by insurance to address skin concerns for diagnostic clarification.    Please avoid picking on the rash- that adds to super added bacterial infection    Thanks

## 2023-10-27 ENCOUNTER — TELEPHONE (OUTPATIENT)
Dept: NEPHROLOGY | Facility: CLINIC | Age: 63
End: 2023-10-27
Payer: MEDICARE

## 2023-10-27 DIAGNOSIS — R76.8 P-ANCA TITER POSITIVE: Primary | ICD-10-CM

## 2023-10-27 RX ORDER — ATORVASTATIN CALCIUM 20 MG/1
20 TABLET, FILM COATED ORAL DAILY
Qty: 90 TABLET | Refills: 3 | Status: SHIPPED | OUTPATIENT
Start: 2023-10-27

## 2023-10-27 NOTE — TELEPHONE ENCOUNTER
Update from Dr. Yap to please order labs and renal ultrasound before appt.  Labs ordered: C3, C4, MPO+PR3, anti-dsDNA, CMP, CBC+diff, UA and UPCR.  Call placed to patient who requested assistance in getting them scheduled at [facility] location.  Called and appts made and returned call to patient and updated her to both appts and confirmed location of both appts at [facility].  Patient instructed to call with any questions or concerns prior to her appt to Vasculitis Program nurse line. Patient verbalized understanding.    Team Work makes the Dream Work!    Silvia Kaiser RN, BSN, PHN  Vasculitis & Lupus Program Nurse Navigator  [phone]

## 2023-10-27 NOTE — TELEPHONE ENCOUNTER
VM left asking patient to call back.  There are 2 TE's for this  patient.    Lawanda Childers RN

## 2023-10-30 ENCOUNTER — LAB (OUTPATIENT)
Dept: LAB | Facility: CLINIC | Age: 63
End: 2023-10-30
Payer: MEDICARE

## 2023-10-30 DIAGNOSIS — R76.8 P-ANCA TITER POSITIVE: ICD-10-CM

## 2023-10-30 LAB
ALBUMIN SERPL BCG-MCNC: 4.3 G/DL (ref 3.5–5.2)
ALP SERPL-CCNC: 77 U/L (ref 35–104)
ALT SERPL W P-5'-P-CCNC: 13 U/L (ref 0–50)
ANION GAP SERPL CALCULATED.3IONS-SCNC: 12 MMOL/L (ref 7–15)
AST SERPL W P-5'-P-CCNC: 19 U/L (ref 0–45)
BASOPHILS # BLD AUTO: 0 10E3/UL (ref 0–0.2)
BASOPHILS NFR BLD AUTO: 1 %
BILIRUB SERPL-MCNC: 0.2 MG/DL
BUN SERPL-MCNC: 37 MG/DL (ref 8–23)
CALCIUM SERPL-MCNC: 9 MG/DL (ref 8.8–10.2)
CHLORIDE SERPL-SCNC: 103 MMOL/L (ref 98–107)
CREAT SERPL-MCNC: 2.26 MG/DL (ref 0.51–0.95)
DEPRECATED HCO3 PLAS-SCNC: 24 MMOL/L (ref 22–29)
EGFRCR SERPLBLD CKD-EPI 2021: 23 ML/MIN/1.73M2
EOSINOPHIL # BLD AUTO: 0.2 10E3/UL (ref 0–0.7)
EOSINOPHIL NFR BLD AUTO: 3 %
ERYTHROCYTE [DISTWIDTH] IN BLOOD BY AUTOMATED COUNT: 12.6 % (ref 10–15)
GLUCOSE SERPL-MCNC: 93 MG/DL (ref 70–99)
HCT VFR BLD AUTO: 41.9 % (ref 35–47)
HGB BLD-MCNC: 13.6 G/DL (ref 11.7–15.7)
IMM GRANULOCYTES # BLD: 0 10E3/UL
IMM GRANULOCYTES NFR BLD: 0 %
LYMPHOCYTES # BLD AUTO: 1.6 10E3/UL (ref 0.8–5.3)
LYMPHOCYTES NFR BLD AUTO: 35 %
MCH RBC QN AUTO: 29.6 PG (ref 26.5–33)
MCHC RBC AUTO-ENTMCNC: 32.5 G/DL (ref 31.5–36.5)
MCV RBC AUTO: 91 FL (ref 78–100)
MONOCYTES # BLD AUTO: 0.5 10E3/UL (ref 0–1.3)
MONOCYTES NFR BLD AUTO: 10 %
NEUTROPHILS # BLD AUTO: 2.3 10E3/UL (ref 1.6–8.3)
NEUTROPHILS NFR BLD AUTO: 50 %
PLATELET # BLD AUTO: 297 10E3/UL (ref 150–450)
POTASSIUM SERPL-SCNC: 4.4 MMOL/L (ref 3.4–5.3)
PROT SERPL-MCNC: 7 G/DL (ref 6.4–8.3)
RBC # BLD AUTO: 4.59 10E6/UL (ref 3.8–5.2)
SODIUM SERPL-SCNC: 139 MMOL/L (ref 135–145)
WBC # BLD AUTO: 4.7 10E3/UL (ref 4–11)

## 2023-10-30 PROCEDURE — 83876 ASSAY MYELOPEROXIDASE: CPT

## 2023-10-30 PROCEDURE — 86225 DNA ANTIBODY NATIVE: CPT

## 2023-10-30 PROCEDURE — 36415 COLL VENOUS BLD VENIPUNCTURE: CPT

## 2023-10-30 PROCEDURE — 86160 COMPLEMENT ANTIGEN: CPT

## 2023-10-30 PROCEDURE — 85025 COMPLETE CBC W/AUTO DIFF WBC: CPT

## 2023-10-30 PROCEDURE — 80053 COMPREHEN METABOLIC PANEL: CPT

## 2023-10-30 PROCEDURE — 83516 IMMUNOASSAY NONANTIBODY: CPT

## 2023-10-30 NOTE — TELEPHONE ENCOUNTER
Call from patient to confirm upcoming appts.  Reviewed EPIC appts with her, confirming today's ultrasound and lab appt, Derm and Nephrology in the next 2 weeks.  Patient verbalized understanding. Denies further questions or concerns at this time.  Team Work makes the Dream Work!    Silvia Kaiser RN, BSN, PHN  Vasculitis & Lupus Program Nurse Navigator  [phone]

## 2023-10-31 LAB
C3 SERPL-MCNC: 95 MG/DL (ref 81–157)
C4 SERPL-MCNC: 20 MG/DL (ref 13–39)
DSDNA AB SER-ACNC: 1.9 IU/ML
MYELOPEROXIDASE AB SER IA-ACNC: 9.7 U/ML
MYELOPEROXIDASE AB SER IA-ACNC: POSITIVE
PROTEINASE3 AB SER IA-ACNC: 5.3 U/ML
PROTEINASE3 AB SER IA-ACNC: POSITIVE

## 2023-10-31 NOTE — TELEPHONE ENCOUNTER
Attempt #2 to reach patient.  Closing encounter.    Left message for patient to call [facility] back  When patient calls back please transfer to an RN  Samantha BRUNSON RN

## 2023-10-31 NOTE — TELEPHONE ENCOUNTER
Attempt #2 to reach patient.  Closing encounter.    Left message for patient to call [facility] back  When patient calls back please transfer to an RN  Samantha BURNSON RN

## 2023-11-01 ENCOUNTER — APPOINTMENT (OUTPATIENT)
Dept: GENERAL RADIOLOGY | Facility: CLINIC | Age: 63
End: 2023-11-01
Attending: EMERGENCY MEDICINE
Payer: MEDICARE

## 2023-11-01 ENCOUNTER — APPOINTMENT (OUTPATIENT)
Dept: CT IMAGING | Facility: CLINIC | Age: 63
End: 2023-11-01
Attending: EMERGENCY MEDICINE
Payer: MEDICARE

## 2023-11-01 ENCOUNTER — HOSPITAL ENCOUNTER (OUTPATIENT)
Facility: CLINIC | Age: 63
Setting detail: OBSERVATION
Discharge: LEFT BEFORE DISCHARGE | End: 2023-11-01
Attending: EMERGENCY MEDICINE | Admitting: INTERNAL MEDICINE
Payer: MEDICARE

## 2023-11-01 VITALS
BODY MASS INDEX: 24.8 KG/M2 | WEIGHT: 140 LBS | HEIGHT: 63 IN | DIASTOLIC BLOOD PRESSURE: 100 MMHG | HEART RATE: 70 BPM | TEMPERATURE: 98.2 F | RESPIRATION RATE: 18 BRPM | OXYGEN SATURATION: 97 % | SYSTOLIC BLOOD PRESSURE: 171 MMHG

## 2023-11-01 DIAGNOSIS — R91.1 PULMONARY NODULE: ICD-10-CM

## 2023-11-01 DIAGNOSIS — G93.40 ENCEPHALOPATHY: ICD-10-CM

## 2023-11-01 PROBLEM — R41.82 AMS (ALTERED MENTAL STATUS): Status: ACTIVE | Noted: 2023-11-01

## 2023-11-01 LAB
ALBUMIN SERPL BCG-MCNC: 3.9 G/DL (ref 3.5–5.2)
ALBUMIN UR-MCNC: 20 MG/DL
ALP SERPL-CCNC: 74 U/L (ref 35–104)
ALT SERPL W P-5'-P-CCNC: 11 U/L (ref 0–50)
AMMONIA PLAS-SCNC: 19 UMOL/L (ref 11–51)
AMPHETAMINES UR QL SCN: ABNORMAL
ANION GAP SERPL CALCULATED.3IONS-SCNC: 9 MMOL/L (ref 7–15)
APPEARANCE UR: CLEAR
AST SERPL W P-5'-P-CCNC: 14 U/L (ref 0–45)
ATRIAL RATE - MUSE: 73 BPM
BARBITURATES UR QL SCN: ABNORMAL
BASOPHILS # BLD AUTO: 0 10E3/UL (ref 0–0.2)
BASOPHILS NFR BLD AUTO: 1 %
BENZODIAZ UR QL SCN: ABNORMAL
BILIRUB SERPL-MCNC: 0.2 MG/DL
BILIRUB UR QL STRIP: NEGATIVE
BUN SERPL-MCNC: 28.5 MG/DL (ref 8–23)
BZE UR QL SCN: ABNORMAL
CALCIUM SERPL-MCNC: 9.1 MG/DL (ref 8.8–10.2)
CANNABINOIDS UR QL SCN: ABNORMAL
CHLORIDE SERPL-SCNC: 106 MMOL/L (ref 98–107)
COLOR UR AUTO: ABNORMAL
CREAT SERPL-MCNC: 1.95 MG/DL (ref 0.51–0.95)
DEPRECATED HCO3 PLAS-SCNC: 25 MMOL/L (ref 22–29)
DIASTOLIC BLOOD PRESSURE - MUSE: NORMAL MMHG
EGFRCR SERPLBLD CKD-EPI 2021: 28 ML/MIN/1.73M2
EOSINOPHIL # BLD AUTO: 0.1 10E3/UL (ref 0–0.7)
EOSINOPHIL NFR BLD AUTO: 2 %
ERYTHROCYTE [DISTWIDTH] IN BLOOD BY AUTOMATED COUNT: 12.5 % (ref 10–15)
ETHANOL SERPL-MCNC: <0.01 G/DL
FENTANYL UR QL: ABNORMAL
GLUCOSE SERPL-MCNC: 106 MG/DL (ref 70–99)
GLUCOSE UR STRIP-MCNC: NEGATIVE MG/DL
HCT VFR BLD AUTO: 42.3 % (ref 35–47)
HGB BLD-MCNC: 14 G/DL (ref 11.7–15.7)
HGB UR QL STRIP: NEGATIVE
IMM GRANULOCYTES # BLD: 0 10E3/UL
IMM GRANULOCYTES NFR BLD: 0 %
INTERPRETATION ECG - MUSE: NORMAL
KETONES UR STRIP-MCNC: NEGATIVE MG/DL
LEUKOCYTE ESTERASE UR QL STRIP: NEGATIVE
LYMPHOCYTES # BLD AUTO: 1.2 10E3/UL (ref 0.8–5.3)
LYMPHOCYTES NFR BLD AUTO: 22 %
MAGNESIUM SERPL-MCNC: 2.1 MG/DL (ref 1.7–2.3)
MCH RBC QN AUTO: 29.9 PG (ref 26.5–33)
MCHC RBC AUTO-ENTMCNC: 33.1 G/DL (ref 31.5–36.5)
MCV RBC AUTO: 90 FL (ref 78–100)
MONOCYTES # BLD AUTO: 0.5 10E3/UL (ref 0–1.3)
MONOCYTES NFR BLD AUTO: 8 %
MUCOUS THREADS #/AREA URNS LPF: PRESENT /LPF
NEUTROPHILS # BLD AUTO: 3.8 10E3/UL (ref 1.6–8.3)
NEUTROPHILS NFR BLD AUTO: 67 %
NITRATE UR QL: NEGATIVE
NRBC # BLD AUTO: 0 10E3/UL
NRBC BLD AUTO-RTO: 0 /100
NT-PROBNP SERPL-MCNC: 1084 PG/ML (ref 0–900)
OPIATES UR QL SCN: ABNORMAL
P AXIS - MUSE: 63 DEGREES
PCP QUAL URINE (ROCHE): ABNORMAL
PH UR STRIP: 6.5 [PH] (ref 5–7)
PLATELET # BLD AUTO: 265 10E3/UL (ref 150–450)
POTASSIUM SERPL-SCNC: 4.7 MMOL/L (ref 3.4–5.3)
PR INTERVAL - MUSE: 140 MS
PROT SERPL-MCNC: 6.7 G/DL (ref 6.4–8.3)
QRS DURATION - MUSE: 80 MS
QT - MUSE: 406 MS
QTC - MUSE: 447 MS
R AXIS - MUSE: 20 DEGREES
RBC # BLD AUTO: 4.68 10E6/UL (ref 3.8–5.2)
RBC URINE: <1 /HPF
SODIUM SERPL-SCNC: 140 MMOL/L (ref 135–145)
SP GR UR STRIP: 1.02 (ref 1–1.03)
SYSTOLIC BLOOD PRESSURE - MUSE: NORMAL MMHG
T AXIS - MUSE: 52 DEGREES
TROPONIN T SERPL HS-MCNC: 18 NG/L
TROPONIN T SERPL HS-MCNC: 18 NG/L
TSH SERPL DL<=0.005 MIU/L-ACNC: 0.51 UIU/ML (ref 0.3–4.2)
UROBILINOGEN UR STRIP-MCNC: NORMAL MG/DL
VENTRICULAR RATE- MUSE: 73 BPM
WBC # BLD AUTO: 5.7 10E3/UL (ref 4–11)
WBC URINE: <1 /HPF

## 2023-11-01 PROCEDURE — 85025 COMPLETE CBC W/AUTO DIFF WBC: CPT | Performed by: EMERGENCY MEDICINE

## 2023-11-01 PROCEDURE — 82140 ASSAY OF AMMONIA: CPT | Performed by: EMERGENCY MEDICINE

## 2023-11-01 PROCEDURE — 70450 CT HEAD/BRAIN W/O DYE: CPT

## 2023-11-01 PROCEDURE — 82077 ASSAY SPEC XCP UR&BREATH IA: CPT | Performed by: EMERGENCY MEDICINE

## 2023-11-01 PROCEDURE — 80307 DRUG TEST PRSMV CHEM ANLYZR: CPT | Performed by: EMERGENCY MEDICINE

## 2023-11-01 PROCEDURE — 71250 CT THORAX DX C-: CPT

## 2023-11-01 PROCEDURE — 81001 URINALYSIS AUTO W/SCOPE: CPT | Mod: XU | Performed by: EMERGENCY MEDICINE

## 2023-11-01 PROCEDURE — 93005 ELECTROCARDIOGRAM TRACING: CPT

## 2023-11-01 PROCEDURE — G0378 HOSPITAL OBSERVATION PER HR: HCPCS

## 2023-11-01 PROCEDURE — 83735 ASSAY OF MAGNESIUM: CPT | Performed by: EMERGENCY MEDICINE

## 2023-11-01 PROCEDURE — 84484 ASSAY OF TROPONIN QUANT: CPT | Performed by: EMERGENCY MEDICINE

## 2023-11-01 PROCEDURE — 80053 COMPREHEN METABOLIC PANEL: CPT | Performed by: EMERGENCY MEDICINE

## 2023-11-01 PROCEDURE — 36415 COLL VENOUS BLD VENIPUNCTURE: CPT | Performed by: EMERGENCY MEDICINE

## 2023-11-01 PROCEDURE — 99285 EMERGENCY DEPT VISIT HI MDM: CPT | Mod: 25

## 2023-11-01 PROCEDURE — 83880 ASSAY OF NATRIURETIC PEPTIDE: CPT | Mod: GZ | Performed by: EMERGENCY MEDICINE

## 2023-11-01 PROCEDURE — 250N000013 HC RX MED GY IP 250 OP 250 PS 637: Performed by: EMERGENCY MEDICINE

## 2023-11-01 PROCEDURE — 84443 ASSAY THYROID STIM HORMONE: CPT | Performed by: EMERGENCY MEDICINE

## 2023-11-01 PROCEDURE — 71046 X-RAY EXAM CHEST 2 VIEWS: CPT

## 2023-11-01 PROCEDURE — 99223 1ST HOSP IP/OBS HIGH 75: CPT | Performed by: INTERNAL MEDICINE

## 2023-11-01 RX ORDER — METOPROLOL SUCCINATE 100 MG/1
100 TABLET, EXTENDED RELEASE ORAL ONCE
Status: COMPLETED | OUTPATIENT
Start: 2023-11-01 | End: 2023-11-01

## 2023-11-01 RX ORDER — HYDRALAZINE HYDROCHLORIDE 20 MG/ML
10 INJECTION INTRAMUSCULAR; INTRAVENOUS EVERY 4 HOURS PRN
Status: CANCELLED | OUTPATIENT
Start: 2023-11-01

## 2023-11-01 RX ORDER — HYDROXYZINE HYDROCHLORIDE 10 MG/1
10 TABLET, FILM COATED ORAL
Status: CANCELLED | OUTPATIENT
Start: 2023-11-01

## 2023-11-01 RX ORDER — ACETAMINOPHEN 325 MG/1
650 TABLET ORAL EVERY 6 HOURS PRN
Status: CANCELLED | OUTPATIENT
Start: 2023-11-01

## 2023-11-01 RX ORDER — AMOXICILLIN 250 MG
2 CAPSULE ORAL 2 TIMES DAILY PRN
Status: CANCELLED | OUTPATIENT
Start: 2023-11-01

## 2023-11-01 RX ORDER — AMLODIPINE BESYLATE 5 MG/1
10 TABLET ORAL ONCE
Status: COMPLETED | OUTPATIENT
Start: 2023-11-01 | End: 2023-11-01

## 2023-11-01 RX ORDER — VENLAFAXINE HYDROCHLORIDE 37.5 MG/1
75 TABLET, EXTENDED RELEASE ORAL DAILY
Status: CANCELLED | OUTPATIENT
Start: 2023-11-01

## 2023-11-01 RX ORDER — AMOXICILLIN 250 MG
1 CAPSULE ORAL 2 TIMES DAILY PRN
Status: CANCELLED | OUTPATIENT
Start: 2023-11-01

## 2023-11-01 RX ORDER — ONDANSETRON 2 MG/ML
4 INJECTION INTRAMUSCULAR; INTRAVENOUS EVERY 6 HOURS PRN
Status: CANCELLED | OUTPATIENT
Start: 2023-11-01

## 2023-11-01 RX ORDER — CLINDAMYCIN PHOSPHATE 10 UG/ML
LOTION TOPICAL 2 TIMES DAILY PRN
COMMUNITY

## 2023-11-01 RX ORDER — AMLODIPINE BESYLATE 5 MG/1
10 TABLET ORAL DAILY
Status: CANCELLED | OUTPATIENT
Start: 2023-11-02

## 2023-11-01 RX ORDER — METOPROLOL SUCCINATE 100 MG/1
100 TABLET, EXTENDED RELEASE ORAL DAILY
Status: CANCELLED | OUTPATIENT
Start: 2023-11-02

## 2023-11-01 RX ORDER — ARIPIPRAZOLE 5 MG/1
5 TABLET ORAL DAILY
Status: CANCELLED | OUTPATIENT
Start: 2023-11-01

## 2023-11-01 RX ORDER — ACETAMINOPHEN 650 MG/1
650 SUPPOSITORY RECTAL EVERY 6 HOURS PRN
Status: CANCELLED | OUTPATIENT
Start: 2023-11-01

## 2023-11-01 RX ORDER — ONDANSETRON 4 MG/1
4 TABLET, ORALLY DISINTEGRATING ORAL EVERY 6 HOURS PRN
Status: CANCELLED | OUTPATIENT
Start: 2023-11-01

## 2023-11-01 RX ORDER — CALAMINE
LOTION (ML) TOPICAL 3 TIMES DAILY
Status: CANCELLED | OUTPATIENT
Start: 2023-11-01

## 2023-11-01 RX ADMIN — AMLODIPINE BESYLATE 10 MG: 5 TABLET ORAL at 14:03

## 2023-11-01 RX ADMIN — METOPROLOL SUCCINATE 100 MG: 100 TABLET, EXTENDED RELEASE ORAL at 13:15

## 2023-11-01 ASSESSMENT — ACTIVITIES OF DAILY LIVING (ADL)
ADLS_ACUITY_SCORE: 35

## 2023-11-01 NOTE — CONFIDENTIAL NOTE
Called patient with a appointment reminder for Mon. 11/13/23 @ 1:00 pm video with Dr. Madhuri Wilson on 11/1/2023 at 1:08 PM

## 2023-11-01 NOTE — TELEPHONE ENCOUNTER
Message left to patient on missed UA/Protein Urine and US and need assist to reschedule before appt.  Team Work makes the Dream Work!    Silvia Kaiser RN, BSN, PHN  Vasculitis & Lupus Program Nurse Navigator  [phone]
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
## 2023-11-01 NOTE — ED TRIAGE NOTES
Pt comes in to ER via EMS from home where pt lives alone. Pt states she has been dealing with some confusion for the last few weeks, has become worse over the last week. Pt states she is unable to care for herself, does not remember how to do simple tasks. Pt stopped taking her medications 3 days ago. . Pt here is alert and oriented x4.      Triage Assessment (Adult)       Row Name 11/01/23 1149          Triage Assessment    Airway WDL WDL        Respiratory WDL    Respiratory WDL WDL        Skin Circulation/Temperature WDL    Skin Circulation/Temperature WDL WDL        Cardiac WDL    Cardiac WDL WDL        Peripheral/Neurovascular WDL    Peripheral Neurovascular WDL WDL        Cognitive/Neuro/Behavioral WDL    Cognitive/Neuro/Behavioral WDL WDL

## 2023-11-01 NOTE — ED NOTES
Bed: ED12  Expected date:   Expected time:   Means of arrival:   Comments:  Raysa - 63 F confusion HTN eta 1130

## 2023-11-01 NOTE — PHARMACY-ADMISSION MEDICATION HISTORY
Pharmacist Admission Medication History    Admission medication history is complete. The information provided in this note is only as accurate as the sources available at the time of the update.    Information Source(s): Patient via in-person    Pertinent Information: non compliance noted    Changes made to PTA medication list:  Added: clindamycin lotion  Deleted: None  Changed: None       Allergies reviewed with patient and updates made in EHR: no    Medication History Completed By: Aletha Pitts [facility] 11/1/2023 6:17 PM    PTA Med List   Medication Sig Last Dose    amLODIPine (NORVASC) 10 MG tablet Take 1 tablet (10 mg) by mouth daily Past Week    ARIPiprazole (ABILIFY) 10 MG tablet Take 5 mg by mouth daily Past Week    atorvastatin (LIPITOR) 20 MG tablet TAKE 1 TABLET(20 MG) BY MOUTH DAILY Past Week    clindamycin (CLEOCIN T) 1 % external lotion Apply topically 2 times daily as needed     hydrOXYzine (ATARAX) 10 MG tablet Take 1 tablet (10 mg) by mouth nightly as needed for itching Past Week    magic mouthwash (ENTER INGREDIENTS IN COMMENTS) suspension Take 5 mLs by mouth every 4 hours as needed Past Week    metoprolol succinate ER (TOPROL XL) 100 MG 24 hr tablet Take 1 tablet (100 mg) by mouth daily Past Week    venlafaxine (EFFEXOR-ER) 37.5 MG 24 hr tablet Take 75 mg by mouth daily Past Week
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
## 2023-11-02 ENCOUNTER — NURSE TRIAGE (OUTPATIENT)
Dept: FAMILY MEDICINE | Facility: CLINIC | Age: 63
End: 2023-11-02
Payer: MEDICARE

## 2023-11-02 DIAGNOSIS — F14.10 COCAINE ABUSE (H): Primary | ICD-10-CM

## 2023-11-02 NOTE — ED NOTES
[place]  called back this RN stating they drove past the pt's house multiple times along with calling the pt's phone-- no response. Officer states they will continue to drive by and monitor the pt's home for activity. Officers aware hospital staff is concerned for pt having an IV still. Officers have hospital number to call back if anything changes. Charge nurse aware of situation.

## 2023-11-02 NOTE — DISCHARGE SUMMARY
Hospitalist Update    Notified that patient eloped from ED earlier this evening.     Zahira Hill,   Internal Medicine - Hospitalist  11/1/2023  10:32 PM

## 2023-11-02 NOTE — ED NOTES
"When the RN was rounding on the pt around 1925 it was discovered the pt was not in the room. Security and writer checked the cameras the patient had left and seen leaving with a gentleman at approximately 1845. Charge RN and security notified. RN called the pt on her cell phone, pt was uncooperative but states she is at home. Pt would not confirm address of home. Pt also states she \"pulled that IV out\" when she left. No signs of IV left in pt's room. [place] [facility] called to check on pt at the address of [address]. PD aware pt may still have IV in arm.       "
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
## 2023-11-02 NOTE — TELEPHONE ENCOUNTER
I reviewed most recent ED visit from second November.  It reports she has/was not able to take her blood pressure medication for 3 days prior to the visit.  That explains very high blood pressure.  Consistency with medications are important.  If she believes she has been taking all the blood pressure medication let the blood pressure remains elevated along with additional concerns then next office urgent care or  or emergency room for that high blood pressure.      Unfortunately she continues to use cocaine and that is contributing to multiple  concerns including blood pressure concern    1. Cocaine abuse (H)    - Adult Mental Health  Referral; Future

## 2023-11-02 NOTE — PROGRESS NOTES
RECEIVING UNIT ED HANDOFF REVIEW    ED Nurse Handoff Report was reviewed by: Cayla Vega RN on November 1, 2023 at 8:21 PM

## 2023-11-06 ENCOUNTER — TELEPHONE (OUTPATIENT)
Dept: FAMILY MEDICINE | Facility: CLINIC | Age: 63
End: 2023-11-06
Payer: MEDICARE

## 2023-11-21 ENCOUNTER — OFFICE VISIT (OUTPATIENT)
Dept: URGENT CARE | Facility: URGENT CARE | Age: 63
End: 2023-11-21
Payer: MEDICARE

## 2023-11-21 VITALS
SYSTOLIC BLOOD PRESSURE: 145 MMHG | TEMPERATURE: 97.7 F | HEART RATE: 73 BPM | DIASTOLIC BLOOD PRESSURE: 86 MMHG | OXYGEN SATURATION: 99 %

## 2023-11-21 DIAGNOSIS — I10 BENIGN ESSENTIAL HYPERTENSION: ICD-10-CM

## 2023-11-21 DIAGNOSIS — L30.9 DERMATITIS: ICD-10-CM

## 2023-11-21 DIAGNOSIS — R22.0 TONGUE SWELLING: Primary | ICD-10-CM

## 2023-11-21 DIAGNOSIS — T78.40XA ALLERGIC REACTION, INITIAL ENCOUNTER: ICD-10-CM

## 2023-11-21 PROCEDURE — 99214 OFFICE O/P EST MOD 30 MIN: CPT | Performed by: EMERGENCY MEDICINE

## 2023-11-21 RX ORDER — PREDNISONE 20 MG/1
40 TABLET ORAL DAILY
Qty: 10 TABLET | Refills: 0 | Status: SHIPPED | OUTPATIENT
Start: 2023-11-21 | End: 2023-11-26

## 2023-11-21 RX ORDER — PERMETHRIN 50 MG/G
CREAM TOPICAL
Qty: 60 G | Refills: 1 | Status: SHIPPED | OUTPATIENT
Start: 2023-11-21

## 2023-11-21 NOTE — PROGRESS NOTES
Assessment & Plan     Diagnosis:    ICD-10-CM    1. Tongue swelling  R22.0 predniSONE (DELTASONE) 20 MG tablet      2. Dermatitis  L30.9 permethrin (ELIMITE) 5 % external cream      3. Benign essential hypertension  I10     take your Amlodipine and Metoprolol a prescribed and follow up closely with PCP for recheck      4. Allergic reaction, initial encounter  T78.40XA predniSONE (DELTASONE) 20 MG tablet          Medical Decision Making:  Daysi Ashley is a 63 year old female who presents for evaluation of tongue pain/swelling, slight odynophagia without dysphagia, HTN and rash concerns. Patient has history of angioedema --this is happened in the past and exam she does have slightly swollen tongue, appears symmetric however.  There is no lip swelling, rashes, wheezing, difficulty breathing or other allergic reaction symptoms.  Patient prefers to start antihistamines at home, will be prescribed prednisone.      She also has a rash that has been ongoing for a long time; has been on Dupixent and clindamycin cream with no improvement.  This does appear like excoriations and possible psychosomatic component; but nonetheless we will also try her on permethrin as she is concerned about scabies/mites.    Blood pressure addressed, she took her blood pressure medication this morning and she is near her goal, slightly hypertensive here but certainly without any signs of hypertensive emergency or urgency. Patient verbalizes understanding and agreement with the plan including reasons to go to the ER. All questions answered.       OSVALDO Cuello [facility]    Subjective     Daysi Ashley is a 63 year old female who presents to clinic today for the following health issues:  Chief Complaint   Patient presents with    Hypertension     Pt reports high BP(204/136-1 hour ago) with SOB since this morning.    Derm Problem     Scalp and back. Dermatologist PCP prescribed Dupixant in Aug. Pt states it is not working.        HPI  Patient reports tongue swelling for the past 12 hours, slight pain with swallowing but no difficulties. She notes this happening in the past; unclear cause and has not started any new medications.  She denies any new rashes, chest pain. She also like to discuss her chronic rashes but on her face, back, is very itchy.  Has been diagnosed with multiple things by her dermatologist, is currently on Dupixent.  She feels it this is not helpful at all.  She does think the clindamycin lotion is helping.  She believes that there is bugs as her stuff coming out of the pores at times.  She also concerned about her blood pressure, it was 204/136 this morning, this is before she took her blood pressure medication.  She is feeling slightly short of breath and nervous.  No chest pain or other concerns.    Review of Systems    See HPI    Objective      Vitals: BP (!) 145/86   Pulse 73   Temp 97.7  F (36.5  C) (Tympanic)   LMP 04/04/2012   SpO2 99%       Patient Vitals for the past 24 hrs:   BP Temp Temp src Pulse SpO2   11/21/23 1442 (!) 145/86 -- -- -- --   11/21/23 1411 (!) 142/81 97.7  F (36.5  C) Tympanic 73 99 %       Vital signs reviewed by: Jose Peck PA-C    Physical Exam   Constitutional: Patient is alert and cooperative. No acute distress.  Ears: Right TM is normal. Left TM is normal. External ear canals are normal.  Mouth: Mucous membranes are moist. Tongue appears slightly swollen; symmetric. Normal lips. Posterior oropharynx is clear.  Eyes: Conjunctivae, EOMI and lids are normal.  Cardiovascular: Regular rate and rhythm  Pulmonary/Chest: Lungs are clear to auscultation throughout. Effort normal. No respiratory distress. No wheezes, rales or rhonchi.  Neurological: Alert and oriented x3.   Skin: small ~0.5cm scabs on the face, back and nape of neck. Some appear in linear fashion on the back. No surrounding erythema, warmth, fluctuance areas of pointing.  No vesicles. No urticaria.   Psychiatric:The  patient has a normal mood and affect.         Jose Peck PA-C, November 21, 2023

## 2023-11-22 ENCOUNTER — TELEPHONE (OUTPATIENT)
Dept: FAMILY MEDICINE | Facility: CLINIC | Age: 63
End: 2023-11-22
Payer: MEDICARE

## 2023-11-22 DIAGNOSIS — B86 SCABIES: Primary | ICD-10-CM

## 2023-11-22 NOTE — TELEPHONE ENCOUNTER
"S-(situation): Patient calling wanting Dr. Srivastava's opinion on her recent rash.    B-(background): Seen in  yesterday, 11/21 and diagnosed with Dermatitis.     A-(assessment): Patient concerned that this is not dermatitis and worried that this is bug bites/mites, or maybe something from her Cat. She wants answers to what's going on. Wondering if she should quarantine from her family until she figures out what's actually going on. Called her dermatologist who she has been seeing for two years and she said the nurse on the phone seemed mad at her and wouldn't give her an appointment until February. She continued to repeat that her dermatologist \"won't see\" her. She needs someone to explain what's going on. This rash has been going on for awhile now. Wondering if she should have more tests done? She reports that she has \"gone to a million providers and no one will tell her what is going on... they know what's going on, they just won't tell me what's going on\"    R-(recommendations): Told patient that I would sent to the [facility] team and Dr. Srivastava to review and they can call her back.     Daysi Wilson RN  "

## 2023-11-24 RX ORDER — IVERMECTIN 3 MG/1
12 TABLET ORAL ONCE
Qty: 8 TABLET | Refills: 0 | Status: SHIPPED | OUTPATIENT
Start: 2023-11-24 | End: 2023-11-24

## 2023-11-24 NOTE — TELEPHONE ENCOUNTER
"MARITZA,  SIERRA.  RN attempted to provide full update as outlined below.  Patient quickly became impatient, asking RN if provider was going to be able to prescribe or not.  RN advised this is not part of the recommendations at this time, and attempted to provide alternatives in the meantime, but patient again provided similar history as included in previous notes.  Patient states \"I am literally pouring gasoline in my mouth to get rid of these, is that something that your [facility] is ok with?\"  RN not able to speak with patient about this as she continued to express frustrations regarding inability to obtain ivermectin prescription.  States she is \"profiled\" because she is white and lives in [place].  States Dr. Srivastava would treat her differently if she is black.   States Dr. Srivastava has never even looked in her hair, only a cursory look in her mouth.  Patient states \"I have never had a psychotic break, I just have depression, but everyone thinks they're the same\"  Patient wants to know why clinic is going against recommendation from urgent care.  \"The [facility] recommends that I be seen as soon as possible by my GP for this and now my GP won't even see me.  \"There are several doctors who will back me up that I am not crazy.\"  Patient disconnected call before RN could provide alternative options or further .  Samantha BRUNSON RN   "

## 2023-11-24 NOTE — TELEPHONE ENCOUNTER
"    Thank you for routing.  I am very sorry to hear she is going through this and especially worry to hear/concerned that she feels like people are keeping information from her.     I don't believe looking in chart that anyone is trying to keep info from her or necessarily know more than she does.     Current diagnoses being treated are dermatitis with prednisone and scabies with permetrin.     Dermatitis is a broad diagnosis so finding the cause can be difficult (I.e. is it allergic, from dry skin, irritants, etc).     This is difficult to evaluate further without repeat exam, more history, etc.     I see she has follow up with Dr. Srivastava on November 28th.     My recommendations:    Use therapy as prescribed (prednisone and permethrin.).     For itchy skin/itching if present would avoid scratching and focus on using cool packs to sooth skin and to help settle it down.     Keep nov 28th appointment.  Dr. Srivastava could take pictures at that time and do \"e-consult\" with our dermatology team to get specialty input as needed.     Joel Wegener,MD   "

## 2023-11-24 NOTE — TELEPHONE ENCOUNTER
Patient updated on ivermectin prescription being sent to pharmacy after huddling with JW on communication breakdown related to request for prescription.  Samantha BRUNSON RN

## 2023-11-24 NOTE — TELEPHONE ENCOUNTER
"Patient called back.  Requested to speak with RN about what is going on in her mouth.    \"I can get the spots out from in between my teeth\"  Little oblong torpedo shaped things that look like larvae.  Larger in mouth, larger than the ones that she gets out of skin.  Assumes that they are in mouth  Have had ulcers in her mouth that dentist does not understand and cannot be explained.  \"I am not crazy, I have told my psychiatrist all of this\"  \"They come out of my nose and my ears\"  \"Pyschiatrist says I have never had a psychotic break, I should seek a biologic answer to this problem. He was sure because of my tenacity, I would be able to seek a biologic answer to this problem.\"  \"When I went in for my blood pressure, I was very surprised when the doctor told me that I had bugs\"  I rub it on my teeth and gums and the little black things come out  \"I really really do not think this is an unusual or crazy thing to want\"  \"If they doubt the diagnosis of the doctor at urgent care, they should take it up with him... If I have to go somewhere other than urgent care, I need to specifically know where, I'm not going to sit at urgent care again\"  \"I'm not going to alina anybody, I just want to get better\"  \"I just need somebody to send in these meds so I can start them today\"  Samantha BRUNSON RN   "
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
## 2023-11-25 NOTE — TELEPHONE ENCOUNTER
Thanks for the help  Dear Triage, & DOD  She has been given multiple dermatology consultations and has been seen  at [facility] dermatology-dated 7/27/2023  I see she was  sent Ivermectin  Will huddle with DOD on Monday about clear indication for the ivermectin & concerns that she has not been heard.    Thanks all

## 2023-11-27 ENCOUNTER — TELEPHONE (OUTPATIENT)
Dept: FAMILY MEDICINE | Facility: CLINIC | Age: 63
End: 2023-11-27
Payer: MEDICARE

## 2023-11-27 NOTE — TELEPHONE ENCOUNTER
Prior Authorization Retail Medication Request    Medication/Dose: Ivermectin 3 mg  Diagnosis and ICD code (if different than what is on RX):  B86  New/renewal/insurance change PA/secondary ins. PA:  Previously Tried and Failed:    Rationale:      Insurance   Primary: Medicare  Insurance ID:  [id number]    Secondary (if applicable):  Insurance ID:      Pharmacy Information (if different than what is on RX)  Name:    [id number] [place], [place] - [address] & [facility]     Phone:    Fax:

## 2023-11-30 NOTE — TELEPHONE ENCOUNTER
Prior Authorization Approval    Authorization Effective Date: 5/1/2023  Authorization Expiration Date: 12/30/2023  Medication: Ivermectin 3 mg  Approved Dose/Quantity:    Reference #:     Insurance Company: [employer] - Phone [phone] Fax [phone]  Expected CoPay:       CoPay Card Available:      Foundation Assistance Needed:    Which Pharmacy is filling the prescription (Not needed for infusion/clinic administered): [facility] #[id number] - [place], [place] - [address]  Pharmacy Notified:  Yes  Patient Notified:  **Instructed pharmacy to notify patient when script is ready to /ship.**

## 2023-11-30 NOTE — TELEPHONE ENCOUNTER
Central Prior Authorization Team   Phone: [phone]    PA Initiation    Medication: Ivermectin 3 mg  Insurance Company: [employer] - Phone [phone] Fax [phone]  Pharmacy Filling the Rx: [facility] #[id number] - [place], [place] - [address]  Filling Pharmacy Phone: [phone]  Filling Pharmacy Fax:    Start Date: 11/30/2023

## 2024-01-10 ENCOUNTER — TRANSFERRED RECORDS (OUTPATIENT)
Dept: HEALTH INFORMATION MANAGEMENT | Facility: CLINIC | Age: 64
End: 2024-01-10
Payer: MEDICARE

## 2024-01-11 ENCOUNTER — TELEPHONE (OUTPATIENT)
Dept: BEHAVIORAL HEALTH | Facility: CLINIC | Age: 64
End: 2024-01-11

## 2024-01-11 NOTE — TELEPHONE ENCOUNTER
Writer received assessment for pt from Tu at [facility]. Faxed to [facility] for scanning.    Pt has Medicare insurance and [facility] cannot accept Medicare. Writer contacted Tu at [facility] and informed him.

## 2024-01-25 ENCOUNTER — NURSE TRIAGE (OUTPATIENT)
Dept: FAMILY MEDICINE | Facility: CLINIC | Age: 64
End: 2024-01-25
Payer: MEDICARE

## 2024-01-25 NOTE — TELEPHONE ENCOUNTER
"Patient calling to schedule hospital follow up visit  Also states /99 currently  Has not taken blood pressure medication yet today  Denied breathing difficulty, chest pain, changes in vision  States \"I'm just not feeling great\"  Offered appointment at [facility] today  But patient went to lay down  Is currently in an addiction treatment center and placed their provider on the phone  Recommended patient go to ED if symptomatic  Provider agreed with plan of care and will take patient to ED now  Clair BURTON RN      Reason for Disposition   Patient sounds very sick or weak to the triager    Additional Information   Negative: Sounds like a life-threatening emergency to the triager   Negative: Symptom is main concern (e.g., headache, chest pain)   Negative: Low blood pressure is main concern   Negative: Systolic BP >= 160 OR Diastolic >= 100, and any cardiac (e.g., breathing difficulty, chest pain) or neurologic symptoms (e.g., new-onset blurred or double vision)   Negative: Pregnant 20 or more weeks (or postpartum < 6 weeks) with new hand or face swelling   Negative: Pregnant 20 or more weeks (or postpartum < 6 weeks) and Systolic BP >= 160 OR Diastolic >= 110    Protocols used: Blood Pressure - High-A-OH    "

## 2024-02-06 ENCOUNTER — OFFICE VISIT (OUTPATIENT)
Dept: INTERNAL MEDICINE | Facility: CLINIC | Age: 64
End: 2024-02-06
Payer: MEDICARE

## 2024-02-06 VITALS
SYSTOLIC BLOOD PRESSURE: 130 MMHG | RESPIRATION RATE: 23 BRPM | HEART RATE: 81 BPM | OXYGEN SATURATION: 97 % | BODY MASS INDEX: 26.54 KG/M2 | WEIGHT: 145.1 LBS | TEMPERATURE: 98 F | DIASTOLIC BLOOD PRESSURE: 80 MMHG

## 2024-02-06 DIAGNOSIS — L28.1 PRURIGO NODULARIS: Primary | ICD-10-CM

## 2024-02-06 PROCEDURE — 99213 OFFICE O/P EST LOW 20 MIN: CPT | Performed by: INTERNAL MEDICINE

## 2024-02-06 NOTE — PROGRESS NOTES
Assessment & Plan     Prurigo nodularis  I do not see any apparent rash other than multiple excoriations and chronic findings consistent with the prurigo nodularis.                  Subjective   Daysi is a 63 year old, presenting for the following health issues:  Derm Problem (/)    HPI     Pt has a rash on face, scalp, shoulder, in between fingers and toes. It itches, hurts, stings, oozing yellowish orange fluid. States that she can pull white stringy things out of the sores. Pt states that her hips hurt as well to the point where it is hard to get in and out of her car. Pt says that she is starting to get crabby because of this rash just itches.                   Objective    /80 (BP Location: Right arm, Patient Position: Sitting, Cuff Size: Adult Regular)   Pulse 81   Temp 98  F (36.7  C) (Temporal)   Resp 23   Wt 65.8 kg (145 lb 1.6 oz)   LMP 04/04/2012   SpO2 97%   BMI 26.54 kg/m    Body mass index is 26.54 kg/m .  Physical Exam   GENERAL: alert and no distress  SKIN: multiple excoriations , some with associated papular small nodules L cheek and back and arms.             Signed Electronically by: Anderson Richard MD

## 2024-02-10 ENCOUNTER — NURSE TRIAGE (OUTPATIENT)
Dept: NURSING | Facility: CLINIC | Age: 64
End: 2024-02-10
Payer: MEDICARE

## 2024-02-10 NOTE — TELEPHONE ENCOUNTER
"   Nurse Triage SBAR    Is this a 2nd Level Triage? NO    Situation: Pt is calling due to having  \"Itching bites on my back and face.\"  Two she states in her mouth and tongue on the L side.  she grabs them but they and go into my mouth.  Look \" like red craps.\"    I have bugs on me.  I flew to [place] to see the dermatologist.  I have had 6 skins tests.   Psychosis.      Protocol Recommended Disposition:   Go to ED Now (Or PCP Triage)    Recommendation: go to ER       Reason for Disposition   Patient sounds very sick or weak to the triager   Very strange or confused behavior    Additional Information   Negative: Anaphylactic reaction suspected (e.g., sudden onset of difficulty breathing, difficulty swallowing, wheezing following bite)   Negative: Sounds like a life-threatening emergency to the triager   Negative: Widespread hives   Negative: Impetigo suspected (i.e., painless infected superficial small sores, < 1 inch or 2.5 cm, often covered by a soft, yellow-brown scab or crust; sometimes occurring near nasal openings)   Negative: Other insect bite suspected   Negative: Doesn't match the SYMPTOMS of bed bug bites   Negative: Patient attempted suicide   Negative: Patient is threatening suicide now   Negative: Violent behavior, or threatening to physically hurt or kill someone   Negative: [1] Patient is very confused (disoriented, slurred speech) AND [2] no other adult (e.g., friend or family member) available   Negative: [1] Difficult to awaken or acting very confused (disoriented, slurred speech) AND [2] new-onset   Negative: Sounds like a life-threatening emergency to the triager   Negative: Suicide thoughts, threats, attempts, or questions   Negative: Questions or concerns about alcohol use, unhealthy alcohol use, binge drinking, intoxication, or withdrawal   Negative: Questions or concerns about substance use (drug use), unhealthy drug use, intoxication, or withdrawal   Negative: Questions or concerns about " bipolar disorder (manic depression)   Negative: Questions or concerns about depression during the postpartum period (< 1 year since delivery)   Negative: [1] Depression AND [2] unable to do any of normal activities (e.g., self care, school, work; in comparison to baseline).    Protocols used: Bed Bug Bite-IZABEL, Depression-IZABEL Shukla RN on 2/10/2024 at 4:01 PM

## 2024-02-24 ENCOUNTER — HOSPITAL ENCOUNTER (EMERGENCY)
Facility: CLINIC | Age: 64
Discharge: HOME | End: 2024-02-24
Attending: EMERGENCY MEDICINE | Admitting: EMERGENCY MEDICINE
Payer: MEDICARE

## 2024-02-24 VITALS
DIASTOLIC BLOOD PRESSURE: 60 MMHG | RESPIRATION RATE: 18 BRPM | OXYGEN SATURATION: 97 % | SYSTOLIC BLOOD PRESSURE: 136 MMHG | BODY MASS INDEX: 24.84 KG/M2 | HEART RATE: 76 BPM | TEMPERATURE: 98.6 F | HEIGHT: 62 IN | WEIGHT: 135 LBS

## 2024-02-24 DIAGNOSIS — H10.33 ACUTE CONJUNCTIVITIS OF BOTH EYES, UNSPECIFIED ACUTE CONJUNCTIVITIS TYPE: ICD-10-CM

## 2024-02-24 DIAGNOSIS — R21 RASH: ICD-10-CM

## 2024-02-24 LAB
ALBUMIN SERPL BCG-MCNC: 3.8 G/DL (ref 3.5–5.2)
ALP SERPL-CCNC: 87 U/L (ref 40–150)
ALT SERPL W P-5'-P-CCNC: 15 U/L (ref 0–50)
ANION GAP SERPL CALCULATED.3IONS-SCNC: 11 MMOL/L (ref 7–15)
AST SERPL W P-5'-P-CCNC: 35 U/L (ref 0–45)
BASOPHILS # BLD AUTO: 0 10E3/UL (ref 0–0.2)
BASOPHILS NFR BLD AUTO: 1 %
BILIRUB SERPL-MCNC: 0.2 MG/DL
BUN SERPL-MCNC: 45.9 MG/DL (ref 8–23)
CALCIUM SERPL-MCNC: 8.5 MG/DL (ref 8.8–10.2)
CHLORIDE SERPL-SCNC: 110 MMOL/L (ref 98–107)
CREAT SERPL-MCNC: 1.91 MG/DL (ref 0.51–0.95)
DEPRECATED HCO3 PLAS-SCNC: 20 MMOL/L (ref 22–29)
EGFRCR SERPLBLD CKD-EPI 2021: 29 ML/MIN/1.73M2
EOSINOPHIL # BLD AUTO: 0.1 10E3/UL (ref 0–0.7)
EOSINOPHIL NFR BLD AUTO: 2 %
ERYTHROCYTE [DISTWIDTH] IN BLOOD BY AUTOMATED COUNT: 13 % (ref 10–15)
ETHANOL SERPL-MCNC: <0.01 G/DL
GLUCOSE SERPL-MCNC: 113 MG/DL (ref 70–99)
HCT VFR BLD AUTO: 38.3 % (ref 35–47)
HGB BLD-MCNC: 12.8 G/DL (ref 11.7–15.7)
IMM GRANULOCYTES # BLD: 0 10E3/UL
IMM GRANULOCYTES NFR BLD: 0 %
LYMPHOCYTES # BLD AUTO: 1.4 10E3/UL (ref 0.8–5.3)
LYMPHOCYTES NFR BLD AUTO: 28 %
MAGNESIUM SERPL-MCNC: 2.3 MG/DL (ref 1.7–2.3)
MCH RBC QN AUTO: 30.3 PG (ref 26.5–33)
MCHC RBC AUTO-ENTMCNC: 33.4 G/DL (ref 31.5–36.5)
MCV RBC AUTO: 91 FL (ref 78–100)
MONOCYTES # BLD AUTO: 0.5 10E3/UL (ref 0–1.3)
MONOCYTES NFR BLD AUTO: 11 %
NEUTROPHILS # BLD AUTO: 2.9 10E3/UL (ref 1.6–8.3)
NEUTROPHILS NFR BLD AUTO: 58 %
NRBC # BLD AUTO: 0 10E3/UL
NRBC BLD AUTO-RTO: 0 /100
PLATELET # BLD AUTO: 216 10E3/UL (ref 150–450)
POTASSIUM SERPL-SCNC: 5.8 MMOL/L (ref 3.4–5.3)
PROT SERPL-MCNC: 6.6 G/DL (ref 6.4–8.3)
RBC # BLD AUTO: 4.23 10E6/UL (ref 3.8–5.2)
SODIUM SERPL-SCNC: 141 MMOL/L (ref 135–145)
WBC # BLD AUTO: 4.9 10E3/UL (ref 4–11)

## 2024-02-24 PROCEDURE — 83735 ASSAY OF MAGNESIUM: CPT | Performed by: EMERGENCY MEDICINE

## 2024-02-24 PROCEDURE — 250N000013 HC RX MED GY IP 250 OP 250 PS 637: Performed by: EMERGENCY MEDICINE

## 2024-02-24 PROCEDURE — 85004 AUTOMATED DIFF WBC COUNT: CPT | Performed by: EMERGENCY MEDICINE

## 2024-02-24 PROCEDURE — 82077 ASSAY SPEC XCP UR&BREATH IA: CPT | Performed by: EMERGENCY MEDICINE

## 2024-02-24 PROCEDURE — 250N000009 HC RX 250: Performed by: EMERGENCY MEDICINE

## 2024-02-24 PROCEDURE — 99285 EMERGENCY DEPT VISIT HI MDM: CPT

## 2024-02-24 PROCEDURE — 36415 COLL VENOUS BLD VENIPUNCTURE: CPT | Performed by: EMERGENCY MEDICINE

## 2024-02-24 PROCEDURE — 80053 COMPREHEN METABOLIC PANEL: CPT | Performed by: EMERGENCY MEDICINE

## 2024-02-24 RX ORDER — PROPARACAINE HYDROCHLORIDE 5 MG/ML
1 SOLUTION/ DROPS OPHTHALMIC ONCE
Status: COMPLETED | OUTPATIENT
Start: 2024-02-24 | End: 2024-02-24

## 2024-02-24 RX ORDER — CIPROFLOXACIN HYDROCHLORIDE 3.5 MG/ML
1 SOLUTION/ DROPS TOPICAL
Status: DISCONTINUED | OUTPATIENT
Start: 2024-02-24 | End: 2024-02-24 | Stop reason: HOSPADM

## 2024-02-24 RX ADMIN — CIPROFLOXACIN 1 DROP: 3 SOLUTION OPHTHALMIC at 13:14

## 2024-02-24 RX ADMIN — PROPARACAINE HYDROCHLORIDE 1 DROP: 5 SOLUTION/ DROPS OPHTHALMIC at 11:20

## 2024-02-24 RX ADMIN — FLUORESCEIN SODIUM 1 STRIP: 1 STRIP OPHTHALMIC at 11:20

## 2024-02-24 ASSESSMENT — COLUMBIA-SUICIDE SEVERITY RATING SCALE - C-SSRS
1. IN THE PAST MONTH, HAVE YOU WISHED YOU WERE DEAD OR WISHED YOU COULD GO TO SLEEP AND NOT WAKE UP?: NO
6. HAVE YOU EVER DONE ANYTHING, STARTED TO DO ANYTHING, OR PREPARED TO DO ANYTHING TO END YOUR LIFE?: YES
2. HAVE YOU ACTUALLY HAD ANY THOUGHTS OF KILLING YOURSELF IN THE PAST MONTH?: NO

## 2024-02-24 ASSESSMENT — ACTIVITIES OF DAILY LIVING (ADL)
ADLS_ACUITY_SCORE: 38

## 2024-02-24 NOTE — ED NOTES
Bed: ED02  Expected date:   Expected time:   Means of arrival:   Comments:  Bjorn [address] [id number] neck pain and shingles
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
## 2024-02-26 ENCOUNTER — OFFICE VISIT (OUTPATIENT)
Dept: URGENT CARE | Facility: URGENT CARE | Age: 64
End: 2024-02-26
Payer: MEDICARE

## 2024-02-26 ENCOUNTER — NURSE TRIAGE (OUTPATIENT)
Dept: NURSING | Facility: CLINIC | Age: 64
End: 2024-02-26
Payer: MEDICARE

## 2024-02-26 VITALS
SYSTOLIC BLOOD PRESSURE: 132 MMHG | DIASTOLIC BLOOD PRESSURE: 60 MMHG | TEMPERATURE: 99.2 F | HEART RATE: 82 BPM | OXYGEN SATURATION: 97 % | RESPIRATION RATE: 18 BRPM

## 2024-02-26 DIAGNOSIS — R21 RASH: Primary | ICD-10-CM

## 2024-02-26 PROCEDURE — 99213 OFFICE O/P EST LOW 20 MIN: CPT | Performed by: FAMILY MEDICINE

## 2024-02-26 RX ORDER — FUROSEMIDE 20 MG
1 TABLET ORAL EVERY MORNING
COMMUNITY
Start: 2024-01-30

## 2024-02-26 RX ORDER — LANOLIN ALCOHOL/MO/W.PET/CERES
6 CREAM (GRAM) TOPICAL
COMMUNITY
Start: 2024-01-22

## 2024-02-26 RX ORDER — TRIAMCINOLONE ACETONIDE 1 MG/G
CREAM TOPICAL 2 TIMES DAILY
Qty: 30 G | Refills: 0 | Status: SHIPPED | OUTPATIENT
Start: 2024-02-26 | End: 2024-03-27

## 2024-02-26 RX ORDER — GABAPENTIN 100 MG/1
100 CAPSULE ORAL
COMMUNITY
Start: 2024-01-22

## 2024-02-26 RX ORDER — METHYLPREDNISOLONE 4 MG
TABLET, DOSE PACK ORAL
Qty: 21 TABLET | Refills: 0 | Status: SHIPPED | OUTPATIENT
Start: 2024-02-26

## 2024-02-26 RX ORDER — NALTREXONE HYDROCHLORIDE 50 MG/1
1 TABLET, FILM COATED ORAL DAILY
COMMUNITY
Start: 2024-01-22

## 2024-02-26 NOTE — TELEPHONE ENCOUNTER
Pt states back of neck and up to ears, width of a necktie, goes up side of face, encompasses ear, crusty skin.  Pt spoke with Dermatologist advises to be seen to rule out meningitis.  Pt seen in [facility] ED.  Pt states she refuses to go to ED.  Pt intends to go to [facility] [place], [place].  Rose Mary Lyles RN  [facility]NA Nurse Advisor    Reason for Disposition   Unable to walk without falling    Additional Information   Negative: Difficult to awaken or acting confused (e.g., disoriented, slurred speech)   Negative: Weakness of the face, arm or leg on one side of the body and new-onset   Negative: Numbness of the face, arm or leg on one side of the body and new-onset   Negative: Loss of speech or garbled speech and new-onset   Negative: Passed out (i.e., fainted, collapsed and was not responding)   Negative: Sounds like a life-threatening emergency to the triager   Negative: Followed a head injury within last 3 days   Negative: Traumatic Brain Injury (TBI) is suspected   Negative: Sinus pain of forehead and yellow or green nasal discharge   Negative: Pregnant    Protocols used: Headache-A-OH

## 2024-02-27 NOTE — PROGRESS NOTES
"SUBJECTIVE: Daysi Mayfield is a 63 year old female presenting with a chief complaint of itchy rash neck into hair line.  Onset of symptoms was day(s) ago.    Past Medical History:   Diagnosis Date    311     Allergic rhinitis, cause unspecified     Arthritis     Depressive disorder     Migraine, unspecified, without mention of intractable migraine without mention of status migrainosus     Rapid weight loss 1/22/2013     Allergies   Allergen Reactions    Compazine [Prochlorperazine] Other (See Comments)     My head stretches back     Phenothiazines Other (See Comments)     Compazine. \"My tongue goes back\"    Other Reaction(s): Dystonia    PN: .    Other reaction(s): Dystonia   Compazine. \"My tongue goes back\"   My head stretches back    PN: .     Social History     Tobacco Use    Smoking status: Never    Smokeless tobacco: Never   Substance Use Topics    Alcohol use: No       ROS:  SKIN: no rash  GI: no vomiting    OBJECTIVE:  /60   Pulse 82   Temp 99.2  F (37.3  C) (Tympanic)   Resp 18   LMP 04/04/2012   SpO2 97% GENERAL APPEARANCE: healthy, alert and no distress  SKIN: dry eczematous skin neck, scalp      ICD-10-CM    1. Rash  R21 methylPREDNISolone (MEDROL DOSEPAK) 4 MG tablet therapy pack     triamcinolone (KENALOG) 0.1 % external cream        Has appointment with Dermatology tomorrow  Fluids/Rest, f/u if worse/not any better    "

## 2024-03-05 ENCOUNTER — TRANSFERRED RECORDS (OUTPATIENT)
Dept: HEALTH INFORMATION MANAGEMENT | Facility: CLINIC | Age: 64
End: 2024-03-05

## 2024-03-17 DIAGNOSIS — I10 BENIGN ESSENTIAL HYPERTENSION: ICD-10-CM

## 2024-03-18 RX ORDER — METOPROLOL SUCCINATE 100 MG/1
100 TABLET, EXTENDED RELEASE ORAL DAILY
Qty: 90 TABLET | Refills: 0 | Status: SHIPPED | OUTPATIENT
Start: 2024-03-18 | End: 2024-07-03

## 2024-03-28 DIAGNOSIS — I10 BENIGN ESSENTIAL HYPERTENSION: ICD-10-CM

## 2024-03-28 RX ORDER — METOPROLOL SUCCINATE 100 MG/1
100 TABLET, EXTENDED RELEASE ORAL DAILY
Qty: 90 TABLET | Refills: 0 | OUTPATIENT
Start: 2024-03-28

## 2024-04-10 ENCOUNTER — TRANSFERRED RECORDS (OUTPATIENT)
Dept: MULTI SPECIALTY CLINIC | Facility: CLINIC | Age: 64
End: 2024-04-10

## 2024-04-10 LAB — ABSTRACT IFOB-NO CHARGE: NEGATIVE

## 2024-05-09 ENCOUNTER — HOSPITAL ENCOUNTER (EMERGENCY)
Facility: CLINIC | Age: 64
Discharge: HOME | End: 2024-05-09
Attending: EMERGENCY MEDICINE | Admitting: EMERGENCY MEDICINE
Payer: MEDICARE

## 2024-05-09 VITALS
RESPIRATION RATE: 18 BRPM | HEART RATE: 94 BPM | SYSTOLIC BLOOD PRESSURE: 192 MMHG | DIASTOLIC BLOOD PRESSURE: 102 MMHG | TEMPERATURE: 99.3 F | OXYGEN SATURATION: 96 %

## 2024-05-09 DIAGNOSIS — L30.9 DERMATITIS: ICD-10-CM

## 2024-05-09 DIAGNOSIS — L28.2: ICD-10-CM

## 2024-05-09 PROCEDURE — 99284 EMERGENCY DEPT VISIT MOD MDM: CPT

## 2024-05-09 RX ORDER — PREDNISONE 10 MG/1
TABLET ORAL
Qty: 32 TABLET | Refills: 0 | Status: SHIPPED | OUTPATIENT
Start: 2024-05-09 | End: 2024-05-19

## 2024-05-09 RX ORDER — HYDROXYZINE HYDROCHLORIDE 25 MG/1
25 TABLET, FILM COATED ORAL 3 TIMES DAILY PRN
Qty: 15 TABLET | Refills: 0 | Status: SHIPPED | OUTPATIENT
Start: 2024-05-09 | End: 2024-05-24

## 2024-05-09 ASSESSMENT — COLUMBIA-SUICIDE SEVERITY RATING SCALE - C-SSRS
2. HAVE YOU ACTUALLY HAD ANY THOUGHTS OF KILLING YOURSELF IN THE PAST MONTH?: NO
6. HAVE YOU EVER DONE ANYTHING, STARTED TO DO ANYTHING, OR PREPARED TO DO ANYTHING TO END YOUR LIFE?: NO
1. IN THE PAST MONTH, HAVE YOU WISHED YOU WERE DEAD OR WISHED YOU COULD GO TO SLEEP AND NOT WAKE UP?: NO

## 2024-05-09 ASSESSMENT — ACTIVITIES OF DAILY LIVING (ADL): ADLS_ACUITY_SCORE: 38

## 2024-05-09 NOTE — DISCHARGE INSTRUCTIONS
Use soothing lotions on your skin like calamine or the clindamycin lotion that you have been given by your dermatologist.  Return for significantly reduced vision that persists, difficulty swallowing or breathing, fever or expanding redness around one of your skin lesions

## 2024-05-09 NOTE — ED PROVIDER NOTES
"  Emergency Department Note      History of Present Illness     Chief Complaint  Derm Problem    HPI  Daysi Mayfield is a 63 year old female with history of hypertension, stage 4 CKD, vasculitis, and prurigo nodularis who presents to the ED alone for evaluation of a derm problem. Patient presents with a chronic rash that has become extremely itchy and uncomfortable, diagnosed as chronic prurigo nodularis.  This intermittently flares and gets worse at times.  States it is now spreading to her scalp and eyes. She notes the rash comes and goes. Daysi has applied clindamycin to her scalp with no relief.  She recently ran out of the clindamycin lotion but states she can get more from her dermatologist.  Dermatology suggested dupixent injections.  She has not yet wanted to start these as she feels its \"a big medicine\".  She is not using an anti-itch medication currently. Occasionally wears gloves to sleep to prevent itching. She ran out of clindamycin yesterday, but is able to refill the prescription.  Reports the nodules went into her mouth and throat 3 years ago, resolved with prednisone.  Tonight patient is hoping for a course of prednisone to try to relieve symptoms while waiting to go back to her dermatologist.  In addition her blood pressure is noted to be elevated and she states she forgot to take her medication this morning    Independent Historian  None as detailed above.    Review of External Notes  I reivewed Dr. Ross's Office 3/1/24 Visit Note regarding chronic prurigo nodularis   Past Medical History   Medical History and Problem List  Allergic rhinitis  Arthritis  Depressive disorder  Migraine   Drug dependence  MDD  Hyperlipidemia   PTSD  SI  Syncope  Vasculitis  Thyroid nodule  Altered mental status  Stage 4 CKD  Agoraphobia  Cocaine dependence  Hyperkalemia   Hernia  Prurigo nodularis   Raynaud's disease   Sicca syndrome     Medications  Clindamycin   Amlodipine  Aripiprazole   Atorvastatin  Dupilumab "   Furosemide  Gabapentin   Methylprednisolone   Metoprolol succinate ER  Venlafaxine   Methylphenidate HCl  Trazadone     Surgical History   Appendectomy    Right foot surgery  Umbilical herniorrhaphy  Orthopedic surgery  Physical Exam   Patient Vitals for the past 24 hrs:   BP Temp Temp src Pulse Resp SpO2   24 0619 (!) 192/102 -- -- 94 -- 96 %   24 0519 (!) 153/113 99.3  F (37.4  C) Temporal 100 18 97 %   24 0518 -- -- -- -- -- 97 %   24 0517 (!) 153/113 -- -- 100 -- 97 %     Physical Exam  General: Patient is alert and normal appearing.  HEENT: Head atraumatic    Eyes: pupils equal and reactive. Conjunctiva clear.  Extraocular movements intact   Nares: patent   Oropharynx: no lesions, uvula midline, no palatal draping, normal voice, no trismus.  Tongue appears normal.  No signs of nodular lesions in her mouth  Neck: Supple without lymphadenopathy, no meningismus  Chest: Heart regular rate and rhythm.   Lungs: Equal clear to auscultation with no wheeze or rales  Abdomen: Soft, non tender, nondistended, normal bowel sounds  Back: No costovertebral angle tenderness, no midline C, T or L spine tenderness  Neuro: Grossly nonfocal, normal speech, strength equal bilaterally, CN 2-12 intact  Extremities: No deformities, equal radial and DP pulses. No clubbing, cyanosis.  No edema  Skin: Warm and dry with red nodular rash noted to her scalp and face primarily but also to her hands and legs.  No surrounding erythema or signs of secondary bacterial infection.  No abscess is seen.    Diagnostics   Lab Results   Labs Ordered and Resulted from Time of ED Arrival to Time of ED Departure - No data to display    Independent Interpretation  None  ED Course    Medications Administered  Medications - No data to display    Procedures  Procedures     Discussion of Management  None    Social Determinants of Health adding to complexity of care  None    ED Course  ED Course as of 24 0639   Thu May  09, 2024   0607 I obtained patient history and performed a physical exam.      Medical Decision Making / Diagnosis   CMS Diagnoses: None    MIPS     None    MDM    Daysi Mayfield is a 63 year old female who presents for evaluation of a rash.  This appears consistent with a dermatitis.  Upon reviewing her records she has been diagnosed with chronic prurigo nodularis.  A broad differential was considered including infection associated with rash, SBI, cellulitis, atopic dermatitis, allergic dermatitis, contact dermatitis, chemical dermatitis, lice, scabies, environmental, etc.  I am favoring exacerbation of her chronic skin rash at this time given time frame and locations on the body.  Patient requesting a trial of prednisone.  Will give a 14-day taper of prednisone.  Encouraged her to have close follow-up with her dermatologist as well as give her another dermatologist for second opinion.  I see no secondary signs of bacterial infection.  No oropharynx involvement that I can see.  Patient's blood pressure was noted to be elevated but states she forgot her medication this morning and she will take that when she gets home.  She felt comfortable with the plan and return precautions and all questions and concerns addressed      Disposition  The patient was discharged.     ICD-10 Codes:    ICD-10-CM    1. Dermatitis  L30.9       2. Chronic prurigo in adult  L28.2            Discharge Medications  Discharge Medication List as of 5/9/2024  6:23 AM        START taking these medications    Details   !! hydrOXYzine HCl (ATARAX) 25 MG tablet Take 1 tablet (25 mg) by mouth 3 times daily as needed for itching, Disp-15 tablet, R-0, Local Print      predniSONE (DELTASONE) 10 MG tablet Take 4 tablets daily for 5 days,  take 2 tablets daily for 3 days, take 1 tablet daily for 3 days, take half a tablet for 3 days., Disp-32 tablet, R-0, Local Print       !! - Potential duplicate medications found. Please discuss with provider.             Scribe Disclosure:  I, Dilma Perez, am serving as a scribe at 6:14 AM on 5/9/2024 to document services personally performed by Devorah Wilhelm MD based on my observations and the provider's statements to me.     [employer]      Devorah Wilhelm MD  05/09/24 0633

## 2024-05-09 NOTE — ED TRIAGE NOTES
Recurrent facial rash that has worsen tonight. Pt reports she has had it for years and not even her dermatologist know what's causing it. Tonight its burning on her eyes and inside her mouth.     Triage Assessment (Adult)       Row Name 05/09/24 0517          Triage Assessment    Airway WDL WDL        Respiratory WDL    Respiratory WDL WDL        Skin Circulation/Temperature WDL    Skin Circulation/Temperature WDL X  rash on face        Cardiac WDL    Cardiac WDL WDL        Peripheral/Neurovascular WDL    Peripheral Neurovascular WDL WDL        Cognitive/Neuro/Behavioral WDL    Cognitive/Neuro/Behavioral WDL WDL

## 2024-07-03 DIAGNOSIS — I10 BENIGN ESSENTIAL HYPERTENSION: ICD-10-CM

## 2024-07-03 RX ORDER — METOPROLOL SUCCINATE 100 MG/1
100 TABLET, EXTENDED RELEASE ORAL DAILY
Qty: 90 TABLET | Refills: 0 | Status: SHIPPED | OUTPATIENT
Start: 2024-07-03

## 2024-08-18 ENCOUNTER — NURSE TRIAGE (OUTPATIENT)
Dept: NURSING | Facility: CLINIC | Age: 64
End: 2024-08-18
Payer: MEDICARE

## 2024-08-18 ENCOUNTER — OFFICE VISIT (OUTPATIENT)
Dept: URGENT CARE | Facility: URGENT CARE | Age: 64
End: 2024-08-18
Payer: MEDICARE

## 2024-08-18 VITALS
DIASTOLIC BLOOD PRESSURE: 102 MMHG | OXYGEN SATURATION: 98 % | HEART RATE: 94 BPM | TEMPERATURE: 99.1 F | BODY MASS INDEX: 27.62 KG/M2 | SYSTOLIC BLOOD PRESSURE: 158 MMHG | WEIGHT: 151 LBS | RESPIRATION RATE: 24 BRPM

## 2024-08-18 DIAGNOSIS — R21 RASH: Primary | ICD-10-CM

## 2024-08-18 PROCEDURE — 99213 OFFICE O/P EST LOW 20 MIN: CPT

## 2024-08-18 RX ORDER — DIAPER,BRIEF,INFANT-TODD,DISP
EACH MISCELLANEOUS 2 TIMES DAILY
Qty: 28 G | Refills: 0 | Status: SHIPPED | OUTPATIENT
Start: 2024-08-18

## 2024-08-18 NOTE — PROGRESS NOTES
Assessment & Plan     Rash  64-year-old with history of prurigo nodularis presenting for rash that she is convinced is parasitic.  Reports it has pruritic and is very convinced that it is scabies.  Does not appear like scabies.  Unsure exact etiology but favor likely continuation of her symptoms.  I recommend she follow-up with dermatology which patient will do.  Discussed return precautions.  - hydrocortisone (CORTAID) 1 % external ointment; Apply topically 2 times daily  - Adult Dermatology  Referral; Future    Subjective   Daysi is a 64 year old, presenting for the following health issues:  Derm Problem (Itching on face.  Thinks she has scabies.  Has been to dermatology for this issue.  )    HPI   History of prurigo nodularis presenting for concern for scabies and parasites on her face and back.  Reports that she has been to several dermatologist who do not know what they are talking about and have told her that they believe that she does not have scabies which she is ultimately concerned about.    Review of Systems  Constitutional, HEENT, cardiovascular, pulmonary, gi and gu systems are negative, except as otherwise noted.      Objective    BP (!) 158/102   Pulse 94   Temp 99.1  F (37.3  C) (Tympanic)   Resp 24   Wt 68.5 kg (151 lb)   LMP 04/04/2012   SpO2 98%   BMI 27.62 kg/m    Body mass index is 27.62 kg/m .  Physical Exam   GENERAL: alert, tangential  NECK: no adenopathy, no asymmetry, masses, or scars  RESP: lungs clear to auscultation - no rales, rhonchi or wheezes  CV: regular rate and rhythm, normal S1 S2, no S3 or S4, no murmur, click or rub, no peripheral edema  ABDOMEN: soft, nontender, no hepatosplenomegaly, no masses and bowel sounds normal  MS: no gross musculoskeletal defects noted, no edema    SKIN: Multiple abrasions to face upper back and anywhere that patient can scratch but no burrows no lesions in between webs.  No surrounding erythema.    Signed Electronically by: Dwaine  Opal, DO

## 2024-08-18 NOTE — TELEPHONE ENCOUNTER
Facial itching with bumps that spread to the scalp across the bridge of the nose and close to her eyes  Symptoms started 10 days and are worse today on 08/18/24  She using clindamycin for her eczema to the facial area  She is taking oral benadryl for the itching twice a day for 4 days now  She reports the cat is itching intensely and this is unusual  She reports a linear lines across the face that turn red when she scratches them  She reports he neck is itching intensely as well and her hair line has scaly plaques  Triage guidelines recommend to see pcp within 3 days  Caller verbalized and understands directives      Reason for Disposition   [1] MODERATE-SEVERE local itching (i.e., interferes with work, school, activities) AND [2] not improved after 24 hours of hydrocortisone cream   [1] Cause unknown AND [2] present > 7 days    Additional Information   Negative: Athlete's foot suspected (e.g., itchy rash of feet, especially 3rd-4th web spaces)   Negative: Head lice suspected (e.g., head itching and lice or nits are seen)   Negative: Insect bites suspected   Negative: Jock Itch suspected (i.e., itchy rash on inner thighs near genital area)   Negative: Poison ivy, oak, or sumac rash suspected (e.g., itchy rash after contact with poison ivy)   Negative: Pubic lice suspected (e.g., genital itching and lice or nits are seen)   Negative: Ringworm suspected (i.e., round pink patch, sometimes looks like ring, usually 1/2 to 1 inch [12-25 mm],  in size, slowly increasing in size)   Negative: [1] Eye itching AND [2] contact with allergic substance   Negative: [1] Eye itching AND [2] cause is unclear   Negative: Genital itching - female   Negative: Genital itching - male   Negative: Lip itching   Negative: Rectal (anus) itching   Negative: Localized rash also present   Negative: Patient sounds very sick or weak to the triager   Negative: Looks infected (spreading redness, red streak, pus)    Protocols used: Itching -  [id number]

## 2024-11-09 ASSESSMENT — PATIENT HEALTH QUESTIONNAIRE - PHQ9: SUM OF ALL RESPONSES TO PHQ QUESTIONS 1-9: 14

## 2024-12-09 ENCOUNTER — HOSPITAL ENCOUNTER (EMERGENCY)
Facility: CLINIC | Age: 64
Discharge: HOME | End: 2024-12-09
Payer: MEDICARE

## 2024-12-18 ENCOUNTER — NURSE TRIAGE (OUTPATIENT)
Dept: FAMILY MEDICINE | Facility: CLINIC | Age: 64
End: 2024-12-18
Payer: MEDICARE

## 2024-12-18 ENCOUNTER — OFFICE VISIT (OUTPATIENT)
Dept: URGENT CARE | Facility: URGENT CARE | Age: 64
End: 2024-12-18
Payer: MEDICARE

## 2024-12-18 VITALS
WEIGHT: 156 LBS | RESPIRATION RATE: 16 BRPM | SYSTOLIC BLOOD PRESSURE: 163 MMHG | TEMPERATURE: 98.3 F | BODY MASS INDEX: 28.53 KG/M2 | OXYGEN SATURATION: 98 % | HEART RATE: 84 BPM | DIASTOLIC BLOOD PRESSURE: 87 MMHG

## 2024-12-18 DIAGNOSIS — I10 BENIGN ESSENTIAL HYPERTENSION: ICD-10-CM

## 2024-12-18 DIAGNOSIS — K04.01 ACUTE PULPITIS: Primary | ICD-10-CM

## 2024-12-18 DIAGNOSIS — R13.10 ODYNOPHAGIA: ICD-10-CM

## 2024-12-18 DIAGNOSIS — K12.1 ORAL ULCERATION: ICD-10-CM

## 2024-12-18 PROCEDURE — 99214 OFFICE O/P EST MOD 30 MIN: CPT | Performed by: EMERGENCY MEDICINE

## 2024-12-18 RX ORDER — DEXAMETHASONE SODIUM PHOSPHATE 4 MG/ML
10 VIAL (ML) INJECTION ONCE
Status: DISCONTINUED | OUTPATIENT
Start: 2024-12-18 | End: 2024-12-18

## 2024-12-18 RX ORDER — DEXAMETHASONE SODIUM PHOSPHATE 10 MG/ML
10 INJECTION INTRAMUSCULAR; INTRAVENOUS ONCE
Status: COMPLETED | OUTPATIENT
Start: 2024-12-18 | End: 2024-12-18

## 2024-12-18 RX ORDER — AMOXICILLIN 875 MG/1
875 TABLET, COATED ORAL 2 TIMES DAILY
Qty: 20 TABLET | Refills: 0 | Status: SHIPPED | OUTPATIENT
Start: 2024-12-18 | End: 2024-12-28

## 2024-12-18 RX ADMIN — DEXAMETHASONE SODIUM PHOSPHATE 10 MG: 10 INJECTION INTRAMUSCULAR; INTRAVENOUS at 17:34

## 2024-12-18 NOTE — TELEPHONE ENCOUNTER
Incoming call from central scheduling, noting patient called with trouble swallowing.   While  was doing warm handoff to triage, patient hung up with scheduling call.     Attempted x2 to call patient back using phone number listed within chart.   # was not in service, according to message when number dialed.     No emergency contacts on file.   No consent to communicate on file.     Called pt's daughter Kirsten listed on chart, requesting contact information for Jessika.  No personal health information provided.   Patient's new # given by daughter Kirsten: [phone].    Called # provided - confirmed patient identity.  Pt described swollen tongue, difficulty swallowing, drooling.   Pt notes she is currently driving.  Advised to pull over immediately, pt complied.   Nurse triage colleague called 911 to alert EMS.  Location: [facility]  Pt parked in dark blue, small SUV [employer].  Pt's breathing worsened while on call.   Advised techniques to open airway including chin elevation and Mauricio's position while within carseat.  EMS arrived, triage disconnected call once EMS audibly connecting with patient.   Patient still awake and audibly breathing when call disconnected, speaking with EMS.     Yamilet HOLLINGSWORTH RN          Reason for Disposition   SEVERE difficulty swallowing (e.g., drooling or spitting) and started suddenly after taking a medicine or allergic foods   Sounds like a life-threatening emergency to the triager   Swollen tongue and sudden onset    Protocols used: Swallowing Difficulty-A-[place]

## 2024-12-18 NOTE — PROGRESS NOTES
Assessment & Plan     Diagnosis:    ICD-10-CM    1. Acute pulpitis  K04.01 amoxicillin (AMOXIL) 875 MG tablet      2. Oral ulceration  K12.1 magic mouthwash suspension (diphenhydrAMINE, lidocaine, aluminum-magnesium & simethicone)    underside of right tongue      3. Odynophagia  R13.10 dexAMETHasone (DECADRON) injectable solution used ORALLY 10 mg     DISCONTINUED: dexAMETHasone (DECADRON) injectable solution used ORALLY 10 mg      4. Benign essential hypertension  I10    Continue your amlodipine, metoprolol, follow up closely with PCP     Medical Decision Making:  Daysi Mayfield is a 64 year old female who presents for evaluation of jaw pain, sore throat, odynophagia and mouth sores.  This appears to be secondary to a dental issue. She also has history of ulcerations in her mouth and one prominent one in the underside of her right tongue is very bothersome.  There is no abscess detected around the tooth amenable to incision and drainage. The differential diagnosis includes: cracked tooth syndrome, pulpitis, sub-apical abscess, facial cellulitis, alveolitis amongst others. Some odynophagia; but no dysphagia and is tolerating fluids currently. There is no evidence of deep space infections, significant facial swelling, Lemierre's Syndrome or Hiram's angina. There are no posterior pharyngeal space (RPA, PTA) infections detected.  Follow up with a dentist/endodontist in the coming days is indicated for  further work up and treatment.   Will start magic mouthwash and antibiotics. Patient verbalizes understanding and agreement with the plan including reasons to go to the ER. All questions answered.    Jose Peck PA-C  [facility]    Subjective     Dyasi Mayfield is a 64 year old female who presents to clinic today for the following health issues:  Chief Complaint   Patient presents with    Urgent Care     Difficulty swallowing, elevated blood pressure, oral swelling, sores on mouth drooling  x a week  - worsening        HPI  States for the last week she has been experiencing sores on her mouth and lips, 1 under the right side of her tongue, gums feel inflamed the right side of her jaw is painful.  Pain seems rating down her throat as well.  She been having some difficulties swallowing today; can still get fluids down fine but it is quite painful.  This has been making her feel little bit short of breath as well, no difficulties breathing currently.  Notes that she has an appoint with a dentist in about a month, but cannot wait to see them she is having a lot of issues in her mouth lately.  No fever, cough, ear pain or other URI symptoms.    Review of Systems    See HPI    Objective      Vitals: BP (!) 163/87   Pulse 84   Temp 98.3  F (36.8  C) (Tympanic)   Resp 16   Wt 70.8 kg (156 lb)   LMP 04/04/2012   SpO2 98%   BMI 28.53 kg/m        Patient Vitals for the past 24 hrs:   BP Temp Temp src Pulse Resp SpO2 Weight   12/18/24 1734 (!) 163/87 -- -- -- -- -- --   12/18/24 1709 (!) 161/92 98.3  F (36.8  C) Tympanic 84 16 98 % 70.8 kg (156 lb)       Vital signs reviewed by: Jose Peck PA-C    Physical Exam   Constitutional: Patient is alert and cooperative. Mild acute distress.  Ears: Right TM is normal. Left TM is normal. External ear canals are normal.  Mouth: Poor dentition throughout with multiple cracked teeth.  The right lower gum lines are slightly swollen and erythematous, no fluctuance or areas of pointing.  There is a 0.5 cm circular aphthous ulcer to the underside of the right tongue.  No submandibular or sublingual swelling or erythema.  Posterior oropharynx slightly erythematous.  No exudates.  Tonsils normal.  Neck: Anterior cervical lymphadenopathy on the right. No meningismus.   Cardiovascular: Regular rate and rhythm  Pulmonary/Chest: Lungs are clear to auscultation throughout. Effort normal. No respiratory distress. No wheezes, rales or rhonchi.  Skin: No rash noted on visualized  skin.  Psychiatric:The patient has a normal mood and affect.     Interventions:  Decadron 10mg PO    Jose Peck PA-C, December 18, 2024

## 2025-01-12 ENCOUNTER — NURSE TRIAGE (OUTPATIENT)
Dept: NURSING | Facility: CLINIC | Age: 65
End: 2025-01-12
Payer: MEDICARE

## 2025-01-12 NOTE — TELEPHONE ENCOUNTER
"Nurse Triage SBAR    Is this a 2nd Level Triage? NO    Situation: white BM X 3 days, abdominal pain    Background: Patient calling to report abdominal pain and white stools for 3 days. Reports abdominal pain is located in upper and lower abdomen, and in her lower back as well.  Reports pain is constant and is an 8/10.      Assessment: severe abdominal pain, lower back pain, white stool X 3 days    Protocol Recommended Disposition:   Go to ED Now    Recommendation: Advised patient to be seen in ED.  Patient declining disposition as wait times are too long and she will be unable to wait in the waiting room.       Does the patient meet one of the following criteria for ADS visit consideration? 16+ years old, with an [facility] PCP     TIP  Providers, please consider if this condition is appropriate for management at one of our Acute and Diagnostic Services sites.     If patient is a good candidate, please use dotphrase <dot>triageresponse and select Refer to ADS to document.  Edda Bermudez RN on 1/12/2025 at 11:38 AM      Reason for Disposition   [1] SEVERE pain (e.g., excruciating) AND [2] present > 1 hour    Additional Information   Negative: Shock suspected (e.g., cold/pale/clammy skin, too weak to stand, low BP, rapid pulse)   Negative: Difficult to awaken or acting confused (e.g., disoriented, slurred speech)   Negative: Passed out (i.e., lost consciousness, collapsed and was not responding)   Negative: Sounds like a life-threatening emergency to the triager   Negative: Followed an abdomen (stomach) injury   Negative: Chest pain   Negative: [1] Abdominal pain AND [2] pregnant < 20 weeks   Negative: [1] Abdominal pain AND [2] pregnant 20 or more weeks   Negative: [1] Abdominal pain AND [2] postpartum (from 0 to 6 weeks after delivery)   Negative: Pain is mainly in upper abdomen  (if needed ask: \"is it mainly above the belly button?\")   Negative: Abdomen bloating or swelling are main symptoms    Protocols used: " Abdominal Pain - Female-A-AH

## 2025-01-28 ENCOUNTER — HOSPITAL ENCOUNTER (OUTPATIENT)
Facility: CLINIC | Age: 65
Setting detail: OBSERVATION
Discharge: HOME | End: 2025-01-29
Attending: EMERGENCY MEDICINE | Admitting: EMERGENCY MEDICINE
Payer: MEDICARE

## 2025-01-28 DIAGNOSIS — F32.A DEPRESSION, UNSPECIFIED DEPRESSION TYPE: ICD-10-CM

## 2025-01-28 DIAGNOSIS — F33.3 SEVERE EPISODE OF RECURRENT MAJOR DEPRESSIVE DISORDER, WITH PSYCHOTIC FEATURES (H): ICD-10-CM

## 2025-01-28 PROBLEM — F41.9 ANXIETY DISORDER, UNSPECIFIED: Status: ACTIVE | Noted: 2025-01-28

## 2025-01-28 LAB
ALBUMIN SERPL BCG-MCNC: 4.1 G/DL (ref 3.5–5.2)
ALBUMIN UR-MCNC: 20 MG/DL
ALP SERPL-CCNC: 95 U/L (ref 40–150)
ALT SERPL W P-5'-P-CCNC: 14 U/L (ref 0–50)
AMPHETAMINES UR QL SCN: ABNORMAL
ANION GAP SERPL CALCULATED.3IONS-SCNC: 10 MMOL/L (ref 7–15)
APPEARANCE UR: CLEAR
AST SERPL W P-5'-P-CCNC: 17 U/L (ref 0–45)
BARBITURATES UR QL SCN: ABNORMAL
BASOPHILS # BLD AUTO: 0 10E3/UL (ref 0–0.2)
BASOPHILS NFR BLD AUTO: 1 %
BENZODIAZ UR QL SCN: ABNORMAL
BILIRUB SERPL-MCNC: 0.2 MG/DL
BILIRUB UR QL STRIP: NEGATIVE
BUN SERPL-MCNC: 30.4 MG/DL (ref 8–23)
BZE UR QL SCN: ABNORMAL
CALCIUM SERPL-MCNC: 8.5 MG/DL (ref 8.8–10.4)
CANNABINOIDS UR QL SCN: ABNORMAL
CHLORIDE SERPL-SCNC: 107 MMOL/L (ref 98–107)
COLOR UR AUTO: ABNORMAL
CREAT SERPL-MCNC: 1.8 MG/DL (ref 0.51–0.95)
EGFRCR SERPLBLD CKD-EPI 2021: 31 ML/MIN/1.73M2
EOSINOPHIL # BLD AUTO: 0.1 10E3/UL (ref 0–0.7)
EOSINOPHIL NFR BLD AUTO: 2 %
ERYTHROCYTE [DISTWIDTH] IN BLOOD BY AUTOMATED COUNT: 12.2 % (ref 10–15)
FENTANYL UR QL: ABNORMAL
GLUCOSE SERPL-MCNC: 103 MG/DL (ref 70–99)
GLUCOSE UR STRIP-MCNC: NEGATIVE MG/DL
HCO3 SERPL-SCNC: 23 MMOL/L (ref 22–29)
HCT VFR BLD AUTO: 35.3 % (ref 35–47)
HGB BLD-MCNC: 11.7 G/DL (ref 11.7–15.7)
HGB UR QL STRIP: NEGATIVE
HOLD SPECIMEN: NORMAL
HOLD SPECIMEN: NORMAL
IMM GRANULOCYTES # BLD: 0 10E3/UL
IMM GRANULOCYTES NFR BLD: 0 %
KETONES UR STRIP-MCNC: NEGATIVE MG/DL
LEUKOCYTE ESTERASE UR QL STRIP: ABNORMAL
LYMPHOCYTES # BLD AUTO: 1.5 10E3/UL (ref 0.8–5.3)
LYMPHOCYTES NFR BLD AUTO: 28 %
MCH RBC QN AUTO: 30.4 PG (ref 26.5–33)
MCHC RBC AUTO-ENTMCNC: 33.1 G/DL (ref 31.5–36.5)
MCV RBC AUTO: 92 FL (ref 78–100)
MONOCYTES # BLD AUTO: 0.5 10E3/UL (ref 0–1.3)
MONOCYTES NFR BLD AUTO: 10 %
MUCOUS THREADS #/AREA URNS LPF: PRESENT /LPF
NEUTROPHILS # BLD AUTO: 3.1 10E3/UL (ref 1.6–8.3)
NEUTROPHILS NFR BLD AUTO: 59 %
NITRATE UR QL: NEGATIVE
NRBC # BLD AUTO: 0 10E3/UL
NRBC BLD AUTO-RTO: 0 /100
OPIATES UR QL SCN: ABNORMAL
PCP QUAL URINE (ROCHE): ABNORMAL
PH UR STRIP: 5.5 [PH] (ref 5–7)
PLATELET # BLD AUTO: 257 10E3/UL (ref 150–450)
POTASSIUM SERPL-SCNC: 4 MMOL/L (ref 3.4–5.3)
PROT SERPL-MCNC: 6.6 G/DL (ref 6.4–8.3)
RBC # BLD AUTO: 3.85 10E6/UL (ref 3.8–5.2)
RBC URINE: <1 /HPF
SODIUM SERPL-SCNC: 140 MMOL/L (ref 135–145)
SP GR UR STRIP: 1.02 (ref 1–1.03)
UROBILINOGEN UR STRIP-MCNC: NORMAL MG/DL
WBC # BLD AUTO: 5.2 10E3/UL (ref 4–11)
WBC URINE: 5 /HPF

## 2025-01-28 PROCEDURE — G0378 HOSPITAL OBSERVATION PER HR: HCPCS

## 2025-01-28 PROCEDURE — 85025 COMPLETE CBC W/AUTO DIFF WBC: CPT | Performed by: EMERGENCY MEDICINE

## 2025-01-28 PROCEDURE — 250N000013 HC RX MED GY IP 250 OP 250 PS 637: Performed by: EMERGENCY MEDICINE

## 2025-01-28 PROCEDURE — 85014 HEMATOCRIT: CPT | Performed by: EMERGENCY MEDICINE

## 2025-01-28 PROCEDURE — 85004 AUTOMATED DIFF WBC COUNT: CPT | Performed by: EMERGENCY MEDICINE

## 2025-01-28 PROCEDURE — 250N000013 HC RX MED GY IP 250 OP 250 PS 637: Performed by: PSYCHIATRY & NEUROLOGY

## 2025-01-28 PROCEDURE — 81001 URINALYSIS AUTO W/SCOPE: CPT | Performed by: EMERGENCY MEDICINE

## 2025-01-28 PROCEDURE — 84155 ASSAY OF PROTEIN SERUM: CPT | Performed by: EMERGENCY MEDICINE

## 2025-01-28 PROCEDURE — 36415 COLL VENOUS BLD VENIPUNCTURE: CPT | Performed by: EMERGENCY MEDICINE

## 2025-01-28 PROCEDURE — 99205 OFFICE O/P NEW HI 60 MIN: CPT | Performed by: NURSE PRACTITIONER

## 2025-01-28 PROCEDURE — 99285 EMERGENCY DEPT VISIT HI MDM: CPT

## 2025-01-28 PROCEDURE — 80307 DRUG TEST PRSMV CHEM ANLYZR: CPT | Performed by: PSYCHIATRY & NEUROLOGY

## 2025-01-28 PROCEDURE — 82040 ASSAY OF SERUM ALBUMIN: CPT | Performed by: EMERGENCY MEDICINE

## 2025-01-28 RX ORDER — METOPROLOL SUCCINATE 50 MG/1
100 TABLET, EXTENDED RELEASE ORAL DAILY
Status: DISCONTINUED | OUTPATIENT
Start: 2025-01-28 | End: 2025-01-29 | Stop reason: HOSPADM

## 2025-01-28 RX ORDER — HYDROXYZINE HYDROCHLORIDE 10 MG/1
10 TABLET, FILM COATED ORAL
Status: DISCONTINUED | OUTPATIENT
Start: 2025-01-28 | End: 2025-01-29 | Stop reason: HOSPADM

## 2025-01-28 RX ORDER — OLANZAPINE 5 MG/1
5 TABLET, ORALLY DISINTEGRATING ORAL AT BEDTIME
Status: DISCONTINUED | OUTPATIENT
Start: 2025-01-29 | End: 2025-01-29 | Stop reason: HOSPADM

## 2025-01-28 RX ORDER — ARIPIPRAZOLE 2 MG/1
2 TABLET ORAL DAILY
COMMUNITY
Start: 2024-11-07

## 2025-01-28 RX ORDER — ACETAMINOPHEN 500 MG
500 TABLET ORAL EVERY 6 HOURS PRN
Status: DISCONTINUED | OUTPATIENT
Start: 2025-01-28 | End: 2025-01-29 | Stop reason: HOSPADM

## 2025-01-28 RX ORDER — METOPROLOL SUCCINATE 50 MG/1
50 TABLET, EXTENDED RELEASE ORAL DAILY
COMMUNITY
Start: 2024-04-26

## 2025-01-28 RX ORDER — OLANZAPINE 5 MG/1
5 TABLET, ORALLY DISINTEGRATING ORAL EVERY 6 HOURS PRN
Status: DISCONTINUED | OUTPATIENT
Start: 2025-01-28 | End: 2025-01-29 | Stop reason: HOSPADM

## 2025-01-28 RX ORDER — DIAZEPAM 5 MG/1
5 TABLET ORAL ONCE
Status: COMPLETED | OUTPATIENT
Start: 2025-01-28 | End: 2025-01-28

## 2025-01-28 RX ADMIN — OLANZAPINE 5 MG: 5 TABLET, ORALLY DISINTEGRATING ORAL at 13:41

## 2025-01-28 RX ADMIN — METOPROLOL SUCCINATE 100 MG: 50 TABLET, EXTENDED RELEASE ORAL at 13:42

## 2025-01-28 RX ADMIN — DIAZEPAM 5 MG: 5 TABLET ORAL at 05:36

## 2025-01-28 ASSESSMENT — ACTIVITIES OF DAILY LIVING (ADL)
ADLS_ACUITY_SCORE: 45

## 2025-01-28 ASSESSMENT — COLUMBIA-SUICIDE SEVERITY RATING SCALE - C-SSRS
6. HAVE YOU EVER DONE ANYTHING, STARTED TO DO ANYTHING, OR PREPARED TO DO ANYTHING TO END YOUR LIFE?: NO
2. HAVE YOU ACTUALLY HAD ANY THOUGHTS OF KILLING YOURSELF IN THE PAST MONTH?: NO
1. IN THE PAST MONTH, HAVE YOU WISHED YOU WERE DEAD OR WISHED YOU COULD GO TO SLEEP AND NOT WAKE UP?: NO

## 2025-01-28 NOTE — ED TRIAGE NOTES
"Pt presents to the ED with concerns of being unable to care for herself. Pt has a hx of depression. For the past three months, pt has been unable to care for herself. Pt reports eating food in bed, not cleaning her house, and not taking care of herself. Pt states that she got taken off her MH meds due to her kidney labs, but has not gotten them adjusted appropriately since then. Pt is unsure what triggered this. Denies SI. Pt reports being \"out of it\" and that her memory getting worse. Family hx of dementia.     Triage Assessment (Adult)       Row Name 01/28/25 0248          Triage Assessment    Airway WDL WDL        Respiratory WDL    Respiratory WDL WDL        Skin Circulation/Temperature WDL    Skin Circulation/Temperature WDL WDL        Cardiac WDL    Cardiac WDL WDL        Peripheral/Neurovascular WDL    Peripheral Neurovascular WDL WDL        Cognitive/Neuro/Behavioral WDL    Cognitive/Neuro/Behavioral WDL WDL                     "

## 2025-01-28 NOTE — PROGRESS NOTES
Went to get patient and she was sleeping. ED nurse in charge indicated that patient needed to be reseen by DEC because she was very restless during the assessment and patient was given Valium. Once reassessed she could then be ready to transfer later this morning.

## 2025-01-28 NOTE — ED NOTES
As per EDMD-patient displayed anxiety while DEC assessment.  Given medicine to calm her down and observed.  Re assessment by DEC in the morning.      Patient stated that having restless leg and still hungry-given crackers and stated eating makes her calm.

## 2025-01-28 NOTE — CONSULTS
Diagnostic Evaluation Consultation  Crisis Assessment    Patient Name: Daysi Mayfield  Age:  64 year old  Legal Sex: female  Gender Identity: female  Pronouns: she/her  Race: White  Ethnicity: Not  or   Language: English      Patient was assessed: Virtual: [employer]   Crisis Assessment Start Date: 01/28/25  Crisis Assessment Start Time: 0501  Crisis Assessment Stop Time: 0518  Patient location: [facility]                             ED15    Referral Data and Chief Complaint  Daysi Mayfield presents to the ED by  self. Patient is presenting to the ED for the following concerns: Anxiety, Worsening psychosocial stress. Factors that make the mental health crisis life threatening or complex are: Pt presents to the ED for mental health evaluation. Pt states that her mental health is declining. She states she was at a lower dose of medication when she was at a different hospital and last night she did not feel she was grasping reality. Per chart review, she will present to ED and then leave without being seen. Pt states that was not treated fairly last night. She went home and went to sleep. She states that when she woke up her surroundings looked 'strange.' Pt identifies psychosocial stressor of moving a few days ago. She has felt overwhelmed because things are in boxes, lost her phone, didn't remember the codes to the building and didn't know how to set up wifi. Pt states that she is experiencing restless legs and has difficulty sleeping. She states that she has had restless legs since she stopped breastfeeding her daughter (who is now an adult). She states she had been prescribed klonipin for the restless legs and decided to stop taking it due to side effects of falling asleep (this was many years ago). Pt denies SI/HI/SIB. Pt states that she is not taking care of herself (she is not picking up after herself). Throughout assessment, pt was laying on the bed with her eyes closes for  most of the time. Pt was engaged and cooperative. She appeared to be moving around a lot (such as her legs moving) and looked to be restless. Toward the end of the assessment, writer asked pt if she was feeling okay and she stated that she was feeling uncomfortable, hot and the restless legs were increasing. Writer ended the assessment at this time to request her nurse come in to assess her and writer spoke with Dr. Tobar to provide update. Pt is seeking resources for mental health and would be appropriate for [facility] once she is feeling better physically. Dr. Tobar stated he would go in to talk with pt to follow up.      Informed Consent and Assessment Methods  Explained the crisis assessment process, including applicable information disclosures and limits to confidentiality, assessed understanding of the process, and obtained consent to proceed with the assessment.  Assessment methods included conducting a formal interview with patient, review of medical records, collaboration with medical staff, and obtaining relevant collateral information from family and community providers when available: done     History of the Crisis   Pt states she has a history of depression and anxiety. Pt states that she does not have a current psychiatrist. Pt does not have a therapist. She states she has run out of medication. Pt states that she has a history of inpatient mental health admission, most recently 10 years ago. Pt states she has a history of substance use. Pt reports she has used pills (barbiturates and opiates), cocaine and weed. Pt states her last use has been 6 months ago.    Brief Psychosocial History  Family:   , Children yes  Support System:  Children  Employment Status:  unemployed  Source of Income:   (unable to assess at this time)  Financial Environmental Concerns:  none  Current Hobbies:  family functions, television/movies/videos  Barriers in Personal Life:  mental health concerns    Significant Clinical  History  Current Anxiety Symptoms:  anxious  Current Depression/Trauma:  helplessness  Current Somatic Symptoms:  anxious, somatic symptoms (abdominal pain, headache, tension)  Current Psychosis/Thought Disturbance:     Current Eating Symptoms:  loss of appetite  Chemical Use History:  Alcohol: None  Benzodiazepines: None  Opiates: None  Cocaine: None  Marijuana: None  Other Use: None   Past diagnosis:  Anxiety Disorder, Depression  Family history:   (unable to ask pt at this time)  Past treatment:  Individual therapy, Psychiatric Medication Management, Inpatient Hospitalization  Details of most recent treatment:  pt does not currently have outpatient providers  Other relevant history:       Have there been any medication changes in the past two weeks:  patient is not on psychiatric meds       Is the patient compliant with medications:  no  pt states that she ran out of medications and does not currently have a provider     Collateral Information  Is there collateral information: No (attempted to contact pts Kirsten finley ([phone]) to obtain collateral information, however, no answer at this time)        Risk Assessment  [place] Suicide Severity Rating Scale Full Clinical Version:  Suicidal Ideation  Q1 Wish to be Dead (Lifetime): Yes  Q2 Non-Specific Active Suicidal Thoughts (Lifetime): Yes  3. Active Suicidal Ideation with any Methods (Not Plan) Without Intent to Act (Lifetime): No  4. Active Suicidal Ideation with Some Intent to Act, Without Specific Plan (Lifetime): No  5. Active Suicidal Ideation with Specific Plan and Intent (Lifetime): No  Q6 Suicide Behavior (Lifetime): no     Suicidal Behavior (Lifetime)  Actual Attempt (Lifetime): No  Has subject engaged in non-suicidal self-injurious behavior? (Lifetime): No  Interrupted Attempts (Lifetime): No  Aborted or Self-Interrupted Attempt (Lifetime): No  Preparatory Acts or Behavior (Lifetime): No    [place] Suicide Severity Rating Scale Recent:    Suicidal Ideation (Recent)  Q1 Wished to be Dead (Past Month): no  Q2 Suicidal Thoughts (Past Month): no  Level of Risk per Screen: no risks indicated  Intensity of Ideation (Recent)  Deterrents (Past 1 Month): Does not apply  Reasons for Ideation (Past 1 Month): Does not apply  Suicidal Behavior (Recent)  Actual Attempt (Past 3 Months): No  Has subject engaged in non-suicidal self-injurious behavior? (Past 3 Months): No  Interrupted Attempts (Past 3 Months): No  Aborted or Self-Interrupted Attempt (Past 3 Months): No  Preparatory Acts or Behavior (Past 3 Months): No    Environmental or Psychosocial Events: other life stressors, recent life events (see comment) (pt states that she moved to a new apartment this past week and it has been overwhelming for her)  Protective Factors: Protective Factors: strong bond to family unit, community support, or employment, responsibilities and duties to others, including pets and children, lives in a responsibly safe and stable environment, help seeking    Does the patient have thoughts of harming others? Feels Like Hurting Others: no  Previous Attempt to Hurt Others: no  Is the patient engaging in sexually inappropriate behavior?: no  Does Patient have a known history of aggressive behavior: No  Does patient have history of aggression in hospital: per chart review, pt has yelled at ED staff previously    Is the patient engaging in sexually inappropriate behavior?  no        Mental Status Exam   Affect: Appropriate  Appearance: Appropriate  Attention Span/Concentration: Attentive  Eye Contact: Variable    Fund of Knowledge: Appropriate   Language /Speech Content: Fluent  Language /Speech Volume: Normal  Language /Speech Rate/Productions: Normal  Recent Memory: Intact  Remote Memory: Intact  Mood: Anxious, Other (please comment) (pt appeared to be restless)  Orientation to Person: Yes   Orientation to Place: Yes  Orientation to Time of Day: Yes  Orientation to Date: Yes      Situation (Do they understand why they are here?): Yes  Psychomotor Behavior: Other (please comment) (pt appeared to be restless)  Thought Content: Clear  Thought Form: Intact     Medication  Psychotropic medications:   Medication Orders - Psychiatric (From admission, onward)      None             Current Care Team  Patient Care Team:  Melonie Srivastava MD as PCP - General (Family Practice)  Melonie Srivastava MD as Assigned PCP  Salena Hawkins MD as MD (Nephrology)  Karolyn Blackwood PA-C as Physician Assistant (Dermatology)    Diagnosis  Patient Active Problem List   Diagnosis Code    Migraine G43.909    Allergic rhinitis J30.9    Drug dependence (H) F19.20    Dysthymia F34.1    Hyperlipidemia LDL goal <160 E78.5    Joint pain M25.50    Rapid weight loss R63.4    PTSD (post-traumatic stress disorder) F43.10    Depression with suicidal ideation F32.A, R45.851    Severe recurrent major depression without psychotic features (H) F33.2    Suicidal ideation R45.851    Syncope R55    Major depressive disorder, severe (H) F32.2    Sore throat J02.9    Other fatigue R53.83    ANCA-associated vasculitis (H) I77.82    Epistaxis R04.0    Episodic mood disorder F39    Thyroid nodule E04.1    Benign essential hypertension I10    Fall, initial encounter W19.XXXA    Altered mental status, unspecified altered mental status type R41.82    Vasculitis I77.6    CKD (chronic kidney disease) stage 4, GFR 15-29 ml/min (H) N18.4    AMS (altered mental status) R41.82    Anxiety disorder, unspecified F41.9       Primary Problem This Admission  Active Hospital Problems    *Anxiety disorder, unspecified        Clinical Summary and Substantiation of Recommendations   Clinical Substantiation:  Pt will transfer to [facility] when appropriate. A lower level of care has been unsuccessful in treating and stabilizing patient s mental health symptoms. Patient will remain on [facility] unit under observation for continued monitoring,  treatment and therapeutic intervention of mental health symptoms. Observation at [facility] could help mitigate the need for a more restrictive level of care in an inpatient setting.    Goals for crisis stabilization:  decrease symptoms, obtain therapeutic rest    Next steps for Care Team:  for pt to meet with psychiatry provider while on [facility] for medication evaluation and disposition support, for pt to engage in safety planning    Treatment Objectives Addressed:  rapport building, processing feelings, assessing safety    Therapeutic Interventions:  Engaged in safety planning, Explored motivation for behavioral change.    Has a specific means been identified for suicidal/homicide actions: No    If yes, describe:       Explain action steps toward mitigation:       Document completion of mitigation actions:       The follow up action still needed prior to discharge:       Patient coping skills attempted to reduce the crisis:  pt spoke with her daughter and presented to the ED seeking help    Disposition  Recommended referrals: Other. please comment (observation at [facility] unit)        Reviewed case and recommendations with attending provider. Attending Name: Dr. Tobar       Attending concurs with disposition: yes       Patient and/or validated legal guardian concurs with disposition:   yes       Final disposition:  observation          Legal status: Voluntary/Patient has signed consent for treatment                                                                            Reviewed court records: yes       Assessment Details   Total duration spent with the patient: 17 min     CPT code(s) utilized: 15156 - Psychotherapy (with patient) - 30 (16-37*) min    Minerva Bueno, RENETTAC, LADC, Psychotherapist  DEC - Triage & Transition Services  Callback: [phone]

## 2025-01-28 NOTE — PROGRESS NOTES
"  Triage & Transition Services, Extended Care     Therapy Progress Note    Patient: Daysi goes by \"Daysi,\" uses she/her pronouns  Date of Service: January 28, 2025  Site of Service: [facility] Emergency [facility]                             [id number]  Patient was seen yes  Mode of Assessment: In person    Presentation Summary: Writer met with Pt at Pts recliner chair. Pt presented with a tired and somewhat anxious affect. Pts mood was congruent with this presentation. Pt was cooperative with assessment. Pt endorsed increased anxiety sx, with racing thoughts and confusion. Pt was oriented x4, Pt endorsed \"intrusive\" anxious thoughts and not being able to care for herself like she has in the past. Pt endorsed that 6 months ago, her psychiatrist of 35 years retired and she has not seen a provider since, Pt endorsed that she would like to establish care with another provider. Pt denied SI but endorsed \"I don't want to feel like this anymore, worried I might do something on accident.\" Pt was not able to fully safety plan with writer today. Pt did not endorse any SIB/HI or AH/VH.    Therapeutic Intervention(s) Provided: Engaged in guided discovery, explored patient's perspectives and helped expand them through socratic dialogue., Reviewed healthy living that supports positive mental health, including looking at sleep hygiene, regular movement, nutrition, and regular socialization., Identified and practiced coping skills.    Current Symptoms: anxious, racing thoughts sadness, negativistic, withdrawl/isolation, thoughts of death/suicide anxious, racing thoughts forgetful, distractability increased appetite    Mental Status Exam   Affect: Appropriate  Appearance: Appropriate  Attention Span/Concentration: Attentive  Eye Contact: Engaged    Fund of Knowledge: Appropriate   Language /Speech Content: Fluent  Language /Speech Volume: Normal  Language /Speech Rate/Productions: Normal  Recent Memory: Intact  Remote Memory: " Intact  Mood: Anxious  Orientation to Person: Yes   Orientation to Place: Yes  Orientation to Time of Day: Yes  Orientation to Date: Yes     Situation (Do they understand why they are here?): Yes  Psychomotor Behavior: Normal  Thought Content: Clear  Thought Form: Intact    Treatment Objective(s) Addressed: rapport building, assessing safety, identifying treatment goals, processing feelings, identifying additional supports, exploring obstacles to safety in the community    Patient Response to Interventions: acceptance expressed, verbalizes understanding    Progress Towards Goals: Patient Reports Symptoms Are: ongoing  Patient Progress Toward Goals: is making progress  Comment: Pt has been receptive to ED interventions.  Next Step to Work Toward Discharge: symptom stabilization  Symptom Stabilization Comment: Pt endorsed ongoing issues with anxiety, racing thoughts and confusion. Pt endorsed passive SI, Pt denied any HI, SIB or AH/VH    Case Management: Summary of Interaction: None at time of assessment    Plan: observation  yes provider, RN    yes    Clinical Substantiation: At this time it does appear that Pt would benefit from further observation and psychiatric stabilization via medication management and ED level therapeutic interventions, due to ongoing anxiety sx, and change in functioning. Pt was not able to fully safety plan with writer. Pt does appear to be at higher risk of death by suicide accidental or intentional due to mental health hx and substance use hx. If Pt is able to effectively safety plan and/or Pts sx improve it would be beneficial to pursue a less restrictive alternative.    Legal Status: Legal Status: Voluntary/Patient has signed consent for treatment    Session Status: Time session started: 0840  Time session ended: 0850  Session Duration (minutes): 10 minutes  Session Number: 1  Anticipated number of sessions or this episode of care: 3    Time Spent: 10 minutes    CPT Code: CPT Codes:  Non-Billable    Diagnosis:   Patient Active Problem List   Diagnosis Code    Migraine G43.909    Allergic rhinitis J30.9    Drug dependence (H) F19.20    Dysthymia F34.1    Hyperlipidemia LDL goal <160 E78.5    Joint pain M25.50    Rapid weight loss R63.4    PTSD (post-traumatic stress disorder) F43.10    Depression with suicidal ideation F32.A, R45.851    Severe recurrent major depression without psychotic features (H) F33.2    Suicidal ideation R45.851    Syncope R55    Major depressive disorder, severe (H) F32.2    Sore throat J02.9    Other fatigue R53.83    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

## 2025-01-28 NOTE — ED PROVIDER NOTES
"  Emergency Department Note      History of Present Illness     Chief Complaint   Depression      HPI   Daysi Mayfield is a 64 year old female is brought to the Emergency Department with concerns that she cannot take care of herself.  Patient has history of depression.  Over the last 3 months, she has been unable to care for herself.  He states she is eating food and bed and not cleaning her house.  She had been taken off her mental health medications due to \"kidney labs\"  Patient denies suicidal ideation.  Patient states she is \"out of it\".  She admits her memory is getting worse over time.  Patient has a family history of dementia.  The patient is concerned about her increasing depression.  She believes she is not on the right medications.    Independent Historian   None    Review of External Notes   [facility] nurse advisors note from 12 January 2025    Past Medical History     Medical History and Problem List   Past Medical History:   Diagnosis Date    311     Allergic rhinitis, cause unspecified     Arthritis     Depressive disorder     Migraine, unspecified, without mention of intractable migraine without mention of status migrainosus     Rapid weight loss 1/22/2013       Medications   amLODIPine (NORVASC) 10 MG tablet  ARIPiprazole (ABILIFY) 10 MG tablet  atorvastatin (LIPITOR) 20 MG tablet  Calamine external lotion  clindamycin (CLEOCIN T) 1 % external lotion  dupilumab (DUPIXENT) 300 MG/2ML prefilled syringe  furosemide (LASIX) 20 MG tablet  gabapentin (NEURONTIN) 100 MG capsule  hydrocortisone (CORTAID) 1 % external ointment  hydrOXYzine (ATARAX) 10 MG tablet  magic mouthwash (ENTER INGREDIENTS IN COMMENTS) suspension  magic mouthwash suspension (diphenhydrAMINE, lidocaine, aluminum-magnesium & simethicone)  melatonin 3 MG tablet  methylPREDNISolone (MEDROL DOSEPAK) 4 MG tablet therapy pack  metoprolol succinate ER (TOPROL XL) 100 MG 24 hr tablet  naltrexone (DEPADE/REVIA) 50 MG tablet  permethrin (ELIMITE) 5 " % external cream  venlafaxine (EFFEXOR-ER) 37.5 MG 24 hr tablet        Surgical History   Past Surgical History:   Procedure Laterality Date    APPENDECTOMY       SECTION   &     FOOT SURGERY      right    HERNIORRHAPHY UMBILICAL  2000    ORTHOPEDIC SURGERY      ZZC NONSPECIFIC PROCEDURE      Appendectomy       Physical Exam     Patient Vitals for the past 24 hrs:   BP Temp Temp src Pulse Resp SpO2   25 0339 (!) 167/78 -- -- -- -- --   25 0251 (!) 208/99 98.1  F (36.7  C) Oral 76 17 99 %     Physical Exam  General: Alert, No distress. Nontoxic appearance  Head: No signs of trauma.   Mouth/Throat: Oropharynx moist.   Eyes: Conjunctivae are normal. Pupils are equal..   Neck: Normal range of motion.    CV: Appears well perfused.  Resp:No respiratory distress.   MSK: Normal range of motion. No obvious deformity.   Neuro: The patient is alert and interactive. COELLO. Speech normal. GCS 15  Skin: No lesion or sign of trauma noted.   Psych: Pleasant.  Polite.  The patient admits to increasing depression.  She has not been taking care of herself or her home.  She denies suicidal ideation      Diagnostics     Lab Results   Labs Ordered and Resulted from Time of ED Arrival to Time of ED Departure   COMPREHENSIVE METABOLIC PANEL - Abnormal       Result Value    Sodium 140      Potassium 4.0      Carbon Dioxide (CO2) 23      Anion Gap 10      Urea Nitrogen 30.4 (*)     Creatinine 1.80 (*)     GFR Estimate 31 (*)     Calcium 8.5 (*)     Chloride 107      Glucose 103 (*)     Alkaline Phosphatase 95      AST 17      ALT 14      Protein Total 6.6      Albumin 4.1      Bilirubin Total 0.2     ROUTINE UA WITH MICROSCOPIC REFLEX TO CULTURE - Abnormal    Color Urine Light Yellow      Appearance Urine Clear      Glucose Urine Negative      Bilirubin Urine Negative      Ketones Urine Negative      Specific Gravity Urine 1.017      Blood Urine Negative      pH Urine 5.5      Protein Albumin Urine 20 (*)      Urobilinogen Urine Normal      Nitrite Urine Negative      Leukocyte Esterase Urine Trace (*)     Mucus Urine Present (*)     RBC Urine <1      WBC Urine 5     CBC WITH PLATELETS AND DIFFERENTIAL    WBC Count 5.2      RBC Count 3.85      Hemoglobin 11.7      Hematocrit 35.3      MCV 92      MCH 30.4      MCHC 33.1      RDW 12.2      Platelet Count 257      % Neutrophils 59      % Lymphocytes 28      % Monocytes 10      % Eosinophils 2      % Basophils 1      % Immature Granulocytes 0      NRBCs per 100 WBC 0      Absolute Neutrophils 3.1      Absolute Lymphocytes 1.5      Absolute Monocytes 0.5      Absolute Eosinophils 0.1      Absolute Basophils 0.0      Absolute Immature Granulocytes 0.0      Absolute NRBCs 0.0         Imaging   No orders to display       Independent Interpretation   None    ED Course      Medications Administered   Medications - No data to display    Procedures   Procedures     Discussion of Management   None    ED Course        Additional Documentation  None    Medical Decision Making / Diagnosis       MYRNA   Daysi Mayfield is a 64 year old female who presents to the ED with increasing depression.  The patient is on chronic medications but she does not think they are helping anymore.  She denies active suicidal ideation or plan.  The patient was referred here by her daughter be seen in the empath unit.  [place] studies are unremarkable.  No medical issues appear to be influencing her presentation here today.  Patient is appropriate for transfer to the empath unit for further evaluation and treatment    Disposition   [facility]     Diagnosis     ICD-10-CM    1. Depression, unspecified depression type  F32.Justyn Sánchez MD  01/28/25 0359

## 2025-01-28 NOTE — ED NOTES
[facility]  ED to EMPATH Checklist:      Goal for EMPATH: Depression management    Current Behavior: Calm and Cooperative    Safety Concerns: None    Legal Hold Status: Voluntary    Medically Cleared by ED provider: IV removed    Patient Therapeutically Searched: N/A    Belongings: Remain with patient    Independent Ambulation at Baseline: Yes/No: Yes    Participates in Care/Conversation: Yes/No: Yes    Patient Informed about EMPATH: Yes/No: Yes    DEC: Ordered and pending    Patient Ready to be Transferred to EMPATH? Yes/No: Yes

## 2025-01-28 NOTE — ED PROVIDER NOTES
[facility]  [facility] Emergency Department  Observation Initiation Date: Jan 28, 2025    Daysi Mayfield MRN: [id number]   Age: 64 year old YOB: 1960     History     Chief Complaint   Patient presents with    Depression     HPI  Daysi Mayfield is a 64 year old female with a past history notable for anxiety, depression, and paranoid ideation.  She told me that she felt so uncomfortable at home that she needed to stay in her car with her cat.   She told me that she has stage IV kidney failure and a few months ago ran out of medications.  She also reported that her previous psychiatric provider switched her from Zyprexa to Abilify years ago and she started to experiencing more symptoms of psychosis, such as paranoia and delusions.  She never experienced hallucinations, she never experienced command hallucinations.  She told me that she was feeling so sick that she relapsed on crack cocaine.  U-Tox came back positive for amphetamines and cocaine.  BUN was 30.4 and creatinine 1.80.  Blood pressure was elevated but is stabilizing now that she started taking metoprolol.  Patient told me that she did not have a primary care provider but was in chemical dependency treatment in April 2024.  Inpatient provider was prescribing psychotropics and other medications.  After discharging from chemical dependency treatment and running out of refills she stopped all medications.  Right now patient is in the process of scheduling an appointment with nephrology but told me that there is a long wait.  She also needs to find a primary care provider.    No evidence of tammy or psychosis.  No evidence of delusional or paranoid ideation.  Patient strongly denied safety concerns.  She denied urges to use drugs or alcohol.  She told me that she only relapsed on crack cocaine once.  She was administered Zyprexa and said that immediately she felt better.   Patient is interested in staying overnight  "and wants to make sure that before leaving she has a psychiatry and a primary care provider appointments scheduled.  Patient requested Zyprexa. Zyprexa does not require dose adjustment for altered kidney functions.  Patient stated that she would like to start Zyprexa tomorrow, she does not believe that she needs another dose tonight.    Past Medical History  Past Medical History:   Diagnosis Date    311     Allergic rhinitis, cause unspecified     Arthritis     Depressive disorder     Migraine, unspecified, without mention of intractable migraine without mention of status migrainosus     Rapid weight loss 2013     Past Surgical History:   Procedure Laterality Date    APPENDECTOMY       SECTION   &     FOOT SURGERY      right    HERNIORRHAPHY UMBILICAL  2000    ORTHOPEDIC SURGERY      Z NONSPECIFIC PROCEDURE      Appendectomy     ARIPiprazole (ABILIFY) 10 MG tablet  ARIPiprazole (ABILIFY) 2 MG tablet  metoprolol succinate ER (TOPROL XL) 100 MG 24 hr tablet  metoprolol succinate ER (TOPROL XL) 50 MG 24 hr tablet  venlafaxine (EFFEXOR-ER) 37.5 MG 24 hr tablet  amLODIPine (NORVASC) 10 MG tablet  atorvastatin (LIPITOR) 20 MG tablet  Calamine external lotion  clindamycin (CLEOCIN T) 1 % external lotion  dupilumab (DUPIXENT) 300 MG/2ML prefilled syringe  furosemide (LASIX) 20 MG tablet  gabapentin (NEURONTIN) 100 MG capsule  hydrocortisone (CORTAID) 1 % external ointment  hydrOXYzine (ATARAX) 10 MG tablet  magic mouthwash (ENTER INGREDIENTS IN COMMENTS) suspension  magic mouthwash suspension (diphenhydrAMINE, lidocaine, aluminum-magnesium & simethicone)  melatonin 3 MG tablet  methylPREDNISolone (MEDROL DOSEPAK) 4 MG tablet therapy pack  naltrexone (DEPADE/REVIA) 50 MG tablet  permethrin (ELIMITE) 5 % external cream      Allergies   Allergen Reactions    Compazine [Prochlorperazine] Other (See Comments)     My head stretches back     Phenothiazines Other (See Comments)     Compazine. \"My tongue " "goes back\"    Other Reaction(s): Dystonia    PN: .    Other reaction(s): Dystonia   Compazine. \"My tongue goes back\"   My head stretches back    PN: .     Family History  Family History   Problem Relation Age of Onset    Dementia Father     Depression Father     Depression Daughter      Social History   Social History     Tobacco Use    Smoking status: Never    Smokeless tobacco: Never   Vaping Use    Vaping status: Never Used   Substance Use Topics    Alcohol use: No    Drug use: No      Past medical history, past surgical history, medications, allergies, family history, and social history were reviewed with the patient. No additional pertinent items.       Review of Systems  A medically appropriate review of systems was performed with pertinent positives and negatives noted in the HPI, and all other systems negative.    Physical Examination   BP: (!) 208/99  Pulse: 76  Temp: 98.1  F (36.7  C)  Resp: 17  Height: 157.5 cm (5' 2\")  Weight: 70.4 kg (155 lb 3.2 oz)  SpO2: 99 %    Physical Exam  General: Appears stated age.   Neuro: Alert and fully oriented. Extremities appear to demonstrate normal strength on visual inspection.   Integumentary/Skin: no rash visualized, normal color    Psychiatric Examination   Appearance: awake, alert  Attitude:  cooperative  Eye Contact:  good  Mood:  better  Affect:  appropriate and in normal range  Speech:  clear, coherent  Psychomotor Behavior:  no evidence of tardive dyskinesia, dystonia, or tics  Thought Process:  logical  Associations:  no loose associations  Thought Content:  no evidence of suicidal ideation or homicidal ideation  Insight:  good  Judgement:  intact  Oriented to:  time, person, and place  Attention Span and Concentration:  intact  Recent and Remote Memory:  intact  Language: able to name/identify objects without impairment  Fund of Knowledge: intact with awareness of current and past events    ED Course        Labs Ordered and Resulted from Time of ED Arrival to " Time of ED Departure   COMPREHENSIVE METABOLIC PANEL - Abnormal       Result Value    Sodium 140      Potassium 4.0      Carbon Dioxide (CO2) 23      Anion Gap 10      Urea Nitrogen 30.4 (*)     Creatinine 1.80 (*)     GFR Estimate 31 (*)     Calcium 8.5 (*)     Chloride 107      Glucose 103 (*)     Alkaline Phosphatase 95      AST 17      ALT 14      Protein Total 6.6      Albumin 4.1      Bilirubin Total 0.2     ROUTINE UA WITH MICROSCOPIC REFLEX TO CULTURE - Abnormal    Color Urine Light Yellow      Appearance Urine Clear      Glucose Urine Negative      Bilirubin Urine Negative      Ketones Urine Negative      Specific Gravity Urine 1.017      Blood Urine Negative      pH Urine 5.5      Protein Albumin Urine 20 (*)     Urobilinogen Urine Normal      Nitrite Urine Negative      Leukocyte Esterase Urine Trace (*)     Mucus Urine Present (*)     RBC Urine <1      WBC Urine 5     URINE DRUG SCREEN PANEL - Abnormal    Amphetamines Urine Screen Positive (*)     Barbituates Urine Screen Negative      Benzodiazepine Urine Screen Negative      Cannabinoids Urine Screen Negative      Cocaine Urine Screen Positive (*)     Fentanyl Qual Urine Screen Negative      Opiates Urine Screen Negative      PCP Urine Screen Negative     CBC WITH PLATELETS AND DIFFERENTIAL    WBC Count 5.2      RBC Count 3.85      Hemoglobin 11.7      Hematocrit 35.3      MCV 92      MCH 30.4      MCHC 33.1      RDW 12.2      Platelet Count 257      % Neutrophils 59      % Lymphocytes 28      % Monocytes 10      % Eosinophils 2      % Basophils 1      % Immature Granulocytes 0      NRBCs per 100 WBC 0      Absolute Neutrophils 3.1      Absolute Lymphocytes 1.5      Absolute Monocytes 0.5      Absolute Eosinophils 0.1      Absolute Basophils 0.0      Absolute Immature Granulocytes 0.0      Absolute NRBCs 0.0         Assessments & Plan (with Medical Decision Making)   Patient presenting with anxiety, depression, and paranoid ideation.    Patient would  like to restart Zyprexa.  .  Patient was in agreement with the recommendations. Nursing notes reviewed noting no acute issues.     I have reviewed the assessment completed by the LMHP.     During the observation period, the patient did not require medications for agitation, and did not require restraints/seclusion for patient and/or provider safety.     The patient was found to have a psychiatric condition that would benefit from an observation stay in the emergency department for further psychiatric stabilization and/or coordination of a safe disposition. The observation plan includes serial assessments of psychiatric condition, potential administration of medications if indicated, further disposition pending the patient's psychiatric course during the monitoring period.     Preliminary diagnosis:    ICD-10-CM    1. Depression, unspecified depression type  F32.A            Treatment Plan:  -Place on observation status for further crisis stabilization and medication management.  -Start Zyprexa 5 mg PO at HS  -Continue home medications including metoprolol 100 mg PO daily   -EmPATH standing order medications for comfort, anxiety and agitation.    -Currently voluntary status.  -Does not meet criteria for a 72 hour hold due to no acute imminent risk to himself or others.  There is a higher risk due to  worsening symptoms, but at this time, that imminent risk is not present.  -Reassess in the AM   -Anticipating the patient will require more time to gain benefit from their treatment plan as what we are allowed to provide on observation status, they will be transferred to the inpatient setting.  -LMHP working on community resources as patient lacks sufficient interpersonal support.  -They report gaining benefit from the therapeutic milieu of the unit.  -They are comfortable with staying another night on the unit and anticipate further reduction of symptoms by tomorrow.  -Problem focused, supportive therapy provided around  patients stressors and symptoms related to their diagnosis.  Validated patients emotions and problems solved with patient around stress management and self care strategies. Patient was receptive.   -Medication education provided this visit includes, rationale for medication, importance of compliance, medication interactions, and common side effects. Patient agreeable.  -The patient was educated regarding their differential diagnoses and the importance of adhering to their treatment plan and outpatient follow up appointments to aid with diagnostic clarity.  -At this time, diagnosis remains complicated by ongoing illicit substance usage.  Diagnostic clarity would be more likely following a period of sobriety combined with appropriate follow through with outpatient providers for continued examination.    I, Sussy Shields, LEXY, APRN, FMHNP-BC, have personally performed an examination of this patient on 1/28/25.   I have discussed this patient with the care team.  I have reviewed all vitals and laboratory findings.      --  JAKOB Howard CNP   [facility] EMERGENCY DEPT  EmPATH Unit       Sussy Blake APRN CNP  01/28/25 [id number]

## 2025-01-28 NOTE — ED NOTES
Patient stated she is hungry-served snacks and ate with appetite.    Re evaluated by EDMD -will go to [facility].

## 2025-01-28 NOTE — PLAN OF CARE
Daysi ADAN Chaparro  January 28, 2025  Plan of Care Hand-off Note     Patient Recommended Care Path: observation    Clinical Substantiation:  Pt will transfer to [facility] when appropriate. A lower level of care has been unsuccessful in treating and stabilizing patient s mental health symptoms. Patient will remain on EmPATH unit under observation for continued monitoring, treatment and therapeutic intervention of mental health symptoms. Observation at [facility] could help mitigate the need for a more restrictive level of care in an inpatient setting.    Goals for crisis stabilization:  decrease symptoms, obtain therapeutic rest    Next steps for Care Team:  for pt to meet with psychiatry provider while on EmPATH for medication evaluation and disposition support, for pt to engage in safety planning, attempt to reach pts daughter to obtain collateral information    Treatment Objectives Addressed:  rapport building, processing feelings, assessing safety    Therapeutic Interventions:  Engaged in safety planning, Explored motivation for behavioral change.    Has a specific means been identified for suicidal.homicide actions: No  If yes, describe:    Explain action steps toward mitigation:    Document completion of mitigation action:    The follow up action still needed prior to discharge:      Patient coping skills attempted to reduce the crisis:  pt spoke with her daughter and presented to the ED seeking help         Collateral contact information:  tushar Curtis, [phone]    Legal Status: Voluntary/Patient has signed consent for treatment                                                                           Reviewed court records: yes     Psychiatry Consult: pt will be seen by psychiatry provider once on EmPATH unit    Minerva Bueno, RENETTAC, LADC

## 2025-01-29 VITALS
RESPIRATION RATE: 18 BRPM | SYSTOLIC BLOOD PRESSURE: 192 MMHG | TEMPERATURE: 98.8 F | DIASTOLIC BLOOD PRESSURE: 105 MMHG | WEIGHT: 155.2 LBS | BODY MASS INDEX: 28.56 KG/M2 | HEART RATE: 60 BPM | HEIGHT: 62 IN | OXYGEN SATURATION: 97 %

## 2025-01-29 PROCEDURE — 250N000013 HC RX MED GY IP 250 OP 250 PS 637: Performed by: STUDENT IN AN ORGANIZED HEALTH CARE EDUCATION/TRAINING PROGRAM

## 2025-01-29 PROCEDURE — 250N000013 HC RX MED GY IP 250 OP 250 PS 637: Performed by: PSYCHIATRY & NEUROLOGY

## 2025-01-29 PROCEDURE — G0378 HOSPITAL OBSERVATION PER HR: HCPCS

## 2025-01-29 PROCEDURE — 99207 PR SC NO CHARGE VISIT/PATIENT NOT SEEN: CPT | Performed by: PSYCHIATRY & NEUROLOGY

## 2025-01-29 RX ORDER — CALCIUM CARBONATE 500 MG/1
500 TABLET, CHEWABLE ORAL DAILY PRN
Status: DISCONTINUED | OUTPATIENT
Start: 2025-01-29 | End: 2025-01-29 | Stop reason: HOSPADM

## 2025-01-29 RX ADMIN — METOPROLOL SUCCINATE 100 MG: 50 TABLET, EXTENDED RELEASE ORAL at 10:00

## 2025-01-29 RX ADMIN — OLANZAPINE 5 MG: 5 TABLET, ORALLY DISINTEGRATING ORAL at 10:15

## 2025-01-29 RX ADMIN — CALCIUM CARBONATE (ANTACID) CHEW TAB 500 MG 500 MG: 500 CHEW TAB at 02:10

## 2025-01-29 ASSESSMENT — ACTIVITIES OF DAILY LIVING (ADL)
ADLS_ACUITY_SCORE: 45

## 2025-01-29 NOTE — PROGRESS NOTES
"RN attempted to recheck blood pressure. Pt irritable in sensory room and states \"I refuse all care unless the doctor sees me right now\". Pt informed the provider will see the patient today but she needs to wait her turn. Pt given lunch tray. Pt again declined to have blood pressure rechecked.  "

## 2025-01-29 NOTE — PROVIDER NOTIFICATION
MD Notification    Notified Person: MD    Notified Person Name: Jorge    Notification Date/Time: 0128    Notification Interaction: Phone    Purpose of Notification: Patient is requesting medication for acid reflux    Orders Received: Waiting    Comments:

## 2025-01-29 NOTE — PROGRESS NOTES
Patient is in resting in sensory room C. She made a few bathroom trips and would go right back to sleep. Patient was also appreciative after taking the Tums and said is really helped. Denies SI, no anxiety this night. Patient's legs observed to be restless when she is standing on the spot.

## 2025-01-29 NOTE — ED NOTES
Pt has been sleeping throughout most of the shift. Pt has been polite, calm, and cooperative. Pt met with the provider and it was determined that the pt had been off of her meds because she was unable to get them ordered. Pt has been off important medications that she needs for her kidney failure. PCP appointment should me made for pt ASAP. Provider anticipates pt to stay on EmPATH for two nights. Pt denies SI. Pt will remain on observation.

## 2025-01-29 NOTE — PROGRESS NOTES
"Blood pressure rechecked 192/105. Pt has been resting comfortably on mat. Denying headache, dizziness, vision changes. Pt reports zyprexa made her feel tired but did improve anxiety \"a little bit\".  "

## 2025-01-29 NOTE — PROGRESS NOTES
"Patient woke up and is grabbing coffee. Asked to recheck BP since it was high earlier, patient declined saying \"that thing is horrible, it gives me bruises everywhere, I also had metoprolol earlier, it should be lower now.\" Patient was informed that the blood pressure machine could always be stopped when it starts hurting. Pt still refused. She went back to rest in the sensory room.   "

## 2025-01-29 NOTE — ED PROVIDER NOTES
Brief psychiatry note:   Nursing staff report that the patient is asking to discharge without meeting a therapist or psychiatrist again. Records reviewed, no safety concerns noted following her initial examination yesterday. There did not appear to be any indication to utilize an involuntary hold. I would have liked to meet with the patient today to gauge her response to the treatment plan noted yesterday however she is not willing to do so. She will be discharged per her request.      Jah Simpson MD  01/29/25 0443

## 2025-01-29 NOTE — PROGRESS NOTES
"Pt anxious and tearful this morning. Rocking on mat in sensory room. States she feels scared. Reports a lot of worry about her cat at home and states she's \"totally lost herself\". She feels shame about \"self-medicating\" with cocaine because it causes her cat to be distressed at home. States she feels hopeless and unsure if she can get better. RN provided reassurance. Pt agreeable to taking 5 mg zyprexa for anxiety.     Vitals assessed and blood pressure elevated 199/110. Metoprolol  mg given. HR 60, O2 97%.       "

## 2025-01-29 NOTE — PROGRESS NOTES
"Pt awoke and approached RN station, irritable and agitated. Pt with raised voice, \"Since nobody cares about me here I'll just leave!\". Pt demanded to be seen by provider and was informed that she would need to wait her turn to meet with the provider. Pt demanded her personal items be returned to her and asked to be discharged. RN listened to patient and validated feelings of frustration. RN encouraged patient to stay and discussed AMA form with patient. RN informed patient that if she were to leave AMA she would not be prescribed any medications or have follow up appointments made for her. Pt denied any thoughts of self-harm or suicide. Provider notified, pt signed AMA form and was discharged out to the lobby.  "

## 2025-03-04 ENCOUNTER — NURSE TRIAGE (OUTPATIENT)
Dept: NURSING | Facility: CLINIC | Age: 65
End: 2025-03-04

## 2025-03-04 NOTE — TELEPHONE ENCOUNTER
Nurse Triage SBAR    Is this a 2nd Level Triage? NO    Situation: Sore throat, coughing up pus    Background:   -Started 3 days ago,      Assessment:   -Really bad sore throat on Right side  -Yesterday sore throat on Left side  -Was at dentist, lotion triamcinolone cream for fungal infection, put some in my mouth, a little on corners of mouth on lips  -It comes back in same exact spots  -skin got strange where cream was applied  -trouble swallowing, couldn't eat  -Today: felt better, sore throat on right side  -Cough, spitting up pus  -Hoarse voice  -Triamcinolone cream has directly affected my throat  -Feels like fungal infection has spread from lips to throat  -Afebrile  -Able to drink fluids  -Urinating normal  -Hx; Dry mouth  -Right side throat pain 7/10,   -Ear pain when swallows  -Cannot take Ibuprofen due to Kidney problem  Doesn't find Tylenol helpful  -Eating only yogurt and oatmeal    Protocol Recommended Disposition:   See PCP Within 24 Hours    Recommendation:   -Care advice reviewed. Patient verbalized understanding and agreed to follow care advice given. Advised to call back with any new signs or symptoms, Patient agreed  -Transferred to scheduling to arrange next day appointment, no appointments open      Reason for Disposition   Earache also present    Additional Information   Negative: SEVERE difficulty breathing (e.g., struggling for each breath, speaks in single words, stridor)   Negative: Sounds like a life-threatening emergency to the triager   Negative: [1] Diagnosed strep throat AND [2] taking antibiotic AND [3] symptoms continue   Negative: Throat culture results, call about   Negative: Productive cough is main symptom   Negative: Non-productive cough is main symptom   Negative: Hoarseness is main symptom   Negative: Runny nose is main symptom   Negative: Uvula swelling is main symptom   Negative: [1] Drooling or spitting out saliva (because can't swallow) AND [2] normal breathing   Negative:  "Unable to open mouth completely   Negative: [1] Difficulty breathing AND [2] not severe   Negative: Fever > 104 F (40 C)   Negative: [1] Refuses to drink anything AND [2] for > 12 hours   Negative: [1] Drinking very little AND [2] dehydration suspected (e.g., no urine > 12 hours, very dry mouth, very lightheaded)   Negative: Patient sounds very sick or weak to the triager   Negative: SEVERE (e.g., excruciating) throat pain   Negative: [1] Pus on tonsils (back of throat) AND [2]  fever AND [3] swollen neck lymph nodes (\"glands\")   Negative: [1] Rash AND [2] widespread (especially chest and abdomen)    Protocols used: Sore Throat-A-  Lyndsay Wilson RN, Triage Nurse Advisor, 3/4/2025 1:08 AM  "

## 2025-03-04 NOTE — TELEPHONE ENCOUNTER
Called pt.  Relayed message from provider (see below), and scheduled for OV in approval required 1130am slot.  All questions were answered.     Yamilet HOLLINGSWORTH RN

## 2025-03-07 ENCOUNTER — OFFICE VISIT (OUTPATIENT)
Dept: URGENT CARE | Facility: URGENT CARE | Age: 65
End: 2025-03-07
Payer: MEDICARE

## 2025-03-07 VITALS
BODY MASS INDEX: 26.28 KG/M2 | TEMPERATURE: 98.2 F | SYSTOLIC BLOOD PRESSURE: 109 MMHG | OXYGEN SATURATION: 96 % | WEIGHT: 143.7 LBS | HEART RATE: 114 BPM | DIASTOLIC BLOOD PRESSURE: 68 MMHG | RESPIRATION RATE: 24 BRPM

## 2025-03-07 DIAGNOSIS — J01.90 ACUTE SINUSITIS WITH SYMPTOMS > 10 DAYS: Primary | ICD-10-CM

## 2025-03-07 DIAGNOSIS — R13.10 ODYNOPHAGIA: ICD-10-CM

## 2025-03-07 DIAGNOSIS — K13.0 ANGULAR CHEILITIS: ICD-10-CM

## 2025-03-07 LAB
DEPRECATED S PYO AG THROAT QL EIA: NEGATIVE
S PYO DNA THROAT QL NAA+PROBE: NOT DETECTED

## 2025-03-07 PROCEDURE — 99213 OFFICE O/P EST LOW 20 MIN: CPT | Performed by: EMERGENCY MEDICINE

## 2025-03-07 PROCEDURE — 87651 STREP A DNA AMP PROBE: CPT | Performed by: EMERGENCY MEDICINE

## 2025-03-07 PROCEDURE — 3074F SYST BP LT 130 MM HG: CPT | Performed by: EMERGENCY MEDICINE

## 2025-03-07 PROCEDURE — 3078F DIAST BP <80 MM HG: CPT | Performed by: EMERGENCY MEDICINE

## 2025-03-07 RX ORDER — FLUORIDE TOOTHPASTE
5 TOOTHPASTE DENTAL 4 TIMES DAILY
Qty: 237 ML | Refills: 0 | Status: SHIPPED | OUTPATIENT
Start: 2025-03-07

## 2025-03-07 RX ORDER — DOXYCYCLINE 100 MG/1
100 CAPSULE ORAL 2 TIMES DAILY
Qty: 20 CAPSULE | Refills: 0 | Status: SHIPPED | OUTPATIENT
Start: 2025-03-07 | End: 2025-03-17

## 2025-03-07 NOTE — PROGRESS NOTES
Assessment & Plan     Diagnosis:    ICD-10-CM    1. Acute sinusitis with symptoms > 10 days  J01.90 doxycycline hyclate (VIBRAMYCIN) 100 MG capsule      2. Odynophagia  R13.10 Streptococcus A Rapid Screen w/Reflex to PCR - Clinic Collect     Group A Streptococcus PCR Throat Swab      3. Angular cheilitis  K13.0 artificial saliva (BIOTENE DRY MOUTHWASH) LIQD liquid        Medical Decision Making:  The patient presented with signs and symptoms of sinusitis, mouth and lip pain. Has signs of angular chelitis and has been on triamcinolone recently which was helpful for her chapped lips.  Based on history and exam, I feel the cause of sinusitis is most likely bacterial given duration of symptoms being almost one month at this point and purulent green nasal discharge noted on exam and on the strep swab we obtained here.  Rapid strep is negative; PCR pending.    Antibiotics are indicated due to duration and acute worsening of symptoms. Evaluation today did not show signs of intracranial complication of sinusitis, facial cellulitis or fungal sinusitis.  The patient was given instructions to follow up with primary care in 3-5 days.  Instructions for symptomatic care were given. Patient verbalizes understanding and agreement with the plan including reasons to go to the ER. All questions answered.     OSVALDO Cuello [facility]    Subjective     Daysi Mayfield is a 64 year old female who presents to clinic today for the following health issues:  Chief Complaint   Patient presents with    Urgent Care    Mouth Problem     Pt reports fungal infection in the mouth prescribed antibiotic cream by dentist 1 week ago  Advanced to nose and ears  Loss of voice, sore throat, difficulty swallowing, cracking in the lips   Mouth pain, unable to eat    Shortness of Breath     SOB on/off last night        HPI  Patient reports for the last month she has had sores in her mouth, intermittent sore throat, sinus pain and  pressure.  Was prescribed some cream for her lips because of excessive cracking and chapped lips.  She thinks he may have had a fungal infection, is not sure what she was prescribed last week by her dentist.  She notes over the last few days is getting worse in her throat, nose and ears.  She is having a lot of green phlegm coming out.  She is also having a new cough, feeling a bit short of breath with this.  Can still eat food and get water down okay, but it is painful.  No fevers, neck pain or stiffness, severe headaches, chest pain or other concerns    Review of Systems    See HPI    Objective      Vitals: /68 (BP Location: Right arm, Patient Position: Sitting, Cuff Size: Adult Regular)   Pulse 114   Temp 98.2  F (36.8  C) (Tympanic)   Resp 24   Wt 65.2 kg (143 lb 11.2 oz)   LMP 04/04/2012   SpO2 96%   BMI 26.28 kg/m      Patient Vitals for the past 24 hrs:   BP Temp Temp src Pulse Resp SpO2 Weight   03/07/25 1549 109/68 98.2  F (36.8  C) Tympanic 114 24 96 % 65.2 kg (143 lb 11.2 oz)       Vital signs reviewed by: Jose Peck PA-C    Physical Exam   Constitutional: Patient is alert and cooperative. No acute distress.  Ears: Right TM is normal. Left TM is normal. External ear canals are normal.  Mouth: Mucous membranes are moist. Normal tongue and tonsil. Posterior oropharynx is erythematous. Shallow ulcerations in the posterior soft palette noted. No fluctuance or areas of pointing. Uvula midline. No submandibular swelling or erythema.  Eyes: Conjunctivae and lids are normal.  Nose: Nasal turbinates are swollen and slightly erythematous.  Green purulent mucus noted in the nasal cavities.  Cardiovascular: Regular rate and rhythm  Pulmonary/Chest: Lungs are clear to auscultation throughout. Effort normal. No respiratory distress. No wheezes, rales or rhonchi.  Neurological: Alert and oriented x3.   Skin: No rash noted on visualized skin.  Psychiatric:The patient has a normal mood and affect.      Labs/Imaging:  Results for orders placed or performed in visit on 03/07/25   Streptococcus A Rapid Screen w/Reflex to PCR - Clinic Collect     Status: Normal    Specimen: Throat; Swab   Result Value Ref Range    Group A Strep antigen Negative Negative       Jose Peck PA-C, March 7, 2025

## 2025-03-20 ENCOUNTER — TELEPHONE (OUTPATIENT)
Dept: FAMILY MEDICINE | Facility: CLINIC | Age: 65
End: 2025-03-20
Payer: MEDICARE

## 2025-03-20 ENCOUNTER — NURSE TRIAGE (OUTPATIENT)
Dept: FAMILY MEDICINE | Facility: CLINIC | Age: 65
End: 2025-03-20
Payer: MEDICARE

## 2025-03-20 NOTE — TELEPHONE ENCOUNTER
Patient calling to report dermatology concerns  Patient states has had a cute on thumb and sore on face (eyebrow) and knee that don't seem to be healing  Patient states has had these for roughly 6 weeks or so with little to no change  Patient states currently averages about 3/10 for pain  Denies shortness of breath, fever, signs of infection  Nurse advised would be best to be seen before the weekend  Assisted with scheduling patient for visit   Advised to call back with new or worsening symptoms  Patient verbalized understanding and agrees with plan of care.   Will call back with new or worsening symptoms    Andie ABAD RN      Reason for Disposition   Sores and crusts are around openings to nose, or anywhere else on face    Additional Information   Negative: Major bleeding (e.g., actively dripping or spurting) and can't be stopped   Negative: Amputation   Negative: Shock suspected (e.g., cold/pale/clammy skin, too weak to stand, low BP, rapid pulse)   Negative: Knife wound (or other possibly deep cut) and to chest, abdomen, back, neck, or head   Negative: Self-injury (e.g., cutting, self-harm) and suicidal or out-of-control   Negative: Sounds like a life-threatening emergency to the triager   Negative: Animal bite and broken skin   Negative: Human bite and broken skin   Negative: Puncture wound   Negative: Bleeding and won't stop after 10 minutes of direct pressure (using correct technique)   Negative: Skin is split open or gaping (or length > 1/2 inch or 12 mm on the skin, 1/4 inch or 6 mm on the face)   Negative: Deep cut and can see bone or tendons   Negative: Cut over knuckle (MCP joint)   Negative: Sensation of something in the wound (i.e., retained object in wound)   Negative: Dirt in the wound and not removed with 15 minutes of scrubbing   Negative: Wound causes numbness (i.e., loss of sensation)   Negative: Wound causes weakness (i.e., decreased ability to move hand, finger, toe)   Negative: SEVERE pain and  not improved 2 hours after pain medicine   Negative: Looks infected and large red area (> 2 inches or 5 cm) or streak   Negative: Fever and spreading red area or streak   Negative: Sounds like a life-threatening emergency to the triager   Negative: Black (necrotic), dark purple, or blisters develop in area of sore   Negative: Patient sounds very sick or weak to the triager   Negative: Looks infected (e.g., spreading redness, pus) and fever   Negative: Looks infected and large red area (> 2 inches or 5 cm) or streak   Negative: Ulcer with black scab that is spreading, painless and cause unknown   Negative: Looks infected (e.g., spreading redness, pus) and no fever   Negative: Unexplained sores and 3 or more   Negative: Large sore (> 1 inch or 2.5 cm across)   Negative: Looks like a boil, deep ulcer or is very painful   Negative: Multiple small blisters grouped together in one area of body (i.e., dermatomal distribution or 'band' or 'stripe') and painful   Negative: Sore on foot (e.g., ulcer, wound, blister, red area) AND new-onset or getting worse AND has diabetes    Protocols used: Cuts and Yevbatakpih-U-IQ, Sores-A-OH

## 2025-03-20 NOTE — TELEPHONE ENCOUNTER
Reason for Call:  Appointment Request    Patient requesting this type of appt: Chronic Diease Management/Medication/Follow-Up    Requested provider: Melonie Srivastava    Reason patient unable to be scheduled: Not with their preferred provider    When does patient want to be seen/preferred time: 1-2 days    Comments: cut that wont heal and med check    Okay to leave a detailed message?: Yes at Home number on file [phone] (home)    Call taken on 3/20/2025 at 12:17 PM by Jaci Best

## 2025-03-20 NOTE — TELEPHONE ENCOUNTER
Triage,   Patient called.   Has cut on R thumb.  The cut won't close.   She cleans the wound 2x a day.   Keeps the wound dry.     Patient is scheduled to [facility] on 3/25/25.     Whit GLASS

## 2025-03-25 ENCOUNTER — TELEPHONE (OUTPATIENT)
Dept: FAMILY MEDICINE | Facility: CLINIC | Age: 65
End: 2025-03-25

## 2025-03-25 NOTE — TELEPHONE ENCOUNTER
Reason for Call:  Appointment Request    Patient requesting this type of appt:  Preventive     Requested provider: Melonie Srivastava    Reason patient unable to be scheduled: Not within requested timeframe    When does patient want to be seen/preferred time:  3/26    Comments: Pt had to cancel her appt this morning; like to renaldo to tmrw afternoon if possible    Okay to leave a detailed message?: Yes at Cell number on file:  [phone]   Telephone Information:   Mobile [phone]   Mobile [phone]       Call taken on 3/25/2025 at 8:11 AM by Lola Dodson

## 2025-03-26 ENCOUNTER — TELEPHONE (OUTPATIENT)
Dept: FAMILY MEDICINE | Facility: CLINIC | Age: 65
End: 2025-03-26

## 2025-03-26 ENCOUNTER — VIRTUAL VISIT (OUTPATIENT)
Dept: FAMILY MEDICINE | Facility: CLINIC | Age: 65
End: 2025-03-26
Payer: MEDICARE

## 2025-03-26 DIAGNOSIS — F43.10 PTSD (POST-TRAUMATIC STRESS DISORDER): Primary | ICD-10-CM

## 2025-03-26 DIAGNOSIS — N18.4 CKD (CHRONIC KIDNEY DISEASE) STAGE 4, GFR 15-29 ML/MIN (H): ICD-10-CM

## 2025-03-26 DIAGNOSIS — F41.9 ANXIETY DISORDER, UNSPECIFIED TYPE: ICD-10-CM

## 2025-03-26 DIAGNOSIS — F32.2 MAJOR DEPRESSIVE DISORDER, SEVERE (H): ICD-10-CM

## 2025-03-26 PROCEDURE — 98014 SYNCH AUDIO-ONLY EST MOD 30: CPT | Performed by: FAMILY MEDICINE

## 2025-03-26 RX ORDER — VENLAFAXINE HYDROCHLORIDE 37.5 MG/1
75 TABLET, EXTENDED RELEASE ORAL DAILY
Status: CANCELLED | OUTPATIENT
Start: 2025-03-26

## 2025-03-26 RX ORDER — VENLAFAXINE HYDROCHLORIDE 37.5 MG/1
75 TABLET, EXTENDED RELEASE ORAL DAILY
Qty: 60 TABLET | Refills: 0 | Status: SHIPPED | OUTPATIENT
Start: 2025-03-26 | End: 2025-03-26

## 2025-03-26 RX ORDER — VENLAFAXINE HYDROCHLORIDE 37.5 MG/1
75 TABLET, EXTENDED RELEASE ORAL DAILY
Qty: 60 TABLET | Refills: 0 | Status: SHIPPED | OUTPATIENT
Start: 2025-03-26

## 2025-03-26 NOTE — TELEPHONE ENCOUNTER
"Pt called stating she was on a Telephone Visit with provider when call got disconnected.   Pt then stated: \"oh, I think she's calling back\" and hung up.     Brinda ROLON RN    "

## 2025-03-26 NOTE — TELEPHONE ENCOUNTER
Spoke with patient  Scheduled today for VV with covering provider to review new medication requests  Thanks,  Clair BURTON RN

## 2025-03-26 NOTE — PROGRESS NOTES
Daysi is a 64 year old who is being evaluated via a billable telephone visit.    What phone number would you like to be contacted at?   How would you like to obtain your AVS? Sonal  Originating Location (pt. Location): Home    Distant Location (provider location):  On-site  Telephone visit completed due to the patient did not have access to video, while the distant provider did.    Assessment & Plan       ICD-10-CM    1. PTSD (post-traumatic stress disorder)  F43.10 Adult Mental Health  Referral     venlafaxine (EFFEXOR-ER) 37.5 MG 24 hr tablet     brexpiprazole (REXULTI) 0.5 MG tablet     DISCONTINUED: brexpiprazole (REXULTI) 0.5 MG tablet     DISCONTINUED: venlafaxine (EFFEXOR-ER) 37.5 MG 24 hr tablet      2. Major depressive disorder, severe (H)  F32.2 Adult Mental Health  Referral     venlafaxine (EFFEXOR-ER) 37.5 MG 24 hr tablet     brexpiprazole (REXULTI) 0.5 MG tablet     DISCONTINUED: brexpiprazole (REXULTI) 0.5 MG tablet     DISCONTINUED: venlafaxine (EFFEXOR-ER) 37.5 MG 24 hr tablet      3. Anxiety disorder, unspecified type  F41.9 Adult Mental Health  Referral     venlafaxine (EFFEXOR-ER) 37.5 MG 24 hr tablet     brexpiprazole (REXULTI) 0.5 MG tablet     DISCONTINUED: brexpiprazole (REXULTI) 0.5 MG tablet     DISCONTINUED: venlafaxine (EFFEXOR-ER) 37.5 MG 24 hr tablet      4. CKD (chronic kidney disease) stage 4, GFR 15-29 ml/min (H)  N18.4          65yo seen s/p hospitalization for mental health a couple months ago, now without long-term psychiatrist who retired.  She was to follow-up with psychiatry, but due to frustration with last provider, does not want to do so- wants her PCP to take over meds.  States no changes have been made, but that is not the case and reviewed that with her today.  Discussed I would send in a month's worth of the two psych medications she needs refills, effexor at the 75mg/d dose (correct per discharge summary in 1/25), and the newer rexulti  0.5mg/day (also reviewed on discharge summary).  Strongly recommended follow-up with psychiatry due to her conditions and medication changes.  She would still like to discuss this with Dr. Srivastava, so she will try and schedule another appointment with her- for these and other chronic cares/med check.                Kathi Tavarez is a 64 year old, presenting for the following health issues:  No chief complaint on file.    HPI      Pt would like psychiatric medication taken over by PCP, usually seen by Dr. Srivastava, but she is out ill today.    Pt notes she was frustrated with cares from Dr. Roa, her psychiatrist of many years.  He is now retired.      She had wanted to try something other than abilify, but he kept her on the abilify and effexor for many years.  She ended up 'at [facility]', in mental health b[facility]t for a week or so, and they transitioned her from abilify to rexulti.  She likes this option better.      She doesn't want to see a new psychiatrist due to her frustration with Dr. Roa, feels that she's been on the 'same meds for decades', and wants meds to come from her PCP.     Reviewed discharge summary from 1/25 hospital stay, and noted that the effexor dose was 75mg/day, not the 37.mg she had stated.  She thinks that the 75mg/day dose is correct.          Review of Systems  Constitutional, neuro, ENT, endocrine, pulmonary, cardiac, gastrointestinal, genitourinary, musculoskeletal, integument and psychiatric systems are negative, except as otherwise noted.      Objective       Vitals:  No vitals were obtained today due to virtual visit.    Physical Exam   General: Alert and no distress //Respiratory: No audible wheeze, cough, or shortness of breath // Psychiatric:  Appropriate affect, tone, and pace of words          Phone call duration: 18 minutes  Signed Electronically by: Mally Chisholm MD

## 2025-03-26 NOTE — TELEPHONE ENCOUNTER
Patient had appt w/ A.S. today but had to be rescheduled as provider was unexpectedly out.   Needs refill.     Triage,  Patient is also requesting refill of rexulti 0.5 mg.   Don't see on medication list.   Patient was going to discuss refill at today's appt.  Thanks!  Whit GLASS

## 2025-03-31 ENCOUNTER — TELEPHONE (OUTPATIENT)
Dept: FAMILY MEDICINE | Facility: CLINIC | Age: 65
End: 2025-03-31
Payer: MEDICARE

## 2025-03-31 RX ORDER — METOPROLOL SUCCINATE 50 MG/1
50 TABLET, EXTENDED RELEASE ORAL DAILY
Status: CANCELLED | OUTPATIENT
Start: 2025-03-31

## 2025-03-31 NOTE — TELEPHONE ENCOUNTER
Prior Authorization Retail Medication Request    Medication/Dose: venlafaxine (EFFEXOR-ER) 37.5 MG 24 hr tablet   Diagnosis and ICD code (if different than what is on RX):    PTSD (post-traumatic stress disorder) [F43.10]  - Primary      Major depressive disorder, severe (H) [F32.2]      Anxiety disorder, unspecified type [F41.9]        New/renewal/insurance change PA/secondary ins. PA:  Previously Tried and Failed:  unknown  Rationale:  unknown    Insurance   Primary: medicare  Insurance ID:  [id number]       Clinic Information  Preferred routing pool for dept communication: [facility]

## 2025-04-01 ENCOUNTER — VIRTUAL VISIT (OUTPATIENT)
Dept: FAMILY MEDICINE | Facility: CLINIC | Age: 65
End: 2025-04-01
Payer: MEDICARE

## 2025-04-01 ENCOUNTER — TELEPHONE (OUTPATIENT)
Dept: FAMILY MEDICINE | Facility: CLINIC | Age: 65
End: 2025-04-01

## 2025-04-01 DIAGNOSIS — F42.2 MIXED OBSESSIONAL THOUGHTS AND ACTS: ICD-10-CM

## 2025-04-01 DIAGNOSIS — N18.4 CKD (CHRONIC KIDNEY DISEASE) STAGE 4, GFR 15-29 ML/MIN (H): ICD-10-CM

## 2025-04-01 DIAGNOSIS — F43.10 PTSD (POST-TRAUMATIC STRESS DISORDER): ICD-10-CM

## 2025-04-01 DIAGNOSIS — F41.9 ANXIETY DISORDER, UNSPECIFIED TYPE: ICD-10-CM

## 2025-04-01 DIAGNOSIS — F32.2 MAJOR DEPRESSIVE DISORDER, SEVERE (H): Primary | ICD-10-CM

## 2025-04-01 PROCEDURE — 98014 SYNCH AUDIO-ONLY EST MOD 30: CPT | Performed by: FAMILY MEDICINE

## 2025-04-01 RX ORDER — VENLAFAXINE HYDROCHLORIDE 75 MG/1
75 CAPSULE, EXTENDED RELEASE ORAL DAILY
Qty: 30 CAPSULE | Refills: 1 | Status: SHIPPED | OUTPATIENT
Start: 2025-04-01

## 2025-04-01 ASSESSMENT — ANXIETY QUESTIONNAIRES: GAD7 TOTAL SCORE: INCOMPLETE

## 2025-04-01 NOTE — TELEPHONE ENCOUNTER
Retail Pharmacy Prior Authorization Team   Phone: [phone]    PA Initiation    Medication: VENLAFAXINE HCL ER 37.5 MG PO TB24  Insurance Company: [employer] - Phone [phone] Fax [phone]  Pharmacy Filling the Rx: [facility] #[id number] - [place], [place] - [address] AT [facility] [address]  Filling Pharmacy Phone: [phone]  Filling Pharmacy Fax:    Start Date: 4/1/2025

## 2025-04-01 NOTE — TELEPHONE ENCOUNTER
Tomorrow is annual wellness .  Her last OV with me was 10/2023.  I do see she had a VV with CW on 3/25.    I am sorry she probably is going through withdrawal and Please call patient and find out if tomorrow visit should be changed to acute visit regarding MH.and or I can offer VV today 4pm - if her focus is about her MH worsening symptoms- otherwise best to be seen in emergency department     I understand if she had frustrating experience with previous MH provider and at the same time she has been advised to find a new MH provider/ psychiatrist. How can we help her further?

## 2025-04-01 NOTE — TELEPHONE ENCOUNTER
Prior Authorization Approval    Authorization Effective Date: 3/18/2025  Authorization Expiration Date: 12/31/2099  Medication: venlafaxine (EFFEXOR-ER) 37.5 MG 24 hr tablet-APPROVED  Reference #:     Insurance Company: [employer] - Phone [phone] Fax [phone]  Which Pharmacy is filling the prescription (Not needed for infusion/clinic administered): [facility] #[id number] - [facility] [address] [place] [address] [address] AT [facility]  Pharmacy Notified: Yes  Patient Notified: Instructed pharmacy to notify patient when script is ready to /ship.    Copay is still $143.  It might be cheaper for Venlafaxine  ER 75mg cap for one a day.

## 2025-04-01 NOTE — TELEPHONE ENCOUNTER
"A.S.,  PCP appointment scheduled tomorrow     Patient calling to notify PCP of high cost mental health medications  States she is out of Rexulti medication, unsure if she has venlafaxine  Patient states \"I feel sick, stomach ache, headache, difficulty sleeping, I don't feel well'  Last taken Rexulti on Wednesday  Denies thoughts of self harm   Triage RN advised [facility] as option if any safety or mental health concerns   Patient declined      Spoke to Nu regarding medications   States venlafaxine is ready for  for $143  Rexulti is ready for : was originally $1800 but after insurance, patient cost is $600 due to patient's insurance deductible   Appears prior authorizations have also been submitted, in case this will help, see other encounters    Thanks,  Clair BURTON RN  "

## 2025-04-01 NOTE — PROGRESS NOTES
Daysi is a 64 year old who is being evaluated via a billable telephone visit.    What phone number would you like to be contacted at? [phone]  How would you like to obtain your AVS? Mail a copy  Originating Location (pt. Location): Home    Distant Location (provider location):  On-site  Telephone visit completed due to the patient did not consent to a video visit.    Assessment & Plan     Major depressive disorder, severe (H)  Anxiety disorder, unspecified type  - venlafaxine (EFFEXOR XR) 75 MG 24 hr capsule; Take 1 capsule (75 mg) by mouth daily.  - Med Therapy Management Referral  - Adult Mental Health  Referral; Future        Mixed obsessional thoughts and acts  PTSD (post-traumatic stress disorder)  Plan: advised to establish care with new MH provider      CKD (chronic kidney disease) stage 4, GFR 15-29 ml/min (H)  Plan: advised to keep follow up with nephrology  Has been seen 3/12/25- [facility]   Has been advised renal, low salt diet and monitor Blood pressure at home  Cociane abuse and uncontrolled hypertension has contributed to Chronic Kidney Disease and she admits she has not taken care of her health.            Subjective   Daysi is a 64 year old, presenting for the following health issues:  MH Follow Up and Kidney Problem (Concerns with medications)  Stage 4 Chronic Kidney Disease   Psychiatrist retired -needs medications  Was given Venleflexine- that's too expensive  Having withdrawal side effects  rexpiprazole 0.5 MG tablet . (Patient not taking: Reported on 3/13/2025)   Poor support at home'  cocaine use disorder  Worked about Chronic Kidney Disease   Wants MH medications to be managed by PCP    Venlafaxine for 28 yrs  Abilify- not working for 3 yrs- leesa not metabolizing well  Rexulti helps more- since feb 2025  Now out of medications  Son in law committed suicide and having a horrible time       Since age 6 ocd tendency vx asperger  Socially awkward all her life  Stopped working  since age 28-  3 treatment program for cocaine addiction- 24 days program in [facility]- April 2024 4/1/2025     3:11 PM   Additional Questions   Roomed by Claudia DAMIAN     History of Present Illness       Mental Health Follow-up:  Patient presents to follow-up on Depression & Anxiety.Patient's depression since last visit has been:  Worse  The patient is not having other symptoms associated with depression.  Patient's anxiety since last visit has been:  Worse  The patient is not having other symptoms associated with anxiety.  Any significant life events: grief or loss  Patient is not feeling anxious or having panic attacks.  Patient has no concerns about alcohol or drug use.    Reason for visit:  MH and Kidney concerns    She eats 2-3 servings of fruits and vegetables daily.She consumes 1 sweetened beverage(s) daily.She exercises with enough effort to increase her heart rate 9 or less minutes per day.  She exercises with enough effort to increase her heart rate 3 or less days per week.   She is taking medications regularly.                  Review of Systems  Constitutional, HEENT, cardiovascular, pulmonary, GI, , musculoskeletal, neuro, skin, endocrine and psych systems are negative, except as otherwise noted.      Objective           Vitals:  No vitals were obtained today due to virtual visit.    Physical Exam   General: Alert and no distress //Respiratory: No audible wheeze, cough, or shortness of breath // Psychiatric:  Appropriate affect, tone, and pace of words            Phone call duration: 30 minutes  Signed Electronically by: Melonie Srivastava MD

## 2025-04-01 NOTE — TELEPHONE ENCOUNTER
Patient prefers to discuss medication questions today - scheduled for VV  Thanks,  Clair BURTON RN

## 2025-04-02 ENCOUNTER — TELEPHONE (OUTPATIENT)
Dept: FAMILY MEDICINE | Facility: CLINIC | Age: 65
End: 2025-04-02

## 2025-04-02 NOTE — TELEPHONE ENCOUNTER
Patient calling to request best next steps  States she arrived to appointment today at 130 and waited in line too long to be seen  Unfortunately appears arrival time was 1:10pm  Patient states PCP notified her that the appointment time was 1:30pm  Patient reports frustration with   Reviewed arrival time information   RN provided support and listened to patient's concern  Also offered to have the  to review incident  Patient declined  No available appointments today  Offered to reschedule tomorrow with PCP  Patient prefers to schedule with PCP on Monday - scheduled  Clair Waite RN

## 2025-04-07 ENCOUNTER — OFFICE VISIT (OUTPATIENT)
Dept: FAMILY MEDICINE | Facility: CLINIC | Age: 65
End: 2025-04-07
Payer: MEDICARE

## 2025-04-07 VITALS
DIASTOLIC BLOOD PRESSURE: 79 MMHG | WEIGHT: 136.5 LBS | HEART RATE: 95 BPM | SYSTOLIC BLOOD PRESSURE: 169 MMHG | TEMPERATURE: 97.5 F | RESPIRATION RATE: 16 BRPM | BODY MASS INDEX: 24.19 KG/M2 | OXYGEN SATURATION: 95 % | HEIGHT: 63 IN

## 2025-04-07 DIAGNOSIS — E78.5 HYPERLIPIDEMIA LDL GOAL <100: ICD-10-CM

## 2025-04-07 DIAGNOSIS — Z23 HIGH PRIORITY FOR 2019-NCOV VACCINE: ICD-10-CM

## 2025-04-07 DIAGNOSIS — F41.9 ANXIETY DISORDER, UNSPECIFIED TYPE: ICD-10-CM

## 2025-04-07 DIAGNOSIS — N18.4 ACUTE RENAL FAILURE SUPERIMPOSED ON STAGE 4 CHRONIC KIDNEY DISEASE, UNSPECIFIED ACUTE RENAL FAILURE TYPE (H): ICD-10-CM

## 2025-04-07 DIAGNOSIS — Z23 NEED FOR SHINGLES VACCINE: ICD-10-CM

## 2025-04-07 DIAGNOSIS — E78.2 MIXED HYPERLIPIDEMIA: ICD-10-CM

## 2025-04-07 DIAGNOSIS — Z12.31 VISIT FOR SCREENING MAMMOGRAM: ICD-10-CM

## 2025-04-07 DIAGNOSIS — Z23 NEED FOR PROPHYLACTIC VACCINATION AGAINST HEPATITIS A: ICD-10-CM

## 2025-04-07 DIAGNOSIS — Z00.00 ENCOUNTER FOR MEDICARE ANNUAL WELLNESS EXAM: Primary | ICD-10-CM

## 2025-04-07 DIAGNOSIS — Z78.0 ASYMPTOMATIC POSTMENOPAUSAL STATUS: ICD-10-CM

## 2025-04-07 DIAGNOSIS — N17.9 ACUTE RENAL FAILURE SUPERIMPOSED ON STAGE 4 CHRONIC KIDNEY DISEASE, UNSPECIFIED ACUTE RENAL FAILURE TYPE (H): ICD-10-CM

## 2025-04-07 DIAGNOSIS — N25.81 SECONDARY RENAL HYPERPARATHYROIDISM: ICD-10-CM

## 2025-04-07 DIAGNOSIS — F32.2 MAJOR DEPRESSIVE DISORDER, SEVERE (H): ICD-10-CM

## 2025-04-07 DIAGNOSIS — R45.86 LABILE MOOD: ICD-10-CM

## 2025-04-07 DIAGNOSIS — I10 HYPERTENSION GOAL BP (BLOOD PRESSURE) < 140/90: ICD-10-CM

## 2025-04-07 DIAGNOSIS — F14.10 COCAINE ABUSE (H): ICD-10-CM

## 2025-04-07 DIAGNOSIS — Z12.11 SCREEN FOR COLON CANCER: ICD-10-CM

## 2025-04-07 PROCEDURE — 36415 COLL VENOUS BLD VENIPUNCTURE: CPT | Performed by: FAMILY MEDICINE

## 2025-04-07 RX ORDER — VENLAFAXINE HYDROCHLORIDE 75 MG/1
75 CAPSULE, EXTENDED RELEASE ORAL DAILY
Qty: 30 CAPSULE | Refills: 1 | Status: SHIPPED | OUTPATIENT
Start: 2025-04-07

## 2025-04-07 RX ORDER — METOPROLOL SUCCINATE 50 MG/1
50 TABLET, EXTENDED RELEASE ORAL DAILY
Qty: 90 TABLET | Refills: 1 | Status: SHIPPED | OUTPATIENT
Start: 2025-04-07

## 2025-04-07 RX ORDER — ATORVASTATIN CALCIUM 20 MG/1
20 TABLET, FILM COATED ORAL DAILY
Qty: 90 TABLET | Refills: 3 | Status: SHIPPED | OUTPATIENT
Start: 2025-04-07 | End: 2025-04-08

## 2025-04-07 NOTE — PROGRESS NOTES
Preventive Care Visit  [facility]  Melonie Srivastava MD, Family Medicine  Apr 7, 2025      Assessment & Plan     1. Encounter for Medicare annual wellness exam (Primary)  Discussed colon cancer screening option.  She is opted for Cologuard if positive she will proceed with colonoscopy    Next mammogram annually is encouraged.  No previous recent Pap smear and she denies history of abnormal Pap smear.     reviewed 04/07/25 - no opiod use   No falls and no fall risk- advised to keep me posted if concerns   Able to mange all activites of daily living           2. Visit for screening mammogram  - MA Screening Bilateral w/ Chai; Future    3. Asymptomatic postmenopausal status  - DEXA HIP/PELVIS/SPINE - Future; Future    4. Screen for colon cancer  Options discussed and willing to do cologaurd- pros and Central nervous system discussed   - COLOGUARD(EXACT SCIENCES); Future    Cocaine abuse (H)  - Plan: advised complete cessation  She is unfortunately unwilling to consider and understand the negative impact on mental wellbeing and physical wellbeing.  Urine Drug Screen; Future    Hypertension goal BP (blood pressure) < 140/90  Untreated/uncontrolled-she has been out of blood pressure medication  - metoprolol succinate ER (TOPROL XL) 50 MG 24 hr tablet; Take 1 tablet (50 mg) by mouth daily.  Dispense: 90 tablet; Refill: 1  Follow-up is advised and will recheck blood pressure at that time    Acute renal failure superimposed on stage 4 chronic kidney disease, unspecified acute renal failure type (H)    - Albumin Random Urine Quantitative with Creat Ratio; Future  - Parathyroid Hormone Intact; Future  - BASIC METABOLIC PANEL; Future  - Basic metabolic panel  (Ca, Cl, CO2, Creat, Gluc, K, Na, BUN); Future  - Adult Nephrology  Referral; Future    Hyperlipidemia LDL goal <100  Non fasting lipid   - Lipid panel reflex to direct LDL Non-fasting; Future  - atorvastatin (LIPITOR) 40 MG tablet; Take  1 tablet (20 mg) by mouth daily.  Dispense: 90 tablet; Refill: 3  Reviewed with patient 04/08/25   Recent Labs   Lab Test 04/07/25  1709 10/19/23  0759   CHOL 253* 268*   HDL 77 60   * 190*   TRIG 82 90        Major depressive disorder, severe (H)  Anxiety disorder, unspecified type  Plan.  She is given option to establish care with a new mental health provider.  Referrals are given.  I do suspect mood disorder, for now we will continue with venlafaxine 75 mg once a day.  - venlafaxine (EFFEXOR XR) 75 MG 24 hr capsule; Take 1 capsule (75 mg) by mouth daily.  Dispense: 30 capsule; Refill: 1  -advised to establish care with new  provider    Need for prophylactic vaccination against hepatitis A  - hepatitis A vaccine (VAQTA) 50 UNIT/ML injection; Inject 1 mL (50 Units) into the muscle once for 1 dose.  Dispense: 1 mL; Refill: 0    Need for shingles vaccine  At pharmacy                  Regular exercise    Subjective   Daysi is a 64 year old, presenting for the following:  Physical (AWV)        4/7/2025     3:40 PM   Additional Questions   Roomed by RICKIE Clay         Wellness Visit Notes:     -Mammogram: Last done 12/2011 (impression: recommended annual screenings). Due 04/2025. Plan: discuss with provider.     -DEXA: Never completed upon chart review. Plan: pt would like referral to complete.    -PAP: not completed in many years per pt. Provider to review PAP recommendations.     -Colon Cancer Screening: Last done via FIT testing on 04/2024. (Impression: negative). Due 04/2025. Plan: requesting to discuss with provider. Interested in doing home testing cologuard.     -Dermatology: Pt verbalized they do not meet with dermatology regularly. .    -Immunizations: Patient is due/able to receive at clinic today: Flu -  and Covid -   Patient is due for the following vaccines: Shingles and Hepatitis A. Advised patient to receive at a pharmacy due to Medicare insurance coverage.     HPI  Blood pressure/ hypertension she  has been out of her metoprolol.  In past she cannot remember if she was on 50 or 100 mg.  We talked about Mood fluctuations, need to establish care with mental health provider.  Currently she is taking Effexor that she found old prescription from home and currently at 150 mg.  She was prescribed 75 mg most recently as a renal dose as well.  She is willing to pick that up.  For now the plan is to take 75 mg once a day and then review her mental health status and ideally seek care from a psychiatrist/mental health provider.  She reports she is less paranoid.  In past she was giving Abilify, it did help but then it stopped working.  She feels her mental provider was not listening and they were giving her higher and higher dose to let stop working.  Most recently the cost of Rexulti which was prohibitive, previously prescribed by mental health provider she is unable to get any refill.  She ensures her safety at home.  She also talked at length about her daughter Kirsten who probably had power of  and interferes with her care a lot.  She is concerned if her daughter is going to find out about her cocaine use.  She continues to use cocaine periodically between minimum 3 to few times a month.   She denies suicidal  ideation,Less paranoid,Denies bipolar depression  A few nights ago- found effexor 150 and resumed it- feels better  She self talks a lot,Labile mood  Goes back and forth with paranoia or reality.  Denies auditory or visual hallucination.  Seen by    Chronic kidney disease provider at [facility].  Last appointment mid March was advised follow-up.  Has not followed through with diet restriction.  Wants to establish care with a new nephrologist in [facility].  She change her mind multiple times between recheck of blood test today and ultimately was willing to get them completed.            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    Social Factors   Worry food won't last until get money to buy more No   Food not last or not have enough money for food? No   Do you have housing? (Housing is defined as stable permanent housing and does not include staying ouside in a car, in a tent, in an abandoned building, in an overnight shelter, or couch-surfing.) Yes   Are you worried about losing your housing? No   Lack of transportation? No   Unable to get utilities (heat,electricity)? No          No data to display                   No data to display                   No data to display                     No data to display                    Today's PHQ-9 Score:       4/1/2025     3:26 PM   PHQ-9 SCORE   PHQ-9 Total Score MyChart Incomplete            No data to display              Social History     Tobacco Use    Smoking status: Never    Smokeless tobacco: Never   Vaping Use    Vaping status: Never Used   Substance Use Topics    Alcohol use: No    Drug use: No          Mammogram Screening - Mammogram every 1-2 years updated in Health Maintenance based on mutual decision making      History of abnormal Pap smear: No - age 30- 64 PAP with HPV every 5 years recommended       ASCVD Risk   The ASCVD Risk score (Sabine CHAUHAN, et al., 2019) failed to calculate for the following reasons:    Risk score cannot be calculated because patient has a medical history suggesting prior/existing ASCVD            Reviewed and updated as needed this visit by Provider   Tobacco  Allergies  Meds  Problems  Med Hx  Surg Hx  Fam Hx            BP Readings from Last 3 Encounters:   04/07/25 (!) 169/79   03/07/25 109/68   01/29/25 (!) 192/105    Wt Readings from Last 3 Encounters:   04/07/25 61.9 kg (136 lb 8 oz)  "  03/07/25 65.2 kg (143 lb 11.2 oz)   01/28/25 70.4 kg (155 lb 3.2 oz)                  Current providers sharing in care for this patient include:  Patient Care Team:  Melonie Srivastava MD as PCP - General (Family Practice)  Melonie Srivastava MD as Assigned PCP  Salena Hawkins MD as MD (Nephrology)  Karolyn Blackwood PA-C as Physician Assistant (Dermatology)    The following health maintenance items are reviewed in Epic and correct as of today:  Health Maintenance   Topic Date Due    DEXA  Never done    PARATHYROID  Never done    PHOSPHORUS  Never done    HEPATITIS A IMMUNIZATION (1 of 2 - Risk 2-dose series) Never done    ZOSTER IMMUNIZATION (1 of 2) Never done    MAMMO SCREENING  12/08/2013    MICROALBUMIN  01/05/2024    PHQ-9  04/05/2024    INFLUENZA VACCINE (1) 09/01/2024    COVID-19 Vaccine (6 - 2024-25 season) 09/01/2024    LIPID  10/19/2024    COLORECTAL CANCER SCREENING  04/10/2025    BMP  04/28/2025    HEMOGLOBIN  07/28/2025    FALL RISK ASSESSMENT  04/01/2026    MEDICARE ANNUAL WELLNESS VISIT  04/07/2026    ANNUAL REVIEW OF HM ORDERS  04/07/2026    DIABETES SCREENING  01/28/2028    DTAP/TDAP/TD IMMUNIZATION (3 - Td or Tdap) 02/22/2028    ADVANCE CARE PLANNING  04/07/2030    RSV VACCINE (1 - 1-dose 75+ series) 06/01/2033    HEPATITIS C SCREENING  Completed    DEPRESSION ACTION PLAN  Completed    MIGRAINE ACTION PLAN  Completed    Pneumococcal Vaccine: 50+ Years  Completed    URINALYSIS  Completed    ALK PHOS  Completed    HPV IMMUNIZATION  Aged Out    MENINGITIS IMMUNIZATION  Aged Out    PAP  Discontinued         Review of Systems  Constitutional, HEENT, cardiovascular, pulmonary, GI, , musculoskeletal, neuro, skin, endocrine and psych systems are negative, except as otherwise noted.     Objective    Exam  BP (!) 169/79 (BP Location: Left arm, Patient Position: Sitting)   Pulse 95   Temp 97.5  F (36.4  C) (Temporal)   Resp 16   Ht 1.6 m (5' 2.99\")   Wt 61.9 kg (136 lb 8 oz)   LMP " "04/04/2012   SpO2 95%   BMI 24.19 kg/m     Estimated body mass index is 24.19 kg/m  as calculated from the following:    Height as of this encounter: 1.6 m (5' 2.99\").    Weight as of this encounter: 61.9 kg (136 lb 8 oz).    Physical Exam  GENERAL: alert and no distress  EYES: Eyes grossly normal to inspection, PERRL and conjunctivae and sclerae normal  HENT: ear canals and TM's normal, nose and mouth without ulcers or lesions  NECK: no adenopathy, no asymmetry, masses, or scars  RESP: lungs clear to auscultation - no rales, rhonchi or wheezes  CV: regular rate and rhythm, normal S1 S2, no S3 or S4, no murmur, click or rub, no peripheral edema  ABDOMEN: soft, nontender, no hepatosplenomegaly, no masses and bowel sounds normal  MS: no gross musculoskeletal defects noted, no edema  SKIN: no suspicious lesions or rashes  NEURO: Normal strength and tone, mentation intact and speech normal  PSYCH: mentation appears normal, affect normal/bright        4/7/2025   Mini Cog   Clock Draw Score 2 Normal   3 Item Recall 3 objects recalled   Mini Cog Total Score 5              Signed Electronically by: Melonie Srivastava MD          "

## 2025-04-07 NOTE — PATIENT INSTRUCTIONS
Patient Education     [place] health counseling- [phone]     [place] [facility] . [facility] [employer] [facility]  [phone]        Preventive Care Advice   This is general advice given by our system to help you stay healthy. However, your care team may have specific advice just for you. Please talk to your care team about your preventive care needs.  Nutrition  Eat 5 or more servings of fruits and vegetables each day.  Try wheat bread, brown rice and whole grain pasta (instead of white bread, rice, and pasta).  Get enough calcium and vitamin D. Check the label on foods and aim for 100% of the RDA (recommended daily allowance).  Lifestyle  Exercise at least 150 minutes each week  (30 minutes a day, 5 days a week).  Do muscle strengthening activities 2 days a week. These help control your weight and prevent disease.  No smoking.  Wear sunscreen to prevent skin cancer.  Have a dental exam and cleaning every 6 months.  Yearly exams  See your health care team every year to talk about:  Any changes in your health.  Any medicines your care team has prescribed.  Preventive care, family planning, and ways to prevent chronic diseases.  Shots (vaccines)   HPV shots (up to age 26), if you've never had them before.  Hepatitis B shots (up to age 59), if you've never had them before.  COVID-19 shot: Get this shot when it's due.  Flu shot: Get a flu shot every year.  Tetanus shot: Get a tetanus shot every 10 years.  Pneumococcal, hepatitis A, and RSV shots: Ask your care team if you need these based on your risk.  Shingles shot (for age 50 and up)  General health tests  Diabetes screening:  Starting at age 35, Get screened for diabetes at least every 3 years.  If you are younger than age 35, ask your care team if you should be screened for diabetes.  Cholesterol test: At age 39, start having a cholesterol test every 5 years, or more often if advised.  Bone density scan (DEXA): At age 50, ask your care team if you should have this scan for  osteoporosis (brittle bones).  Hepatitis C: Get tested at least once in your life.  STIs (sexually transmitted infections)  Before age 24: Ask your care team if you should be screened for STIs.  After age 24: Get screened for STIs if you're at risk. You are at risk for STIs (including HIV) if:  You are sexually active with more than one person.  You don't use condoms every time.  You or a partner was diagnosed with a sexually transmitted infection.  If you are at risk for HIV, ask about PrEP medicine to prevent HIV.  Get tested for HIV at least once in your life, whether you are at risk for HIV or not.  Cancer screening tests  Cervical cancer screening: If you have a cervix, begin getting regular cervical cancer screening tests starting at age 21.  Breast cancer scan (mammogram): If you've ever had breasts, begin having regular mammograms starting at age 40. This is a scan to check for breast cancer.  Colon cancer screening: It is important to start screening for colon cancer at age 45.  Have a colonoscopy test every 10 years (or more often if you're at risk) Or, ask your provider about stool tests like a FIT test every year or Cologuard test every 3 years.  To learn more about your testing options, visit:   .  For help making a decision, visit:   https://bit.ly/en49620.  Prostate cancer screening test: If you have a prostate, ask your care team if a prostate cancer screening test (PSA) at age 55 is right for you.  Lung cancer screening: If you are a current or former smoker ages 50 to 80, ask your care team if ongoing lung cancer screenings are right for you.  For informational purposes only. Not to replace the advice of your health care provider. Copyright   2023 [employer]. All rights reserved. Clinically reviewed by the [facility] Transitions Program. RetSKU 497217 - REV 01/24.

## 2025-04-08 ENCOUNTER — PATIENT OUTREACH (OUTPATIENT)
Dept: CARE COORDINATION | Facility: CLINIC | Age: 65
End: 2025-04-08
Payer: MEDICARE

## 2025-04-08 PROBLEM — N25.81 SECONDARY RENAL HYPERPARATHYROIDISM: Status: ACTIVE | Noted: 2025-04-08

## 2025-04-08 PROBLEM — N17.9 ACUTE RENAL FAILURE SUPERIMPOSED ON STAGE 4 CHRONIC KIDNEY DISEASE, UNSPECIFIED ACUTE RENAL FAILURE TYPE (H): Status: ACTIVE | Noted: 2025-04-08

## 2025-04-08 PROBLEM — N18.4 ACUTE RENAL FAILURE SUPERIMPOSED ON STAGE 4 CHRONIC KIDNEY DISEASE, UNSPECIFIED ACUTE RENAL FAILURE TYPE (H): Status: ACTIVE | Noted: 2025-04-08

## 2025-04-08 LAB
ANION GAP SERPL CALCULATED.3IONS-SCNC: 10 MMOL/L (ref 7–15)
BUN SERPL-MCNC: 31 MG/DL (ref 8–23)
CALCIUM SERPL-MCNC: 9.1 MG/DL (ref 8.8–10.4)
CHLORIDE SERPL-SCNC: 105 MMOL/L (ref 98–107)
CHOLEST SERPL-MCNC: 253 MG/DL
CREAT SERPL-MCNC: 1.63 MG/DL (ref 0.51–0.95)
CREAT UR-MCNC: 60.1 MG/DL
EGFRCR SERPLBLD CKD-EPI 2021: 34 ML/MIN/1.73M2
FASTING STATUS PATIENT QL REPORTED: ABNORMAL
FASTING STATUS PATIENT QL REPORTED: ABNORMAL
GLUCOSE SERPL-MCNC: 101 MG/DL (ref 70–99)
HCO3 SERPL-SCNC: 25 MMOL/L (ref 22–29)
HDLC SERPL-MCNC: 77 MG/DL
LDLC SERPL CALC-MCNC: 160 MG/DL
MICROALBUMIN UR-MCNC: 89.5 MG/L
MICROALBUMIN/CREAT UR: 148.92 MG/G CR (ref 0–25)
NONHDLC SERPL-MCNC: 176 MG/DL
PHOSPHATE SERPL-MCNC: 3.6 MG/DL (ref 2.5–4.5)
POTASSIUM SERPL-SCNC: 5 MMOL/L (ref 3.4–5.3)
PTH-INTACT SERPL-MCNC: 163 PG/ML (ref 15–65)
SODIUM SERPL-SCNC: 140 MMOL/L (ref 135–145)
TRIGL SERPL-MCNC: 82 MG/DL
VIT D+METAB SERPL-MCNC: 24 NG/ML (ref 20–50)

## 2025-04-08 RX ORDER — ATORVASTATIN CALCIUM 40 MG/1
40 TABLET, FILM COATED ORAL DAILY
Qty: 90 TABLET | Refills: 3 | Status: SHIPPED | OUTPATIENT
Start: 2025-04-08

## 2025-04-10 ENCOUNTER — TELEPHONE (OUTPATIENT)
Dept: FAMILY MEDICINE | Facility: CLINIC | Age: 65
End: 2025-04-10
Payer: MEDICARE

## 2025-04-10 ENCOUNTER — NURSE TRIAGE (OUTPATIENT)
Dept: FAMILY MEDICINE | Facility: CLINIC | Age: 65
End: 2025-04-10
Payer: MEDICARE

## 2025-04-10 NOTE — TELEPHONE ENCOUNTER
Patient calling to request same day appointment  Reports brushing her hair quite hard with a hair brush and experiencing open sores to the scalp  Reports 3 dead white bugs also noted on the brush after brushing hair  Patient concerned for head lice  Notified RN would huddle with PCP and call patient back with best next steps  Patient verbalized understanding    Huddled with PCP   Unfortunately no available appointment today at the [facility]  PCP recommends to go to urgent care today for further evaluation     Spoke to patient and provided recommendation   Patient verbalized understanding and agreed to go to urgent care in [place] when she feels up to it, possibly tomorrow as she has a long dentist appointment today     ThanksClair RN    Reason for Disposition   Looks infected (e.g., pus, soft scabs, open sores)    Additional Information   Negative: Pubic lice   Negative: Doesn't match the SYMPTOMS for head lice and insect bite suspected   Negative: Doesn't match the SYMPTOMS for head lice    Protocols used: Head Lice-A-AH

## 2025-04-10 NOTE — TELEPHONE ENCOUNTER
"Pt called back stating that she went to Urgent Care ([facility]), and UC staff instructed her to report to the ER. \"I will not go to the ER and wait there for several hours\".   Pt verbalized a lot of frustration that \"nobody wants to help me\".   Stated she attempted to contact dermatology clinic but they were unable to offer her an appointment.   Pt also stated: \"I am not imagining this. I don't care what the mental health doctor said; I see white worms on my hair and sores on my skin\".   Writer attempted to offer to connect with central scheduling to see if other clinic locations have any appointments today, but pt hung up the call.     Brinda ROLON RN    "

## 2025-04-10 NOTE — TELEPHONE ENCOUNTER
General Call    Contacts       Contact Date/Time Type Contact Phone/Fax    04/10/2025 08:54 AM CDT Phone (Incoming) Daysi Mayfield (Self) [phone] (M)          Reason for Call:  Is there any psychologist that Dr. Srivastava can recommend? Pt is having a very hard  time mentally and just got medication.  Can patient come in before May to see Dr. Srivastava?    What are your questions or concerns:  n/a    Date of last appointment with provider: n/a    Okay to leave a detailed message?: Yes at Cell number on file:    Telephone Information:   Mobile [phone]

## 2025-04-24 ENCOUNTER — NURSE TRIAGE (OUTPATIENT)
Dept: FAMILY MEDICINE | Facility: CLINIC | Age: 65
End: 2025-04-24
Payer: MEDICARE

## 2025-04-24 NOTE — TELEPHONE ENCOUNTER
Patient calling to request testing of nasal sinuses for micro-organisms   Requesting if this is an available test due to concern for deviated septum   Denies injury or foreign body  Advised patient to schedule appointment - offered VV today   Patient requesting appointment asap because she states she has a busy day   Notified patient that urgent care would be the best walk in clinic option   Patient verbalized understanding and plans to go to urgent care today instead  ThanksClair RN    Reason for Disposition   Nursing judgment    Additional Information   Negative: Sounds like a life-threatening emergency to the triager   Negative: Information only call about a Well Adult (no illness or injury)   Negative: Caller can't be reached by phone   Negative: Nursing judgment   Negative: Nursing judgment   Negative: Nursing judgment   Negative: Nursing judgment   Negative: Nursing judgment    Protocols used: No Protocol [id number]

## 2025-05-05 ENCOUNTER — NURSE TRIAGE (OUTPATIENT)
Dept: FAMILY MEDICINE | Facility: CLINIC | Age: 65
End: 2025-05-05

## 2025-05-05 NOTE — TELEPHONE ENCOUNTER
"Patient calling to report ongoing \"sores\" on face for last 10-14 days  States they are not open wounds but more raised bumps/lines, sometimes they are red in color but often are not super noticeable but states they are tender  States she has also been dealing with a little bit of a cold but is unsure if that is related as sores started first  Patient denies fever, shortness of breath, chest pain, signs of infection  Nurse advised would be best to be be seen today for evaluation  Discussed care advice  Patient verbalized understanding and agrees with plan of care.   Will call back with new or worsening symptoms    Andie ABAD RN    Reason for Disposition   Swelling is painful to touch and no fever    Additional Information   Negative: Sounds like a life-threatening emergency to the triager   Negative: Hernia suspected (bulge in groin or abdomen) and painful or vomiting   Negative: Swollen lump in groin and pulsating (like heartbeat)   Negative: Patient sounds very sick or weak to the triager   Negative: SEVERE pain (e.g., excruciating)   Negative: Swelling is painful to touch and fever   Negative: Swelling is red and fever   Negative: Swelling is red and size > 2 inches (5.0 cm)    Protocols used: Skin Lump or Localized Swelling-A-OH    "

## 2025-05-05 NOTE — TELEPHONE ENCOUNTER
Reason for Call:  Appointment Request    Patient requesting this type of appt:  sores on face     Requested provider: Melonie Srivastava    Reason patient unable to be scheduled:  pt was offered next availability for clinic and that is not till may 25th pt would like to be seen today as sores are painful with no answer where they came from     When does patient want to be seen/preferred time: Same day    Comments: pt is requsting call back       Okay to leave a detailed message?: Yes at Cell number on file:    Telephone Information:   Mobile [phone]   Mobile [phone]       Call taken on 5/5/2025 at 9:28 AM by Farrah Segovia

## 2025-05-08 ENCOUNTER — TELEPHONE (OUTPATIENT)
Dept: FAMILY MEDICINE | Facility: CLINIC | Age: 65
End: 2025-05-08
Payer: MEDICARE

## 2025-05-08 DIAGNOSIS — R21 RASH: Primary | ICD-10-CM

## 2025-05-08 NOTE — TELEPHONE ENCOUNTER
"A.S. and MARITZA (DOD),  Please see below message and advise.  Patient had been scheduled for visit on 05/05 post triage assessment for sores but appears patient cancelled appointment with note saying \"too ill to come in, patient feels bad she can't make it\"  Pended if approved    Thanks,  Andie ABAD RN    "

## 2025-05-08 NOTE — TELEPHONE ENCOUNTER
General Call    Contacts       Contact Date/Time Type Contact Phone/Fax    05/08/2025 01:53 PM CDT Phone (Incoming) ChaparroDaysi (Self) [phone] (M)          Reason for Call:  Pt called in and was hoping that Dr. Srivastava could write an urgent referral for Dermatology due to the sores on her face. Pt is scheduled in June and called in and tried to get an earlier date with Dr. Srivastava as well. Pt is scheduled on 5/27/25.       Okay to leave a detailed message?: Yes at Cell number on file:    Telephone Information:   Mobile [phone]   Mobile [phone]

## 2025-05-08 NOTE — TELEPHONE ENCOUNTER
Called pt and relayed PCP's recommendation as note below.   Provided Dermatology scheduling phone number [phone].  Pt verbalized understanding of the provided information.     Brinda ROLON RN

## 2025-05-08 NOTE — TELEPHONE ENCOUNTER
Referral signed but may need to find outside dermatologist due to usually long wait time for [facility] appointments.  If she finds a different dermatologist we can fax the referral to her clinic of choice     Joel Wegener,MD w

## 2025-05-12 ENCOUNTER — PATIENT OUTREACH (OUTPATIENT)
Dept: CARE COORDINATION | Facility: CLINIC | Age: 65
End: 2025-05-12
Payer: MEDICARE

## 2025-05-31 ENCOUNTER — NURSE TRIAGE (OUTPATIENT)
Dept: NURSING | Facility: CLINIC | Age: 65
End: 2025-05-31
Payer: MEDICARE

## 2025-05-31 NOTE — TELEPHONE ENCOUNTER
"Nurse Triage SBAR    Is this a 2nd Level Triage? YES, LICENSED PRACTITIONER REVIEW IS REQUIRED    Situation: lower lip swelling    Background: Patient saw her dentist yesterday about an issue with her upper front and left side teeth. One tooth is broken and a crown is missing. The dentist poked around in there and there was a \"bad smell in there\". Patient noted her lower lip starting to swell after that appointment. Patient did a hydrogen peroxide rinse in her mouth when she got home which seemed to anger her lower lip. Patient iced her lip and then went to sleep. She woke up with pain in her lower lip. The back of her neck is stiff and she is drooling. Patient has not been able to see her dentist. Patient is feeling pressure around her eyes and mild swelling but states that her eyes are always somewhat swollen.  Patient has a linear rash on her face and ears that has gotten much worse. Patient states that she has had this rash for years and it seemed more pronounced yesterday but the dentist thought it was old scars. The rash has been addressed by dermatology and     The left upper crown is loose which is new this morning.     Assessment: ED evaluation    Protocol Recommended Disposition:   Go to ED Now (Or PCP Triage), See More Appropriate Guideline    Recommendation: provider input on disposition     Paged to provider    Does the patient meet one of the following criteria for ADS visit consideration? 16+ years old, with an [facility] PCP     TIP  Providers, please consider if this condition is appropriate for management at one of our Acute and Diagnostic Services sites.     If patient is a good candidate, please use dotphrase <dot>triageresponse and select Refer to ADS to document.    [place] [facility] Provider consult indicated.    Reason for page: lower lip swelling    Specialty Group number: [id number]   Specialty Group: FM-Family Medicine    Initial call made to Dr. Nori Ramirez by Answering Service at 8:09 am. "     Provider recommended plan of care: ED evaluation    Provider Recommendation Follow Up:   Reached patient/caregiver. Informed of provider's recommendations. Patient verbalized understanding and agrees with the plan.     {  Suma Vieira RN        Reason for Disposition   Swelling mainly of lip(s)   [1] Severe swelling AND [2] cause unknown    Additional Information   Negative: Unresponsive, passed out or very weak   Negative: Swollen tongue   Negative: Difficulty breathing or wheezing   Negative: [1] Life-threatening reaction in the past to similar substance (e.g., food, insect bite/sting, chemical, etc.) AND [2] < 2 hours since exposure   Negative: Sounds like a life-threatening emergency to the triager   Negative: Followed a lip injury   Negative: Entire face is swollen   Negative: Followed an insect bite to the area   Negative: Cold sore suspected (i.e., fever blister sore on the outer lip)   Negative: Mouth sores or ulcers on inner lip   Negative: [1] Life-threatening reaction (anaphylaxis) in the past to similar substance (e.g., food, insect bite/sting, chemical, etc.) AND [2] < 2 hours since exposure   Negative: Unresponsive, passed out or very weak   Negative: Swollen tongue   Negative: Difficulty breathing or wheezing   Negative: Sounds like a life-threatening emergency to the triager   Negative: Followed a face injury   Negative: [1] Bee sting AND [2] within last 24 hours   Negative: Insect bite suspected   Negative: Taking an ACE Inhibitor medicine (e.g., benazepril / LOTENSIN, captopril / CAPOTEN, enalapril / VASOTEC, lisinopril / ZESTRIL)    Protocols used: Face Swelling-A-AH, Lip Swelling-A-AH

## 2025-06-07 ENCOUNTER — NURSE TRIAGE (OUTPATIENT)
Dept: NURSING | Facility: CLINIC | Age: 65
End: 2025-06-07
Payer: MEDICARE

## 2025-06-07 NOTE — TELEPHONE ENCOUNTER
Nurse Triage SBAR    Is this a 2nd Level Triage? NO    Situation: Pt states her cat was seen at the vet and has mites. Pt currently has itchyscalp and sores on her face    Background: Hx of calling about lice, but no clinic visit found    Assessment: Pt states that she has been itching all over for months and now has areas on her face and scalp that look like sores. Pt states she took her cat to the vet and they have mites. She is asking how to proceed     Protocol Recommended Disposition:   See PCP Within 24 Hours    Recommendation: Pt was advised to be seen within 24 hours to ensure diagnosis and treatment plan. After this advisement was given the call disconnected. Writer attempted to reach the patient with no success to assist with further questions     Reason for Disposition   Looks infected (e.g., pus, soft scabs, open sores)    Protocols used: Head Lice-A-  Mayelin Marlow RN   Triage Nurse Advisor on 6/7/2025 at 2:37 PM

## 2025-06-08 ENCOUNTER — NURSE TRIAGE (OUTPATIENT)
Dept: NURSING | Facility: CLINIC | Age: 65
End: 2025-06-08
Payer: MEDICARE

## 2025-06-08 ENCOUNTER — TELEPHONE (OUTPATIENT)
Dept: NURSING | Facility: CLINIC | Age: 65
End: 2025-06-08
Payer: MEDICARE

## 2025-06-09 NOTE — TELEPHONE ENCOUNTER
"Pt called back.   Writer explained there are no same day appointments available today.   Instructed pt to report to Urgent Care.   Pt refused UC disposition. Stated \"I will absolutely not go. They will not be helpful to me. They will probably just tell me to follow up with you\".   Writer explained the rationale for UC disposition.   Pt refused it once again and requested soonest OV appointment.   OV scheduled on 6/10/25 at 1000; arrival time 0940.   Red flag symptoms reviewed with patient in detail.   Instructed pt to call back if onset of new or worsening symptoms.   Pt verbalized understanding of the provided information.     Brinda ROLON RN    " 5-Fu Pregnancy And Lactation Text: This medication is Pregnancy Category X and contraindicated in pregnancy and in women who may become pregnant. It is unknown if this medication is excreted in breast milk.

## 2025-06-09 NOTE — TELEPHONE ENCOUNTER
"Patient calling again. She is concerned about a time when she was admitted to [facility] with shortness of breath. Unable to locate the record that patient is referring to. Offered to give her the phone number for Health Information Management.     Patient stated \"let me grab a pen,\" and the call was ended.     Carey Adame RN  [facility]  June 8, 2025, 11:07 PM    "

## 2025-06-09 NOTE — TELEPHONE ENCOUNTER
Called pt x 3 to follow up on Triage Call r/t [place] and to instruct her to report to Urgent Care as there are no appointments available at the [facility] on 6/9/25.   Writer obtained no answer and was unable to leave VM d/t voicemail box being full.   Unable to notify pt via [employer] as it is not active.   When/if pt calls back, please inform her of the above.     Brinda ROLON RN

## 2025-06-09 NOTE — TELEPHONE ENCOUNTER
Patient calling. She went to the dentist last week because she had knocked 2 teeth with crowns out. There is a suspected infection. Since she was seen at the dentist, her ears have started hurting. Her right ear had bloody drainage, both last night and tonight.     Care advice given for patient to be seen within 24 hours. She will call in the morning for a same-day appointment. She is aware that if no appointments are available, she should be seen in urgent care.     Carey Adame RN  [facility]  June 8, 2025, 8:37 PM    Reason for Disposition   Bloody discharge or unexplained bleeding from ear canal    Additional Information   Negative: Moving the earlobe or touching the ear clearly increases the pain   Negative: Foreign body stuck in the ear (e.g., bug, piece of cotton)   Negative: Followed an ear injury   Negative: [1] Recently diagnosed with otitis media AND [2] currently on oral antibiotics   Negative: [1] Stiff neck (can't touch chin to chest) AND [2] fever   Negative: [1] Bony area of skull behind the ear is pink or swollen AND [2] fever   Negative: Fever > 104 F (40 C)   Negative: Patient sounds very sick or weak to the triager   Negative: [1] SEVERE pain AND [2] not improved 2 hours after taking analgesic medication (e.g., ibuprofen or acetaminophen)   Negative: Walking is very unsteady or feels very dizzy   Negative: Sudden onset of ear pain after long - thin object was inserted into the ear canal (e.g., pencil, Q-tip)   Negative: Diabetes mellitus or weak immune system (e.g., HIV positive, cancer chemo, splenectomy, organ transplant, chronic steroids)   Negative: New blurred vision or vision changes   Negative: White, yellow, or green discharge    Protocols used: Earache-A-AH

## 2025-06-26 ENCOUNTER — TELEPHONE (OUTPATIENT)
Dept: FAMILY MEDICINE | Facility: CLINIC | Age: 65
End: 2025-06-26

## 2025-06-26 ENCOUNTER — NURSE TRIAGE (OUTPATIENT)
Dept: FAMILY MEDICINE | Facility: CLINIC | Age: 65
End: 2025-06-26

## 2025-06-26 DIAGNOSIS — F39 MOOD DISORDER: ICD-10-CM

## 2025-06-26 DIAGNOSIS — F14.10 COCAINE ABUSE (H): ICD-10-CM

## 2025-06-26 DIAGNOSIS — F32.2 MAJOR DEPRESSIVE DISORDER, SEVERE (H): ICD-10-CM

## 2025-06-26 DIAGNOSIS — I10 UNCONTROLLED HYPERTENSION: Primary | ICD-10-CM

## 2025-06-26 DIAGNOSIS — F42.2 MIXED OBSESSIONAL THOUGHTS AND ACTS: ICD-10-CM

## 2025-06-26 DIAGNOSIS — F43.10 PTSD (POST-TRAUMATIC STRESS DISORDER): ICD-10-CM

## 2025-06-26 DIAGNOSIS — E78.2 MIXED HYPERLIPIDEMIA: ICD-10-CM
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
## 2025-06-26 NOTE — TELEPHONE ENCOUNTER
"Pt calling the clinic with concerns for high blood pressure.   States she has been unable to  BP medication prescribed by Nephrologist due to out of pocket cost :\"It's like $500; I don't have that kind of money\".     Pt had appt with PCP scheduled today at 1030 but states she was unable to make it to the appointment d/t not feeling well.   Unsure what her BP is as she is unable to check it.   Pt currently endorses dizziness and blurry vision.   Writer advised pt to report to the ED.   Pt yelled: \"I couldn't even make it to the clinic I don't feel well. How do you expect me to wait for several hours at the ED?\".     Writer explained ED is the recommended disposition, but validated pt that she has the right to refuse disposition and writer respects pt's decision.   Pt stated: \"You are a real pain, did you know that?\".   Pt went on to elaborate she is also out of Abilify.   States her psychiatrist instructed her to notify PCP she is no longer on Abilify.   \"Just please ask if Dr. Srivastava can prescribe my Metoprolol and Abilify\".   Pt stated: \"I just don't feel good. I'm not good at all\".   Writer advised pt once again to report to the ED.   Pt once again refused disposition. \"Just send a message to Dr. Srivastava about my meds\".   Pt proceeded to call writer tobias langston.   Writer ended the call.     Brinda ROLON RN      Reason for Disposition   SEVERE headache or neck pain    Additional Information   Negative: SEVERE difficulty breathing (e.g., struggling for each breath, speaks in single words)   Negative: Shock suspected (e.g., cold/pale/clammy skin, too weak to stand, low BP, rapid pulse)   Negative: Difficult to awaken or acting confused (e.g., disoriented, slurred speech)   Negative: Fainted, and still feels dizzy afterwards   Negative: Overdose (accidental or intentional) of medications   Negative: New neurologic deficit that is present now: * Weakness of the face, arm, or leg on one side of the body * " Numbness of the face, arm, or leg on one side of the body * Loss of speech or garbled speech   Negative: Heart beating < 50 beats per minute OR > 140 beats per minute   Negative: Sounds like a life-threatening emergency to the triager   Negative: Chest pain   Negative: Rectal bleeding, bloody stool, or tarry-black stool   Negative: Vomiting is main symptom   Negative: Diarrhea is main symptom   Negative: Headache is main symptom   Negative: Heat exhaustion suspected (i.e., dehydration from heat exposure)   Negative: Patient states that they are having an anxiety or panic attack   Negative: Dizziness from low blood sugar (i.e., < 60 mg/dl or 3.5 mmol/l)   Negative: SEVERE dizziness (e.g., unable to stand, requires support to walk, feels like passing out now)    Protocols used: Dizziness-A-[place]

## 2025-06-26 NOTE — TELEPHONE ENCOUNTER
FYI - Status Update    Who is Calling: patient    Update: kidney dr prescribed 2 new meds, 1 for BP and 1 for kidney function. They are filled and waiting at [facility] but her Medicare part b and d isn't working and she's not sure why because daughter won't give pt her info. Pt is not sure what to do. Talked w/old psychiatrist and they said that she should let you know that she is not doing well w/o her Abilify  that was discontinued. Pt needs advice and needs to know if there are other options.    Does caller want a call/response back: Yes     Okay to leave a detailed message?: N/A at Cell number on file:    Telephone Information:   Mobile [phone]

## 2025-06-27 ENCOUNTER — PATIENT OUTREACH (OUTPATIENT)
Dept: CARE COORDINATION | Facility: CLINIC | Age: 65
End: 2025-06-27
Payer: MEDICARE

## 2025-06-27 NOTE — PROGRESS NOTES
Clinic Care Coordination Contact  [facility]/Voicemail    Clinical Data: Care Coordinator Outreach    Outreach Documentation Number of Outreach Attempt   6/27/2025   1:41 PM 1       Unable to leave a message due to: Voicemail is not set up.      Plan: Care Coordinator will try to reach patient again in 1-2 business days.    Jessika Sharma  Community Health Worker  [facility]  [phone]

## 2025-06-27 NOTE — LETTER
[facility] CARE COORDINATION  [address]  [place] [address]    June 30, 2025    Daysi Mayfield  [address]   [place] [address]      Dear Daysi,    I am a clinic community health worker who works with Melonie Srivastava MD with the [facility]. I have been trying to reach you recently to introduce Clinic Care Coordination. Below is a description of clinic care coordination and how I can further assist you.       The clinic care coordination team is made up of a registered nurse, , financial resource worker and community health worker who understand the health care system. The goal of clinic care coordination is to help you manage your health and improve access to the health care system. Our team works alongside your provider to assist you in determining your health and social needs. We can help you obtain health care and community resources, providing you with necessary information and education. We can work with you through any barriers and develop a care plan that helps coordinate and strengthen the communication between you and your care team.  Our services are voluntary and are offered without charge to you personally.    Please feel free to contact me with any questions or concerns regarding care coordination and what we can offer.      We are focused on providing you with the highest-quality healthcare experience possible.      Sincerely,     Jessika Sharma  Community Health Worker  [facility]  [phone]

## 2025-06-30 NOTE — PROGRESS NOTES
Clinic Care Coordination Contact  [facility]/Voicemail    Clinical Data: Care Coordinator Outreach    Outreach Documentation Number of Outreach Attempt   6/27/2025   1:41 PM 1   6/30/2025  11:31 AM 2       Unable to leave a message due to: Voicemail is full.      Plan: Care Coordinator will send care coordination introduction letter with care coordinator contact information and explanation of care coordination services via mail. Care Coordinator will do no further outreaches at this time.    Jessika Sharma  Community Health Worker  [facility]  [phone]

## 2025-08-04 ENCOUNTER — TELEPHONE (OUTPATIENT)
Dept: FAMILY MEDICINE | Facility: CLINIC | Age: 65
End: 2025-08-04
Payer: MEDICARE

## 2025-09-03 DIAGNOSIS — N18.4 CHRONIC RENAL DISEASE, STAGE IV (H): Primary | ICD-10-CM

## (undated) RX ORDER — METHOHEXITAL IN WATER/PF 100MG/10ML
SYRINGE (ML) INTRAVENOUS
Status: DISPENSED
Start: 2018-12-07

## (undated) RX ORDER — ACETAMINOPHEN 325 MG/1
TABLET ORAL
Status: DISPENSED
Start: 2018-12-05

## (undated) RX ORDER — KETOROLAC TROMETHAMINE 30 MG/ML
INJECTION, SOLUTION INTRAMUSCULAR; INTRAVENOUS
Status: DISPENSED
Start: 2018-12-07

## (undated) RX ORDER — CAFFEINE AND SODIUM BENZOATE 125 MG/ML
INJECTION, SOLUTION INTRAMUSCULAR; INTRAVENOUS
Status: DISPENSED
Start: 2018-12-07

## (undated) RX ORDER — METHOHEXITAL IN WATER/PF 100MG/10ML
SYRINGE (ML) INTRAVENOUS
Status: DISPENSED
Start: 2018-12-03